# Patient Record
Sex: FEMALE | Race: WHITE | NOT HISPANIC OR LATINO | Employment: OTHER | URBAN - NONMETROPOLITAN AREA
[De-identification: names, ages, dates, MRNs, and addresses within clinical notes are randomized per-mention and may not be internally consistent; named-entity substitution may affect disease eponyms.]

---

## 2018-10-22 ENCOUNTER — HISTORICAL (OUTPATIENT)
Dept: ADMINISTRATIVE | Facility: HOSPITAL | Age: 68
End: 2018-10-22

## 2018-10-23 ENCOUNTER — HISTORICAL (OUTPATIENT)
Dept: ADMINISTRATIVE | Facility: HOSPITAL | Age: 68
End: 2018-10-23

## 2019-06-20 ENCOUNTER — HISTORICAL (OUTPATIENT)
Dept: ADMINISTRATIVE | Facility: HOSPITAL | Age: 69
End: 2019-06-20

## 2019-11-14 ENCOUNTER — HISTORICAL (OUTPATIENT)
Dept: ADMINISTRATIVE | Facility: HOSPITAL | Age: 69
End: 2019-11-14

## 2021-03-18 LAB
ALBUMIN SERPL-MCNC: 4 G/DL (ref 3.4–5)
ALBUMIN/GLOB SERPL: 1.4 {RATIO}
ALP SERPL-CCNC: 96 U/L (ref 50–144)
ALT SERPL-CCNC: 10 U/L (ref 1–45)
ANION GAP SERPL CALC-SCNC: 7 MMOL/L (ref 7–16)
AST SERPL-CCNC: 21 U/L (ref 14–36)
BASOPHILS # BLD AUTO: 0.04 X10(3)/MCL (ref 0.01–0.08)
BASOPHILS NFR BLD AUTO: 0.5 % (ref 0.1–1.2)
BILIRUB SERPL-MCNC: 0.45 MG/DL (ref 0.1–1)
BUN SERPL-MCNC: 32 MG/DL (ref 7–20)
CALCIUM SERPL-MCNC: 9.3 MG/DL (ref 8.4–10.2)
CHLORIDE SERPL-SCNC: 107 MMOL/L (ref 94–110)
CHOLEST SERPL-MCNC: 117 MG/DL (ref 0–200)
CO2 SERPL-SCNC: 24 MMOL/L (ref 21–32)
CREAT SERPL-MCNC: 1.8 MG/DL (ref 0.52–1.04)
CREAT/UREA NIT SERPL: 17.8 (ref 12–20)
EOSINOPHIL # BLD AUTO: 0.41 X10(3)/MCL (ref 0.04–0.36)
EOSINOPHIL NFR BLD AUTO: 5.3 % (ref 0.7–7)
EOSINOPHIL NFR BLD MANUAL: 3 % (ref 0–3)
ERYTHROCYTE [DISTWIDTH] IN BLOOD BY AUTOMATED COUNT: 15.9 % (ref 11–14.5)
EST. AVERAGE GLUCOSE BLD GHB EST-MCNC: 111 MG/DL (ref 70–115)
GLOBULIN SER-MCNC: 2.9 G/DL (ref 2–3.9)
GLUCOSE SERPL-MCNC: 105 MGM./DL (ref 70–115)
GRANULOCYTES NFR BLD MANUAL: 75 % (ref 37–73)
HBA1C MFR BLD: 5.7 % (ref 4–6)
HCT VFR BLD AUTO: 31.2 % (ref 36–48)
HDLC SERPL-MCNC: 62 MG/DL (ref 40–60)
HGB BLD-MCNC: 9.5 G/DL (ref 11.8–16)
IMM GRANULOCYTES # BLD AUTO: 0.02 X10E3/UL (ref 0–0.03)
IMM GRANULOCYTES NFR BLD AUTO: 0.3 % (ref 0–0.5)
LDLC SERPL CALC-MCNC: <30 MG/DL (ref 30–100)
LYMPHOCYTES # BLD AUTO: 1.33 X10(3)/MCL (ref 1.16–3.74)
LYMPHOCYTES NFR BLD AUTO: 17.1 % (ref 20–55)
LYMPHOCYTES NFR BLD MANUAL: 16 % (ref 20–55)
MCH RBC QN AUTO: 26.4 PG (ref 27–34)
MCHC RBC AUTO-ENTMCNC: 30.4 G/DL (ref 31–37)
MCV RBC AUTO: 86.7 FL (ref 79–99)
MONOCYTES # BLD AUTO: 0.65 X10(3)/MCL (ref 0.24–0.36)
MONOCYTES NFR BLD AUTO: 8.4 % (ref 4.7–12.5)
MONOCYTES NFR BLD MANUAL: 6 % (ref 0–10)
NEUTROPHILS # BLD AUTO: 5.31 X10(3)/MCL (ref 1.56–6.13)
NEUTROPHILS NFR BLD AUTO: 68.4 % (ref 37–73)
NRBC BLD MANUAL-RTO: 1 % (ref 0–1)
PLATELET # BLD AUTO: 353 X10(3)/MCL (ref 140–371)
PMV BLD AUTO: 10.8 FL (ref 9.4–12.4)
POTASSIUM SERPL-SCNC: 5.7 MMOL/L (ref 3.5–5.1)
PROT SERPL-MCNC: 6.9 G/DL (ref 6.3–8.2)
RBC # BLD AUTO: 3.6 X10(6)/MCL (ref 4–5.1)
SODIUM SERPL-SCNC: 138 MMOL/L (ref 135–145)
TRIGL SERPL-MCNC: 107 MG/DL (ref 30–200)
TSH SERPL-ACNC: 3.63 UIU/ML (ref 0.36–3.74)
WBC # SPEC AUTO: 7.8 X10(3)/MCL (ref 4–11.5)

## 2021-04-11 ENCOUNTER — HISTORICAL (OUTPATIENT)
Dept: ADMINISTRATIVE | Facility: HOSPITAL | Age: 71
End: 2021-04-11

## 2021-10-01 LAB
ALBUMIN SERPL-MCNC: 4.2 G/DL (ref 3.4–5)
ALBUMIN/GLOB SERPL: 1.4 {RATIO}
ALP SERPL-CCNC: 108 U/L (ref 50–144)
ALT SERPL-CCNC: 10 U/L (ref 1–45)
ANION GAP SERPL CALC-SCNC: 6 MMOL/L (ref 7–16)
AST SERPL-CCNC: 16 U/L (ref 14–36)
BASOPHILS # BLD AUTO: 0.08 X10(3)/MCL (ref 0.01–0.08)
BASOPHILS NFR BLD AUTO: 0.8 % (ref 0.1–1.2)
BILIRUB SERPL-MCNC: 0.31 MG/DL (ref 0.1–1)
BUN SERPL-MCNC: 21 MG/DL (ref 7–20)
CALCIUM SERPL-MCNC: 9.7 MG/DL (ref 8.4–10.2)
CHLORIDE SERPL-SCNC: 110 MMOL/L (ref 94–110)
CHOLEST SERPL-MCNC: 178 MG/DL (ref 0–200)
CO2 SERPL-SCNC: 23 MMOL/L (ref 21–32)
CREAT SERPL-MCNC: 2.13 MG/DL (ref 0.52–1.04)
CREAT/UREA NIT SERPL: 9.9 (ref 12–20)
EOSINOPHIL # BLD AUTO: 0.31 X10(3)/MCL (ref 0.04–0.36)
EOSINOPHIL NFR BLD AUTO: 3.1 % (ref 0.7–7)
ERYTHROCYTE [DISTWIDTH] IN BLOOD BY AUTOMATED COUNT: 15.5 % (ref 11–14.5)
GLOBULIN SER-MCNC: 3 G/DL (ref 2–3.9)
GLUCOSE SERPL-MCNC: 106 MGM./DL (ref 70–115)
HCT VFR BLD AUTO: 31.7 % (ref 36–48)
HDLC SERPL-MCNC: 65 MG/DL (ref 40–60)
HGB BLD-MCNC: 10 G/DL (ref 11.8–16)
IMM GRANULOCYTES # BLD AUTO: 0.03 X10E3/UL (ref 0–0.03)
IMM GRANULOCYTES NFR BLD AUTO: 0.3 % (ref 0–0.5)
LDLC SERPL CALC-MCNC: 72.3 MG/DL (ref 30–100)
LYMPHOCYTES # BLD AUTO: 1.62 X10(3)/MCL (ref 1.16–3.74)
LYMPHOCYTES NFR BLD AUTO: 16.3 % (ref 20–55)
MCH RBC QN AUTO: 26.2 PG (ref 27–34)
MCHC RBC AUTO-ENTMCNC: 31.5 G/DL (ref 31–37)
MCV RBC AUTO: 83 FL (ref 79–99)
MONOCYTES # BLD AUTO: 0.6 X10(3)/MCL (ref 0.24–0.36)
MONOCYTES NFR BLD AUTO: 6 % (ref 4.7–12.5)
NEUTROPHILS # BLD AUTO: 7.31 X10(3)/MCL (ref 1.56–6.13)
NEUTROPHILS NFR BLD AUTO: 73.5 % (ref 37–73)
PLATELET # BLD AUTO: 397 X10(3)/MCL (ref 140–371)
PMV BLD AUTO: 10.1 FL (ref 9.4–12.4)
POTASSIUM SERPL-SCNC: 5 MMOL/L (ref 3.5–5.1)
PROT SERPL-MCNC: 7.2 G/DL (ref 6.3–8.2)
RBC # BLD AUTO: 3.82 X10(6)/MCL (ref 4–5.1)
SODIUM SERPL-SCNC: 139 MMOL/L (ref 135–145)
TRIGL SERPL-MCNC: 129 MG/DL (ref 30–200)
TSH SERPL-ACNC: 2.37 UIU/ML (ref 0.36–3.74)
WBC # SPEC AUTO: 10 X10(3)/MCL (ref 4–11.5)

## 2021-11-02 LAB
ANION GAP SERPL CALC-SCNC: 11 MMOL/L (ref 7–16)
BUN SERPL-MCNC: 17 MG/DL (ref 7–20)
CALCIUM SERPL-MCNC: 9.1 MG/DL (ref 8.4–10.2)
CHLORIDE SERPL-SCNC: 104 MMOL/L (ref 94–110)
CO2 SERPL-SCNC: 24 MMOL/L (ref 21–32)
CREAT SERPL-MCNC: 1.61 MG/DL (ref 0.52–1.04)
CREAT/UREA NIT SERPL: 10.6 (ref 12–20)
GLUCOSE SERPL-MCNC: 104 MGM./DL (ref 70–115)
POTASSIUM SERPL-SCNC: 4.1 MMOL/L (ref 3.5–5.1)
SODIUM SERPL-SCNC: 139 MMOL/L (ref 135–145)

## 2022-02-16 LAB
AGE: 70
ANION GAP SERPL CALC-SCNC: 8 MMOL/L (ref 2–13)
BUN SERPL-MCNC: 21 MG/DL (ref 7–20)
CALCIUM SERPL-MCNC: 9.2 MG/DL (ref 8.4–10.2)
CHLORIDE SERPL-SCNC: 106 MMOL/L (ref 94–110)
CO2 SERPL-SCNC: 23 MMOL/L (ref 21–32)
CREAT SERPL-MCNC: 1.92 MG/DL (ref 0.52–1.04)
CREAT/UREA NIT SERPL: 10.9 (ref 12–20)
GLUCOSE SERPL-MCNC: 96 MG/DL (ref 70–115)
POTASSIUM SERPL-SCNC: 4.4 MMOL/L (ref 3.5–5.1)
SODIUM SERPL-SCNC: 137 MMOL/L (ref 135–145)

## 2022-03-15 LAB
AGE: 70
ANION GAP SERPL CALC-SCNC: 9 MMOL/L (ref 2–13)
BUN SERPL-MCNC: 24 MG/DL (ref 7–20)
CALCIUM SERPL-MCNC: 9.3 MG/DL (ref 8.4–10.2)
CHLORIDE SERPL-SCNC: 103 MMOL/L (ref 94–110)
CO2 SERPL-SCNC: 25 MMOL/L (ref 21–32)
CREAT SERPL-MCNC: 1.88 MG/DL (ref 0.52–1.04)
CREAT/UREA NIT SERPL: 12.8 (ref 12–20)
GLUCOSE SERPL-MCNC: 105 MG/DL (ref 70–115)
POTASSIUM SERPL-SCNC: 4.6 MMOL/L (ref 3.5–5.1)
SODIUM SERPL-SCNC: 137 MMOL/L (ref 135–145)

## 2022-04-10 ENCOUNTER — HISTORICAL (OUTPATIENT)
Dept: ADMINISTRATIVE | Facility: HOSPITAL | Age: 72
End: 2022-04-10

## 2022-04-27 VITALS
OXYGEN SATURATION: 97 % | SYSTOLIC BLOOD PRESSURE: 110 MMHG | WEIGHT: 190.94 LBS | DIASTOLIC BLOOD PRESSURE: 60 MMHG | HEIGHT: 67 IN | BODY MASS INDEX: 29.97 KG/M2

## 2022-05-04 ENCOUNTER — HISTORICAL (OUTPATIENT)
Dept: ADMINISTRATIVE | Facility: HOSPITAL | Age: 72
End: 2022-05-04

## 2022-11-30 ENCOUNTER — HISTORICAL (OUTPATIENT)
Dept: ADMINISTRATIVE | Facility: HOSPITAL | Age: 72
End: 2022-11-30

## 2023-01-05 VITALS
DIASTOLIC BLOOD PRESSURE: 78 MMHG | OXYGEN SATURATION: 99 % | HEART RATE: 80 BPM | BODY MASS INDEX: 30.42 KG/M2 | WEIGHT: 193.81 LBS | HEIGHT: 67 IN | TEMPERATURE: 99 F | SYSTOLIC BLOOD PRESSURE: 120 MMHG

## 2023-01-05 DIAGNOSIS — Z28.21 INFLUENZA VACCINATION DECLINED: ICD-10-CM

## 2023-01-05 DIAGNOSIS — I25.10 ARTERIOSCLEROSIS OF CORONARY ARTERY: ICD-10-CM

## 2023-01-05 DIAGNOSIS — F32.A DEPRESSIVE DISORDER: Primary | ICD-10-CM

## 2023-01-05 DIAGNOSIS — M17.9 OSTEOARTHRITIS OF KNEE, UNSPECIFIED LATERALITY, UNSPECIFIED OSTEOARTHRITIS TYPE: ICD-10-CM

## 2023-01-05 DIAGNOSIS — E66.9 OBESITY, UNSPECIFIED CLASSIFICATION, UNSPECIFIED OBESITY TYPE, UNSPECIFIED WHETHER SERIOUS COMORBIDITY PRESENT: ICD-10-CM

## 2023-01-05 DIAGNOSIS — K11.7 XEROSTOMIA: ICD-10-CM

## 2023-01-05 DIAGNOSIS — N18.30 STAGE 3 CHRONIC KIDNEY DISEASE, UNSPECIFIED WHETHER STAGE 3A OR 3B CKD: ICD-10-CM

## 2023-01-05 DIAGNOSIS — F41.1 GENERALIZED ANXIETY DISORDER: ICD-10-CM

## 2023-01-05 DIAGNOSIS — Z28.21 PNEUMOCOCCAL VACCINATION DECLINED: ICD-10-CM

## 2023-01-05 DIAGNOSIS — G47.00 INSOMNIA, UNSPECIFIED TYPE: ICD-10-CM

## 2023-01-05 DIAGNOSIS — I10 HYPERTENSION, UNSPECIFIED TYPE: ICD-10-CM

## 2023-01-05 RX ORDER — BUSPIRONE HYDROCHLORIDE 15 MG/1
15 TABLET ORAL 3 TIMES DAILY
COMMUNITY
Start: 2022-11-22 | End: 2023-02-23 | Stop reason: SDUPTHER

## 2023-01-05 RX ORDER — ROPINIROLE 0.25 MG/1
0.25 TABLET, FILM COATED ORAL
COMMUNITY
Start: 2022-07-05 | End: 2023-02-23 | Stop reason: SDUPTHER

## 2023-01-05 RX ORDER — ATORVASTATIN CALCIUM 40 MG/1
40 TABLET, FILM COATED ORAL DAILY
COMMUNITY
Start: 2022-03-15 | End: 2023-02-23 | Stop reason: SDUPTHER

## 2023-01-05 RX ORDER — DAPAGLIFLOZIN 10 MG/1
10 TABLET, FILM COATED ORAL DAILY
COMMUNITY
Start: 2022-05-18 | End: 2023-02-23 | Stop reason: SDUPTHER

## 2023-01-05 RX ORDER — AMLODIPINE BESYLATE 5 MG/1
5 TABLET ORAL DAILY
COMMUNITY
Start: 2022-11-04 | End: 2023-02-23 | Stop reason: SDUPTHER

## 2023-01-05 RX ORDER — HYDRALAZINE HYDROCHLORIDE 50 MG/1
50 TABLET, FILM COATED ORAL 2 TIMES DAILY
COMMUNITY
Start: 2022-11-04 | End: 2023-02-23 | Stop reason: SDUPTHER

## 2023-01-05 RX ORDER — CEPHALEXIN 250 MG/1
100 CAPSULE ORAL 2 TIMES DAILY
COMMUNITY
Start: 2022-11-23 | End: 2023-01-05

## 2023-01-05 RX ORDER — ESCITALOPRAM OXALATE 20 MG/1
20 TABLET ORAL DAILY
COMMUNITY
Start: 2022-11-04 | End: 2023-02-23 | Stop reason: SDUPTHER

## 2023-01-05 RX ORDER — CLOPIDOGREL BISULFATE 75 MG/1
75 TABLET ORAL DAILY
COMMUNITY
Start: 2022-03-15 | End: 2023-02-23 | Stop reason: SDUPTHER

## 2023-01-05 RX ORDER — ATORVASTATIN CALCIUM 40 MG/1
40 TABLET, FILM COATED ORAL DAILY
COMMUNITY
Start: 2022-11-04 | End: 2023-01-05

## 2023-01-05 RX ORDER — METOPROLOL SUCCINATE 25 MG/1
25 TABLET, EXTENDED RELEASE ORAL DAILY
COMMUNITY
Start: 2022-03-15 | End: 2023-05-02 | Stop reason: SDUPTHER

## 2023-01-25 ENCOUNTER — OFFICE VISIT (OUTPATIENT)
Dept: FAMILY MEDICINE | Facility: CLINIC | Age: 73
End: 2023-01-25
Payer: MEDICARE

## 2023-01-25 VITALS
HEIGHT: 67 IN | DIASTOLIC BLOOD PRESSURE: 82 MMHG | OXYGEN SATURATION: 97 % | HEART RATE: 100 BPM | SYSTOLIC BLOOD PRESSURE: 116 MMHG | TEMPERATURE: 98 F | BODY MASS INDEX: 29.79 KG/M2 | WEIGHT: 189.81 LBS

## 2023-01-25 DIAGNOSIS — D50.9 IRON DEFICIENCY ANEMIA, UNSPECIFIED IRON DEFICIENCY ANEMIA TYPE: ICD-10-CM

## 2023-01-25 DIAGNOSIS — F41.1 GENERALIZED ANXIETY DISORDER: ICD-10-CM

## 2023-01-25 DIAGNOSIS — F32.A DEPRESSIVE DISORDER: Primary | ICD-10-CM

## 2023-01-25 DIAGNOSIS — N18.32 STAGE 3B CHRONIC KIDNEY DISEASE: ICD-10-CM

## 2023-01-25 PROBLEM — M17.12 ARTHRITIS OF LEFT KNEE: Status: ACTIVE | Noted: 2023-01-25

## 2023-01-25 PROBLEM — D64.9 ANEMIA: Status: ACTIVE | Noted: 2023-01-25

## 2023-01-25 PROBLEM — I49.9 IRREGULAR HEART RHYTHM: Status: ACTIVE | Noted: 2023-01-25

## 2023-01-25 PROBLEM — Z79.899 MEDICATION MANAGEMENT: Status: ACTIVE | Noted: 2023-01-25

## 2023-01-25 PROCEDURE — 99214 OFFICE O/P EST MOD 30 MIN: CPT | Mod: ,,, | Performed by: NURSE PRACTITIONER

## 2023-01-25 PROCEDURE — 99214 PR OFFICE/OUTPT VISIT, EST, LEVL IV, 30-39 MIN: ICD-10-PCS | Mod: ,,, | Performed by: NURSE PRACTITIONER

## 2023-01-25 RX ORDER — CLONIDINE HYDROCHLORIDE 0.2 MG/1
0.2 TABLET ORAL 2 TIMES DAILY
COMMUNITY
Start: 2022-12-29 | End: 2023-02-23 | Stop reason: SDUPTHER

## 2023-01-25 RX ORDER — FERROUS SULFATE 324(65)MG
324 TABLET, DELAYED RELEASE (ENTERIC COATED) ORAL EVERY OTHER DAY
Qty: 15 TABLET | Refills: 11 | Status: SHIPPED | OUTPATIENT
Start: 2023-01-25 | End: 2023-05-02 | Stop reason: SDUPTHER

## 2023-01-25 NOTE — PROGRESS NOTES
Subjective:       Patient ID: Rosalie Campbell is a 72 y.o. female.    Chief Complaint: Anxiety and Depression    72-year-old white female with past medical history chronic kidney disease, chronic anemia, anxiety depression, RLS, CAD with stent 2019, hypertension, here for follow-up on depression/anxiety, chronic renal failure and iron supplementation.    Was seen for wellness in November and had a decline in her GFR.  She has previou refused referral to Nephrology.  She was started on Farxiga and she is tolerating that.  Repeat lab work shows stable renal function with a baseline creatinine of about 1.8.    She has chronic anemia, has been anemic since she was a child.  Had not been taking any iron supplementation but started after last visit.  However she is not been taking it routinely as it causes constipation.  She has been taking OTC medications to help with regulating her bowels.    At last visit she reported some increase in depression and anxiety.  She is currently taking care of her younger brother who was recently diagnosed with renal cancer and she is the sole caregiver for him since he has been ill.  Lexapro was increased and she feels that this has definitely perked up her mood.  Also she was given BuSpar to take t.i.d. instead of b.i.d. and she feels this has controlled her anxiety very well.  Denies SI/HI.    Labs reviewed with patient.  BUN 18, creatinine 1.79, GFR 29.8, H&H 8.9/28.1, platelets 348    Review of Systems   See HPI  Objective:      Physical Exam  Vitals and nursing note reviewed.   Constitutional:       Appearance: Normal appearance.   Cardiovascular:      Rate and Rhythm: Regular rhythm.      Heart sounds: Normal heart sounds.   Pulmonary:      Effort: Pulmonary effort is normal.      Breath sounds: Normal breath sounds.   Abdominal:      General: Bowel sounds are normal.      Palpations: Abdomen is soft.   Skin:     General: Skin is warm and dry.   Neurological:      Mental Status: She  is alert.   Psychiatric:         Mood and Affect: Mood normal.         Behavior: Behavior normal.       Assessment:       Problem List Items Addressed This Visit          Psychiatric    Depressive disorder - Primary    Generalized anxiety disorder       Renal/    Stage 3 chronic kidney disease    Relevant Orders    Basic Metabolic Panel       Oncology    Anemia    Relevant Orders    CBC Auto Differential         Plan:     1. Depressive disorder  Continue Lexapro 20 mg daily    2. Generalized anxiety disorder  Continue BuSpar 15 mg t.i.d.    3. Stage 3b chronic kidney disease  Renal function at baseline, creatinine 1.79.  She continues to refuse nephrology referral.  Currently on Farxiga 10 mg daily and tolerating.  - Basic Metabolic Panel; Future    4. Iron deficiency anemia, unspecified iron deficiency anemia type  Advised that she take iron supplementation every other day rather than daily.  Increase dietary fiber and water intake to help combat constipation.  Take OTC medications for constipation when needed.  - CBC Auto Differential; Future  - ferrous sulfate 324 mg (65 mg iron) TbEC; Take 1 tablet (324 mg total) by mouth every other day.  Dispense: 15 tablet; Refill: 11

## 2023-01-25 NOTE — PROGRESS NOTES
Subjective:       Patient ID: Rosalie Campbell is a 72 y.o. female.    Chief Complaint: Anxiety and Depression  Anxiety        Depression            Objective:          Assessment:       Problem List Items Addressed This Visit    None      Plan:     1. Depressive disorder  Improved on higher dose Lexapro. Continue Lexapro 20mg daily    2. Generalized anxiety disorder  Improved with increased dose of Buspar; continue 15mg TID    3. Stage 3b chronic kidney disease  Stable; creatinine improves slightly. Refuses Nephrology referral.     4. Anemia due to stage 3b chronic kidney disease  Advised to try taking iron supplementation every other day. Increase dietary fiber and water intake to help avoid constipation.    ***

## 2023-01-27 ENCOUNTER — DOCUMENTATION ONLY (OUTPATIENT)
Dept: FAMILY MEDICINE | Facility: CLINIC | Age: 73
End: 2023-01-27
Payer: MEDICARE

## 2023-02-23 ENCOUNTER — TELEPHONE (OUTPATIENT)
Dept: FAMILY MEDICINE | Facility: CLINIC | Age: 73
End: 2023-02-23
Payer: MEDICARE

## 2023-02-23 DIAGNOSIS — I10 HYPERTENSION, UNSPECIFIED TYPE: ICD-10-CM

## 2023-02-23 DIAGNOSIS — I25.10 CORONARY ARTERIOSCLEROSIS: ICD-10-CM

## 2023-02-23 DIAGNOSIS — F41.9 ANXIETY: ICD-10-CM

## 2023-02-23 DIAGNOSIS — E11.9 TYPE 2 DIABETES MELLITUS WITHOUT COMPLICATION, WITHOUT LONG-TERM CURRENT USE OF INSULIN: ICD-10-CM

## 2023-02-23 DIAGNOSIS — F32.A DEPRESSION, UNSPECIFIED DEPRESSION TYPE: ICD-10-CM

## 2023-02-23 DIAGNOSIS — E78.5 HYPERLIPIDEMIA, UNSPECIFIED HYPERLIPIDEMIA TYPE: ICD-10-CM

## 2023-02-23 DIAGNOSIS — G25.81 RLS (RESTLESS LEGS SYNDROME): Primary | ICD-10-CM

## 2023-02-23 RX ORDER — ESCITALOPRAM OXALATE 20 MG/1
20 TABLET ORAL DAILY
Qty: 30 TABLET | Refills: 2 | Status: SHIPPED | OUTPATIENT
Start: 2023-02-23 | End: 2023-05-02 | Stop reason: SDUPTHER

## 2023-02-23 RX ORDER — CLONIDINE HYDROCHLORIDE 0.2 MG/1
0.2 TABLET ORAL 2 TIMES DAILY
Qty: 60 TABLET | Refills: 2 | Status: SHIPPED | OUTPATIENT
Start: 2023-02-23 | End: 2023-05-02 | Stop reason: SDUPTHER

## 2023-02-23 RX ORDER — AMLODIPINE BESYLATE 5 MG/1
5 TABLET ORAL DAILY
Qty: 30 TABLET | Refills: 2 | Status: SHIPPED | OUTPATIENT
Start: 2023-02-23 | End: 2023-05-02 | Stop reason: SDUPTHER

## 2023-02-23 RX ORDER — ROPINIROLE 0.25 MG/1
0.25 TABLET, FILM COATED ORAL NIGHTLY
Qty: 30 TABLET | Refills: 2 | Status: SHIPPED | OUTPATIENT
Start: 2023-02-23 | End: 2023-05-02 | Stop reason: SDUPTHER

## 2023-02-23 RX ORDER — BUSPIRONE HYDROCHLORIDE 15 MG/1
15 TABLET ORAL 3 TIMES DAILY
Qty: 90 TABLET | Refills: 2 | Status: SHIPPED | OUTPATIENT
Start: 2023-02-23 | End: 2023-05-02 | Stop reason: SDUPTHER

## 2023-02-23 RX ORDER — HYDRALAZINE HYDROCHLORIDE 50 MG/1
50 TABLET, FILM COATED ORAL EVERY 8 HOURS
Qty: 90 TABLET | Refills: 2 | Status: SHIPPED | OUTPATIENT
Start: 2023-02-23 | End: 2023-05-02 | Stop reason: SDUPTHER

## 2023-02-23 RX ORDER — CLOPIDOGREL BISULFATE 75 MG/1
75 TABLET ORAL DAILY
Qty: 30 TABLET | Refills: 2 | Status: SHIPPED | OUTPATIENT
Start: 2023-02-23 | End: 2023-05-02 | Stop reason: SDUPTHER

## 2023-02-23 RX ORDER — ATORVASTATIN CALCIUM 40 MG/1
40 TABLET, FILM COATED ORAL DAILY
Qty: 30 TABLET | Refills: 2 | Status: SHIPPED | OUTPATIENT
Start: 2023-02-23 | End: 2023-05-02 | Stop reason: SDUPTHER

## 2023-02-23 RX ORDER — DAPAGLIFLOZIN 10 MG/1
10 TABLET, FILM COATED ORAL DAILY
Qty: 30 TABLET | Refills: 2 | Status: SHIPPED | OUTPATIENT
Start: 2023-02-23 | End: 2023-05-02 | Stop reason: SDUPTHER

## 2023-02-23 NOTE — TELEPHONE ENCOUNTER
Norvasc 5mg, lipitor 40mg, buspirone 15mg, clonidine 0.2mg, plavisc 75mg, farxiga 10mg, lexapro 20mg, hydralazine 50mg, requip 0.25mg, please refill at the hospital pharmacy

## 2023-04-13 PROCEDURE — 80048 BASIC METABOLIC PNL TOTAL CA: CPT | Performed by: NURSE PRACTITIONER

## 2023-04-13 PROCEDURE — 85025 COMPLETE CBC W/AUTO DIFF WBC: CPT | Performed by: NURSE PRACTITIONER

## 2023-05-02 ENCOUNTER — OFFICE VISIT (OUTPATIENT)
Dept: FAMILY MEDICINE | Facility: CLINIC | Age: 73
End: 2023-05-02
Payer: MEDICARE

## 2023-05-02 VITALS
HEART RATE: 100 BPM | TEMPERATURE: 98 F | BODY MASS INDEX: 28.68 KG/M2 | DIASTOLIC BLOOD PRESSURE: 70 MMHG | HEIGHT: 67 IN | WEIGHT: 182.75 LBS | OXYGEN SATURATION: 97 % | SYSTOLIC BLOOD PRESSURE: 138 MMHG

## 2023-05-02 DIAGNOSIS — I10 HYPERTENSION, UNSPECIFIED TYPE: ICD-10-CM

## 2023-05-02 DIAGNOSIS — N18.32 STAGE 3B CHRONIC KIDNEY DISEASE: Primary | ICD-10-CM

## 2023-05-02 DIAGNOSIS — D50.9 IRON DEFICIENCY ANEMIA, UNSPECIFIED IRON DEFICIENCY ANEMIA TYPE: ICD-10-CM

## 2023-05-02 DIAGNOSIS — F32.A DEPRESSION, UNSPECIFIED DEPRESSION TYPE: ICD-10-CM

## 2023-05-02 DIAGNOSIS — I25.10 CORONARY ARTERIOSCLEROSIS: ICD-10-CM

## 2023-05-02 DIAGNOSIS — M70.61 TROCHANTERIC BURSITIS OF RIGHT HIP: ICD-10-CM

## 2023-05-02 DIAGNOSIS — N18.32 ANEMIA DUE TO STAGE 3B CHRONIC KIDNEY DISEASE: ICD-10-CM

## 2023-05-02 DIAGNOSIS — F41.9 ANXIETY: ICD-10-CM

## 2023-05-02 DIAGNOSIS — G25.81 RLS (RESTLESS LEGS SYNDROME): ICD-10-CM

## 2023-05-02 DIAGNOSIS — D63.1 ANEMIA DUE TO STAGE 3B CHRONIC KIDNEY DISEASE: ICD-10-CM

## 2023-05-02 DIAGNOSIS — E78.5 HYPERLIPIDEMIA, UNSPECIFIED HYPERLIPIDEMIA TYPE: ICD-10-CM

## 2023-05-02 DIAGNOSIS — E11.9 TYPE 2 DIABETES MELLITUS WITHOUT COMPLICATION, WITHOUT LONG-TERM CURRENT USE OF INSULIN: ICD-10-CM

## 2023-05-02 PROCEDURE — 99214 OFFICE O/P EST MOD 30 MIN: CPT | Mod: ,,, | Performed by: NURSE PRACTITIONER

## 2023-05-02 PROCEDURE — 99214 PR OFFICE/OUTPT VISIT, EST, LEVL IV, 30-39 MIN: ICD-10-PCS | Mod: ,,, | Performed by: NURSE PRACTITIONER

## 2023-05-02 RX ORDER — DAPAGLIFLOZIN 10 MG/1
10 TABLET, FILM COATED ORAL DAILY
Qty: 30 TABLET | Refills: 11 | Status: SHIPPED | OUTPATIENT
Start: 2023-05-02 | End: 2024-01-31 | Stop reason: SINTOL

## 2023-05-02 RX ORDER — CLONIDINE HYDROCHLORIDE 0.2 MG/1
0.2 TABLET ORAL 2 TIMES DAILY
Qty: 60 TABLET | Refills: 11 | Status: SHIPPED | OUTPATIENT
Start: 2023-05-02 | End: 2024-01-31 | Stop reason: SDUPTHER

## 2023-05-02 RX ORDER — BUSPIRONE HYDROCHLORIDE 15 MG/1
15 TABLET ORAL 3 TIMES DAILY
Qty: 90 TABLET | Refills: 11 | Status: SHIPPED | OUTPATIENT
Start: 2023-05-02

## 2023-05-02 RX ORDER — METOPROLOL SUCCINATE 25 MG/1
25 TABLET, EXTENDED RELEASE ORAL DAILY
Qty: 30 TABLET | Refills: 11 | Status: SHIPPED | OUTPATIENT
Start: 2023-05-02 | End: 2024-01-31 | Stop reason: SDUPTHER

## 2023-05-02 RX ORDER — AMLODIPINE BESYLATE 5 MG/1
5 TABLET ORAL DAILY
Qty: 30 TABLET | Refills: 11 | Status: SHIPPED | OUTPATIENT
Start: 2023-05-02 | End: 2024-01-31 | Stop reason: SDUPTHER

## 2023-05-02 RX ORDER — CLOPIDOGREL BISULFATE 75 MG/1
75 TABLET ORAL DAILY
Qty: 30 TABLET | Refills: 11 | Status: SHIPPED | OUTPATIENT
Start: 2023-05-02 | End: 2024-01-31 | Stop reason: SDUPTHER

## 2023-05-02 RX ORDER — ATORVASTATIN CALCIUM 40 MG/1
40 TABLET, FILM COATED ORAL DAILY
Qty: 30 TABLET | Refills: 11 | Status: SHIPPED | OUTPATIENT
Start: 2023-05-02 | End: 2024-01-31 | Stop reason: SDUPTHER

## 2023-05-02 RX ORDER — PREDNISONE 20 MG/1
40 TABLET ORAL DAILY
Qty: 6 TABLET | Refills: 0 | Status: SHIPPED | OUTPATIENT
Start: 2023-05-02 | End: 2023-05-05

## 2023-05-02 RX ORDER — FERROUS SULFATE 324(65)MG
324 TABLET, DELAYED RELEASE (ENTERIC COATED) ORAL EVERY OTHER DAY
Qty: 15 TABLET | Refills: 11 | Status: SHIPPED | OUTPATIENT
Start: 2023-05-02

## 2023-05-02 RX ORDER — ESCITALOPRAM OXALATE 20 MG/1
20 TABLET ORAL DAILY
Qty: 30 TABLET | Refills: 11 | Status: SHIPPED | OUTPATIENT
Start: 2023-05-02 | End: 2024-01-31 | Stop reason: SDUPTHER

## 2023-05-02 RX ORDER — ROPINIROLE 0.25 MG/1
0.25 TABLET, FILM COATED ORAL NIGHTLY
Qty: 30 TABLET | Refills: 11 | Status: SHIPPED | OUTPATIENT
Start: 2023-05-02 | End: 2024-01-31 | Stop reason: SDUPTHER

## 2023-05-02 RX ORDER — HYDRALAZINE HYDROCHLORIDE 50 MG/1
50 TABLET, FILM COATED ORAL EVERY 8 HOURS
Qty: 90 TABLET | Refills: 11 | Status: SHIPPED | OUTPATIENT
Start: 2023-05-02 | End: 2024-01-31

## 2023-05-02 NOTE — PROGRESS NOTES
Patient ID: Rosalie Campbell  : 1950    Chief Complaint: Anemia and Chronic Kidney Disease (Follow up)    Allergies: Patient is allergic to codeine.     History of Present Illness:  The patient is a 72 y.o. White female who presents to clinic for follow up on Anemia and Chronic Kidney Disease (Follow up)     3 month f/u anemia and crf. GFR declined last year and was referred to nephrology (she refused referral) and started on Farxiga. Baseline creatinine around 1.8. her renal function has remained stable since starting the Farxiga.     Longstanding hx of anemia since childhood. Takes iron supplementation every 2-3 days. She does have issue with constipation if she takes it daily. She is drinking plenty of water but has not increased her dietary fiber intake.    She complains of RT hip pain over the last 2 months or so. Constant aching pain, worse with walking/standing or if she attempts to lay on that side. She has full ROM of that hip. +tenderness of lateral hip. She denies injury, no falls. She continues to care for her brother who has cancer and she has been taking him to many appointments/treatments and it entails a great deal of walking with each visit.     Wellness labs 2022:   H&H 8.6/26.58, platelets 322  Cholesterol 118, HDL 71, Trigs 123, LDL 30  TSH 2.39  BUN 25, creatinine 1.93, GFR 27  Liver wnl         Social History:  reports that she has been smoking cigarettes. She has been smoking an average of 1.5 packs per day. She has been exposed to tobacco smoke. She has never used smokeless tobacco. She reports that she does not drink alcohol and does not use drugs.    Past Medical History:  has a past medical history of Anxiety, Chronic renal disease stage 4, Colon cancer screening declined, Coronary artery disease, Depression, Hypertension, Irregular heart rhythm, Medication management, Obesity, unspecified, Osteoarthritis of knee, Pain, joint, shoulder region, right, Refused influenza vaccine, and  "Refused pneumococcal vaccination.    Current Medications:  Current Outpatient Medications   Medication Instructions    amLODIPine (NORVASC) 5 mg, Oral, Daily    atorvastatin (LIPITOR) 40 mg, Oral, Daily    busPIRone (BUSPAR) 15 mg, Oral, 3 times daily    cloNIDine (CATAPRES) 0.2 mg, Oral, 2 times daily    clopidogreL (PLAVIX) 75 mg, Oral, Daily    dapagliflozin (FARXIGA) 10 mg, Oral, Daily    EScitalopram oxalate (LEXAPRO) 20 mg, Oral, Daily    ferrous sulfate 324 mg, Oral, Every other day    hydrALAZINE (APRESOLINE) 50 mg, Oral, Every 8 hours    metoprolol succinate (TOPROL-XL) 25 mg, Oral, Daily    predniSONE (DELTASONE) 40 mg, Oral, Daily    rOPINIRole (REQUIP) 0.25 mg, Oral, Nightly       Review of Systems   See HPI    Visit Vitals  /70 (BP Location: Left arm, Patient Position: Sitting)   Pulse 100   Temp 97.9 °F (36.6 °C) (Temporal)   Ht 5' 6.93" (1.7 m)   Wt 82.9 kg (182 lb 12.2 oz)   SpO2 97%   BMI 28.68 kg/m²       Physical Exam  Vitals reviewed.   Constitutional:       Appearance: Normal appearance.   Cardiovascular:      Heart sounds: Normal heart sounds.   Pulmonary:      Breath sounds: Normal breath sounds.   Musculoskeletal:      Right hip: Bony tenderness (greater trochanter) present. No crepitus. Normal range of motion.      Left hip: Normal.   Skin:     General: Skin is warm and dry.   Neurological:      Mental Status: She is oriented to person, place, and time.          Labs Reviewed:  Chemistry:  Lab Results   Component Value Date     04/13/2023    K 4.3 04/13/2023    CHLORIDE 104 04/13/2023    BUN 21.0 (H) 04/13/2023    CREATININE 1.93 (H) 04/13/2023    EGFRNORACEVR 27 04/13/2023    GLUCOSE 105 04/13/2023    CALCIUM 9.0 04/13/2023    ALKPHOS 108 10/01/2021    LABPROT 7.2 10/01/2021    ALBUMIN 4.2 10/01/2021    AST 16 10/01/2021    ALT 10 10/01/2021    TSH 2.37 10/01/2021      Hematology:  Lab Results   Component Value Date    WBC 8.5 04/13/2023    RBC 3.52 (L) 04/13/2023    " HGB 9.0 (L) 04/13/2023    HCT 28.3 (L) 04/13/2023    MCV 80.4 04/13/2023    MCH 25.6 (L) 04/13/2023    MCHC 31.8 04/13/2023    RDW 16.0 (H) 04/13/2023     (H) 04/13/2023    MPV 9.8 04/13/2023         Assessment & Plan:  1. Stage 3b chronic kidney disease  Stable function; continue on Farxiga 10 mg daily.   Also discussed referral to Nephrology again and she is still unwilling to go at this time.     2. Anemia due to stage 3b chronic kidney disease  H&H 9.0/28.3  Improved from previous labs. Advised to continue with QOD Iron supplementation. Eat diet high in fiber and drink plenty of water to avoid constipation.     3. Trochanteric bursitis of right hip  Apply Voltaren to RT hip several times daily. Rest when possible. Apply ice/heat therapy 15 minutes at a time several times daily. Avoid NSAIDs secondary to CRF; continue with Tylenol Arthritis. Prednisone for a few days. Call is not improving over a couple weeks and we will send for xray.   Advised some stretching exercises.     -     predniSONE (DELTASONE) 20 MG tablet; Take 2 tablets (40 mg total) by mouth once daily. for 3 days  Dispense: 6 tablet; Refill: 0    Future Appointments   Date Time Provider Department Center   11/21/2023  8:10 AM LAB, [facility] LABORATORY DRAW STATION [facility] [place]   11/28/2023  8:30 AM JASWINDER Garcia [facility] Jenna [facility]       Follow up if symptoms worsen or fail to improve. Call sooner if needed.    JASWINDER Mosqueda

## 2023-05-30 ENCOUNTER — PATIENT MESSAGE (OUTPATIENT)
Dept: ADMINISTRATIVE | Facility: HOSPITAL | Age: 73
End: 2023-05-30
Payer: MEDICARE

## 2023-06-26 ENCOUNTER — HOSPITAL ENCOUNTER (INPATIENT)
Facility: HOSPITAL | Age: 73
LOS: 4 days | Discharge: HOME HEALTH | DRG: 378 | End: 2023-07-01
Attending: FAMILY MEDICINE | Admitting: FAMILY MEDICINE
Payer: MEDICARE

## 2023-06-26 DIAGNOSIS — N39.0 URINARY TRACT INFECTION WITHOUT HEMATURIA, SITE UNSPECIFIED: Primary | ICD-10-CM

## 2023-06-26 DIAGNOSIS — R53.1 WEAK: ICD-10-CM

## 2023-06-26 DIAGNOSIS — D64.9 ANEMIA, UNSPECIFIED TYPE: ICD-10-CM

## 2023-06-26 PROBLEM — K59.00 CONSTIPATION: Status: ACTIVE | Noted: 2023-06-26

## 2023-06-26 PROBLEM — N17.9 AKI (ACUTE KIDNEY INJURY): Status: ACTIVE | Noted: 2023-06-26

## 2023-06-26 PROBLEM — D62 ACUTE BLOOD LOSS ANEMIA: Status: ACTIVE | Noted: 2023-06-26

## 2023-06-26 LAB
ALBUMIN SERPL-MCNC: 3.8 G/DL (ref 3.4–5)
ALBUMIN/GLOB SERPL: 1.2 RATIO
ALP SERPL-CCNC: 87 UNIT/L (ref 50–144)
ALT SERPL-CCNC: 19 UNIT/L (ref 1–45)
ANION GAP SERPL CALC-SCNC: 8 MEQ/L (ref 2–13)
APPEARANCE UR: ABNORMAL
AST SERPL-CCNC: 20 UNIT/L (ref 14–36)
BACTERIA #/AREA URNS AUTO: ABNORMAL /HPF
BASOPHILS # BLD AUTO: 0.06 X10(3)/MCL (ref 0.01–0.08)
BASOPHILS NFR BLD AUTO: 0.4 % (ref 0.1–1.2)
BILIRUB UR QL STRIP.AUTO: NEGATIVE MG/DL
BILIRUBIN DIRECT+TOT PNL SERPL-MCNC: 0.3 MG/DL (ref 0–1)
BUN SERPL-MCNC: 20 MG/DL (ref 7–20)
CALCIUM SERPL-MCNC: 8.8 MG/DL (ref 8.4–10.2)
CHLORIDE SERPL-SCNC: 109 MMOL/L (ref 98–110)
CK MB SERPL-MCNC: 0.81 NG/ML (ref 0–3.38)
CK SERPL-CCNC: 42 U/L (ref 30–135)
CO2 SERPL-SCNC: 21 MMOL/L (ref 21–32)
COLOR STL: YELLOW
COLOR UR: YELLOW
CONSISTENCY STL: ABNORMAL
CREAT SERPL-MCNC: 2.56 MG/DL (ref 0.66–1.25)
CREAT/UREA NIT SERPL: 8 (ref 12–20)
EOSINOPHIL # BLD AUTO: 0.17 X10(3)/MCL (ref 0.04–0.36)
EOSINOPHIL NFR BLD AUTO: 1.2 % (ref 0.7–7)
ERYTHROCYTE [DISTWIDTH] IN BLOOD BY AUTOMATED COUNT: 15.4 % (ref 11–14.5)
FERRITIN SERPL-MCNC: 30.1 NG/ML (ref 6.24–264)
GFR SERPLBLD CREATININE-BSD FMLA CKD-EPI: 20 MLS/MIN/1.73/M2
GLOBULIN SER-MCNC: 3.2 GM/DL (ref 2–3.9)
GLUCOSE SERPL-MCNC: 122 MG/DL (ref 70–115)
GLUCOSE UR QL STRIP.AUTO: NEGATIVE MG/DL
HCT VFR BLD AUTO: 23.9 % (ref 36–48)
HEMOCCULT SP1 STL QL: POSITIVE
HEMOCCULT SP2 STL QL: POSITIVE
HGB BLD-MCNC: 7.5 G/DL (ref 11.8–16)
IMM GRANULOCYTES # BLD AUTO: 0.08 X10(3)/MCL (ref 0–0.03)
IMM GRANULOCYTES NFR BLD AUTO: 0.6 % (ref 0–0.5)
IRON SATN MFR SERPL: 6 % (ref 20–50)
IRON SERPL-MCNC: 11 UG/DL (ref 50–170)
KETONES UR QL STRIP.AUTO: NEGATIVE MG/DL
LACTATE SERPL-SCNC: 1.4 MMOL/L (ref 0.4–2)
LEUKOCYTE ESTERASE UR QL STRIP.AUTO: ABNORMAL UNIT/L
LYMPHOCYTES # BLD AUTO: 0.67 X10(3)/MCL (ref 1.16–3.74)
LYMPHOCYTES NFR BLD AUTO: 4.7 % (ref 20–55)
MAGNESIUM SERPL-MCNC: 1.6 MG/DL (ref 1.8–2.4)
MCH RBC QN AUTO: 24.4 PG (ref 27–34)
MCHC RBC AUTO-ENTMCNC: 31.4 G/DL (ref 31–37)
MCV RBC AUTO: 77.9 FL (ref 79–99)
MONOCYTES # BLD AUTO: 0.96 X10(3)/MCL (ref 0.24–0.36)
MONOCYTES NFR BLD AUTO: 6.8 % (ref 4.7–12.5)
NEUTROPHILS # BLD AUTO: 12.18 X10(3)/MCL (ref 1.56–6.13)
NEUTROPHILS NFR BLD AUTO: 86.3 % (ref 37–73)
NITRITE UR QL STRIP.AUTO: NEGATIVE
NRBC BLD AUTO-RTO: 0 %
PH UR STRIP.AUTO: 6 [PH]
PLATELET # BLD AUTO: 471 X10(3)/MCL (ref 140–371)
PMV BLD AUTO: 9.8 FL (ref 9.4–12.4)
POTASSIUM SERPL-SCNC: 3.8 MMOL/L (ref 3.5–5.1)
PROT SERPL-MCNC: 7 GM/DL (ref 6.3–8.2)
PROT UR QL STRIP.AUTO: 30 MG/DL
RBC # BLD AUTO: 3.07 X10(6)/MCL (ref 4–5.1)
RBC #/AREA URNS AUTO: ABNORMAL /HPF
RBC UR QL AUTO: NEGATIVE UNIT/L
SODIUM SERPL-SCNC: 138 MMOL/L (ref 135–145)
SP GR UR STRIP.AUTO: 1.02
SQUAMOUS #/AREA URNS AUTO: ABNORMAL /HPF
TIBC SERPL-MCNC: 178 UG/DL (ref 70–310)
TIBC SERPL-MCNC: 189 UG/DL (ref 250–450)
TRANSFERRIN SERPL-MCNC: 172 MG/DL (ref 173–360)
TROPONIN I SERPL-MCNC: <0.012 NG/ML (ref 0–0.03)
UROBILINOGEN UR STRIP-ACNC: 0.2 MG/DL
WBC # SPEC AUTO: 14.12 X10(3)/MCL (ref 4–11.5)
WBC #/AREA URNS AUTO: ABNORMAL /HPF

## 2023-06-26 PROCEDURE — G0378 HOSPITAL OBSERVATION PER HR: HCPCS

## 2023-06-26 PROCEDURE — 96375 TX/PRO/DX INJ NEW DRUG ADDON: CPT

## 2023-06-26 PROCEDURE — 25000003 PHARM REV CODE 250: Performed by: FAMILY MEDICINE

## 2023-06-26 PROCEDURE — 80053 COMPREHEN METABOLIC PANEL: CPT | Performed by: FAMILY MEDICINE

## 2023-06-26 PROCEDURE — 85025 COMPLETE CBC W/AUTO DIFF WBC: CPT | Performed by: FAMILY MEDICINE

## 2023-06-26 PROCEDURE — 82728 ASSAY OF FERRITIN: CPT | Performed by: FAMILY MEDICINE

## 2023-06-26 PROCEDURE — 84484 ASSAY OF TROPONIN QUANT: CPT | Performed by: FAMILY MEDICINE

## 2023-06-26 PROCEDURE — 63600175 PHARM REV CODE 636 W HCPCS: Performed by: FAMILY MEDICINE

## 2023-06-26 PROCEDURE — 93005 ELECTROCARDIOGRAM TRACING: CPT

## 2023-06-26 PROCEDURE — 82272 OCCULT BLD FECES 1-3 TESTS: CPT | Performed by: FAMILY MEDICINE

## 2023-06-26 PROCEDURE — C9113 INJ PANTOPRAZOLE SODIUM, VIA: HCPCS | Performed by: FAMILY MEDICINE

## 2023-06-26 PROCEDURE — 81001 URINALYSIS AUTO W/SCOPE: CPT | Performed by: FAMILY MEDICINE

## 2023-06-26 PROCEDURE — 36415 COLL VENOUS BLD VENIPUNCTURE: CPT | Performed by: FAMILY MEDICINE

## 2023-06-26 PROCEDURE — 82550 ASSAY OF CK (CPK): CPT | Performed by: FAMILY MEDICINE

## 2023-06-26 PROCEDURE — 83550 IRON BINDING TEST: CPT | Performed by: FAMILY MEDICINE

## 2023-06-26 PROCEDURE — 87088 URINE BACTERIA CULTURE: CPT | Performed by: FAMILY MEDICINE

## 2023-06-26 PROCEDURE — 94761 N-INVAS EAR/PLS OXIMETRY MLT: CPT

## 2023-06-26 PROCEDURE — 99285 EMERGENCY DEPT VISIT HI MDM: CPT | Mod: 25

## 2023-06-26 PROCEDURE — 96365 THER/PROPH/DIAG IV INF INIT: CPT

## 2023-06-26 PROCEDURE — 83735 ASSAY OF MAGNESIUM: CPT | Performed by: FAMILY MEDICINE

## 2023-06-26 PROCEDURE — 83605 ASSAY OF LACTIC ACID: CPT | Performed by: FAMILY MEDICINE

## 2023-06-26 PROCEDURE — 82272 OCCULT BLD FECES 1-3 TESTS: CPT | Mod: 91 | Performed by: FAMILY MEDICINE

## 2023-06-26 PROCEDURE — 82553 CREATINE MB FRACTION: CPT | Performed by: FAMILY MEDICINE

## 2023-06-26 PROCEDURE — 93010 EKG 12-LEAD: ICD-10-PCS | Mod: ,,, | Performed by: INTERNAL MEDICINE

## 2023-06-26 PROCEDURE — 99900035 HC TECH TIME PER 15 MIN (STAT)

## 2023-06-26 PROCEDURE — 93010 ELECTROCARDIOGRAM REPORT: CPT | Mod: ,,, | Performed by: INTERNAL MEDICINE

## 2023-06-26 PROCEDURE — 87040 BLOOD CULTURE FOR BACTERIA: CPT | Performed by: FAMILY MEDICINE

## 2023-06-26 RX ORDER — ATORVASTATIN CALCIUM 40 MG/1
40 TABLET, FILM COATED ORAL DAILY
Status: DISCONTINUED | OUTPATIENT
Start: 2023-06-26 | End: 2023-07-01 | Stop reason: HOSPADM

## 2023-06-26 RX ORDER — FAMOTIDINE 20 MG/1
20 TABLET, FILM COATED ORAL 2 TIMES DAILY
Status: DISCONTINUED | OUTPATIENT
Start: 2023-06-26 | End: 2023-06-26

## 2023-06-26 RX ORDER — METOPROLOL SUCCINATE 25 MG/1
25 TABLET, EXTENDED RELEASE ORAL DAILY
Status: DISCONTINUED | OUTPATIENT
Start: 2023-06-26 | End: 2023-07-01 | Stop reason: HOSPADM

## 2023-06-26 RX ORDER — SODIUM CHLORIDE 9 MG/ML
INJECTION, SOLUTION INTRAVENOUS CONTINUOUS
Status: DISCONTINUED | OUTPATIENT
Start: 2023-06-26 | End: 2023-07-01 | Stop reason: HOSPADM

## 2023-06-26 RX ORDER — ROPINIROLE 0.25 MG/1
0.25 TABLET, FILM COATED ORAL NIGHTLY
Status: DISCONTINUED | OUTPATIENT
Start: 2023-06-26 | End: 2023-07-01 | Stop reason: HOSPADM

## 2023-06-26 RX ORDER — PANTOPRAZOLE SODIUM 40 MG/10ML
40 INJECTION, POWDER, LYOPHILIZED, FOR SOLUTION INTRAVENOUS DAILY
Status: DISCONTINUED | OUTPATIENT
Start: 2023-06-27 | End: 2023-07-01

## 2023-06-26 RX ORDER — BUSPIRONE HYDROCHLORIDE 15 MG/1
15 TABLET ORAL 3 TIMES DAILY
Status: DISCONTINUED | OUTPATIENT
Start: 2023-06-26 | End: 2023-06-26

## 2023-06-26 RX ORDER — TALC
6 POWDER (GRAM) TOPICAL NIGHTLY PRN
Status: DISCONTINUED | OUTPATIENT
Start: 2023-06-26 | End: 2023-07-01 | Stop reason: HOSPADM

## 2023-06-26 RX ORDER — DAPAGLIFLOZIN 10 MG/1
10 TABLET, FILM COATED ORAL DAILY
Status: DISCONTINUED | OUTPATIENT
Start: 2023-06-27 | End: 2023-06-27

## 2023-06-26 RX ORDER — HYDRALAZINE HYDROCHLORIDE 50 MG/1
50 TABLET, FILM COATED ORAL EVERY 8 HOURS
Status: DISCONTINUED | OUTPATIENT
Start: 2023-06-26 | End: 2023-07-01 | Stop reason: HOSPADM

## 2023-06-26 RX ORDER — ACETAMINOPHEN 325 MG/1
650 TABLET ORAL EVERY 8 HOURS PRN
Status: DISCONTINUED | OUTPATIENT
Start: 2023-06-26 | End: 2023-06-29

## 2023-06-26 RX ORDER — FAMOTIDINE 20 MG/1
20 TABLET, FILM COATED ORAL DAILY
Status: DISCONTINUED | OUTPATIENT
Start: 2023-06-26 | End: 2023-07-01 | Stop reason: HOSPADM

## 2023-06-26 RX ORDER — PANTOPRAZOLE SODIUM 40 MG/10ML
40 INJECTION, POWDER, LYOPHILIZED, FOR SOLUTION INTRAVENOUS
Status: COMPLETED | OUTPATIENT
Start: 2023-06-26 | End: 2023-06-26

## 2023-06-26 RX ORDER — GLUCAGON 1 MG
1 KIT INJECTION
Status: CANCELLED | OUTPATIENT
Start: 2023-06-26

## 2023-06-26 RX ORDER — INSULIN ASPART 100 [IU]/ML
0-5 INJECTION, SOLUTION INTRAVENOUS; SUBCUTANEOUS EVERY 6 HOURS PRN
Status: CANCELLED | OUTPATIENT
Start: 2023-06-26

## 2023-06-26 RX ORDER — AMLODIPINE BESYLATE 5 MG/1
5 TABLET ORAL DAILY
Status: DISCONTINUED | OUTPATIENT
Start: 2023-06-26 | End: 2023-07-01 | Stop reason: HOSPADM

## 2023-06-26 RX ORDER — ONDANSETRON 2 MG/ML
4 INJECTION INTRAMUSCULAR; INTRAVENOUS EVERY 6 HOURS PRN
Status: DISCONTINUED | OUTPATIENT
Start: 2023-06-26 | End: 2023-07-01 | Stop reason: HOSPADM

## 2023-06-26 RX ORDER — LOSARTAN POTASSIUM 50 MG/1
50 TABLET ORAL DAILY
COMMUNITY
End: 2024-01-31 | Stop reason: SDUPTHER

## 2023-06-26 RX ORDER — ESCITALOPRAM OXALATE 10 MG/1
20 TABLET ORAL DAILY
Status: DISCONTINUED | OUTPATIENT
Start: 2023-06-26 | End: 2023-07-01 | Stop reason: HOSPADM

## 2023-06-26 RX ORDER — DAPAGLIFLOZIN 10 MG/1
10 TABLET, FILM COATED ORAL DAILY
Status: DISCONTINUED | OUTPATIENT
Start: 2023-06-26 | End: 2023-06-26

## 2023-06-26 RX ORDER — CLONIDINE HYDROCHLORIDE 0.1 MG/1
0.2 TABLET ORAL 2 TIMES DAILY
Status: DISCONTINUED | OUTPATIENT
Start: 2023-06-26 | End: 2023-07-01 | Stop reason: HOSPADM

## 2023-06-26 RX ADMIN — ROPINIROLE HYDROCHLORIDE 0.25 MG: 0.25 TABLET, FILM COATED ORAL at 09:06

## 2023-06-26 RX ADMIN — ATORVASTATIN CALCIUM 40 MG: 40 TABLET, FILM COATED ORAL at 08:06

## 2023-06-26 RX ADMIN — HYDRALAZINE HYDROCHLORIDE 50 MG: 50 TABLET, FILM COATED ORAL at 02:06

## 2023-06-26 RX ADMIN — CEFTRIAXONE SODIUM 1 G: 1 INJECTION, POWDER, FOR SOLUTION INTRAMUSCULAR; INTRAVENOUS at 11:06

## 2023-06-26 RX ADMIN — SODIUM CHLORIDE: 9 INJECTION, SOLUTION INTRAVENOUS at 05:06

## 2023-06-26 RX ADMIN — CLONIDINE HYDROCHLORIDE 0.2 MG: 0.1 TABLET ORAL at 09:06

## 2023-06-26 RX ADMIN — PANTOPRAZOLE SODIUM 40 MG: 40 INJECTION, POWDER, FOR SOLUTION INTRAVENOUS at 11:06

## 2023-06-26 RX ADMIN — HYDRALAZINE HYDROCHLORIDE 50 MG: 50 TABLET, FILM COATED ORAL at 09:06

## 2023-06-26 RX ADMIN — SODIUM CHLORIDE: 9 INJECTION, SOLUTION INTRAVENOUS at 02:06

## 2023-06-26 RX ADMIN — SODIUM CHLORIDE 1000 ML: 9 INJECTION, SOLUTION INTRAVENOUS at 11:06

## 2023-06-26 NOTE — HPI
Fatigue    Constipation    Shaking       C/o shaking, weakness onset Friday pm constipation onset x 2 weeks, pt reports having a normal bm this am      73 yo followed by Liang Mccracken at the [facility] clinic.    Patient presents to the emergency room with weakness and shivering which started this morning.  She suffers from anxiety and had a similar shivering episode on Saturday morning, for which she took an anxiety pill.  It did not stop and to about an hour and a half later.  But since then she has been feeling a little weak and tired and this morning had another shivering episode. , concerned that it is anxiety.  He has had some constipation since starting iron pills about 2 weeks ago for chronic anemia, but this resolved this morning and she is feeling much better with that.  She denies any abdominal pain.  Does state that she has been having melena on and off for a while, does take Plavix for cardiac stents.  Pt was evaluated in ER and found to have HGB of 7.5 down from 9 about 2 months ago as well as worsening BUN/Cr up to 2.5 which was 1.9 in April.  She still has not had a BM since taking one iron pill Rx by her PCP 2 weeks ago.

## 2023-06-26 NOTE — SUBJECTIVE & OBJECTIVE
Past Medical History:   Diagnosis Date    Anxiety     Chronic renal disease stage 4     Colon cancer screening declined     Coronary artery disease     Depression     Hypertension     Irregular heart rhythm     Medication management     Obesity, unspecified     Osteoarthritis of knee     Pain, joint, shoulder region, right     Refused influenza vaccine     Refused pneumococcal vaccination        Past Surgical History:   Procedure Laterality Date    ANGIOPLASTY  11/20/2019    ESOPHAGOGASTRODUODENOSCOPY  10/29/2018    STENT, AORTA         Review of patient's allergies indicates:   Allergen Reactions    Codeine Other (See Comments)     Syncope  Other reaction(s): Syncope       No current facility-administered medications on file prior to encounter.     Current Outpatient Medications on File Prior to Encounter   Medication Sig    amLODIPine (NORVASC) 5 MG tablet Take 1 tablet (5 mg total) by mouth once daily.    atorvastatin (LIPITOR) 40 MG tablet Take 1 tablet (40 mg total) by mouth Daily.    busPIRone (BUSPAR) 15 MG tablet Take 1 tablet (15 mg total) by mouth 3 (three) times daily.    cloNIDine (CATAPRES) 0.2 MG tablet Take 1 tablet (0.2 mg total) by mouth 2 (two) times daily.    clopidogreL (PLAVIX) 75 mg tablet Take 1 tablet (75 mg total) by mouth Daily.    EScitalopram oxalate (LEXAPRO) 20 MG tablet Take 1 tablet (20 mg total) by mouth once daily.    hydrALAZINE (APRESOLINE) 50 MG tablet Take 1 tablet (50 mg total) by mouth every 8 (eight) hours.    losartan (COZAAR) 50 MG tablet Take 50 mg by mouth once daily.    rOPINIRole (REQUIP) 0.25 MG tablet Take 1 tablet (0.25 mg total) by mouth every evening.    dapagliflozin (FARXIGA) 10 mg tablet Take 1 tablet (10 mg total) by mouth once daily.    ferrous sulfate 324 mg (65 mg iron) TbEC Take 1 tablet (324 mg total) by mouth every other day.    metoprolol succinate (TOPROL-XL) 25 MG 24 hr tablet Take 1 tablet (25 mg total) by mouth once daily.     Family History     None       Tobacco Use    Smoking status: Every Day     Packs/day: 1.50     Types: Cigarettes     Passive exposure: Current    Smokeless tobacco: Never   Substance and Sexual Activity    Alcohol use: Never    Drug use: Never    Sexual activity: Not Currently     Review of Systems   Constitutional:  Negative for activity change, appetite change and fever.   Respiratory:  Negative for chest tightness, shortness of breath and wheezing.    Cardiovascular:  Negative for chest pain.   Gastrointestinal:  Negative for abdominal pain, constipation, diarrhea, nausea and vomiting.   Genitourinary:  Negative for dysuria.   Skin:  Negative for rash and wound.   Neurological:  Negative for tremors and headaches.   Objective:     Vital Signs (Most Recent):  Temp: 99 °F (37.2 °C) (06/26/23 1305)  Pulse: 64 (06/26/23 1305)  Resp: 20 (06/26/23 1305)  BP: (!) 122/52 (06/26/23 1305)  SpO2: 99 % (06/26/23 1216) Vital Signs (24h Range):  Temp:  [99 °F (37.2 °C)-99.9 °F (37.7 °C)] 99 °F (37.2 °C)  Pulse:  [63-99] 64  Resp:  [18-20] 20  SpO2:  [98 %-100 %] 99 %  BP: (106-122)/(52-60) 122/52     Weight: 76.4 kg (168 lb 8 oz)  Body mass index is 25.62 kg/m².     Physical Exam  Constitutional:       General: She is not in acute distress.     Appearance: Normal appearance. She is not ill-appearing, toxic-appearing or diaphoretic.   HENT:      Head: Normocephalic and atraumatic.      Right Ear: External ear normal.      Left Ear: External ear normal.      Nose: Nose normal. No congestion or rhinorrhea.      Mouth/Throat:      Mouth: Mucous membranes are moist.      Pharynx: Oropharynx is clear.   Eyes:      Extraocular Movements: Extraocular movements intact.      Conjunctiva/sclera: Conjunctivae normal.      Pupils: Pupils are equal, round, and reactive to light.   Neck:      Vascular: No carotid bruit.   Cardiovascular:      Rate and Rhythm: Normal rate and regular rhythm.      Pulses: Normal pulses.      Heart sounds: Normal heart sounds.  No murmur heard.  Pulmonary:      Effort: Pulmonary effort is normal. No respiratory distress.      Breath sounds: Normal breath sounds. No wheezing, rhonchi or rales.   Abdominal:      General: Abdomen is flat. Bowel sounds are normal. There is no distension.      Palpations: Abdomen is soft.      Tenderness: There is no abdominal tenderness. There is no guarding.   Musculoskeletal:         General: No swelling or tenderness. Normal range of motion.      Cervical back: Normal range of motion and neck supple. No tenderness.      Right lower leg: No edema.      Left lower leg: No edema.   Lymphadenopathy:      Cervical: No cervical adenopathy.   Skin:     General: Skin is warm and dry.      Coloration: Skin is not jaundiced or pale.      Findings: Bruising present.   Neurological:      General: No focal deficit present.      Mental Status: She is alert and oriented to person, place, and time. Mental status is at baseline.      Cranial Nerves: No cranial nerve deficit.      Motor: No weakness.      Coordination: Coordination normal.      Gait: Gait normal.   Psychiatric:         Mood and Affect: Mood normal.         Behavior: Behavior normal.         Thought Content: Thought content normal.         Judgment: Judgment normal.            CRANIAL NERVES     CN III, IV, VI   Pupils are equal, round, and reactive to light.     Significant Labs: All pertinent labs within the past 24 hours have been reviewed.  BMP:   Recent Labs   Lab 06/26/23  0953      K 3.8   CO2 21   BUN 20.0   CREATININE 2.56*   CALCIUM 8.8   MG 1.60*     CBC:   Recent Labs   Lab 06/26/23  0953   WBC 14.12*   HGB 7.5*   HCT 23.9*   *       Significant Imaging: I have reviewed all pertinent imaging results/findings within the past 24 hours.

## 2023-06-26 NOTE — ASSESSMENT & PLAN NOTE
Patient with acute kidney injury likely due to IVVD/dehydration NEYDA is currently worsening. Labs reviewed- Renal function/electrolytes with Estimated Creatinine Clearance: 20.3 mL/min (A) (based on SCr of 2.56 mg/dL (H)). according to latest data. Monitor urine output and serial BMP and adjust therapy as needed. Avoid nephrotoxins and renally dose meds for GFR listed above.     Cr was 1.9 in April, now 2.5  Give ivf  Recheck in am

## 2023-06-26 NOTE — ED PROVIDER NOTES
Encounter Date: 6/26/2023       History     Chief Complaint   Patient presents with    Fatigue    Constipation    Shaking     C/o shaking, weakness onset Friday pm constipation onset x 2 weeks, pt reports having a normal bm this am     Patient presents to the emergency room with weakness and shivering which started this morning.  She suffers from anxiety and had a similar shivering episode on Saturday morning, for which she took an anxiety pill.  It did not stop and to about an hour and a half later.  But since then she has been feeling a little weak and tired and this morning had another shivering episode. , concerned that it is anxiety.  He has had some constipation since starting iron pills but 2 weeks ago for chronic anemia, but this resolved this morning and she is feeling much better with that.  She denies any abdominal pain.  Does state that she has been having melena on and off for a while, does take Plavix for cardiac stents.    The history is provided by the patient.   Review of patient's allergies indicates:   Allergen Reactions    Codeine Other (See Comments)     Syncope  Other reaction(s): Syncope     Past Medical History:   Diagnosis Date    Anxiety     Chronic renal disease stage 4     Colon cancer screening declined     Coronary artery disease     Depression     Hypertension     Irregular heart rhythm     Medication management     Obesity, unspecified     Osteoarthritis of knee     Pain, joint, shoulder region, right     Refused influenza vaccine     Refused pneumococcal vaccination      Past Surgical History:   Procedure Laterality Date    ANGIOPLASTY  11/20/2019    ESOPHAGOGASTRODUODENOSCOPY  10/29/2018    STENT, AORTA       History reviewed. No pertinent family history.  Social History     Tobacco Use    Smoking status: Every Day     Packs/day: 1.50     Types: Cigarettes     Passive exposure: Current    Smokeless tobacco: Never   Substance Use Topics    Alcohol use: Never    Drug use: Never      Review of Systems   Constitutional:  Positive for chills and fatigue. Negative for fever.   HENT:  Negative for sore throat.    Respiratory:  Negative for shortness of breath.    Cardiovascular:  Negative for chest pain.   Gastrointestinal:  Positive for constipation. Negative for nausea and vomiting.        Occasional black stools   Genitourinary:  Negative for dysuria and urgency.   Musculoskeletal:  Negative for back pain.   Skin:  Negative for rash.   Neurological:  Positive for tremors. Negative for weakness.   Hematological:  Does not bruise/bleed easily.   Psychiatric/Behavioral:  The patient is nervous/anxious.    All other systems reviewed and are negative.    Physical Exam     Initial Vitals [06/26/23 0919]   BP Pulse Resp Temp SpO2   106/60 99 20 99.9 °F (37.7 °C) 100 %      MAP       --         Physical Exam    Nursing note and vitals reviewed.  Constitutional: She appears well-developed and well-nourished.   Patient is in no distress is in good spirits, but is a little tremulous.  She is alert and oriented x4   HENT:   Head: Normocephalic and atraumatic.   Eyes: EOM are normal. Pupils are equal, round, and reactive to light.   Neck: Neck supple.   Normal range of motion.  Cardiovascular:  Normal rate, regular rhythm and normal heart sounds.           Pulmonary/Chest: Breath sounds normal.   Abdominal: Abdomen is soft. Bowel sounds are normal. There is no abdominal tenderness.   Musculoskeletal:         General: No edema. Normal range of motion.      Cervical back: Normal range of motion and neck supple.     Neurological: She is alert and oriented to person, place, and time.   Skin: Skin is warm and dry. Capillary refill takes less than 2 seconds.   Psychiatric: Her behavior is normal. Judgment and thought content normal.   She is anxious but does not appear depressed       ED Course   Procedures  Labs Reviewed   COMPREHENSIVE METABOLIC PANEL - Abnormal; Notable for the following components:        Result Value    Glucose Level 122 (*)     Creatinine 2.56 (*)     BUN/Creatinine Ratio 8 (*)     All other components within normal limits   URINALYSIS - Abnormal; Notable for the following components:    Appearance, UA SL CLOUDY (*)     Protein, UA 30 (*)     Leukocyte Esterase, UA Small (*)     All other components within normal limits    Narrative:      URINE STABILITY IS 2 HOURS AT ROOM TEMP OR    SIX HOURS REFRIGERATED. PERFORMING TESTING ON    SPECIMENS GREATER THAN THIS AGE MAY AFFECT THE    FOLLOWING TESTS:    PH          SPECIFIC GRAVITY           BLOOD    CLARITY     BILIRUBIN               UROBILINOGEN   MAGNESIUM - Abnormal; Notable for the following components:    Magnesium Level 1.60 (*)     All other components within normal limits   CBC WITH DIFFERENTIAL - Abnormal; Notable for the following components:    WBC 14.12 (*)     RBC 3.07 (*)     Hgb 7.5 (*)     Hct 23.9 (*)     MCV 77.9 (*)     MCH 24.4 (*)     RDW 15.4 (*)     Platelet 471 (*)     Neut % 86.3 (*)     Lymph % 4.7 (*)     Lymph # 0.67 (*)     Neut # 12.18 (*)     Mono # 0.96 (*)     IG# 0.08 (*)     IG% 0.6 (*)     All other components within normal limits   URINALYSIS, MICROSCOPIC - Abnormal; Notable for the following components:    Bacteria, UA 1+ (*)     WBC, UA 11-20 (*)     Squamous Epithelial Cells, UA Few (*)     All other components within normal limits   TROPONIN I - Normal   CK - Normal   CK-MB - Normal   CULTURE, URINE   BLOOD CULTURE [facility]   BLOOD CULTURE [facility]   CBC W/ AUTO DIFFERENTIAL    Narrative:     The following orders were created for panel order CBC Auto Differential.  Procedure                               Abnormality         Status                     ---------                               -----------         ------                     CBC with Differential[[id number]]        Abnormal            Final result                 Please view results for these tests on the individual orders.   LACTIC ACID, PLASMA   IRON AND  TIBC   FERRITIN     EKG Readings: (Independently Interpreted)   Rhythm: Normal Sinus Rhythm. Ectopy: No Ectopy. Conduction: Normal. ST Segments: Normal ST Segments. T Waves: Normal. Clinical Impression: Normal Sinus Rhythm     Imaging Results    None          Medications   cefTRIAXone (ROCEPHIN) 1 g in dextrose 5 % in water (D5W) 5 % 100 mL IVPB (MB+) (1 g Intravenous New Bag 6/26/23 1135)   sodium chloride 0.9% bolus 1,000 mL 1,000 mL (1,000 mLs Intravenous New Bag 6/26/23 1135)   pantoprazole injection 40 mg (40 mg Intravenous Given 6/26/23 1138)     Medical Decision Making:   Initial Assessment:   Tremors, anxiety  Differential Diagnosis:   Chills?, sepsis, anxiety, anemia  Clinical Tests:   Lab Tests: Ordered and Reviewed       <> Summary of Lab: Wbc 14  UTI on ua  Cr >2  Radiological Study: Ordered and Reviewed  Medical Tests: Ordered and Reviewed  ED Management:  Wbc's are elevated at 14 K, urinary tract infection diagnosed.  Added lactic acid and cultures x2, Rocephin, IV fluid.  Doubt sepsis at this point in time, but with the tremors, they actually maybe chills or rigors.  Will admit to the hospital for this, also H&H is low and hemoglobin of 7.5.  In April, it was 9.  With melanoma, and history of peptic ulcer disease in the past, start Protonix IV and get a surgical consult.  Chronic kidney disease noted as well.                        Clinical Impression:   Final diagnoses:  [R53.1] Weak  [N39.0] Urinary tract infection without hematuria, site unspecified (Primary)  [D64.9] Anemia, unspecified type        ED Disposition Condition    Observation Stable                George Saleh MD  06/26/23 8216

## 2023-06-26 NOTE — PLAN OF CARE
06/26/23 1606   Discharge Assessment   Assessment Type Discharge Planning Assessment   Confirmed/corrected address, phone number and insurance Yes   Confirmed Demographics Correct on Facesheet   Source of Information patient   When was your last doctors appointment? 05/02/23   Reason For Admission UTI, Weakness, Anemia   People in Home alone   Do you expect to return to your current living situation? No   Prior to hospitilization cognitive status: Alert/Oriented   Current cognitive status: Alert/Oriented   Walking or Climbing Stairs ambulation difficulty, requires equipment   Mobility Management Rolling Walker   Equipment Currently Used at Home walker, rolling   Readmission within 30 days? No   Patient currently being followed by outpatient case management? No   Do you currently have service(s) that help you manage your care at home? No   Do you take prescription medications? Yes   Do you have prescription coverage? Yes   Coverage MCR   Do you have any problems affording any of your prescribed medications? No   Is the patient taking medications as prescribed? yes   Who is going to help you get home at discharge? Brother   How do you get to doctors appointments? car, drives self   Are you on dialysis? No   Do you take coumadin? No   Discharge Plan A Home   DME Needed Upon Discharge  none   Discharge Plan discussed with: Patient   Transition of Care Barriers None   Physical Activity   On average, how many days per week do you engage in moderate to strenuous exercise (like a brisk walk)? 3 days   On average, how many minutes do you engage in exercise at this level? 10 min   Financial Resource Strain   How hard is it for you to pay for the very basics like food, housing, medical care, and heating? Not very   Housing Stability   In the last 12 months, was there a time when you were not able to pay the mortgage or rent on time? N   In the last 12 months, how many places have you lived? 1   In the last 12 months, was  there a time when you did not have a steady place to sleep or slept in a shelter (including now)? N   Transportation Needs   In the past 12 months, has lack of transportation kept you from medical appointments or from getting medications? no   In the past 12 months, has lack of transportation kept you from meetings, work, or from getting things needed for daily living? No   Food Insecurity   Within the past 12 months, you worried that your food would run out before you got the money to buy more. Never true   Within the past 12 months, the food you bought just didn't last and you didn't have money to get more. Never true   Stress   Do you feel stress - tense, restless, nervous, or anxious, or unable to sleep at night because your mind is troubled all the time - these days? Rather much   Social Connections   In a typical week, how many times do you talk on the phone with family, friends, or neighbors? Twice a week   How often do you get together with friends or relatives? More than 3   How often do you attend Mandaeism or Faith services? More than 4   Do you belong to any clubs or organizations such as Mandaeism groups, unions, fraternal or athletic groups, or school groups? No   How often do you attend meetings of the clubs or organizations you belong to? Never   Are you , , , , never , or living with a partner?    Alcohol Use   Q1: How often do you have a drink containing alcohol? Monthly or l   Q2: How many drinks containing alcohol do you have on a typical day when you are drinking? 1 or 2   Q3: How often do you have six or more drinks on one occasion? Never

## 2023-06-26 NOTE — PLAN OF CARE
Patient status ongoing, progressing. Will continue monitoring.    Problem: Adult Inpatient Plan of Care  Goal: Plan of Care Review  Outcome: Ongoing, Progressing  Goal: Patient-Specific Goal (Individualized)  Outcome: Ongoing, Progressing  Goal: Absence of Hospital-Acquired Illness or Injury  Outcome: Ongoing, Progressing  Goal: Optimal Comfort and Wellbeing  Outcome: Ongoing, Progressing  Goal: Readiness for Transition of Care  Outcome: Ongoing, Progressing     Problem: Fall Injury Risk  Goal: Absence of Fall and Fall-Related Injury  Outcome: Ongoing, Progressing     Problem: Impaired Wound Healing  Goal: Optimal Wound Healing  Outcome: Ongoing, Progressing     Problem: Fluid and Electrolyte Imbalance (Acute Kidney Injury/Impairment)  Goal: Fluid and Electrolyte Balance  Outcome: Ongoing, Progressing     Problem: Oral Intake Inadequate (Acute Kidney Injury/Impairment)  Goal: Optimal Nutrition Intake  Outcome: Ongoing, Progressing     Problem: Renal Function Impairment (Acute Kidney Injury/Impairment)  Goal: Effective Renal Function  Outcome: Ongoing, Progressing     Problem: Anemia  Goal: Anemia Symptom Improvement  Outcome: Ongoing, Progressing     Problem: UTI (Urinary Tract Infection)  Goal: Improved Infection Symptoms  Outcome: Ongoing, Progressing

## 2023-06-26 NOTE — H&P
[facility] Medicine  History & Physical    Patient Name: Rosalie Campbell  MRN: [id number]  Patient Class: OP- Observation  Admission Date: 6/26/2023  Attending Physician: Raquel Vogel MD   Primary Care Provider: JASWINDER Mosqueda         Patient information was obtained from patient and ER records.     Subjective:     Principal Problem:Acute blood loss anemia    Chief Complaint:   Chief Complaint   Patient presents with    Fatigue    Constipation    Shaking     C/o shaking, weakness onset Friday pm constipation onset x 2 weeks, pt reports having a normal bm this am        HPI:    Fatigue    Constipation    Shaking       C/o shaking, weakness onset Friday pm constipation onset x 2 weeks, pt reports having a normal bm this am      71 yo followed by Liang Mccracken at the [facility].    Patient presents to the emergency room with weakness and shivering which started this morning.  She suffers from anxiety and had a similar shivering episode on Saturday morning, for which she took an anxiety pill.  It did not stop and to about an hour and a half later.  But since then she has been feeling a little weak and tired and this morning had another shivering episode. , concerned that it is anxiety.  He has had some constipation since starting iron pills about 2 weeks ago for chronic anemia, but this resolved this morning and she is feeling much better with that.  She denies any abdominal pain.  Does state that she has been having melena on and off for a while, does take Plavix for cardiac stents.  Pt was evaluated in ER and found to have HGB of 7.5 down from 9 about 2 months ago as well as worsening BUN/Cr up to 2.5 which was 1.9 in April.  She still has not had a BM since taking one iron pill Rx by her PCP 2 weeks ago.      Past Medical History:   Diagnosis Date    Anxiety     Chronic renal disease stage 4     Colon cancer screening declined     Coronary artery disease      Depression     Hypertension     Irregular heart rhythm     Medication management     Obesity, unspecified     Osteoarthritis of knee     Pain, joint, shoulder region, right     Refused influenza vaccine     Refused pneumococcal vaccination        Past Surgical History:   Procedure Laterality Date    ANGIOPLASTY  11/20/2019    ESOPHAGOGASTRODUODENOSCOPY  10/29/2018    STENT, AORTA         Review of patient's allergies indicates:   Allergen Reactions    Codeine Other (See Comments)     Syncope  Other reaction(s): Syncope       No current facility-administered medications on file prior to encounter.     Current Outpatient Medications on File Prior to Encounter   Medication Sig    amLODIPine (NORVASC) 5 MG tablet Take 1 tablet (5 mg total) by mouth once daily.    atorvastatin (LIPITOR) 40 MG tablet Take 1 tablet (40 mg total) by mouth Daily.    busPIRone (BUSPAR) 15 MG tablet Take 1 tablet (15 mg total) by mouth 3 (three) times daily.    cloNIDine (CATAPRES) 0.2 MG tablet Take 1 tablet (0.2 mg total) by mouth 2 (two) times daily.    clopidogreL (PLAVIX) 75 mg tablet Take 1 tablet (75 mg total) by mouth Daily.    EScitalopram oxalate (LEXAPRO) 20 MG tablet Take 1 tablet (20 mg total) by mouth once daily.    hydrALAZINE (APRESOLINE) 50 MG tablet Take 1 tablet (50 mg total) by mouth every 8 (eight) hours.    losartan (COZAAR) 50 MG tablet Take 50 mg by mouth once daily.    rOPINIRole (REQUIP) 0.25 MG tablet Take 1 tablet (0.25 mg total) by mouth every evening.    dapagliflozin (FARXIGA) 10 mg tablet Take 1 tablet (10 mg total) by mouth once daily.    ferrous sulfate 324 mg (65 mg iron) TbEC Take 1 tablet (324 mg total) by mouth every other day.    metoprolol succinate (TOPROL-XL) 25 MG 24 hr tablet Take 1 tablet (25 mg total) by mouth once daily.     Family History    None       Tobacco Use    Smoking status: Every Day     Packs/day: 1.50     Types: Cigarettes     Passive exposure: Current     Home Smokeless tobacco: Never   Substance and Sexual Activity    Alcohol use: Never    Drug use: Never    Sexual activity: Not Currently     Review of Systems   Constitutional:  Negative for activity change, appetite change and fever.   Respiratory:  Negative for chest tightness, shortness of breath and wheezing.    Cardiovascular:  Negative for chest pain.   Gastrointestinal:  Negative for abdominal pain, constipation, diarrhea, nausea and vomiting.   Genitourinary:  Negative for dysuria.   Skin:  Negative for rash and wound.   Neurological:  Negative for tremors and headaches.   Objective:     Vital Signs (Most Recent):  Temp: 99 °F (37.2 °C) (06/26/23 1305)  Pulse: 64 (06/26/23 1305)  Resp: 20 (06/26/23 1305)  BP: (!) 122/52 (06/26/23 1305)  SpO2: 99 % (06/26/23 1216) Vital Signs (24h Range):  Temp:  [99 °F (37.2 °C)-99.9 °F (37.7 °C)] 99 °F (37.2 °C)  Pulse:  [63-99] 64  Resp:  [18-20] 20  SpO2:  [98 %-100 %] 99 %  BP: (106-122)/(52-60) 122/52     Weight: 76.4 kg (168 lb 8 oz)  Body mass index is 25.62 kg/m².     Physical Exam  Constitutional:       General: She is not in acute distress.     Appearance: Normal appearance. She is not ill-appearing, toxic-appearing or diaphoretic.   HENT:      Head: Normocephalic and atraumatic.      Right Ear: External ear normal.      Left Ear: External ear normal.      Nose: Nose normal. No congestion or rhinorrhea.      Mouth/Throat:      Mouth: Mucous membranes are moist.      Pharynx: Oropharynx is clear.   Eyes:      Extraocular Movements: Extraocular movements intact.      Conjunctiva/sclera: Conjunctivae normal.      Pupils: Pupils are equal, round, and reactive to light.   Neck:      Vascular: No carotid bruit.   Cardiovascular:      Rate and Rhythm: Normal rate and regular rhythm.      Pulses: Normal pulses.      Heart sounds: Normal heart sounds. No murmur heard.  Pulmonary:      Effort: Pulmonary effort is normal. No respiratory distress.      Breath sounds: Normal  breath sounds. No wheezing, rhonchi or rales.   Abdominal:      General: Abdomen is flat. Bowel sounds are normal. There is no distension.      Palpations: Abdomen is soft.      Tenderness: There is no abdominal tenderness. There is no guarding.   Musculoskeletal:         General: No swelling or tenderness. Normal range of motion.      Cervical back: Normal range of motion and neck supple. No tenderness.      Right lower leg: No edema.      Left lower leg: No edema.   Lymphadenopathy:      Cervical: No cervical adenopathy.   Skin:     General: Skin is warm and dry.      Coloration: Skin is not jaundiced or pale.      Findings: Bruising present.   Neurological:      General: No focal deficit present.      Mental Status: She is alert and oriented to person, place, and time. Mental status is at baseline.      Cranial Nerves: No cranial nerve deficit.      Motor: No weakness.      Coordination: Coordination normal.      Gait: Gait normal.   Psychiatric:         Mood and Affect: Mood normal.         Behavior: Behavior normal.         Thought Content: Thought content normal.         Judgment: Judgment normal.            CRANIAL NERVES     CN III, IV, VI   Pupils are equal, round, and reactive to light.     Significant Labs: All pertinent labs within the past 24 hours have been reviewed.  BMP:   Recent Labs   Lab 06/26/23  0953      K 3.8   CO2 21   BUN 20.0   CREATININE 2.56*   CALCIUM 8.8   MG 1.60*     CBC:   Recent Labs   Lab 06/26/23  0953   WBC 14.12*   HGB 7.5*   HCT 23.9*   *       Significant Imaging: I have reviewed all pertinent imaging results/findings within the past 24 hours.    Assessment/Plan:     * Acute blood loss anemia  Check iron levels, b12  cosnult surgery for possible EGD      Constipation  Give lactulose      Hypertension  Chronic, controlled.  Latest blood pressure and vitals reviewed-   Temp:  [99 °F (37.2 °C)-99.9 °F (37.7 °C)]   Pulse:  [63-99]   Resp:  [18-20]   BP:  (106-122)/(52-60)   SpO2:  [98 %-100 %] .   Home meds for hypertension were reviewed and noted below.   Hypertension Medications             amLODIPine (NORVASC) 5 MG tablet Take 1 tablet (5 mg total) by mouth once daily.    cloNIDine (CATAPRES) 0.2 MG tablet Take 1 tablet (0.2 mg total) by mouth 2 (two) times daily.    hydrALAZINE (APRESOLINE) 50 MG tablet Take 1 tablet (50 mg total) by mouth every 8 (eight) hours.    losartan (COZAAR) 50 MG tablet Take 50 mg by mouth once daily.    metoprolol succinate (TOPROL-XL) 25 MG 24 hr tablet Take 1 tablet (25 mg total) by mouth once daily.          While in the hospital, will manage blood pressure as follows; Continue home antihypertensive regimen    Will utilize p.r.n. blood pressure medication only if patient's blood pressure greater than  180/110 and she develops symptoms such as worsening chest pain or shortness of breath.        NEYDA (acute kidney injury)  Patient with acute kidney injury likely due to IVVD/dehydration NEYDA is currently worsening. Labs reviewed- Renal function/electrolytes with Estimated Creatinine Clearance: 20.3 mL/min (A) (based on SCr of 2.56 mg/dL (H)). according to latest data. Monitor urine output and serial BMP and adjust therapy as needed. Avoid nephrotoxins and renally dose meds for GFR listed above.     Cr was 1.9 in April, now 2.5  Give ivf  Recheck in am      VTE Risk Mitigation (From admission, onward)         Ordered     IP VTE HIGH RISK PATIENT  Once         06/26/23 1331     Place sequential compression device  Until discontinued         06/26/23 1331                   On 06/26/2023, patient should be placed in hospital observation services under my care.        Raquel Vogel MD  Department of Hospital Medicine  [facility]

## 2023-06-26 NOTE — ASSESSMENT & PLAN NOTE
Chronic, controlled.  Latest blood pressure and vitals reviewed-   Temp:  [99 °F (37.2 °C)-99.9 °F (37.7 °C)]   Pulse:  [63-99]   Resp:  [18-20]   BP: (106-122)/(52-60)   SpO2:  [98 %-100 %] .   Home meds for hypertension were reviewed and noted below.   Hypertension Medications             amLODIPine (NORVASC) 5 MG tablet Take 1 tablet (5 mg total) by mouth once daily.    cloNIDine (CATAPRES) 0.2 MG tablet Take 1 tablet (0.2 mg total) by mouth 2 (two) times daily.    hydrALAZINE (APRESOLINE) 50 MG tablet Take 1 tablet (50 mg total) by mouth every 8 (eight) hours.    losartan (COZAAR) 50 MG tablet Take 50 mg by mouth once daily.    metoprolol succinate (TOPROL-XL) 25 MG 24 hr tablet Take 1 tablet (25 mg total) by mouth once daily.          While in the hospital, will manage blood pressure as follows; Continue home antihypertensive regimen    Will utilize p.r.n. blood pressure medication only if patient's blood pressure greater than  180/110 and she develops symptoms such as worsening chest pain or shortness of breath.

## 2023-06-26 NOTE — Clinical Note
Diagnosis: Urinary tract infection without hematuria, site unspecified [[id number]]   Future Attending Provider: AMOS VIVAS [[id number]]   Admitting Provider:: AMOS VIVAS [[id number]]

## 2023-06-26 NOTE — PROGRESS NOTES
Pharmacist Renal Dose Adjustment Note    Rosalie Campbell is a 72 y.o. female being treated with the medication famotidine    Patient Data:    Vital Signs (Most Recent):  Temp: 99 °F (37.2 °C) (06/26/23 1305)  Pulse: 64 (06/26/23 1305)  Resp: 20 (06/26/23 1305)  BP: (!) 122/52 (06/26/23 1305)  SpO2: 99 % (06/26/23 1216) Vital Signs (72h Range):  Temp:  [99 °F (37.2 °C)-99.9 °F (37.7 °C)]   Pulse:  [63-99]   Resp:  [18-20]   BP: (106-122)/(52-60)   SpO2:  [98 %-100 %]      Recent Labs   Lab 06/26/23  0953   CREATININE 2.56*     Serum creatinine: 2.56 mg/dL (H) 06/26/23 0953  Estimated creatinine clearance: 20.3 mL/min (A)    Medication:famotidine dose: 20mg frequency q12h will be changed to medication:famotidine dose:20mg frequency:q24h    Pharmacist's Name: Lorena Grayson  Pharmacist's Extension: [phone]

## 2023-06-27 LAB
ABO + RH BLD: NORMAL
ABO + RH BLD: NORMAL
ABO AND RH: NORMAL
ABORH RETYPE: NORMAL
ALBUMIN SERPL-MCNC: 2.9 G/DL (ref 3.4–5)
ALBUMIN/GLOB SERPL: 1.1 RATIO
ALP SERPL-CCNC: 80 UNIT/L (ref 50–144)
ALT SERPL-CCNC: 11 UNIT/L (ref 1–45)
ANION GAP SERPL CALC-SCNC: 7 MEQ/L (ref 2–13)
ANTIBODY SCREEN: NORMAL
AST SERPL-CCNC: 15 UNIT/L (ref 14–36)
BASOPHILS # BLD AUTO: 0.04 X10(3)/MCL (ref 0.01–0.08)
BASOPHILS NFR BLD AUTO: 0.4 % (ref 0.1–1.2)
BILIRUBIN DIRECT+TOT PNL SERPL-MCNC: 0.4 MG/DL (ref 0–1)
BLD PROD TYP BPU: NORMAL
BLD PROD TYP BPU: NORMAL
BLOOD UNIT EXPIRATION DATE: NORMAL
BLOOD UNIT EXPIRATION DATE: NORMAL
BLOOD UNIT TYPE CODE: 600
BLOOD UNIT TYPE CODE: 600
BUN SERPL-MCNC: 17 MG/DL (ref 7–20)
CALCIUM SERPL-MCNC: 8.1 MG/DL (ref 8.4–10.2)
CHLORIDE SERPL-SCNC: 110 MMOL/L (ref 98–110)
CO2 SERPL-SCNC: 18 MMOL/L (ref 21–32)
CREAT SERPL-MCNC: 2.09 MG/DL (ref 0.66–1.25)
CREAT/UREA NIT SERPL: 8 (ref 12–20)
CROSSMATCH INTERPRETATION: NORMAL
CROSSMATCH INTERPRETATION: NORMAL
DISPENSE STATUS: NORMAL
DISPENSE STATUS: NORMAL
EOSINOPHIL # BLD AUTO: 0.21 X10(3)/MCL (ref 0.04–0.36)
EOSINOPHIL NFR BLD AUTO: 2.2 % (ref 0.7–7)
ERYTHROCYTE [DISTWIDTH] IN BLOOD BY AUTOMATED COUNT: 15.2 % (ref 11–14.5)
FERRITIN SERPL-MCNC: 29.7 NG/ML (ref 6.24–264)
FOLATE SERPL-MCNC: 3.8 NG/ML (ref 2.8–20)
GFR SERPLBLD CREATININE-BSD FMLA CKD-EPI: 25 MLS/MIN/1.73/M2
GLOBULIN SER-MCNC: 2.7 GM/DL (ref 2–3.9)
GLUCOSE SERPL-MCNC: 101 MG/DL (ref 70–115)
HCT VFR BLD AUTO: 21.5 % (ref 36–48)
HEMOCCULT SP3 STL QL: POSITIVE
HGB BLD-MCNC: 6.7 G/DL (ref 11.8–16)
IMM GRANULOCYTES # BLD AUTO: 0.05 X10(3)/MCL (ref 0–0.03)
IMM GRANULOCYTES NFR BLD AUTO: 0.5 % (ref 0–0.5)
IRON SATN MFR SERPL: 7 % (ref 20–50)
IRON SERPL-MCNC: 12 UG/DL (ref 50–170)
LYMPHOCYTES # BLD AUTO: 1 X10(3)/MCL (ref 1.16–3.74)
LYMPHOCYTES NFR BLD AUTO: 10.3 % (ref 20–55)
MAGNESIUM SERPL-MCNC: 1.6 MG/DL (ref 1.8–2.4)
MCH RBC QN AUTO: 23.8 PG (ref 27–34)
MCHC RBC AUTO-ENTMCNC: 31.2 G/DL (ref 31–37)
MCV RBC AUTO: 76.5 FL (ref 79–99)
MONOCYTES # BLD AUTO: 0.85 X10(3)/MCL (ref 0.24–0.36)
MONOCYTES NFR BLD AUTO: 8.8 % (ref 4.7–12.5)
NEUTROPHILS # BLD AUTO: 7.52 X10(3)/MCL (ref 1.56–6.13)
NEUTROPHILS NFR BLD AUTO: 77.8 % (ref 37–73)
NRBC BLD AUTO-RTO: 0 %
PLATELET # BLD AUTO: 403 X10(3)/MCL (ref 140–371)
PMV BLD AUTO: 9.9 FL (ref 9.4–12.4)
POTASSIUM SERPL-SCNC: 3.6 MMOL/L (ref 3.5–5.1)
PROT SERPL-MCNC: 5.6 GM/DL (ref 6.3–8.2)
RBC # BLD AUTO: 2.81 X10(6)/MCL (ref 4–5.1)
SODIUM SERPL-SCNC: 135 MMOL/L (ref 135–145)
SPECIMEN OUTDATE: NORMAL
TIBC SERPL-MCNC: 167 UG/DL (ref 70–310)
TIBC SERPL-MCNC: 179 UG/DL (ref 250–450)
TRANSFERRIN SERPL-MCNC: 158 MG/DL (ref 173–360)
UNIT NUMBER: NORMAL
UNIT NUMBER: NORMAL
VIT B12 SERPL-MCNC: 298 PG/ML (ref 211–946)
WBC # SPEC AUTO: 9.67 X10(3)/MCL (ref 4–11.5)

## 2023-06-27 PROCEDURE — 36415 COLL VENOUS BLD VENIPUNCTURE: CPT | Performed by: FAMILY MEDICINE

## 2023-06-27 PROCEDURE — 83735 ASSAY OF MAGNESIUM: CPT | Performed by: FAMILY MEDICINE

## 2023-06-27 PROCEDURE — C9113 INJ PANTOPRAZOLE SODIUM, VIA: HCPCS | Performed by: FAMILY MEDICINE

## 2023-06-27 PROCEDURE — 25000003 PHARM REV CODE 250: Performed by: FAMILY MEDICINE

## 2023-06-27 PROCEDURE — 86920 COMPATIBILITY TEST SPIN: CPT | Performed by: INTERNAL MEDICINE

## 2023-06-27 PROCEDURE — 82746 ASSAY OF FOLIC ACID SERUM: CPT | Performed by: FAMILY MEDICINE

## 2023-06-27 PROCEDURE — 11000001 HC ACUTE MED/SURG PRIVATE ROOM

## 2023-06-27 PROCEDURE — 82728 ASSAY OF FERRITIN: CPT | Performed by: FAMILY MEDICINE

## 2023-06-27 PROCEDURE — 80053 COMPREHEN METABOLIC PANEL: CPT | Performed by: FAMILY MEDICINE

## 2023-06-27 PROCEDURE — 82607 VITAMIN B-12: CPT | Performed by: FAMILY MEDICINE

## 2023-06-27 PROCEDURE — 82272 OCCULT BLD FECES 1-3 TESTS: CPT | Performed by: FAMILY MEDICINE

## 2023-06-27 PROCEDURE — P9016 RBC LEUKOCYTES REDUCED: HCPCS | Performed by: INTERNAL MEDICINE

## 2023-06-27 PROCEDURE — 86900 BLOOD TYPING SEROLOGIC ABO: CPT | Performed by: INTERNAL MEDICINE

## 2023-06-27 PROCEDURE — 36430 TRANSFUSION BLD/BLD COMPNT: CPT

## 2023-06-27 PROCEDURE — 63600175 PHARM REV CODE 636 W HCPCS: Performed by: FAMILY MEDICINE

## 2023-06-27 PROCEDURE — 83550 IRON BINDING TEST: CPT | Performed by: FAMILY MEDICINE

## 2023-06-27 PROCEDURE — 36415 COLL VENOUS BLD VENIPUNCTURE: CPT | Performed by: INTERNAL MEDICINE

## 2023-06-27 PROCEDURE — 85025 COMPLETE CBC W/AUTO DIFF WBC: CPT | Performed by: FAMILY MEDICINE

## 2023-06-27 PROCEDURE — 94761 N-INVAS EAR/PLS OXIMETRY MLT: CPT

## 2023-06-27 RX ORDER — HYDROCODONE BITARTRATE AND ACETAMINOPHEN 500; 5 MG/1; MG/1
TABLET ORAL
Status: DISCONTINUED | OUTPATIENT
Start: 2023-06-27 | End: 2023-07-01 | Stop reason: HOSPADM

## 2023-06-27 RX ADMIN — CEFTRIAXONE SODIUM 1 G: 1 INJECTION, POWDER, FOR SOLUTION INTRAMUSCULAR; INTRAVENOUS at 10:06

## 2023-06-27 RX ADMIN — CLONIDINE HYDROCHLORIDE 0.2 MG: 0.1 TABLET ORAL at 09:06

## 2023-06-27 RX ADMIN — METOPROLOL SUCCINATE 25 MG: 25 TABLET, EXTENDED RELEASE ORAL at 09:06

## 2023-06-27 RX ADMIN — PANTOPRAZOLE SODIUM 40 MG: 40 INJECTION, POWDER, FOR SOLUTION INTRAVENOUS at 09:06

## 2023-06-27 RX ADMIN — AMLODIPINE BESYLATE 5 MG: 5 TABLET ORAL at 09:06

## 2023-06-27 RX ADMIN — HYDRALAZINE HYDROCHLORIDE 50 MG: 50 TABLET, FILM COATED ORAL at 06:06

## 2023-06-27 RX ADMIN — ATORVASTATIN CALCIUM 40 MG: 40 TABLET, FILM COATED ORAL at 05:06

## 2023-06-27 RX ADMIN — ESCITALOPRAM OXALATE 20 MG: 10 TABLET ORAL at 09:06

## 2023-06-27 RX ADMIN — FAMOTIDINE 20 MG: 20 TABLET, FILM COATED ORAL at 09:06

## 2023-06-27 RX ADMIN — HYDRALAZINE HYDROCHLORIDE 50 MG: 50 TABLET, FILM COATED ORAL at 01:06

## 2023-06-27 RX ADMIN — ROPINIROLE HYDROCHLORIDE 0.25 MG: 0.25 TABLET, FILM COATED ORAL at 08:06

## 2023-06-27 RX ADMIN — Medication 6 MG: at 08:06

## 2023-06-27 NOTE — CONSULTS
" Inpatient Nutrition Assessment    Admit Date: 6/26/2023   Total duration of encounter: 1 day     Nutrition Recommendation/Prescription     Continue Clear Liquid Diet. Once medically appropriate ADAT to Renal, Low Residue Diet.    Add Boost Breeze BID. Once diet advanced change to Novasource Renal.    If appetite does not improve by follow up recommend consider appetite stimulant d/t early satiety     Continue to encourage PO intake and honor preferences as able.       RD to monitor patients Electrolytes, GI Function , Labs, PO Intake, and Weight and adjust MNT as needed.       Communication of Recommendations: reviewed with nurse and reviewed with patient    Nutrition Assessment     Malnutrition Assessment/Nutrition-Focused Physical Exam    Unable to perform at this time.     Chart Review    Reason Seen: continuous nutrition monitoring and physician consult for wound    Malnutrition Screening Tool Results   Have you recently lost weight without trying?: No  Have you been eating poorly because of a decreased appetite?: No   MST Score: 0     Diagnosis:  Acute Blood Loss Anemia, Constipation, HTN, NEYDA.     Relevant Medical History: Anxiety, CKD, Depression, HTN, and Obesity.    Nutrition-Related Medications: Atorvastatin, Toprol XL, IVF @ 100 ml/hr.   Calorie Containing IV Medications: no significant kcals from medications at this time    Nutrition-Related Labs: Date: (6/27):  , RBC- 2.81L, HGB- 6.7L, HCT- 21.5L, MCV- 76.5L, MCH- 23.8L, CO2- 18L, Cr- 2.09H, CrCl- 26.9, Alb- 2.9L, and Mag- 1.6L    Diet/PN Order: Diet clear liquid  Diet NPO  Oral Supplement Order: none  Tube Feeding Order: none  Appetite/Oral Intake: poor/not applicable No recorded PO intake at this time.   Factors Affecting Nutritional Intake: decreased appetite and early satiety  Food/Adventism/Cultural Preferences:  vegetables "broccoli and spinach"  Food Allergies: none reported and no known food allergies    Skin Integrity: bruised " "(ecchymotic)  Wound(s):       Comments    (): Patient in room with multiple family members. Patient reports poor appetite now and has been that way for a couple of months d/t early satiety. Patient believes she's lost ~20# and does not like vegetables. Willing to try ONS. GI: CONSTIPATION/LBM- (Nurse reports no more constipation), BRDN: 20, NO EDEMA NOTED, 24 HR I/O: 120/0.    Anthropometrics    Height: 5' 8" (172.7 cm) Height Method: Stated  Last Weight: 76.8 kg (169 lb 4.8 oz) (23 2301) Weight Method: Bed Scale  BMI (Calculated): 25.7  BMI Classification: normal (BMI 18.5-24.9)     Ideal Body Weight (IBW), Female: 140 lb     % Ideal Body Weight, Female (lb): 120.36 %                             Usual Weight Provided By: patient and EMR weight history    Wt Readings from Last 5 Encounters:   23 76.8 kg (169 lb 4.8 oz)   23 82.9 kg (182 lb 12.2 oz)   23 86.1 kg (189 lb 13.1 oz)   23 87.9 kg (193 lb 12.6 oz)   21 86.6 kg (190 lb 14.7 oz)     Weight Change(s) Since Admission:  Admit Weight: 82.6 kg (182 lb) (23 0919)  (): Patient reports ~20# weight loss, per EMR patient lost 13.4# (7.3%) weight loss in almost 2 months. Significant per time frame.     Estimated Needs    Weight Used For Calorie Calculations: 66.9 kg (147 lb 7.8 oz) (ABW)  Energy Calorie Requirements (kcal): 0799-7911 KCAL (26-30 KCAL/KG ABW)  Energy Need Method: Kcal/kg  Weight Used For Protein Calculations: 66.9 kg (147 lb 7.8 oz) (ABW)  Protein Requirements: 60-74 GM PRO (0.9-1.1 GM/KG ABW)  Fluid Requirements (mL): 1000 ML H2O + OUTPUT  Temp (24hrs), Av.4 °F (36.9 °C), Min:97.3 °F (36.3 °C), Max:99.5 °F (37.5 °C)         Enteral Nutrition    Patient not receiving enteral nutrition at this time.    Parenteral Nutrition    Patient not receiving parenteral nutrition support at this time.    Evaluation of Received Nutrient Intake    Calories: not meeting estimated needs  Protein: not meeting " estimated needs    Patient Education    Not applicable.         Nutrition Diagnosis     PES: Inadequate oral intake related to altered GI function as evidenced by conditions associated with diagnosis. (new)    Interventions/Goals     Intervention(s): general/healthful diet, modified composition of meals/snacks, commercial beverage, and collaboration with other providers  Goal: Meet Greater than 75% of nutritional needs by discharge. (new)    Monitoring & Evaluation     Dietitian will monitor Food and Beverage Intake, Energy Intake, Weight, Weight Change, and Gastrointestinal Profile.  Nutrition Risk/Follow-Up: high (follow-up in 1-4 days)   Please consult if re-assessment needed sooner.

## 2023-06-27 NOTE — SUBJECTIVE & OBJECTIVE
Past Medical History:   Diagnosis Date    Anxiety     Chronic renal disease stage 4     Colon cancer screening declined     Coronary artery disease     Depression     Hypertension     Irregular heart rhythm     Medication management     Obesity, unspecified     Osteoarthritis of knee     Pain, joint, shoulder region, right     Refused influenza vaccine     Refused pneumococcal vaccination        Past Surgical History:   Procedure Laterality Date    ANGIOPLASTY  11/20/2019    ESOPHAGOGASTRODUODENOSCOPY  10/29/2018    STENT, AORTA         Review of patient's allergies indicates:   Allergen Reactions    Codeine Other (See Comments)     Syncope  Other reaction(s): Syncope       No current facility-administered medications on file prior to encounter.     Current Outpatient Medications on File Prior to Encounter   Medication Sig    amLODIPine (NORVASC) 5 MG tablet Take 1 tablet (5 mg total) by mouth once daily.    atorvastatin (LIPITOR) 40 MG tablet Take 1 tablet (40 mg total) by mouth Daily.    busPIRone (BUSPAR) 15 MG tablet Take 1 tablet (15 mg total) by mouth 3 (three) times daily.    cloNIDine (CATAPRES) 0.2 MG tablet Take 1 tablet (0.2 mg total) by mouth 2 (two) times daily.    clopidogreL (PLAVIX) 75 mg tablet Take 1 tablet (75 mg total) by mouth Daily.    EScitalopram oxalate (LEXAPRO) 20 MG tablet Take 1 tablet (20 mg total) by mouth once daily.    hydrALAZINE (APRESOLINE) 50 MG tablet Take 1 tablet (50 mg total) by mouth every 8 (eight) hours.    losartan (COZAAR) 50 MG tablet Take 50 mg by mouth once daily.    rOPINIRole (REQUIP) 0.25 MG tablet Take 1 tablet (0.25 mg total) by mouth every evening.    dapagliflozin (FARXIGA) 10 mg tablet Take 1 tablet (10 mg total) by mouth once daily.    ferrous sulfate 324 mg (65 mg iron) TbEC Take 1 tablet (324 mg total) by mouth every other day.    metoprolol succinate (TOPROL-XL) 25 MG 24 hr tablet Take 1 tablet (25 mg total) by mouth once daily.     Family History     None       Tobacco Use    Smoking status: Every Day     Packs/day: 1.50     Types: Cigarettes     Passive exposure: Current    Smokeless tobacco: Never   Substance and Sexual Activity    Alcohol use: Never    Drug use: Never    Sexual activity: Not Currently     Review of Systems   Constitutional:  Negative for activity change, appetite change and fever.   Respiratory:  Negative for chest tightness, shortness of breath and wheezing.    Cardiovascular:  Negative for chest pain.   Gastrointestinal:  Negative for abdominal pain, constipation, diarrhea, nausea and vomiting.   Genitourinary:  Negative for dysuria.   Skin:  Negative for rash and wound.   Neurological:  Negative for tremors and headaches.   Objective:     Vital Signs (Most Recent):  Temp: 97.6 °F (36.4 °C) (06/27/23 1132)  Pulse: (!) 57 (06/27/23 1132)  Resp: 20 (06/27/23 1132)  BP: 110/82 (06/27/23 1132)  SpO2: 96 % (06/27/23 1132) Vital Signs (24h Range):  Temp:  [97.3 °F (36.3 °C)-99.5 °F (37.5 °C)] 97.6 °F (36.4 °C)  Pulse:  [57-85] 57  Resp:  [18-20] 20  SpO2:  [96 %-99 %] 96 %  BP: (110-145)/(46-82) 110/82     Weight: 76.8 kg (169 lb 4.8 oz)  Body mass index is 25.74 kg/m².     Physical Exam  Constitutional:       General: She is not in acute distress.     Appearance: Normal appearance. She is not ill-appearing, toxic-appearing or diaphoretic.   HENT:      Head: Normocephalic and atraumatic.      Right Ear: External ear normal.      Left Ear: External ear normal.      Nose: Nose normal. No congestion or rhinorrhea.      Mouth/Throat:      Mouth: Mucous membranes are moist.      Pharynx: Oropharynx is clear.   Eyes:      Extraocular Movements: Extraocular movements intact.      Conjunctiva/sclera: Conjunctivae normal.      Pupils: Pupils are equal, round, and reactive to light.   Neck:      Vascular: No carotid bruit.   Cardiovascular:      Rate and Rhythm: Normal rate and regular rhythm.      Pulses: Normal pulses.      Heart sounds: Normal heart  sounds. No murmur heard.  Pulmonary:      Effort: Pulmonary effort is normal. No respiratory distress.      Breath sounds: Normal breath sounds. No wheezing, rhonchi or rales.   Abdominal:      General: Abdomen is flat. Bowel sounds are normal. There is no distension.      Palpations: Abdomen is soft.      Tenderness: There is no abdominal tenderness. There is no guarding.   Musculoskeletal:         General: No swelling or tenderness. Normal range of motion.      Cervical back: Normal range of motion and neck supple. No tenderness.      Right lower leg: No edema.      Left lower leg: No edema.   Lymphadenopathy:      Cervical: No cervical adenopathy.   Skin:     General: Skin is warm and dry.      Coloration: Skin is not jaundiced or pale.      Findings: Bruising present.   Neurological:      General: No focal deficit present.      Mental Status: She is alert and oriented to person, place, and time. Mental status is at baseline.      Cranial Nerves: No cranial nerve deficit.      Motor: No weakness.      Coordination: Coordination normal.      Gait: Gait normal.   Psychiatric:         Mood and Affect: Mood normal.         Behavior: Behavior normal.         Thought Content: Thought content normal.         Judgment: Judgment normal.            CRANIAL NERVES     CN III, IV, VI   Pupils are equal, round, and reactive to light.     Significant Labs: All pertinent labs within the past 24 hours have been reviewed.  BMP:   Recent Labs   Lab 06/27/23  0451      K 3.6   CO2 18*   BUN 17.0   CREATININE 2.09*   CALCIUM 8.1*   MG 1.60*       CBC:   Recent Labs   Lab 06/26/23  0953 06/27/23  0451   WBC 14.12* 9.67   HGB 7.5* 6.7*   HCT 23.9* 21.5*   * 403*         Significant Imaging: I have reviewed all pertinent imaging results/findings within the past 24 hours.   O-T Advancement Flap Text: The defect edges were debeveled with a #15 scalpel blade.  Given the location of the defect, shape of the defect and the proximity to free margins an O-T advancement flap was deemed most appropriate.  Using a sterile surgical marker, an appropriate advancement flap was drawn incorporating the defect and placing the expected incisions within the relaxed skin tension lines where possible.    The area thus outlined was incised deep to adipose tissue with a #15 scalpel blade.  The skin margins were undermined to an appropriate distance in all directions utilizing iris scissors.

## 2023-06-27 NOTE — PLAN OF CARE
Problem: Adult Inpatient Plan of Care  Goal: Plan of Care Review  Outcome: Ongoing, Not Progressing  Goal: Patient-Specific Goal (Individualized)  Outcome: Ongoing, Not Progressing  Goal: Absence of Hospital-Acquired Illness or Injury  Outcome: Ongoing, Not Progressing  Goal: Optimal Comfort and Wellbeing  Outcome: Ongoing, Not Progressing  Goal: Readiness for Transition of Care  Outcome: Ongoing, Not Progressing     Problem: Fall Injury Risk  Goal: Absence of Fall and Fall-Related Injury  Outcome: Ongoing, Not Progressing     Problem: Impaired Wound Healing  Goal: Optimal Wound Healing  Outcome: Ongoing, Not Progressing     Problem: Fluid and Electrolyte Imbalance (Acute Kidney Injury/Impairment)  Goal: Fluid and Electrolyte Balance  Outcome: Ongoing, Not Progressing     Problem: Oral Intake Inadequate (Acute Kidney Injury/Impairment)  Goal: Optimal Nutrition Intake  Outcome: Ongoing, Not Progressing     Problem: Renal Function Impairment (Acute Kidney Injury/Impairment)  Goal: Effective Renal Function  Outcome: Ongoing, Not Progressing     Problem: Anemia  Goal: Anemia Symptom Improvement  Outcome: Ongoing, Not Progressing     Problem: UTI (Urinary Tract Infection)  Goal: Improved Infection Symptoms  Outcome: Ongoing, Not Progressing

## 2023-06-27 NOTE — PLAN OF CARE
Problem: Adult Inpatient Plan of Care  Goal: Plan of Care Review  Outcome: Ongoing, Progressing  Goal: Patient-Specific Goal (Individualized)  Outcome: Ongoing, Progressing  Goal: Absence of Hospital-Acquired Illness or Injury  Outcome: Ongoing, Progressing  Goal: Optimal Comfort and Wellbeing  Outcome: Ongoing, Progressing  Goal: Readiness for Transition of Care  Outcome: Ongoing, Progressing     Problem: Fall Injury Risk  Goal: Absence of Fall and Fall-Related Injury  Outcome: Ongoing, Progressing     Problem: Impaired Wound Healing  Goal: Optimal Wound Healing  Outcome: Ongoing, Progressing     Problem: Fluid and Electrolyte Imbalance (Acute Kidney Injury/Impairment)  Goal: Fluid and Electrolyte Balance  Outcome: Ongoing, Progressing     Problem: Oral Intake Inadequate (Acute Kidney Injury/Impairment)  Goal: Optimal Nutrition Intake  Outcome: Ongoing, Progressing     Problem: Renal Function Impairment (Acute Kidney Injury/Impairment)  Goal: Effective Renal Function  Outcome: Ongoing, Progressing     Problem: Anemia  Goal: Anemia Symptom Improvement  Outcome: Ongoing, Progressing     Problem: UTI (Urinary Tract Infection)  Goal: Improved Infection Symptoms  Outcome: Ongoing, Progressing

## 2023-06-27 NOTE — PROGRESS NOTES
[facility] Medicine  Progress Note    Patient Name: Rosalie Campbell  MRN: [id number]  Patient Class: OP- Observation   Admission Date: 6/26/2023  Length of Stay: 0 days  Attending Physician: Raquel Vogel MD  Primary Care Provider: JASWINDER Mosqueda        Subjective:     Principal Problem:Acute blood loss anemia        HPI:   Fatigue    Constipation    Shaking       C/o shaking, weakness onset Friday pm constipation onset x 2 weeks, pt reports having a normal bm this am      71 yo followed by Liang Mccracken at the [facility] clinic.    Patient presents to the emergency room with weakness and shivering which started this morning.  She suffers from anxiety and had a similar shivering episode on Saturday morning, for which she took an anxiety pill.  It did not stop and to about an hour and a half later.  But since then she has been feeling a little weak and tired and this morning had another shivering episode. , concerned that it is anxiety.  He has had some constipation since starting iron pills about 2 weeks ago for chronic anemia, but this resolved this morning and she is feeling much better with that.  She denies any abdominal pain.  Does state that she has been having melena on and off for a while, does take Plavix for cardiac stents.  Pt was evaluated in ER and found to have HGB of 7.5 down from 9 about 2 months ago as well as worsening BUN/Cr up to 2.5 which was 1.9 in April.  She still has not had a BM since taking one iron pill Rx by her PCP 2 weeks ago.      Overview/Hospital Course:  06/27/2023 patient doing well.  Surgery has been consulted to evaluate the GI bleeding.  Creatinine is around 2.  Hemoglobin is down in the sixes.  We will transfuse blood and get her prepared for scopes tomorrow.      Past Medical History:   Diagnosis Date    Anxiety     Chronic renal disease stage 4     Colon cancer screening declined     Coronary artery disease     Depression      Hypertension     Irregular heart rhythm     Medication management     Obesity, unspecified     Osteoarthritis of knee     Pain, joint, shoulder region, right     Refused influenza vaccine     Refused pneumococcal vaccination        Past Surgical History:   Procedure Laterality Date    ANGIOPLASTY  11/20/2019    ESOPHAGOGASTRODUODENOSCOPY  10/29/2018    STENT, AORTA         Review of patient's allergies indicates:   Allergen Reactions    Codeine Other (See Comments)     Syncope  Other reaction(s): Syncope       No current facility-administered medications on file prior to encounter.     Current Outpatient Medications on File Prior to Encounter   Medication Sig    amLODIPine (NORVASC) 5 MG tablet Take 1 tablet (5 mg total) by mouth once daily.    atorvastatin (LIPITOR) 40 MG tablet Take 1 tablet (40 mg total) by mouth Daily.    busPIRone (BUSPAR) 15 MG tablet Take 1 tablet (15 mg total) by mouth 3 (three) times daily.    cloNIDine (CATAPRES) 0.2 MG tablet Take 1 tablet (0.2 mg total) by mouth 2 (two) times daily.    clopidogreL (PLAVIX) 75 mg tablet Take 1 tablet (75 mg total) by mouth Daily.    EScitalopram oxalate (LEXAPRO) 20 MG tablet Take 1 tablet (20 mg total) by mouth once daily.    hydrALAZINE (APRESOLINE) 50 MG tablet Take 1 tablet (50 mg total) by mouth every 8 (eight) hours.    losartan (COZAAR) 50 MG tablet Take 50 mg by mouth once daily.    rOPINIRole (REQUIP) 0.25 MG tablet Take 1 tablet (0.25 mg total) by mouth every evening.    dapagliflozin (FARXIGA) 10 mg tablet Take 1 tablet (10 mg total) by mouth once daily.    ferrous sulfate 324 mg (65 mg iron) TbEC Take 1 tablet (324 mg total) by mouth every other day.    metoprolol succinate (TOPROL-XL) 25 MG 24 hr tablet Take 1 tablet (25 mg total) by mouth once daily.     Family History    None       Tobacco Use    Smoking status: Every Day     Packs/day: 1.50     Types: Cigarettes     Passive exposure: Current    Smokeless tobacco:  Never   Substance and Sexual Activity    Alcohol use: Never    Drug use: Never    Sexual activity: Not Currently     Review of Systems   Constitutional:  Negative for activity change, appetite change and fever.   Respiratory:  Negative for chest tightness, shortness of breath and wheezing.    Cardiovascular:  Negative for chest pain.   Gastrointestinal:  Negative for abdominal pain, constipation, diarrhea, nausea and vomiting.   Genitourinary:  Negative for dysuria.   Skin:  Negative for rash and wound.   Neurological:  Negative for tremors and headaches.   Objective:     Vital Signs (Most Recent):  Temp: 97.6 °F (36.4 °C) (06/27/23 1132)  Pulse: (!) 57 (06/27/23 1132)  Resp: 20 (06/27/23 1132)  BP: 110/82 (06/27/23 1132)  SpO2: 96 % (06/27/23 1132) Vital Signs (24h Range):  Temp:  [97.3 °F (36.3 °C)-99.5 °F (37.5 °C)] 97.6 °F (36.4 °C)  Pulse:  [57-85] 57  Resp:  [18-20] 20  SpO2:  [96 %-99 %] 96 %  BP: (110-145)/(46-82) 110/82     Weight: 76.8 kg (169 lb 4.8 oz)  Body mass index is 25.74 kg/m².     Physical Exam  Constitutional:       General: She is not in acute distress.     Appearance: Normal appearance. She is not ill-appearing, toxic-appearing or diaphoretic.   HENT:      Head: Normocephalic and atraumatic.      Right Ear: External ear normal.      Left Ear: External ear normal.      Nose: Nose normal. No congestion or rhinorrhea.      Mouth/Throat:      Mouth: Mucous membranes are moist.      Pharynx: Oropharynx is clear.   Eyes:      Extraocular Movements: Extraocular movements intact.      Conjunctiva/sclera: Conjunctivae normal.      Pupils: Pupils are equal, round, and reactive to light.   Neck:      Vascular: No carotid bruit.   Cardiovascular:      Rate and Rhythm: Normal rate and regular rhythm.      Pulses: Normal pulses.      Heart sounds: Normal heart sounds. No murmur heard.  Pulmonary:      Effort: Pulmonary effort is normal. No respiratory distress.      Breath sounds: Normal breath sounds.  No wheezing, rhonchi or rales.   Abdominal:      General: Abdomen is flat. Bowel sounds are normal. There is no distension.      Palpations: Abdomen is soft.      Tenderness: There is no abdominal tenderness. There is no guarding.   Musculoskeletal:         General: No swelling or tenderness. Normal range of motion.      Cervical back: Normal range of motion and neck supple. No tenderness.      Right lower leg: No edema.      Left lower leg: No edema.   Lymphadenopathy:      Cervical: No cervical adenopathy.   Skin:     General: Skin is warm and dry.      Coloration: Skin is not jaundiced or pale.      Findings: Bruising present.   Neurological:      General: No focal deficit present.      Mental Status: She is alert and oriented to person, place, and time. Mental status is at baseline.      Cranial Nerves: No cranial nerve deficit.      Motor: No weakness.      Coordination: Coordination normal.      Gait: Gait normal.   Psychiatric:         Mood and Affect: Mood normal.         Behavior: Behavior normal.         Thought Content: Thought content normal.         Judgment: Judgment normal.            CRANIAL NERVES     CN III, IV, VI   Pupils are equal, round, and reactive to light.     Significant Labs: All pertinent labs within the past 24 hours have been reviewed.  BMP:   Recent Labs   Lab 06/27/23  0451      K 3.6   CO2 18*   BUN 17.0   CREATININE 2.09*   CALCIUM 8.1*   MG 1.60*       CBC:   Recent Labs   Lab 06/26/23  0953 06/27/23  0451   WBC 14.12* 9.67   HGB 7.5* 6.7*   HCT 23.9* 21.5*   * 403*         Significant Imaging: I have reviewed all pertinent imaging results/findings within the past 24 hours.      Assessment/Plan:      * Acute blood loss anemia  Check iron levels, b12  cosnult surgery for possible EGD  06/27/2023 we will transfuse 2 units of packed red blood cells.  Surgery will evaluate for endoscopy.  Keep her NPO after midnight.    NEYDA (acute kidney injury)  Patient with acute kidney  injury likely due to IVVD/dehydration NEYDA is currently worsening. Labs reviewed- Renal function/electrolytes with Estimated Creatinine Clearance: 26.9 mL/min (A) (based on SCr of 2.09 mg/dL (H)). according to latest data. Monitor urine output and serial BMP and adjust therapy as needed. Avoid nephrotoxins and renally dose meds for GFR listed above.     Cr was 1.9 in April, now 2.5  Give ivf  Recheck in am    Constipation  Give lactulose      Hypertension  Chronic, controlled.  Latest blood pressure and vitals reviewed-   Temp:  [97.3 °F (36.3 °C)-99.5 °F (37.5 °C)]   Pulse:  [57-85]   Resp:  [18-20]   BP: (110-145)/(46-82)   SpO2:  [96 %-99 %] .   Home meds for hypertension were reviewed and noted below.   Hypertension Medications             amLODIPine (NORVASC) 5 MG tablet Take 1 tablet (5 mg total) by mouth once daily.    cloNIDine (CATAPRES) 0.2 MG tablet Take 1 tablet (0.2 mg total) by mouth 2 (two) times daily.    hydrALAZINE (APRESOLINE) 50 MG tablet Take 1 tablet (50 mg total) by mouth every 8 (eight) hours.    losartan (COZAAR) 50 MG tablet Take 50 mg by mouth once daily.    metoprolol succinate (TOPROL-XL) 25 MG 24 hr tablet Take 1 tablet (25 mg total) by mouth once daily.          While in the hospital, will manage blood pressure as follows; Continue home antihypertensive regimen    Will utilize p.r.n. blood pressure medication only if patient's blood pressure greater than  180/110 and she develops symptoms such as worsening chest pain or shortness of breath.          VTE Risk Mitigation (From admission, onward)         Ordered     IP VTE HIGH RISK PATIENT  Once         06/26/23 1331     Place sequential compression device  Until discontinued         06/26/23 1331                Discharge Planning   PARRIS: 6/29/2023     Code Status: Full Code   Is the patient medically ready for discharge?:     Reason for patient still in hospital (select all that apply): Patient unstable  Discharge Plan A: Home                   Darren Meier MD  Department of Hospital Medicine   [facility]

## 2023-06-27 NOTE — ASSESSMENT & PLAN NOTE
Check iron levels, b12  cosnult surgery for possible EGD  06/27/2023 we will transfuse 2 units of packed red blood cells.  Surgery will evaluate for endoscopy.  Keep her NPO after midnight.

## 2023-06-27 NOTE — ASSESSMENT & PLAN NOTE
Patient with acute kidney injury likely due to IVVD/dehydration NEYDA is currently worsening. Labs reviewed- Renal function/electrolytes with Estimated Creatinine Clearance: 26.9 mL/min (A) (based on SCr of 2.09 mg/dL (H)). according to latest data. Monitor urine output and serial BMP and adjust therapy as needed. Avoid nephrotoxins and renally dose meds for GFR listed above.     Cr was 1.9 in April, now 2.5  Give ivf  Recheck in am

## 2023-06-27 NOTE — ASSESSMENT & PLAN NOTE
Chronic, controlled.  Latest blood pressure and vitals reviewed-   Temp:  [97.3 °F (36.3 °C)-99.5 °F (37.5 °C)]   Pulse:  [57-85]   Resp:  [18-20]   BP: (110-145)/(46-82)   SpO2:  [96 %-99 %] .   Home meds for hypertension were reviewed and noted below.   Hypertension Medications             amLODIPine (NORVASC) 5 MG tablet Take 1 tablet (5 mg total) by mouth once daily.    cloNIDine (CATAPRES) 0.2 MG tablet Take 1 tablet (0.2 mg total) by mouth 2 (two) times daily.    hydrALAZINE (APRESOLINE) 50 MG tablet Take 1 tablet (50 mg total) by mouth every 8 (eight) hours.    losartan (COZAAR) 50 MG tablet Take 50 mg by mouth once daily.    metoprolol succinate (TOPROL-XL) 25 MG 24 hr tablet Take 1 tablet (25 mg total) by mouth once daily.          While in the hospital, will manage blood pressure as follows; Continue home antihypertensive regimen    Will utilize p.r.n. blood pressure medication only if patient's blood pressure greater than  180/110 and she develops symptoms such as worsening chest pain or shortness of breath.

## 2023-06-27 NOTE — HOSPITAL COURSE
06/27/2023 patient doing well.  Surgery has been consulted to evaluate the GI bleeding.  Creatinine is around 2.  Hemoglobin is down in the sixes.  We will transfuse blood and get her prepared for scopes tomorrow.  08/20/2023 patient doing well waiting for scopes.  H&H is stable she is not had any further bleeding  06/29/2023 patient being set up for colonoscopy tomorrow.  Bleeding scan pending.  H&H stable.  06/30/2023 patient's colonoscopy was canceled today will be done tomorrow.  H&H is stable and she is in good spirits.  07/01/2023 brief discharge summary elderly female on Plavix for coronary stents presented with GI bleeding.  Hospital course patient was admitted to the hospital she was transfused with blood.  Surgery was consulted.  Plavix was held.  She underwent EGD which did not show any evidence of bleeding.  Prep her for colonoscopy however patient declined she wants to go home and do this as an outpatient.  She is followed by Sheila Reynolds.  She will go home on p.o. Protonix.  Due to coronary stents will restart her Plavix.  She will come back to the emergency room she has any further bleeding or feels bad.  Plan is to do an outpatient colonoscopy through Dr. Hansen

## 2023-06-28 ENCOUNTER — ANESTHESIA (OUTPATIENT)
Dept: GASTROENTEROLOGY | Facility: HOSPITAL | Age: 73
DRG: 378 | End: 2023-06-28
Payer: MEDICARE

## 2023-06-28 ENCOUNTER — ANESTHESIA EVENT (OUTPATIENT)
Dept: GASTROENTEROLOGY | Facility: HOSPITAL | Age: 73
DRG: 378 | End: 2023-06-28
Payer: MEDICARE

## 2023-06-28 LAB
ANION GAP SERPL CALC-SCNC: 7 MEQ/L (ref 2–13)
BACTERIA UR CULT: ABNORMAL
BASOPHILS # BLD AUTO: 0.04 X10(3)/MCL (ref 0.01–0.08)
BASOPHILS NFR BLD AUTO: 0.4 % (ref 0.1–1.2)
BUN SERPL-MCNC: 15 MG/DL (ref 7–20)
CALCIUM SERPL-MCNC: 7.9 MG/DL (ref 8.4–10.2)
CHLORIDE SERPL-SCNC: 112 MMOL/L (ref 98–110)
CO2 SERPL-SCNC: 16 MMOL/L (ref 21–32)
CREAT SERPL-MCNC: 1.88 MG/DL (ref 0.66–1.25)
CREAT/UREA NIT SERPL: 8 (ref 12–20)
EOSINOPHIL # BLD AUTO: 0.17 X10(3)/MCL (ref 0.04–0.36)
EOSINOPHIL NFR BLD AUTO: 1.8 % (ref 0.7–7)
ERYTHROCYTE [DISTWIDTH] IN BLOOD BY AUTOMATED COUNT: 16.1 % (ref 11–14.5)
GFR SERPLBLD CREATININE-BSD FMLA CKD-EPI: 28 MLS/MIN/1.73/M2
GLUCOSE SERPL-MCNC: 99 MG/DL (ref 70–115)
HCT VFR BLD AUTO: 27 % (ref 36–48)
HGB BLD-MCNC: 8.8 G/DL (ref 11.8–16)
IMM GRANULOCYTES # BLD AUTO: 0.05 X10(3)/MCL (ref 0–0.03)
IMM GRANULOCYTES NFR BLD AUTO: 0.5 % (ref 0–0.5)
LYMPHOCYTES # BLD AUTO: 0.88 X10(3)/MCL (ref 1.16–3.74)
LYMPHOCYTES NFR BLD AUTO: 9.4 % (ref 20–55)
MCH RBC QN AUTO: 26 PG (ref 27–34)
MCHC RBC AUTO-ENTMCNC: 32.6 G/DL (ref 31–37)
MCV RBC AUTO: 79.9 FL (ref 79–99)
MONOCYTES # BLD AUTO: 0.86 X10(3)/MCL (ref 0.24–0.36)
MONOCYTES NFR BLD AUTO: 9.1 % (ref 4.7–12.5)
NEUTROPHILS # BLD AUTO: 7.41 X10(3)/MCL (ref 1.56–6.13)
NEUTROPHILS NFR BLD AUTO: 78.8 % (ref 37–73)
NRBC BLD AUTO-RTO: 0 %
PLATELET # BLD AUTO: 361 X10(3)/MCL (ref 140–371)
PMV BLD AUTO: 9.8 FL (ref 9.4–12.4)
POTASSIUM SERPL-SCNC: 3.5 MMOL/L (ref 3.5–5.1)
RBC # BLD AUTO: 3.38 X10(6)/MCL (ref 4–5.1)
SODIUM SERPL-SCNC: 135 MMOL/L (ref 135–145)
WBC # SPEC AUTO: 9.41 X10(3)/MCL (ref 4–11.5)

## 2023-06-28 PROCEDURE — 21400001 HC TELEMETRY ROOM

## 2023-06-28 PROCEDURE — 36415 COLL VENOUS BLD VENIPUNCTURE: CPT | Performed by: INTERNAL MEDICINE

## 2023-06-28 PROCEDURE — 63600175 PHARM REV CODE 636 W HCPCS: Performed by: FAMILY MEDICINE

## 2023-06-28 PROCEDURE — D9220A PRA ANESTHESIA: ICD-10-PCS | Mod: ,,, | Performed by: NURSE ANESTHETIST, CERTIFIED REGISTERED

## 2023-06-28 PROCEDURE — 25000003 PHARM REV CODE 250: Performed by: SURGERY

## 2023-06-28 PROCEDURE — 25000003 PHARM REV CODE 250: Performed by: FAMILY MEDICINE

## 2023-06-28 PROCEDURE — 63600175 PHARM REV CODE 636 W HCPCS: Performed by: NURSE ANESTHETIST, CERTIFIED REGISTERED

## 2023-06-28 PROCEDURE — 43239 EGD BIOPSY SINGLE/MULTIPLE: CPT | Performed by: SURGERY

## 2023-06-28 PROCEDURE — C9113 INJ PANTOPRAZOLE SODIUM, VIA: HCPCS | Performed by: FAMILY MEDICINE

## 2023-06-28 PROCEDURE — 94799 UNLISTED PULMONARY SVC/PX: CPT

## 2023-06-28 PROCEDURE — 85025 COMPLETE CBC W/AUTO DIFF WBC: CPT | Performed by: INTERNAL MEDICINE

## 2023-06-28 PROCEDURE — 25000003 PHARM REV CODE 250: Performed by: NURSE ANESTHETIST, CERTIFIED REGISTERED

## 2023-06-28 PROCEDURE — D9220A PRA ANESTHESIA: Mod: ,,, | Performed by: NURSE ANESTHETIST, CERTIFIED REGISTERED

## 2023-06-28 PROCEDURE — 94761 N-INVAS EAR/PLS OXIMETRY MLT: CPT

## 2023-06-28 PROCEDURE — 80048 BASIC METABOLIC PNL TOTAL CA: CPT | Performed by: INTERNAL MEDICINE

## 2023-06-28 PROCEDURE — 11000001 HC ACUTE MED/SURG PRIVATE ROOM

## 2023-06-28 RX ORDER — MUPIROCIN 20 MG/G
1 OINTMENT TOPICAL 2 TIMES DAILY
Status: DISCONTINUED | OUTPATIENT
Start: 2023-06-28 | End: 2023-07-01 | Stop reason: HOSPADM

## 2023-06-28 RX ORDER — LIDOCAINE HYDROCHLORIDE 20 MG/ML
INJECTION, SOLUTION EPIDURAL; INFILTRATION; INTRACAUDAL; PERINEURAL
Status: DISCONTINUED | OUTPATIENT
Start: 2023-06-28 | End: 2023-06-28

## 2023-06-28 RX ORDER — GLYCOPYRROLATE 0.2 MG/ML
INJECTION INTRAMUSCULAR; INTRAVENOUS
Status: DISCONTINUED | OUTPATIENT
Start: 2023-06-28 | End: 2023-06-28

## 2023-06-28 RX ORDER — PROPOFOL 10 MG/ML
VIAL (ML) INTRAVENOUS
Status: DISCONTINUED | OUTPATIENT
Start: 2023-06-28 | End: 2023-06-28

## 2023-06-28 RX ORDER — SODIUM CHLORIDE 9 MG/ML
INJECTION, SOLUTION INTRAVENOUS CONTINUOUS
Status: DISCONTINUED | OUTPATIENT
Start: 2023-06-28 | End: 2023-07-01 | Stop reason: HOSPADM

## 2023-06-28 RX ORDER — HYDROCODONE BITARTRATE AND ACETAMINOPHEN 5; 325 MG/1; MG/1
1 TABLET ORAL EVERY 4 HOURS PRN
Status: DISCONTINUED | OUTPATIENT
Start: 2023-06-28 | End: 2023-07-01 | Stop reason: HOSPADM

## 2023-06-28 RX ADMIN — CLONIDINE HYDROCHLORIDE 0.2 MG: 0.1 TABLET ORAL at 08:06

## 2023-06-28 RX ADMIN — ROPINIROLE HYDROCHLORIDE 0.25 MG: 0.25 TABLET, FILM COATED ORAL at 08:06

## 2023-06-28 RX ADMIN — METOPROLOL SUCCINATE 25 MG: 25 TABLET, EXTENDED RELEASE ORAL at 09:06

## 2023-06-28 RX ADMIN — PROPOFOL 30 MG: 10 INJECTION, EMULSION INTRAVENOUS at 03:06

## 2023-06-28 RX ADMIN — PROPOFOL 40 MG: 10 INJECTION, EMULSION INTRAVENOUS at 03:06

## 2023-06-28 RX ADMIN — CEFTRIAXONE SODIUM 1 G: 1 INJECTION, POWDER, FOR SOLUTION INTRAMUSCULAR; INTRAVENOUS at 12:06

## 2023-06-28 RX ADMIN — ATORVASTATIN CALCIUM 40 MG: 40 TABLET, FILM COATED ORAL at 05:06

## 2023-06-28 RX ADMIN — SODIUM CHLORIDE: 9 INJECTION, SOLUTION INTRAVENOUS at 03:06

## 2023-06-28 RX ADMIN — MUPIROCIN 1 G: 20 OINTMENT TOPICAL at 08:06

## 2023-06-28 RX ADMIN — WHITE PETROLATUM 41 % TOPICAL OINTMENT: OINTMENT at 10:06

## 2023-06-28 RX ADMIN — SODIUM CHLORIDE: 9 INJECTION, SOLUTION INTRAVENOUS at 10:06

## 2023-06-28 RX ADMIN — WHITE PETROLATUM 41 % TOPICAL OINTMENT: OINTMENT at 05:06

## 2023-06-28 RX ADMIN — LIDOCAINE HYDROCHLORIDE 4 ML: 20 INJECTION, SOLUTION EPIDURAL; INFILTRATION; INTRACAUDAL; PERINEURAL at 03:06

## 2023-06-28 RX ADMIN — WHITE PETROLATUM 41 % TOPICAL OINTMENT: OINTMENT at 12:06

## 2023-06-28 RX ADMIN — FAMOTIDINE 20 MG: 20 TABLET, FILM COATED ORAL at 09:06

## 2023-06-28 RX ADMIN — GLYCOPYRROLATE 0.2 MG: 0.2 INJECTION INTRAMUSCULAR; INTRAVENOUS at 03:06

## 2023-06-28 RX ADMIN — PANTOPRAZOLE SODIUM 40 MG: 40 INJECTION, POWDER, FOR SOLUTION INTRAVENOUS at 09:06

## 2023-06-28 RX ADMIN — HYDRALAZINE HYDROCHLORIDE 50 MG: 50 TABLET, FILM COATED ORAL at 10:06

## 2023-06-28 NOTE — PROGRESS NOTES
[facility]-Med/Surg  Wound Care    Patient Name:  Rosalie Campbell   MRN:  [id number]  Date: 6/28/2023  Diagnosis: Acute blood loss anemia    History:     Past Medical History:   Diagnosis Date    Anxiety     Chronic renal disease stage 4     Colon cancer screening declined     Coronary artery disease     Depression     Hypertension     Irregular heart rhythm     Medication management     Obesity, unspecified     Osteoarthritis of knee     Pain, joint, shoulder region, right     Refused influenza vaccine     Refused pneumococcal vaccination        Social History     Socioeconomic History    Marital status:    Tobacco Use    Smoking status: Every Day     Packs/day: 1.50     Types: Cigarettes     Passive exposure: Current    Smokeless tobacco: Never   Substance and Sexual Activity    Alcohol use: Never    Drug use: Never    Sexual activity: Not Currently     Social Determinants of Health     Financial Resource Strain: Low Risk     Difficulty of Paying Living Expenses: Not very hard   Food Insecurity: No Food Insecurity    Worried About Running Out of Food in the Last Year: Never true    Ran Out of Food in the Last Year: Never true   Transportation Needs: No Transportation Needs    Lack of Transportation (Medical): No    Lack of Transportation (Non-Medical): No   Physical Activity: Insufficiently Active    Days of Exercise per Week: 3 days    Minutes of Exercise per Session: 10 min   Stress: Stress Concern Present    Feeling of Stress : Rather much   Social Connections: Moderately Isolated    Frequency of Communication with Friends and Family: Twice a week    Frequency of Social Gatherings with Friends and Family: More than three times a week    Attends Anabaptism Services: More than 4 times per year    Active Member of Clubs or Organizations: No    Attends Club or Organization Meetings: Never    Marital Status:    Housing Stability: Low Risk     Unable to Pay for Housing in the Last Year: No     Number of Places Lived in the Last Year: 1    Unstable Housing in the Last Year: No       Precautions:     Allergies as of 06/26/2023 - Reviewed 06/26/2023   Allergen Reaction Noted    Codeine Other (See Comments) 01/05/2023       WOC Assessment Details/Treatment        06/28/23 0950   WOCN Assessment   WOCN Total Time (mins) 40   Visit Date 06/28/23   Visit Time 0900   Consult Type New   WOCN Speciality Wound   Number of Wounds 1   Intervention assessed;applied   Teaching on-going        Altered Skin Integrity 06/28/23 0949 Right Groin Moisture associated dermatitis   Date First Assessed/Time First Assessed: 06/28/23 0949   Side: Right  Location: Groin  Primary Wound Type: Moisture associated dermatitis   Wound Image    Dressing Appearance Open to air   Drainage Amount None   Appearance Intact;Red   Tissue loss description Not applicable   Care Cleansed with:;Sterile normal saline  (applied barrier cream)        Altered Skin Integrity 06/28/23 0950 Left Groin Moisture associated dermatitis   Date First Assessed/Time First Assessed: 06/28/23 0950   Side: Left  Location: Groin  Primary Wound Type: Moisture associated dermatitis   Wound Image    Dressing Appearance Open to air   Drainage Amount None   Appearance Intact;Red   Tissue loss description Not applicable   Care Cleansed with:;Sterile normal saline  (applied barrier cream)        Altered Skin Integrity 06/28/23 0952 Gluteal cleft Moisture associated dermatitis   Date First Assessed/Time First Assessed: 06/28/23 0952   Altered Skin Integrity Present on Admission - Did Patient arrive to the hospital with altered skin?: yes  Location: Gluteal cleft  Primary Wound Type: Moisture associated dermatitis   Wound Image     Dressing Appearance Open to air   Drainage Amount Scant   Drainage Characteristics/Odor Serosanguineous   Appearance Pink;Smooth;Moist   Tissue loss description Partial thickness   Wound Edges Defined   Wound Length (cm) 1.2 cm   Wound Width (cm) 0.2  cm   Wound Depth (cm) 0.1 cm   Wound Volume (cm^3) 0.024 cm^3   Wound Surface Area (cm^2) 0.24 cm^2   Care Cleansed with:;Sterile normal saline  (applied barrier cream)      Orders placed.     06/28/2023

## 2023-06-28 NOTE — CONSULTS
Patient is a 72-yr-old female followed by Duke Mccracken at the [facility] clinic. Patient presents to ER with weakness and shivering which started this morning.  She suffers from anxiety and had a similar shivering episode on Saturday morning, for which she took an anxiety pill.  It did not stop and to about an hour and a half later.  But since then she has been feeling a little weak and tired and this morning had another shivering episode, concerned that it is anxiety. Patient has had some constipation since starting iron pills about 2 weeks ago for chronic anemia, but this resolved this morning and she is feeling much better with that.  She denied any abdominal pain..  Does state that she has been having melena on and off for a while, does take Plavix for cardiac stents. Patient was evaluated in ER and found to have HGB of 7.5 down from about 9 about 2 months ago as well as worsening Bun/Cr up to 2.5 which was 1.9 in April. She still has not had a BM since taking one iron pill Rx by her PCP 2 weeks ago.    Review of patients' allergies indicates:  Allergens  Codeine reaction on patient is Syncope    Past Medical History  Diagnosis  Anxiety  Chronic renal disease stage 4  Colon cancer screening declined  Coronary Artery Disease(S/P stents -Angioplasty, Dr. Nagy, [facility])  Depression  Hypertension  Irregular Heart Rhythm(? Atrial Fib- not on blood thinners)  Medication Management  Obesity, Unspecified -Lost 20# Unexplained.   Osteoarthritis of Knee   Right Joint pain shoulder region    Past Surgical History:  Procedure  No Colonoscopy  EGD(Esophagogastroduodenoscopy) - 4-6 yrs ago, Bleeding Ulcer  Angioplasty(every 3-4 yrs)   Stent, Aorta  Stress test >4 yrs ago, Dr. Nagy  Echocardiogram     Immunizations  Dtap vaccine > 5yrs  No Flu vaccine  No Pneumonia vaccine  No Shingles vaccine  No Covid 19 vaccine  No Hepatitis vaccine    Family History  Mother  of Pancreatic Ca  Father  from MI(Myocardial  "Infarct)  Brother remission from Lympha carcoma  Brother is bipolar schizophrenic    Social History:  Smoker smokes 1/2 lise cigarettes per day for 55 yrs  Alcohol use Myrtle   No Drugs  No  service  No snf time  No Rehab  Employment  at [employer]  Unmarried    X2   AB0  1Son, pharmacy tech, J[place]  Resides in [place]  PCP is Liang Taveras,CELSA-KATHLEEN    Review of Systems:  10 pt ROS Performed and is negative unless noted    Objective   Vital Signs    2023   1305  Temp is 99 F (37.2 C)  Pulse is 64  Respirations is 20  BP is 122/52  SpO2 is 99%  Weight is 76.5 kg(168 lb 9.6 oz)  Height is 5' 8" (172.7 cm)  Body Mass Index is 25.64 kg/m2    Physical Exam  Patient is in normal appearance and is not in acute distress  Head is atraumatic. Mucous membranes are moist  Extraocular movements intact and pupils are equal, round, and reactive to light and patient wears cheaters to read.   No carotid bruit  Heart rate and rhythm is normal  Pulmonary efforts are normal with no respiratory distress.   Abdomen is flat with no distention, and bowel sounds are normal  Normal range of motion with no edema of the  bilateral extremities   No cervical adenopathy  Skin is warm and dry   Patient is alert, and oriented to person, place and time. Gait is normal. Mental status is at baseline and coordination is normal.  Mood and behavior is normal.     Laboratory Findings  CBC 2023  0953  14.12 >  7.5 /23.9   47.1<     79.9 /26.0  CMP   2023  0953    135/3.5     112 /16     15.0/1.88     99<     7.9  /          Assessment  Patient is a 72-yr-old female followed by Duke Mccracken at the [facility] clinic. Patient presents to ER with weakness and shivering which started this morning.  She suffers from anxiety and had a similar shivering episode on Saturday morning, for which she took an anxiety pill.  It did not stop and to about an hour and a half later.  But since then she has been feeling " a little weak and tired and this morning had another shivering episode, concerned that it is anxiety. Patient has had some constipation since starting iron pills about 2 weeks ago for chronic anemia, but this resolved this morning and she is feeling much better with that.  She denied any abdominal pain..  Does state that she has been having melena on and off for a while, does take Plavix for cardiac stents. Patient was evaluated in ER and found to have HGB of 7.5 down from about 9 about 2 months ago as well as worsening Bun/Cr up to 2.5 which was 1.9 in April. She still has not had a BM since taking one iron pill Rx by her PCP 2 weeks ago.      Plan  Bleeding Scan 24 hr   EGD in AM  Colonoscopy to follow, after above

## 2023-06-28 NOTE — PROGRESS NOTES
[facility] Medicine  Progress Note    Patient Name: Rosalie Campbell  MRN: [id number]  Patient Class: IP- Inpatient   Admission Date: 6/26/2023  Length of Stay: 1 days  Attending Physician: Raquel Vogel MD  Primary Care Provider: JASWINDER Mosqueda        Subjective:     Principal Problem:Acute blood loss anemia        HPI:   Fatigue    Constipation    Shaking       C/o shaking, weakness onset Friday pm constipation onset x 2 weeks, pt reports having a normal bm this am      73 yo followed by Liang Mccracken at the [facility] clinic.    Patient presents to the emergency room with weakness and shivering which started this morning.  She suffers from anxiety and had a similar shivering episode on Saturday morning, for which she took an anxiety pill.  It did not stop and to about an hour and a half later.  But since then she has been feeling a little weak and tired and this morning had another shivering episode. , concerned that it is anxiety.  He has had some constipation since starting iron pills about 2 weeks ago for chronic anemia, but this resolved this morning and she is feeling much better with that.  She denies any abdominal pain.  Does state that she has been having melena on and off for a while, does take Plavix for cardiac stents.  Pt was evaluated in ER and found to have HGB of 7.5 down from 9 about 2 months ago as well as worsening BUN/Cr up to 2.5 which was 1.9 in April.  She still has not had a BM since taking one iron pill Rx by her PCP 2 weeks ago.      Overview/Hospital Course:  06/27/2023 patient doing well.  Surgery has been consulted to evaluate the GI bleeding.  Creatinine is around 2.  Hemoglobin is down in the sixes.  We will transfuse blood and get her prepared for scopes tomorrow.  08/20/2023 patient doing well waiting for scopes.  H&H is stable she is not had any further bleeding      Past Medical History:   Diagnosis Date    Anxiety     Chronic renal disease  stage 4     Colon cancer screening declined     Coronary artery disease     Depression     Hypertension     Irregular heart rhythm     Medication management     Obesity, unspecified     Osteoarthritis of knee     Pain, joint, shoulder region, right     Refused influenza vaccine     Refused pneumococcal vaccination        Past Surgical History:   Procedure Laterality Date    ANGIOPLASTY  11/20/2019    ESOPHAGOGASTRODUODENOSCOPY  10/29/2018    STENT, AORTA         Review of patient's allergies indicates:   Allergen Reactions    Codeine Other (See Comments)     Syncope  Other reaction(s): Syncope       No current facility-administered medications on file prior to encounter.     Current Outpatient Medications on File Prior to Encounter   Medication Sig    amLODIPine (NORVASC) 5 MG tablet Take 1 tablet (5 mg total) by mouth once daily.    atorvastatin (LIPITOR) 40 MG tablet Take 1 tablet (40 mg total) by mouth Daily.    busPIRone (BUSPAR) 15 MG tablet Take 1 tablet (15 mg total) by mouth 3 (three) times daily.    cloNIDine (CATAPRES) 0.2 MG tablet Take 1 tablet (0.2 mg total) by mouth 2 (two) times daily.    clopidogreL (PLAVIX) 75 mg tablet Take 1 tablet (75 mg total) by mouth Daily.    EScitalopram oxalate (LEXAPRO) 20 MG tablet Take 1 tablet (20 mg total) by mouth once daily.    hydrALAZINE (APRESOLINE) 50 MG tablet Take 1 tablet (50 mg total) by mouth every 8 (eight) hours.    losartan (COZAAR) 50 MG tablet Take 50 mg by mouth once daily.    rOPINIRole (REQUIP) 0.25 MG tablet Take 1 tablet (0.25 mg total) by mouth every evening.    dapagliflozin (FARXIGA) 10 mg tablet Take 1 tablet (10 mg total) by mouth once daily.    ferrous sulfate 324 mg (65 mg iron) TbEC Take 1 tablet (324 mg total) by mouth every other day.    metoprolol succinate (TOPROL-XL) 25 MG 24 hr tablet Take 1 tablet (25 mg total) by mouth once daily.     Family History    None       Tobacco Use    Smoking status: Every Day      Packs/day: 1.50     Types: Cigarettes     Passive exposure: Current    Smokeless tobacco: Never   Substance and Sexual Activity    Alcohol use: Never    Drug use: Never    Sexual activity: Not Currently     Review of Systems   Constitutional:  Negative for activity change, appetite change and fever.   Respiratory:  Negative for chest tightness, shortness of breath and wheezing.    Cardiovascular:  Negative for chest pain.   Gastrointestinal:  Negative for abdominal pain, constipation, diarrhea, nausea and vomiting.   Genitourinary:  Negative for dysuria.   Skin:  Negative for rash and wound.   Neurological:  Negative for tremors and headaches.   Objective:     Vital Signs (Most Recent):  Temp: 98.9 °F (37.2 °C) (06/28/23 1105)  Pulse: (!) 55 (06/28/23 1105)  Resp: 20 (06/28/23 1105)  BP: 118/79 (06/28/23 1105)  SpO2: 99 % (06/28/23 1105) Vital Signs (24h Range):  Temp:  [97 °F (36.1 °C)-98.9 °F (37.2 °C)] 98.9 °F (37.2 °C)  Pulse:  [54-72] 55  Resp:  [18-20] 20  SpO2:  [97 %-100 %] 99 %  BP: ()/(41-79) 118/79     Weight: 76.5 kg (168 lb 9.6 oz)  Body mass index is 25.64 kg/m².     Physical Exam  Constitutional:       General: She is not in acute distress.     Appearance: Normal appearance. She is not ill-appearing, toxic-appearing or diaphoretic.   HENT:      Head: Normocephalic and atraumatic.      Right Ear: External ear normal.      Left Ear: External ear normal.      Nose: Nose normal. No congestion or rhinorrhea.      Mouth/Throat:      Mouth: Mucous membranes are moist.      Pharynx: Oropharynx is clear.   Eyes:      Extraocular Movements: Extraocular movements intact.      Conjunctiva/sclera: Conjunctivae normal.      Pupils: Pupils are equal, round, and reactive to light.   Neck:      Vascular: No carotid bruit.   Cardiovascular:      Rate and Rhythm: Normal rate and regular rhythm.      Pulses: Normal pulses.      Heart sounds: Normal heart sounds. No murmur heard.  Pulmonary:      Effort:  Pulmonary effort is normal. No respiratory distress.      Breath sounds: Normal breath sounds. No wheezing, rhonchi or rales.   Abdominal:      General: Abdomen is flat. Bowel sounds are normal. There is no distension.      Palpations: Abdomen is soft.      Tenderness: There is no abdominal tenderness. There is no guarding.   Musculoskeletal:         General: No swelling or tenderness. Normal range of motion.      Cervical back: Normal range of motion and neck supple. No tenderness.      Right lower leg: No edema.      Left lower leg: No edema.   Lymphadenopathy:      Cervical: No cervical adenopathy.   Skin:     General: Skin is warm and dry.      Coloration: Skin is not jaundiced or pale.      Findings: Bruising present.   Neurological:      General: No focal deficit present.      Mental Status: She is alert and oriented to person, place, and time. Mental status is at baseline.      Cranial Nerves: No cranial nerve deficit.      Motor: No weakness.      Coordination: Coordination normal.      Gait: Gait normal.   Psychiatric:         Mood and Affect: Mood normal.         Behavior: Behavior normal.         Thought Content: Thought content normal.         Judgment: Judgment normal.            CRANIAL NERVES     CN III, IV, VI   Pupils are equal, round, and reactive to light.     Significant Labs: All pertinent labs within the past 24 hours have been reviewed.  BMP:   Recent Labs   Lab 06/27/23  0451 06/28/23  0427    135   K 3.6 3.5   CO2 18* 16*   BUN 17.0 15.0   CREATININE 2.09* 1.88*   CALCIUM 8.1* 7.9*   MG 1.60*  --        CBC:   Recent Labs   Lab 06/27/23  0451 06/28/23  0427   WBC 9.67 9.41   HGB 6.7* 8.8*   HCT 21.5* 27.0*   * 361         Significant Imaging: I have reviewed all pertinent imaging results/findings within the past 24 hours.      Assessment/Plan:      * Acute blood loss anemia  Check iron levels, b12  cosnult surgery for possible EGD  06/27/2023 we will transfuse 2 units of packed  red blood cells.  Surgery will evaluate for endoscopy.  Keep her NPO after midnight.  06/28/2023 patient doing well tolerating the 2 units of packed red blood cells.  Awaiting for endoscopy today.  We will repeat labs tomorrow.    NEYDA (acute kidney injury)  Patient with acute kidney injury likely due to IVVD/dehydration NEYDA is currently worsening. Labs reviewed- Renal function/electrolytes with Estimated Creatinine Clearance: 27.7 mL/min (A) (based on SCr of 1.88 mg/dL (H)). according to latest data. Monitor urine output and serial BMP and adjust therapy as needed. Avoid nephrotoxins and renally dose meds for GFR listed above.     Cr was 1.9 in April, now 2.5  Give ivf  Recheck in am    Constipation  Give lactulose      Hypertension  Chronic, controlled.  Latest blood pressure and vitals reviewed-   Temp:  [97 °F (36.1 °C)-98.9 °F (37.2 °C)]   Pulse:  [54-72]   Resp:  [18-20]   BP: ()/(41-79)   SpO2:  [97 %-100 %] .   Home meds for hypertension were reviewed and noted below.   Hypertension Medications             amLODIPine (NORVASC) 5 MG tablet Take 1 tablet (5 mg total) by mouth once daily.    cloNIDine (CATAPRES) 0.2 MG tablet Take 1 tablet (0.2 mg total) by mouth 2 (two) times daily.    hydrALAZINE (APRESOLINE) 50 MG tablet Take 1 tablet (50 mg total) by mouth every 8 (eight) hours.    losartan (COZAAR) 50 MG tablet Take 50 mg by mouth once daily.    metoprolol succinate (TOPROL-XL) 25 MG 24 hr tablet Take 1 tablet (25 mg total) by mouth once daily.          While in the hospital, will manage blood pressure as follows; Continue home antihypertensive regimen    Will utilize p.r.n. blood pressure medication only if patient's blood pressure greater than  180/110 and she develops symptoms such as worsening chest pain or shortness of breath.          VTE Risk Mitigation (From admission, onward)         Ordered     IP VTE HIGH RISK PATIENT  Once         06/26/23 1331     Place sequential compression device   Until discontinued         06/26/23 1331                Discharge Planning   PARRIS: 6/30/2023     Code Status: Full Code   Is the patient medically ready for discharge?:     Reason for patient still in hospital (select all that apply): Patient unstable  Discharge Plan A: Home                  Darren Meier MD  Department of Hospital Medicine   [facility]

## 2023-06-28 NOTE — ASSESSMENT & PLAN NOTE
Chronic, controlled.  Latest blood pressure and vitals reviewed-   Temp:  [97 °F (36.1 °C)-98.9 °F (37.2 °C)]   Pulse:  [54-72]   Resp:  [18-20]   BP: ()/(41-79)   SpO2:  [97 %-100 %] .   Home meds for hypertension were reviewed and noted below.   Hypertension Medications             amLODIPine (NORVASC) 5 MG tablet Take 1 tablet (5 mg total) by mouth once daily.    cloNIDine (CATAPRES) 0.2 MG tablet Take 1 tablet (0.2 mg total) by mouth 2 (two) times daily.    hydrALAZINE (APRESOLINE) 50 MG tablet Take 1 tablet (50 mg total) by mouth every 8 (eight) hours.    losartan (COZAAR) 50 MG tablet Take 50 mg by mouth once daily.    metoprolol succinate (TOPROL-XL) 25 MG 24 hr tablet Take 1 tablet (25 mg total) by mouth once daily.          While in the hospital, will manage blood pressure as follows; Continue home antihypertensive regimen    Will utilize p.r.n. blood pressure medication only if patient's blood pressure greater than  180/110 and she develops symptoms such as worsening chest pain or shortness of breath.

## 2023-06-28 NOTE — SUBJECTIVE & OBJECTIVE
Past Medical History:   Diagnosis Date    Anxiety     Chronic renal disease stage 4     Colon cancer screening declined     Coronary artery disease     Depression     Hypertension     Irregular heart rhythm     Medication management     Obesity, unspecified     Osteoarthritis of knee     Pain, joint, shoulder region, right     Refused influenza vaccine     Refused pneumococcal vaccination        Past Surgical History:   Procedure Laterality Date    ANGIOPLASTY  11/20/2019    ESOPHAGOGASTRODUODENOSCOPY  10/29/2018    STENT, AORTA         Review of patient's allergies indicates:   Allergen Reactions    Codeine Other (See Comments)     Syncope  Other reaction(s): Syncope       No current facility-administered medications on file prior to encounter.     Current Outpatient Medications on File Prior to Encounter   Medication Sig    amLODIPine (NORVASC) 5 MG tablet Take 1 tablet (5 mg total) by mouth once daily.    atorvastatin (LIPITOR) 40 MG tablet Take 1 tablet (40 mg total) by mouth Daily.    busPIRone (BUSPAR) 15 MG tablet Take 1 tablet (15 mg total) by mouth 3 (three) times daily.    cloNIDine (CATAPRES) 0.2 MG tablet Take 1 tablet (0.2 mg total) by mouth 2 (two) times daily.    clopidogreL (PLAVIX) 75 mg tablet Take 1 tablet (75 mg total) by mouth Daily.    EScitalopram oxalate (LEXAPRO) 20 MG tablet Take 1 tablet (20 mg total) by mouth once daily.    hydrALAZINE (APRESOLINE) 50 MG tablet Take 1 tablet (50 mg total) by mouth every 8 (eight) hours.    losartan (COZAAR) 50 MG tablet Take 50 mg by mouth once daily.    rOPINIRole (REQUIP) 0.25 MG tablet Take 1 tablet (0.25 mg total) by mouth every evening.    dapagliflozin (FARXIGA) 10 mg tablet Take 1 tablet (10 mg total) by mouth once daily.    ferrous sulfate 324 mg (65 mg iron) TbEC Take 1 tablet (324 mg total) by mouth every other day.    metoprolol succinate (TOPROL-XL) 25 MG 24 hr tablet Take 1 tablet (25 mg total) by mouth once daily.     Family History     None       Tobacco Use    Smoking status: Every Day     Packs/day: 1.50     Types: Cigarettes     Passive exposure: Current    Smokeless tobacco: Never   Substance and Sexual Activity    Alcohol use: Never    Drug use: Never    Sexual activity: Not Currently     Review of Systems   Constitutional:  Negative for activity change, appetite change and fever.   Respiratory:  Negative for chest tightness, shortness of breath and wheezing.    Cardiovascular:  Negative for chest pain.   Gastrointestinal:  Negative for abdominal pain, constipation, diarrhea, nausea and vomiting.   Genitourinary:  Negative for dysuria.   Skin:  Negative for rash and wound.   Neurological:  Negative for tremors and headaches.   Objective:     Vital Signs (Most Recent):  Temp: 98.9 °F (37.2 °C) (06/28/23 1105)  Pulse: (!) 55 (06/28/23 1105)  Resp: 20 (06/28/23 1105)  BP: 118/79 (06/28/23 1105)  SpO2: 99 % (06/28/23 1105) Vital Signs (24h Range):  Temp:  [97 °F (36.1 °C)-98.9 °F (37.2 °C)] 98.9 °F (37.2 °C)  Pulse:  [54-72] 55  Resp:  [18-20] 20  SpO2:  [97 %-100 %] 99 %  BP: ()/(41-79) 118/79     Weight: 76.5 kg (168 lb 9.6 oz)  Body mass index is 25.64 kg/m².     Physical Exam  Constitutional:       General: She is not in acute distress.     Appearance: Normal appearance. She is not ill-appearing, toxic-appearing or diaphoretic.   HENT:      Head: Normocephalic and atraumatic.      Right Ear: External ear normal.      Left Ear: External ear normal.      Nose: Nose normal. No congestion or rhinorrhea.      Mouth/Throat:      Mouth: Mucous membranes are moist.      Pharynx: Oropharynx is clear.   Eyes:      Extraocular Movements: Extraocular movements intact.      Conjunctiva/sclera: Conjunctivae normal.      Pupils: Pupils are equal, round, and reactive to light.   Neck:      Vascular: No carotid bruit.   Cardiovascular:      Rate and Rhythm: Normal rate and regular rhythm.      Pulses: Normal pulses.      Heart sounds: Normal heart  sounds. No murmur heard.  Pulmonary:      Effort: Pulmonary effort is normal. No respiratory distress.      Breath sounds: Normal breath sounds. No wheezing, rhonchi or rales.   Abdominal:      General: Abdomen is flat. Bowel sounds are normal. There is no distension.      Palpations: Abdomen is soft.      Tenderness: There is no abdominal tenderness. There is no guarding.   Musculoskeletal:         General: No swelling or tenderness. Normal range of motion.      Cervical back: Normal range of motion and neck supple. No tenderness.      Right lower leg: No edema.      Left lower leg: No edema.   Lymphadenopathy:      Cervical: No cervical adenopathy.   Skin:     General: Skin is warm and dry.      Coloration: Skin is not jaundiced or pale.      Findings: Bruising present.   Neurological:      General: No focal deficit present.      Mental Status: She is alert and oriented to person, place, and time. Mental status is at baseline.      Cranial Nerves: No cranial nerve deficit.      Motor: No weakness.      Coordination: Coordination normal.      Gait: Gait normal.   Psychiatric:         Mood and Affect: Mood normal.         Behavior: Behavior normal.         Thought Content: Thought content normal.         Judgment: Judgment normal.            CRANIAL NERVES     CN III, IV, VI   Pupils are equal, round, and reactive to light.     Significant Labs: All pertinent labs within the past 24 hours have been reviewed.  BMP:   Recent Labs   Lab 06/27/23  0451 06/28/23 0427    135   K 3.6 3.5   CO2 18* 16*   BUN 17.0 15.0   CREATININE 2.09* 1.88*   CALCIUM 8.1* 7.9*   MG 1.60*  --        CBC:   Recent Labs   Lab 06/27/23  0451 06/28/23 0427   WBC 9.67 9.41   HGB 6.7* 8.8*   HCT 21.5* 27.0*   * 361         Significant Imaging: I have reviewed all pertinent imaging results/findings within the past 24 hours.

## 2023-06-28 NOTE — ANESTHESIA PREPROCEDURE EVALUATION
06/28/2023  Rosalie Campbell is a 72 y.o., female.      Pre-op Assessment    I have reviewed the Patient Summary Reports.     I have reviewed the Nursing Notes. I have reviewed the NPO Status.   I have reviewed the Medications.     Review of Systems  Anesthesia Hx:  No problems with previous Anesthesia  Denies Family Hx of Anesthesia complications.   Denies Personal Hx of Anesthesia complications.   Hematology/Oncology:  Hematology Normal   Oncology Normal     EENT/Dental:EENT/Dental Normal   Cardiovascular:   Exercise tolerance: good Hypertension, well controlled CAD asymptomatic   Hypertension, Essential Hypertension    Pulmonary:  Pulmonary Normal    Renal/:   Chronic Renal Disease, CKD  Kidney Function/Disease    Hepatic/GI:  Hepatic/GI Normal    Musculoskeletal:   Arthritis     Neurological:  Neurology Normal    Endocrine:  Endocrine Normal    Dermatological:  Skin Normal    Psych:   Psychiatric History anxiety          Physical Exam  General: Well nourished, Cooperative, Alert and Oriented    Airway:  Mallampati: II / II  Mouth Opening: Normal  TM Distance: Normal  Tongue: Normal  Neck ROM: Normal ROM    Dental:  Intact        Anesthesia Plan  Type of Anesthesia, risks & benefits discussed:    Anesthesia Type: MAC  Intra-op Monitoring Plan: Standard ASA Monitors  Post Op Pain Control Plan: multimodal analgesia  Induction:  IV  Informed Consent: Informed consent signed with the Patient and all parties understand the risks and agree with anesthesia plan.  All questions answered. Patient consented to blood products? Yes  ASA Score: 3  Day of Surgery Review of History & Physical: H&P Update referred to the surgeon/provider.I have interviewed and examined the patient. I have reviewed the patient's H&P dated: There are no significant changes.     Ready For Surgery From Anesthesia Perspective.     .

## 2023-06-28 NOTE — ASSESSMENT & PLAN NOTE
Patient with acute kidney injury likely due to IVVD/dehydration NEYDA is currently worsening. Labs reviewed- Renal function/electrolytes with Estimated Creatinine Clearance: 27.7 mL/min (A) (based on SCr of 1.88 mg/dL (H)). according to latest data. Monitor urine output and serial BMP and adjust therapy as needed. Avoid nephrotoxins and renally dose meds for GFR listed above.     Cr was 1.9 in April, now 2.5  Give ivf  Recheck in am

## 2023-06-28 NOTE — ASSESSMENT & PLAN NOTE
Check iron levels, b12  cosnult surgery for possible EGD  06/27/2023 we will transfuse 2 units of packed red blood cells.  Surgery will evaluate for endoscopy.  Keep her NPO after midnight.  06/28/2023 patient doing well tolerating the 2 units of packed red blood cells.  Awaiting for endoscopy today.  We will repeat labs tomorrow.

## 2023-06-28 NOTE — ANESTHESIA POSTPROCEDURE EVALUATION
Anesthesia Post Evaluation    Patient: Rosalie Campbell    Procedure(s) Performed: * No procedures listed *    Final Anesthesia Type: MAC      Patient location during evaluation: GI PACU  Patient participation: Yes- Able to Participate  Level of consciousness: awake and alert and oriented  Post-procedure vital signs: reviewed and stable  Pain management: adequate  Airway patency: patent    PONV status at discharge: No PONV  Anesthetic complications: no      Cardiovascular status: blood pressure returned to baseline and stable  Respiratory status: unassisted, spontaneous ventilation and room air  Hydration status: euvolemic  Follow-up not needed.  Comments: Patient to bed per self          Vitals Value Taken Time   /61 06/28/23 1355   Temp 36.9 °C (98.4 °F) 06/28/23 1355   Pulse 58 06/28/23 1355   Resp 18 06/28/23 1355   SpO2 99 % 06/28/23 1355         No case tracking events are documented in the log.      Pain/Amy Score: No data recorded

## 2023-06-28 NOTE — H&P (VIEW-ONLY)
Patient is a 72-yr-old female followed by Duke Mccracken at the [facility] clinic. Patient presents to ER with weakness and shivering which started this morning.  She suffers from anxiety and had a similar shivering episode on Saturday morning, for which she took an anxiety pill.  It did not stop and to about an hour and a half later.  But since then she has been feeling a little weak and tired and this morning had another shivering episode, concerned that it is anxiety. Patient has had some constipation since starting iron pills about 2 weeks ago for chronic anemia, but this resolved this morning and she is feeling much better with that.  She denied any abdominal pain..  Does state that she has been having melena on and off for a while, does take Plavix for cardiac stents. Patient was evaluated in ER and found to have HGB of 7.5 down from about 9 about 2 months ago as well as worsening Bun/Cr up to 2.5 which was 1.9 in April. She still has not had a BM since taking one iron pill Rx by her PCP 2 weeks ago.    Review of patients' allergies indicates:  Allergens  Codeine reaction on patient is Syncope    Past Medical History  Diagnosis  Anxiety  Chronic renal disease stage 4  Colon cancer screening declined  Coronary Artery Disease(S/P stents -Angioplasty, Dr. Nagy, [facility])  Depression  Hypertension  Irregular Heart Rhythm(? Atrial Fib- not on blood thinners)  Medication Management  Obesity, Unspecified -Lost 20# Unexplained.   Osteoarthritis of Knee   Right Joint pain shoulder region    Past Surgical History:  Procedure  No Colonoscopy  EGD(Esophagogastroduodenoscopy) - 4-6 yrs ago, Bleeding Ulcer  Angioplasty(every 3-4 yrs)   Stent, Aorta  Stress test >4 yrs ago, Dr. Nagy  Echocardiogram     Immunizations  Dtap vaccine > 5yrs  No Flu vaccine  No Pneumonia vaccine  No Shingles vaccine  No Covid 19 vaccine  No Hepatitis vaccine    Family History  Mother  of Pancreatic Ca  Father  from MI(Myocardial  "Infarct)  Brother remission from Lympha carcoma  Brother is bipolar schizophrenic    Social History:  Smoker smokes 1/2 lise cigarettes per day for 55 yrs  Alcohol use Myrtle   No Drugs  No  service  No group home time  No Rehab  Employment  at [employer]  Unmarried    X2   AB0  1Son, pharmacy tech, Jena  Resides in [place]  PCP is Liang Taveras,CELSA-KATHLEEN    Review of Systems:  10 pt ROS Performed and is negative unless noted    Objective   Vital Signs    2023   1305  Temp is 99 F (37.2 C)  Pulse is 64  Respirations is 20  BP is 122/52  SpO2 is 99%  Weight is 76.5 kg(168 lb 9.6 oz)  Height is 5' 8" (172.7 cm)  Body Mass Index is 25.64 kg/m2    Physical Exam  Patient is in normal appearance and is not in acute distress  Head is atraumatic. Mucous membranes are moist  Extraocular movements intact and pupils are equal, round, and reactive to light and patient wears cheaters to read.   No carotid bruit  Heart rate and rhythm is normal  Pulmonary efforts are normal with no respiratory distress.   Abdomen is flat with no distention, and bowel sounds are normal  Normal range of motion with no edema of the  bilateral extremities   No cervical adenopathy  Skin is warm and dry   Patient is alert, and oriented to person, place and time. Gait is normal. Mental status is at baseline and coordination is normal.  Mood and behavior is normal.     Laboratory Findings  CBC 2023  0953  14.12 >  7.5 /23.9   47.1<     79.9 /26.0  CMP   2023  0953    135/3.5     112 /16     15.0/1.88     99<     7.9  /          Assessment  Patient is a 72-yr-old female followed by Duke Mccracken at the [facility] clinic. Patient presents to ER with weakness and shivering which started this morning.  She suffers from anxiety and had a similar shivering episode on Saturday morning, for which she took an anxiety pill.  It did not stop and to about an hour and a half later.  But since then she has been feeling " a little weak and tired and this morning had another shivering episode, concerned that it is anxiety. Patient has had some constipation since starting iron pills about 2 weeks ago for chronic anemia, but this resolved this morning and she is feeling much better with that.  She denied any abdominal pain..  Does state that she has been having melena on and off for a while, does take Plavix for cardiac stents. Patient was evaluated in ER and found to have HGB of 7.5 down from about 9 about 2 months ago as well as worsening Bun/Cr up to 2.5 which was 1.9 in April. She still has not had a BM since taking one iron pill Rx by her PCP 2 weeks ago.      Plan  Bleeding Scan 24 hr   EGD in AM  Colonoscopy to follow, after above

## 2023-06-29 LAB
ANION GAP SERPL CALC-SCNC: 7 MEQ/L (ref 2–13)
BASOPHILS # BLD AUTO: 0.06 X10(3)/MCL (ref 0.01–0.08)
BASOPHILS NFR BLD AUTO: 0.8 % (ref 0.1–1.2)
BUN SERPL-MCNC: 13 MG/DL (ref 7–20)
CALCIUM SERPL-MCNC: 8.4 MG/DL (ref 8.4–10.2)
CHLORIDE SERPL-SCNC: 111 MMOL/L (ref 98–110)
CO2 SERPL-SCNC: 20 MMOL/L (ref 21–32)
CREAT SERPL-MCNC: 1.83 MG/DL (ref 0.66–1.25)
CREAT/UREA NIT SERPL: 7 (ref 12–20)
EOSINOPHIL # BLD AUTO: 0.23 X10(3)/MCL (ref 0.04–0.36)
EOSINOPHIL NFR BLD AUTO: 3.1 % (ref 0.7–7)
ERYTHROCYTE [DISTWIDTH] IN BLOOD BY AUTOMATED COUNT: 16.3 % (ref 11–14.5)
GFR SERPLBLD CREATININE-BSD FMLA CKD-EPI: 29 MLS/MIN/1.73/M2
GLUCOSE SERPL-MCNC: 96 MG/DL (ref 70–115)
HCT VFR BLD AUTO: 30.5 % (ref 36–48)
HGB BLD-MCNC: 9.7 G/DL (ref 11.8–16)
IMM GRANULOCYTES # BLD AUTO: 0.04 X10(3)/MCL (ref 0–0.03)
IMM GRANULOCYTES NFR BLD AUTO: 0.5 % (ref 0–0.5)
LYMPHOCYTES # BLD AUTO: 1.09 X10(3)/MCL (ref 1.16–3.74)
LYMPHOCYTES NFR BLD AUTO: 14.7 % (ref 20–55)
MCH RBC QN AUTO: 25.8 PG (ref 27–34)
MCHC RBC AUTO-ENTMCNC: 31.8 G/DL (ref 31–37)
MCV RBC AUTO: 81.1 FL (ref 79–99)
MONOCYTES # BLD AUTO: 0.52 X10(3)/MCL (ref 0.24–0.36)
MONOCYTES NFR BLD AUTO: 7 % (ref 4.7–12.5)
NEUTROPHILS # BLD AUTO: 5.45 X10(3)/MCL (ref 1.56–6.13)
NEUTROPHILS NFR BLD AUTO: 73.9 % (ref 37–73)
NRBC BLD AUTO-RTO: 0 %
PLATELET # BLD AUTO: 371 X10(3)/MCL (ref 140–371)
PMV BLD AUTO: 10 FL (ref 9.4–12.4)
POTASSIUM SERPL-SCNC: 3.9 MMOL/L (ref 3.5–5.1)
RBC # BLD AUTO: 3.76 X10(6)/MCL (ref 4–5.1)
SODIUM SERPL-SCNC: 138 MMOL/L (ref 135–145)
WBC # SPEC AUTO: 7.39 X10(3)/MCL (ref 4–11.5)

## 2023-06-29 PROCEDURE — 25000003 PHARM REV CODE 250: Performed by: SURGERY

## 2023-06-29 PROCEDURE — 21400001 HC TELEMETRY ROOM

## 2023-06-29 PROCEDURE — 99900035 HC TECH TIME PER 15 MIN (STAT)

## 2023-06-29 PROCEDURE — 63600175 PHARM REV CODE 636 W HCPCS: Performed by: SURGERY

## 2023-06-29 PROCEDURE — 36415 COLL VENOUS BLD VENIPUNCTURE: CPT | Performed by: FAMILY MEDICINE

## 2023-06-29 PROCEDURE — 94761 N-INVAS EAR/PLS OXIMETRY MLT: CPT

## 2023-06-29 PROCEDURE — 80048 BASIC METABOLIC PNL TOTAL CA: CPT | Performed by: FAMILY MEDICINE

## 2023-06-29 PROCEDURE — 94760 N-INVAS EAR/PLS OXIMETRY 1: CPT

## 2023-06-29 PROCEDURE — C9113 INJ PANTOPRAZOLE SODIUM, VIA: HCPCS | Performed by: SURGERY

## 2023-06-29 PROCEDURE — 94799 UNLISTED PULMONARY SVC/PX: CPT

## 2023-06-29 PROCEDURE — 85025 COMPLETE CBC W/AUTO DIFF WBC: CPT | Performed by: FAMILY MEDICINE

## 2023-06-29 RX ORDER — POLYETHYLENE GLYCOL 3350 17 G/17G
17 POWDER, FOR SOLUTION ORAL
Status: COMPLETED | OUTPATIENT
Start: 2023-06-29 | End: 2023-06-29

## 2023-06-29 RX ORDER — ACETAMINOPHEN 325 MG/1
650 TABLET ORAL EVERY 8 HOURS PRN
Status: DISCONTINUED | OUTPATIENT
Start: 2023-06-29 | End: 2023-07-01 | Stop reason: HOSPADM

## 2023-06-29 RX ADMIN — WHITE PETROLATUM 41 % TOPICAL OINTMENT: OINTMENT at 11:06

## 2023-06-29 RX ADMIN — SODIUM CHLORIDE: 9 INJECTION, SOLUTION INTRAVENOUS at 04:06

## 2023-06-29 RX ADMIN — POLYETHYLENE GLYCOL 3350 17 G: 17 POWDER, FOR SOLUTION ORAL at 04:06

## 2023-06-29 RX ADMIN — MUPIROCIN 1 G: 20 OINTMENT TOPICAL at 09:06

## 2023-06-29 RX ADMIN — POLYETHYLENE GLYCOL 3350 17 G: 17 POWDER, FOR SOLUTION ORAL at 12:06

## 2023-06-29 RX ADMIN — AMLODIPINE BESYLATE 5 MG: 5 TABLET ORAL at 09:06

## 2023-06-29 RX ADMIN — POLYETHYLENE GLYCOL 3350 17 G: 17 POWDER, FOR SOLUTION ORAL at 02:06

## 2023-06-29 RX ADMIN — CLONIDINE HYDROCHLORIDE 0.2 MG: 0.1 TABLET ORAL at 09:06

## 2023-06-29 RX ADMIN — ESCITALOPRAM OXALATE 20 MG: 10 TABLET ORAL at 09:06

## 2023-06-29 RX ADMIN — POLYETHYLENE GLYCOL 3350 17 G: 17 POWDER, FOR SOLUTION ORAL at 01:06

## 2023-06-29 RX ADMIN — Medication 6 MG: at 09:06

## 2023-06-29 RX ADMIN — POLYETHYLENE GLYCOL 3350 17 G: 17 POWDER, FOR SOLUTION ORAL at 06:06

## 2023-06-29 RX ADMIN — POLYETHYLENE GLYCOL 3350 17 G: 17 POWDER, FOR SOLUTION ORAL at 05:06

## 2023-06-29 RX ADMIN — WHITE PETROLATUM 41 % TOPICAL OINTMENT: OINTMENT at 12:06

## 2023-06-29 RX ADMIN — ROPINIROLE HYDROCHLORIDE 0.25 MG: 0.25 TABLET, FILM COATED ORAL at 09:06

## 2023-06-29 RX ADMIN — PANTOPRAZOLE SODIUM 40 MG: 40 INJECTION, POWDER, FOR SOLUTION INTRAVENOUS at 09:06

## 2023-06-29 RX ADMIN — FAMOTIDINE 20 MG: 20 TABLET, FILM COATED ORAL at 09:06

## 2023-06-29 RX ADMIN — CEFTRIAXONE SODIUM 1 G: 1 INJECTION, POWDER, FOR SOLUTION INTRAMUSCULAR; INTRAVENOUS at 12:06

## 2023-06-29 RX ADMIN — ATORVASTATIN CALCIUM 40 MG: 40 TABLET, FILM COATED ORAL at 04:06

## 2023-06-29 RX ADMIN — HYDRALAZINE HYDROCHLORIDE 50 MG: 50 TABLET, FILM COATED ORAL at 05:06

## 2023-06-29 RX ADMIN — POLYETHYLENE GLYCOL 3350 17 G: 17 POWDER, FOR SOLUTION ORAL at 03:06

## 2023-06-29 RX ADMIN — WHITE PETROLATUM 41 % TOPICAL OINTMENT: OINTMENT at 06:06

## 2023-06-29 RX ADMIN — HYDRALAZINE HYDROCHLORIDE 50 MG: 50 TABLET, FILM COATED ORAL at 01:06

## 2023-06-29 RX ADMIN — METOPROLOL SUCCINATE 25 MG: 25 TABLET, EXTENDED RELEASE ORAL at 09:06

## 2023-06-29 RX ADMIN — WHITE PETROLATUM 41 % TOPICAL OINTMENT: OINTMENT at 05:06

## 2023-06-29 NOTE — ASSESSMENT & PLAN NOTE
Check iron levels, b12  cosnult surgery for possible EGD  06/27/2023 we will transfuse 2 units of packed red blood cells.  Surgery will evaluate for endoscopy.  Keep her NPO after midnight.  06/28/2023 patient doing well tolerating the 2 units of packed red blood cells.  Awaiting for endoscopy today.  We will repeat labs tomorrow.  06/29/2023 awaiting colonoscopy for tomorrow.  EGD was negative.

## 2023-06-29 NOTE — ASSESSMENT & PLAN NOTE
Patient with acute kidney injury likely due to IVVD/dehydration NEYDA is currently worsening. Labs reviewed- Renal function/electrolytes with Estimated Creatinine Clearance: 28.4 mL/min (A) (based on SCr of 1.83 mg/dL (H)). according to latest data. Monitor urine output and serial BMP and adjust therapy as needed. Avoid nephrotoxins and renally dose meds for GFR listed above.     Cr was 1.9 in April, now 2.5  Give ivf  Recheck in am

## 2023-06-29 NOTE — PROGRESS NOTES
[facility] Medicine  Progress Note    Patient Name: Rosalie Campbell  MRN: [id number]  Patient Class: IP- Inpatient   Admission Date: 6/26/2023  Length of Stay: 2 days  Attending Physician: Raquel Vogel MD  Primary Care Provider: JASWINDER Mosqueda        Subjective:     Principal Problem:Acute blood loss anemia        HPI:   Fatigue    Constipation    Shaking       C/o shaking, weakness onset Friday pm constipation onset x 2 weeks, pt reports having a normal bm this am      73 yo followed by Liang Mccracken at the [facility] clinic.    Patient presents to the emergency room with weakness and shivering which started this morning.  She suffers from anxiety and had a similar shivering episode on Saturday morning, for which she took an anxiety pill.  It did not stop and to about an hour and a half later.  But since then she has been feeling a little weak and tired and this morning had another shivering episode. , concerned that it is anxiety.  He has had some constipation since starting iron pills about 2 weeks ago for chronic anemia, but this resolved this morning and she is feeling much better with that.  She denies any abdominal pain.  Does state that she has been having melena on and off for a while, does take Plavix for cardiac stents.  Pt was evaluated in ER and found to have HGB of 7.5 down from 9 about 2 months ago as well as worsening BUN/Cr up to 2.5 which was 1.9 in April.  She still has not had a BM since taking one iron pill Rx by her PCP 2 weeks ago.      Overview/Hospital Course:  06/27/2023 patient doing well.  Surgery has been consulted to evaluate the GI bleeding.  Creatinine is around 2.  Hemoglobin is down in the sixes.  We will transfuse blood and get her prepared for scopes tomorrow.  08/20/2023 patient doing well waiting for scopes.  H&H is stable she is not had any further bleeding  06/29/2023 patient being set up for colonoscopy tomorrow.  Bleeding scan pending.   H&H stable.      Past Medical History:   Diagnosis Date    Anxiety     Chronic renal disease stage 4     Colon cancer screening declined     Coronary artery disease     Depression     Hypertension     Irregular heart rhythm     Medication management     Obesity, unspecified     Osteoarthritis of knee     Pain, joint, shoulder region, right     Refused influenza vaccine     Refused pneumococcal vaccination        Past Surgical History:   Procedure Laterality Date    ANGIOPLASTY  11/20/2019    ESOPHAGOGASTRODUODENOSCOPY  10/29/2018    STENT, AORTA         Review of patient's allergies indicates:   Allergen Reactions    Codeine Other (See Comments)     Syncope  Other reaction(s): Syncope       No current facility-administered medications on file prior to encounter.     Current Outpatient Medications on File Prior to Encounter   Medication Sig    amLODIPine (NORVASC) 5 MG tablet Take 1 tablet (5 mg total) by mouth once daily.    atorvastatin (LIPITOR) 40 MG tablet Take 1 tablet (40 mg total) by mouth Daily.    busPIRone (BUSPAR) 15 MG tablet Take 1 tablet (15 mg total) by mouth 3 (three) times daily.    cloNIDine (CATAPRES) 0.2 MG tablet Take 1 tablet (0.2 mg total) by mouth 2 (two) times daily.    clopidogreL (PLAVIX) 75 mg tablet Take 1 tablet (75 mg total) by mouth Daily.    EScitalopram oxalate (LEXAPRO) 20 MG tablet Take 1 tablet (20 mg total) by mouth once daily.    hydrALAZINE (APRESOLINE) 50 MG tablet Take 1 tablet (50 mg total) by mouth every 8 (eight) hours.    losartan (COZAAR) 50 MG tablet Take 50 mg by mouth once daily.    rOPINIRole (REQUIP) 0.25 MG tablet Take 1 tablet (0.25 mg total) by mouth every evening.    dapagliflozin (FARXIGA) 10 mg tablet Take 1 tablet (10 mg total) by mouth once daily.    ferrous sulfate 324 mg (65 mg iron) TbEC Take 1 tablet (324 mg total) by mouth every other day.    metoprolol succinate (TOPROL-XL) 25 MG 24 hr tablet Take 1 tablet (25 mg total)  by mouth once daily.     Family History    None       Tobacco Use    Smoking status: Every Day     Packs/day: 1.50     Types: Cigarettes     Passive exposure: Current    Smokeless tobacco: Never   Substance and Sexual Activity    Alcohol use: Never    Drug use: Never    Sexual activity: Not Currently     Review of Systems   Constitutional:  Negative for activity change, appetite change and fever.   Respiratory:  Negative for chest tightness, shortness of breath and wheezing.    Cardiovascular:  Negative for chest pain.   Gastrointestinal:  Negative for abdominal pain, constipation, diarrhea, nausea and vomiting.   Genitourinary:  Negative for dysuria.   Skin:  Negative for rash and wound.   Neurological:  Negative for tremors and headaches.   Objective:     Vital Signs (Most Recent):  Temp: 98.5 °F (36.9 °C) (06/29/23 1056)  Pulse: (!) 56 (06/29/23 1056)  Resp: 20 (06/29/23 1056)  BP: (!) 136/57 (06/29/23 1056)  SpO2: 99 % (06/29/23 1056) Vital Signs (24h Range):  Temp:  [97.6 °F (36.4 °C)-99 °F (37.2 °C)] 98.5 °F (36.9 °C)  Pulse:  [56-90] 56  Resp:  [17-20] 20  SpO2:  [94 %-99 %] 99 %  BP: (119-154)/(48-78) 136/57     Weight: 76.2 kg (168 lb 1.6 oz)  Body mass index is 25.56 kg/m².     Physical Exam  Constitutional:       General: She is not in acute distress.     Appearance: Normal appearance. She is not ill-appearing, toxic-appearing or diaphoretic.   HENT:      Head: Normocephalic and atraumatic.      Right Ear: External ear normal.      Left Ear: External ear normal.      Nose: Nose normal. No congestion or rhinorrhea.      Mouth/Throat:      Mouth: Mucous membranes are moist.      Pharynx: Oropharynx is clear.   Eyes:      Extraocular Movements: Extraocular movements intact.      Conjunctiva/sclera: Conjunctivae normal.      Pupils: Pupils are equal, round, and reactive to light.   Neck:      Vascular: No carotid bruit.   Cardiovascular:      Rate and Rhythm: Normal rate and regular rhythm.      Pulses:  Normal pulses.      Heart sounds: Normal heart sounds. No murmur heard.  Pulmonary:      Effort: Pulmonary effort is normal. No respiratory distress.      Breath sounds: Normal breath sounds. No wheezing, rhonchi or rales.   Abdominal:      General: Abdomen is flat. Bowel sounds are normal. There is no distension.      Palpations: Abdomen is soft.      Tenderness: There is no abdominal tenderness. There is no guarding.   Musculoskeletal:         General: No swelling or tenderness. Normal range of motion.      Cervical back: Normal range of motion and neck supple. No tenderness.      Right lower leg: No edema.      Left lower leg: No edema.   Lymphadenopathy:      Cervical: No cervical adenopathy.   Skin:     General: Skin is warm and dry.      Coloration: Skin is not jaundiced or pale.      Findings: Bruising present.   Neurological:      General: No focal deficit present.      Mental Status: She is alert and oriented to person, place, and time. Mental status is at baseline.      Cranial Nerves: No cranial nerve deficit.      Motor: No weakness.      Coordination: Coordination normal.      Gait: Gait normal.   Psychiatric:         Mood and Affect: Mood normal.         Behavior: Behavior normal.         Thought Content: Thought content normal.         Judgment: Judgment normal.            CRANIAL NERVES     CN III, IV, VI   Pupils are equal, round, and reactive to light.     Significant Labs: All pertinent labs within the past 24 hours have been reviewed.  BMP:   Recent Labs   Lab 06/29/23  0507      K 3.9   CO2 20*   BUN 13.0   CREATININE 1.83*   CALCIUM 8.4       CBC:   Recent Labs   Lab 06/28/23  0427 06/29/23  0507   WBC 9.41 7.39   HGB 8.8* 9.7*   HCT 27.0* 30.5*    371         Significant Imaging: I have reviewed all pertinent imaging results/findings within the past 24 hours.      Assessment/Plan:      * Acute blood loss anemia  Check iron levels, b12  cosnult surgery for possible EGD  06/27/2023  we will transfuse 2 units of packed red blood cells.  Surgery will evaluate for endoscopy.  Keep her NPO after midnight.  06/28/2023 patient doing well tolerating the 2 units of packed red blood cells.  Awaiting for endoscopy today.  We will repeat labs tomorrow.  06/29/2023 awaiting colonoscopy for tomorrow.  EGD was negative.    NEYDA (acute kidney injury)  Patient with acute kidney injury likely due to IVVD/dehydration NEYDA is currently worsening. Labs reviewed- Renal function/electrolytes with Estimated Creatinine Clearance: 28.4 mL/min (A) (based on SCr of 1.83 mg/dL (H)). according to latest data. Monitor urine output and serial BMP and adjust therapy as needed. Avoid nephrotoxins and renally dose meds for GFR listed above.     Cr was 1.9 in April, now 2.5  Give ivf  Recheck in am    Constipation  Give lactulose      Hypertension  Chronic, controlled.  Latest blood pressure and vitals reviewed-   Temp:  [97.6 °F (36.4 °C)-99 °F (37.2 °C)]   Pulse:  [56-90]   Resp:  [17-20]   BP: (119-154)/(48-78)   SpO2:  [94 %-99 %] .   Home meds for hypertension were reviewed and noted below.   Hypertension Medications             amLODIPine (NORVASC) 5 MG tablet Take 1 tablet (5 mg total) by mouth once daily.    cloNIDine (CATAPRES) 0.2 MG tablet Take 1 tablet (0.2 mg total) by mouth 2 (two) times daily.    hydrALAZINE (APRESOLINE) 50 MG tablet Take 1 tablet (50 mg total) by mouth every 8 (eight) hours.    losartan (COZAAR) 50 MG tablet Take 50 mg by mouth once daily.    metoprolol succinate (TOPROL-XL) 25 MG 24 hr tablet Take 1 tablet (25 mg total) by mouth once daily.          While in the hospital, will manage blood pressure as follows; Continue home antihypertensive regimen    Will utilize p.r.n. blood pressure medication only if patient's blood pressure greater than  180/110 and she develops symptoms such as worsening chest pain or shortness of breath.          VTE Risk Mitigation (From admission, onward)         Ordered      IP VTE HIGH RISK PATIENT  Once         06/28/23 1814     Place sequential compression device  Until discontinued         06/28/23 1814     Place sequential compression device  Until discontinued         06/26/23 1331                Discharge Planning   PARRIS: 6/30/2023     Code Status: Full Code   Is the patient medically ready for discharge?:     Reason for patient still in hospital (select all that apply): Patient unstable  Discharge Plan A: Home                  Darren Meier MD  Department of Hospital Medicine   [facility]-Med/Surg

## 2023-06-29 NOTE — SUBJECTIVE & OBJECTIVE
Past Medical History:   Diagnosis Date    Anxiety     Chronic renal disease stage 4     Colon cancer screening declined     Coronary artery disease     Depression     Hypertension     Irregular heart rhythm     Medication management     Obesity, unspecified     Osteoarthritis of knee     Pain, joint, shoulder region, right     Refused influenza vaccine     Refused pneumococcal vaccination        Past Surgical History:   Procedure Laterality Date    ANGIOPLASTY  11/20/2019    ESOPHAGOGASTRODUODENOSCOPY  10/29/2018    STENT, AORTA         Review of patient's allergies indicates:   Allergen Reactions    Codeine Other (See Comments)     Syncope  Other reaction(s): Syncope       No current facility-administered medications on file prior to encounter.     Current Outpatient Medications on File Prior to Encounter   Medication Sig    amLODIPine (NORVASC) 5 MG tablet Take 1 tablet (5 mg total) by mouth once daily.    atorvastatin (LIPITOR) 40 MG tablet Take 1 tablet (40 mg total) by mouth Daily.    busPIRone (BUSPAR) 15 MG tablet Take 1 tablet (15 mg total) by mouth 3 (three) times daily.    cloNIDine (CATAPRES) 0.2 MG tablet Take 1 tablet (0.2 mg total) by mouth 2 (two) times daily.    clopidogreL (PLAVIX) 75 mg tablet Take 1 tablet (75 mg total) by mouth Daily.    EScitalopram oxalate (LEXAPRO) 20 MG tablet Take 1 tablet (20 mg total) by mouth once daily.    hydrALAZINE (APRESOLINE) 50 MG tablet Take 1 tablet (50 mg total) by mouth every 8 (eight) hours.    losartan (COZAAR) 50 MG tablet Take 50 mg by mouth once daily.    rOPINIRole (REQUIP) 0.25 MG tablet Take 1 tablet (0.25 mg total) by mouth every evening.    dapagliflozin (FARXIGA) 10 mg tablet Take 1 tablet (10 mg total) by mouth once daily.    ferrous sulfate 324 mg (65 mg iron) TbEC Take 1 tablet (324 mg total) by mouth every other day.    metoprolol succinate (TOPROL-XL) 25 MG 24 hr tablet Take 1 tablet (25 mg total) by mouth once daily.     Family History     None       Tobacco Use    Smoking status: Every Day     Packs/day: 1.50     Types: Cigarettes     Passive exposure: Current    Smokeless tobacco: Never   Substance and Sexual Activity    Alcohol use: Never    Drug use: Never    Sexual activity: Not Currently     Review of Systems   Constitutional:  Negative for activity change, appetite change and fever.   Respiratory:  Negative for chest tightness, shortness of breath and wheezing.    Cardiovascular:  Negative for chest pain.   Gastrointestinal:  Negative for abdominal pain, constipation, diarrhea, nausea and vomiting.   Genitourinary:  Negative for dysuria.   Skin:  Negative for rash and wound.   Neurological:  Negative for tremors and headaches.   Objective:     Vital Signs (Most Recent):  Temp: 98.5 °F (36.9 °C) (06/29/23 1056)  Pulse: (!) 56 (06/29/23 1056)  Resp: 20 (06/29/23 1056)  BP: (!) 136/57 (06/29/23 1056)  SpO2: 99 % (06/29/23 1056) Vital Signs (24h Range):  Temp:  [97.6 °F (36.4 °C)-99 °F (37.2 °C)] 98.5 °F (36.9 °C)  Pulse:  [56-90] 56  Resp:  [17-20] 20  SpO2:  [94 %-99 %] 99 %  BP: (119-154)/(48-78) 136/57     Weight: 76.2 kg (168 lb 1.6 oz)  Body mass index is 25.56 kg/m².     Physical Exam  Constitutional:       General: She is not in acute distress.     Appearance: Normal appearance. She is not ill-appearing, toxic-appearing or diaphoretic.   HENT:      Head: Normocephalic and atraumatic.      Right Ear: External ear normal.      Left Ear: External ear normal.      Nose: Nose normal. No congestion or rhinorrhea.      Mouth/Throat:      Mouth: Mucous membranes are moist.      Pharynx: Oropharynx is clear.   Eyes:      Extraocular Movements: Extraocular movements intact.      Conjunctiva/sclera: Conjunctivae normal.      Pupils: Pupils are equal, round, and reactive to light.   Neck:      Vascular: No carotid bruit.   Cardiovascular:      Rate and Rhythm: Normal rate and regular rhythm.      Pulses: Normal pulses.      Heart sounds: Normal heart  sounds. No murmur heard.  Pulmonary:      Effort: Pulmonary effort is normal. No respiratory distress.      Breath sounds: Normal breath sounds. No wheezing, rhonchi or rales.   Abdominal:      General: Abdomen is flat. Bowel sounds are normal. There is no distension.      Palpations: Abdomen is soft.      Tenderness: There is no abdominal tenderness. There is no guarding.   Musculoskeletal:         General: No swelling or tenderness. Normal range of motion.      Cervical back: Normal range of motion and neck supple. No tenderness.      Right lower leg: No edema.      Left lower leg: No edema.   Lymphadenopathy:      Cervical: No cervical adenopathy.   Skin:     General: Skin is warm and dry.      Coloration: Skin is not jaundiced or pale.      Findings: Bruising present.   Neurological:      General: No focal deficit present.      Mental Status: She is alert and oriented to person, place, and time. Mental status is at baseline.      Cranial Nerves: No cranial nerve deficit.      Motor: No weakness.      Coordination: Coordination normal.      Gait: Gait normal.   Psychiatric:         Mood and Affect: Mood normal.         Behavior: Behavior normal.         Thought Content: Thought content normal.         Judgment: Judgment normal.            CRANIAL NERVES     CN III, IV, VI   Pupils are equal, round, and reactive to light.     Significant Labs: All pertinent labs within the past 24 hours have been reviewed.  BMP:   Recent Labs   Lab 06/29/23  0507      K 3.9   CO2 20*   BUN 13.0   CREATININE 1.83*   CALCIUM 8.4       CBC:   Recent Labs   Lab 06/28/23  0427 06/29/23  0507   WBC 9.41 7.39   HGB 8.8* 9.7*   HCT 27.0* 30.5*    371         Significant Imaging: I have reviewed all pertinent imaging results/findings within the past 24 hours.

## 2023-06-29 NOTE — ASSESSMENT & PLAN NOTE
Chronic, controlled.  Latest blood pressure and vitals reviewed-   Temp:  [97.6 °F (36.4 °C)-99 °F (37.2 °C)]   Pulse:  [56-90]   Resp:  [17-20]   BP: (119-154)/(48-78)   SpO2:  [94 %-99 %] .   Home meds for hypertension were reviewed and noted below.   Hypertension Medications             amLODIPine (NORVASC) 5 MG tablet Take 1 tablet (5 mg total) by mouth once daily.    cloNIDine (CATAPRES) 0.2 MG tablet Take 1 tablet (0.2 mg total) by mouth 2 (two) times daily.    hydrALAZINE (APRESOLINE) 50 MG tablet Take 1 tablet (50 mg total) by mouth every 8 (eight) hours.    losartan (COZAAR) 50 MG tablet Take 50 mg by mouth once daily.    metoprolol succinate (TOPROL-XL) 25 MG 24 hr tablet Take 1 tablet (25 mg total) by mouth once daily.          While in the hospital, will manage blood pressure as follows; Continue home antihypertensive regimen    Will utilize p.r.n. blood pressure medication only if patient's blood pressure greater than  180/110 and she develops symptoms such as worsening chest pain or shortness of breath.

## 2023-06-30 ENCOUNTER — ANESTHESIA EVENT (OUTPATIENT)
Dept: GASTROENTEROLOGY | Facility: HOSPITAL | Age: 73
DRG: 378 | End: 2023-06-30
Payer: MEDICARE

## 2023-06-30 ENCOUNTER — ANESTHESIA (OUTPATIENT)
Dept: GASTROENTEROLOGY | Facility: HOSPITAL | Age: 73
DRG: 378 | End: 2023-06-30
Payer: MEDICARE

## 2023-06-30 LAB
ANION GAP SERPL CALC-SCNC: 10 MEQ/L (ref 2–13)
BASOPHILS # BLD AUTO: 0.04 X10(3)/MCL (ref 0.01–0.08)
BASOPHILS NFR BLD AUTO: 0.7 % (ref 0.1–1.2)
BUN SERPL-MCNC: 11 MG/DL (ref 7–20)
CALCIUM SERPL-MCNC: 8.2 MG/DL (ref 8.4–10.2)
CHLORIDE SERPL-SCNC: 109 MMOL/L (ref 98–110)
CO2 SERPL-SCNC: 18 MMOL/L (ref 21–32)
CREAT SERPL-MCNC: 1.68 MG/DL (ref 0.66–1.25)
CREAT/UREA NIT SERPL: 7 (ref 12–20)
EOSINOPHIL # BLD AUTO: 0.15 X10(3)/MCL (ref 0.04–0.36)
EOSINOPHIL NFR BLD AUTO: 2.6 % (ref 0.7–7)
ERYTHROCYTE [DISTWIDTH] IN BLOOD BY AUTOMATED COUNT: 16.7 % (ref 11–14.5)
GFR SERPLBLD CREATININE-BSD FMLA CKD-EPI: 32 MLS/MIN/1.73/M2
GLUCOSE SERPL-MCNC: 106 MG/DL (ref 70–115)
HCT VFR BLD AUTO: 30 % (ref 36–48)
HGB BLD-MCNC: 9.5 G/DL (ref 11.8–16)
IMM GRANULOCYTES # BLD AUTO: 0.04 X10(3)/MCL (ref 0–0.03)
IMM GRANULOCYTES NFR BLD AUTO: 0.7 % (ref 0–0.5)
LYMPHOCYTES # BLD AUTO: 0.76 X10(3)/MCL (ref 1.16–3.74)
LYMPHOCYTES NFR BLD AUTO: 12.9 % (ref 20–55)
MCH RBC QN AUTO: 25.8 PG (ref 27–34)
MCHC RBC AUTO-ENTMCNC: 31.7 G/DL (ref 31–37)
MCV RBC AUTO: 81.5 FL (ref 79–99)
MONOCYTES # BLD AUTO: 0.39 X10(3)/MCL (ref 0.24–0.36)
MONOCYTES NFR BLD AUTO: 6.6 % (ref 4.7–12.5)
NEUTROPHILS # BLD AUTO: 4.49 X10(3)/MCL (ref 1.56–6.13)
NEUTROPHILS NFR BLD AUTO: 76.5 % (ref 37–73)
NRBC BLD AUTO-RTO: 0 %
PLATELET # BLD AUTO: 353 X10(3)/MCL (ref 140–371)
PMV BLD AUTO: 9.5 FL (ref 9.4–12.4)
POCT GLUCOSE: 120 MG/DL (ref 70–110)
POTASSIUM SERPL-SCNC: 3.5 MMOL/L (ref 3.5–5.1)
RBC # BLD AUTO: 3.68 X10(6)/MCL (ref 4–5.1)
SODIUM SERPL-SCNC: 137 MMOL/L (ref 135–145)
WBC # SPEC AUTO: 5.87 X10(3)/MCL (ref 4–11.5)

## 2023-06-30 PROCEDURE — 63600175 PHARM REV CODE 636 W HCPCS: Performed by: SURGERY

## 2023-06-30 PROCEDURE — 21400001 HC TELEMETRY ROOM

## 2023-06-30 PROCEDURE — 36415 COLL VENOUS BLD VENIPUNCTURE: CPT | Performed by: FAMILY MEDICINE

## 2023-06-30 PROCEDURE — 94761 N-INVAS EAR/PLS OXIMETRY MLT: CPT

## 2023-06-30 PROCEDURE — 25000003 PHARM REV CODE 250: Performed by: SURGERY

## 2023-06-30 PROCEDURE — C9113 INJ PANTOPRAZOLE SODIUM, VIA: HCPCS | Performed by: SURGERY

## 2023-06-30 PROCEDURE — 85025 COMPLETE CBC W/AUTO DIFF WBC: CPT | Performed by: FAMILY MEDICINE

## 2023-06-30 PROCEDURE — 80048 BASIC METABOLIC PNL TOTAL CA: CPT | Performed by: FAMILY MEDICINE

## 2023-06-30 PROCEDURE — 94799 UNLISTED PULMONARY SVC/PX: CPT

## 2023-06-30 RX ADMIN — CEFTRIAXONE SODIUM 1 G: 1 INJECTION, POWDER, FOR SOLUTION INTRAMUSCULAR; INTRAVENOUS at 11:06

## 2023-06-30 RX ADMIN — ESCITALOPRAM OXALATE 20 MG: 10 TABLET ORAL at 09:06

## 2023-06-30 RX ADMIN — WHITE PETROLATUM 41 % TOPICAL OINTMENT: OINTMENT at 05:06

## 2023-06-30 RX ADMIN — AMLODIPINE BESYLATE 5 MG: 5 TABLET ORAL at 09:06

## 2023-06-30 RX ADMIN — ATORVASTATIN CALCIUM 40 MG: 40 TABLET, FILM COATED ORAL at 04:06

## 2023-06-30 RX ADMIN — HYDRALAZINE HYDROCHLORIDE 50 MG: 50 TABLET, FILM COATED ORAL at 09:06

## 2023-06-30 RX ADMIN — WHITE PETROLATUM 41 % TOPICAL OINTMENT: OINTMENT at 11:06

## 2023-06-30 RX ADMIN — CLONIDINE HYDROCHLORIDE 0.2 MG: 0.1 TABLET ORAL at 09:06

## 2023-06-30 RX ADMIN — PANTOPRAZOLE SODIUM 40 MG: 40 INJECTION, POWDER, FOR SOLUTION INTRAVENOUS at 09:06

## 2023-06-30 RX ADMIN — FAMOTIDINE 20 MG: 20 TABLET, FILM COATED ORAL at 09:06

## 2023-06-30 RX ADMIN — SODIUM CHLORIDE: 9 INJECTION, SOLUTION INTRAVENOUS at 01:06

## 2023-06-30 RX ADMIN — SODIUM CHLORIDE: 9 INJECTION, SOLUTION INTRAVENOUS at 04:06

## 2023-06-30 RX ADMIN — MUPIROCIN 1 G: 20 OINTMENT TOPICAL at 09:06

## 2023-06-30 RX ADMIN — WHITE PETROLATUM 41 % TOPICAL OINTMENT: OINTMENT at 06:06

## 2023-06-30 RX ADMIN — SODIUM CHLORIDE: 9 INJECTION, SOLUTION INTRAVENOUS at 11:06

## 2023-06-30 RX ADMIN — ROPINIROLE HYDROCHLORIDE 0.25 MG: 0.25 TABLET, FILM COATED ORAL at 09:06

## 2023-06-30 NOTE — SUBJECTIVE & OBJECTIVE
Past Medical History:   Diagnosis Date    Anxiety     Chronic renal disease stage 4     Colon cancer screening declined     Coronary artery disease     Depression     Hypertension     Irregular heart rhythm     Medication management     Obesity, unspecified     Osteoarthritis of knee     Pain, joint, shoulder region, right     Refused influenza vaccine     Refused pneumococcal vaccination        Past Surgical History:   Procedure Laterality Date    ANGIOPLASTY  11/20/2019    ESOPHAGOGASTRODUODENOSCOPY  10/29/2018    STENT, AORTA         Review of patient's allergies indicates:   Allergen Reactions    Codeine Other (See Comments)     Syncope  Other reaction(s): Syncope       No current facility-administered medications on file prior to encounter.     Current Outpatient Medications on File Prior to Encounter   Medication Sig    amLODIPine (NORVASC) 5 MG tablet Take 1 tablet (5 mg total) by mouth once daily.    atorvastatin (LIPITOR) 40 MG tablet Take 1 tablet (40 mg total) by mouth Daily.    busPIRone (BUSPAR) 15 MG tablet Take 1 tablet (15 mg total) by mouth 3 (three) times daily.    cloNIDine (CATAPRES) 0.2 MG tablet Take 1 tablet (0.2 mg total) by mouth 2 (two) times daily.    clopidogreL (PLAVIX) 75 mg tablet Take 1 tablet (75 mg total) by mouth Daily.    EScitalopram oxalate (LEXAPRO) 20 MG tablet Take 1 tablet (20 mg total) by mouth once daily.    hydrALAZINE (APRESOLINE) 50 MG tablet Take 1 tablet (50 mg total) by mouth every 8 (eight) hours.    losartan (COZAAR) 50 MG tablet Take 50 mg by mouth once daily.    rOPINIRole (REQUIP) 0.25 MG tablet Take 1 tablet (0.25 mg total) by mouth every evening.    dapagliflozin (FARXIGA) 10 mg tablet Take 1 tablet (10 mg total) by mouth once daily.    ferrous sulfate 324 mg (65 mg iron) TbEC Take 1 tablet (324 mg total) by mouth every other day.    metoprolol succinate (TOPROL-XL) 25 MG 24 hr tablet Take 1 tablet (25 mg total) by mouth once daily.     Family History     None       Tobacco Use    Smoking status: Every Day     Packs/day: 1.50     Types: Cigarettes     Passive exposure: Current    Smokeless tobacco: Never   Substance and Sexual Activity    Alcohol use: Never    Drug use: Never    Sexual activity: Not Currently     Review of Systems   Constitutional:  Negative for activity change, appetite change and fever.   Respiratory:  Negative for chest tightness, shortness of breath and wheezing.    Cardiovascular:  Negative for chest pain.   Gastrointestinal:  Negative for abdominal pain, constipation, diarrhea, nausea and vomiting.   Genitourinary:  Negative for dysuria.   Skin:  Negative for rash and wound.   Neurological:  Negative for tremors and headaches.   Objective:     Vital Signs (Most Recent):  Temp: 97.7 °F (36.5 °C) (06/30/23 0732)  Pulse: (!) 55 (06/30/23 0753)  Resp: 18 (06/30/23 0753)  BP: (!) 126/56 (06/30/23 0732)  SpO2: 100 % (06/30/23 0753) Vital Signs (24h Range):  Temp:  [97.1 °F (36.2 °C)-98.7 °F (37.1 °C)] 97.7 °F (36.5 °C)  Pulse:  [47-60] 55  Resp:  [18-20] 18  SpO2:  [97 %-100 %] 100 %  BP: ()/(45-64) 126/56     Weight: 76.2 kg (168 lb)  Body mass index is 25.54 kg/m².     Physical Exam  Constitutional:       General: She is not in acute distress.     Appearance: Normal appearance. She is not ill-appearing, toxic-appearing or diaphoretic.   HENT:      Head: Normocephalic and atraumatic.      Right Ear: External ear normal.      Left Ear: External ear normal.      Nose: Nose normal. No congestion or rhinorrhea.      Mouth/Throat:      Mouth: Mucous membranes are moist.      Pharynx: Oropharynx is clear.   Eyes:      Extraocular Movements: Extraocular movements intact.      Conjunctiva/sclera: Conjunctivae normal.      Pupils: Pupils are equal, round, and reactive to light.   Neck:      Vascular: No carotid bruit.   Cardiovascular:      Rate and Rhythm: Normal rate and regular rhythm.      Pulses: Normal pulses.      Heart sounds: Normal heart  sounds. No murmur heard.  Pulmonary:      Effort: Pulmonary effort is normal. No respiratory distress.      Breath sounds: Normal breath sounds. No wheezing, rhonchi or rales.   Abdominal:      General: Abdomen is flat. Bowel sounds are normal. There is no distension.      Palpations: Abdomen is soft.      Tenderness: There is no abdominal tenderness. There is no guarding.   Musculoskeletal:         General: No swelling or tenderness. Normal range of motion.      Cervical back: Normal range of motion and neck supple. No tenderness.      Right lower leg: No edema.      Left lower leg: No edema.   Lymphadenopathy:      Cervical: No cervical adenopathy.   Skin:     General: Skin is warm and dry.      Coloration: Skin is not jaundiced or pale.      Findings: Bruising present.   Neurological:      General: No focal deficit present.      Mental Status: She is alert and oriented to person, place, and time. Mental status is at baseline.      Cranial Nerves: No cranial nerve deficit.      Motor: No weakness.      Coordination: Coordination normal.      Gait: Gait normal.   Psychiatric:         Mood and Affect: Mood normal.         Behavior: Behavior normal.         Thought Content: Thought content normal.         Judgment: Judgment normal.            CRANIAL NERVES     CN III, IV, VI   Pupils are equal, round, and reactive to light.     Significant Labs: All pertinent labs within the past 24 hours have been reviewed.  BMP:   Recent Labs   Lab 06/30/23  0443      K 3.5   CO2 18*   BUN 11.0   CREATININE 1.68*   CALCIUM 8.2*       CBC:   Recent Labs   Lab 06/29/23  0507 06/30/23  0443   WBC 7.39 5.87   HGB 9.7* 9.5*   HCT 30.5* 30.0*    353         Significant Imaging: I have reviewed all pertinent imaging results/findings within the past 24 hours.

## 2023-06-30 NOTE — PROGRESS NOTES
[facility]  Progress Note    Patient Name: Rosalie Campbell  MRN: [id number]  Patient Class: IP- Inpatient   Admission Date: 6/26/2023  Length of Stay: 3 days  Attending Physician: Raquel Vogel MD  Primary Care Provider: JASWINDER Mosqueda        Subjective:     Principal Problem:Acute blood loss anemia        HPI:   Fatigue    Constipation    Shaking       C/o shaking, weakness onset Friday pm constipation onset x 2 weeks, pt reports having a normal bm this am      71 yo followed by Liang Mccracken at the [facility] clinic.    Patient presents to the emergency room with weakness and shivering which started this morning.  She suffers from anxiety and had a similar shivering episode on Saturday morning, for which she took an anxiety pill.  It did not stop and to about an hour and a half later.  But since then she has been feeling a little weak and tired and this morning had another shivering episode. , concerned that it is anxiety.  He has had some constipation since starting iron pills about 2 weeks ago for chronic anemia, but this resolved this morning and she is feeling much better with that.  She denies any abdominal pain.  Does state that she has been having melena on and off for a while, does take Plavix for cardiac stents.  Pt was evaluated in ER and found to have HGB of 7.5 down from 9 about 2 months ago as well as worsening BUN/Cr up to 2.5 which was 1.9 in April.  She still has not had a BM since taking one iron pill Rx by her PCP 2 weeks ago.      Overview/Hospital Course:  06/27/2023 patient doing well.  Surgery has been consulted to evaluate the GI bleeding.  Creatinine is around 2.  Hemoglobin is down in the sixes.  We will transfuse blood and get her prepared for scopes tomorrow.  08/20/2023 patient doing well waiting for scopes.  H&H is stable she is not had any further bleeding  06/29/2023 patient being set up for colonoscopy tomorrow.  Bleeding scan pending.   H&H stable.  06/30/2023 patient's colonoscopy was canceled today will be done tomorrow.  H&H is stable and she is in good spirits.      Past Medical History:   Diagnosis Date    Anxiety     Chronic renal disease stage 4     Colon cancer screening declined     Coronary artery disease     Depression     Hypertension     Irregular heart rhythm     Medication management     Obesity, unspecified     Osteoarthritis of knee     Pain, joint, shoulder region, right     Refused influenza vaccine     Refused pneumococcal vaccination        Past Surgical History:   Procedure Laterality Date    ANGIOPLASTY  11/20/2019    ESOPHAGOGASTRODUODENOSCOPY  10/29/2018    STENT, AORTA         Review of patient's allergies indicates:   Allergen Reactions    Codeine Other (See Comments)     Syncope  Other reaction(s): Syncope       No current facility-administered medications on file prior to encounter.     Current Outpatient Medications on File Prior to Encounter   Medication Sig    amLODIPine (NORVASC) 5 MG tablet Take 1 tablet (5 mg total) by mouth once daily.    atorvastatin (LIPITOR) 40 MG tablet Take 1 tablet (40 mg total) by mouth Daily.    busPIRone (BUSPAR) 15 MG tablet Take 1 tablet (15 mg total) by mouth 3 (three) times daily.    cloNIDine (CATAPRES) 0.2 MG tablet Take 1 tablet (0.2 mg total) by mouth 2 (two) times daily.    clopidogreL (PLAVIX) 75 mg tablet Take 1 tablet (75 mg total) by mouth Daily.    EScitalopram oxalate (LEXAPRO) 20 MG tablet Take 1 tablet (20 mg total) by mouth once daily.    hydrALAZINE (APRESOLINE) 50 MG tablet Take 1 tablet (50 mg total) by mouth every 8 (eight) hours.    losartan (COZAAR) 50 MG tablet Take 50 mg by mouth once daily.    rOPINIRole (REQUIP) 0.25 MG tablet Take 1 tablet (0.25 mg total) by mouth every evening.    dapagliflozin (FARXIGA) 10 mg tablet Take 1 tablet (10 mg total) by mouth once daily.    ferrous sulfate 324 mg (65 mg iron) TbEC Take 1 tablet (324 mg  total) by mouth every other day.    metoprolol succinate (TOPROL-XL) 25 MG 24 hr tablet Take 1 tablet (25 mg total) by mouth once daily.     Family History    None       Tobacco Use    Smoking status: Every Day     Packs/day: 1.50     Types: Cigarettes     Passive exposure: Current    Smokeless tobacco: Never   Substance and Sexual Activity    Alcohol use: Never    Drug use: Never    Sexual activity: Not Currently     Review of Systems   Constitutional:  Negative for activity change, appetite change and fever.   Respiratory:  Negative for chest tightness, shortness of breath and wheezing.    Cardiovascular:  Negative for chest pain.   Gastrointestinal:  Negative for abdominal pain, constipation, diarrhea, nausea and vomiting.   Genitourinary:  Negative for dysuria.   Skin:  Negative for rash and wound.   Neurological:  Negative for tremors and headaches.   Objective:     Vital Signs (Most Recent):  Temp: 97.7 °F (36.5 °C) (06/30/23 0732)  Pulse: (!) 55 (06/30/23 0753)  Resp: 18 (06/30/23 0753)  BP: (!) 126/56 (06/30/23 0732)  SpO2: 100 % (06/30/23 0753) Vital Signs (24h Range):  Temp:  [97.1 °F (36.2 °C)-98.7 °F (37.1 °C)] 97.7 °F (36.5 °C)  Pulse:  [47-60] 55  Resp:  [18-20] 18  SpO2:  [97 %-100 %] 100 %  BP: ()/(45-64) 126/56     Weight: 76.2 kg (168 lb)  Body mass index is 25.54 kg/m².     Physical Exam  Constitutional:       General: She is not in acute distress.     Appearance: Normal appearance. She is not ill-appearing, toxic-appearing or diaphoretic.   HENT:      Head: Normocephalic and atraumatic.      Right Ear: External ear normal.      Left Ear: External ear normal.      Nose: Nose normal. No congestion or rhinorrhea.      Mouth/Throat:      Mouth: Mucous membranes are moist.      Pharynx: Oropharynx is clear.   Eyes:      Extraocular Movements: Extraocular movements intact.      Conjunctiva/sclera: Conjunctivae normal.      Pupils: Pupils are equal, round, and reactive to light.   Neck:       Vascular: No carotid bruit.   Cardiovascular:      Rate and Rhythm: Normal rate and regular rhythm.      Pulses: Normal pulses.      Heart sounds: Normal heart sounds. No murmur heard.  Pulmonary:      Effort: Pulmonary effort is normal. No respiratory distress.      Breath sounds: Normal breath sounds. No wheezing, rhonchi or rales.   Abdominal:      General: Abdomen is flat. Bowel sounds are normal. There is no distension.      Palpations: Abdomen is soft.      Tenderness: There is no abdominal tenderness. There is no guarding.   Musculoskeletal:         General: No swelling or tenderness. Normal range of motion.      Cervical back: Normal range of motion and neck supple. No tenderness.      Right lower leg: No edema.      Left lower leg: No edema.   Lymphadenopathy:      Cervical: No cervical adenopathy.   Skin:     General: Skin is warm and dry.      Coloration: Skin is not jaundiced or pale.      Findings: Bruising present.   Neurological:      General: No focal deficit present.      Mental Status: She is alert and oriented to person, place, and time. Mental status is at baseline.      Cranial Nerves: No cranial nerve deficit.      Motor: No weakness.      Coordination: Coordination normal.      Gait: Gait normal.   Psychiatric:         Mood and Affect: Mood normal.         Behavior: Behavior normal.         Thought Content: Thought content normal.         Judgment: Judgment normal.            CRANIAL NERVES     CN III, IV, VI   Pupils are equal, round, and reactive to light.     Significant Labs: All pertinent labs within the past 24 hours have been reviewed.  BMP:   Recent Labs   Lab 06/30/23  0443      K 3.5   CO2 18*   BUN 11.0   CREATININE 1.68*   CALCIUM 8.2*       CBC:   Recent Labs   Lab 06/29/23  0507 06/30/23  0443   WBC 7.39 5.87   HGB 9.7* 9.5*   HCT 30.5* 30.0*    353         Significant Imaging: I have reviewed all pertinent imaging results/findings within the past 24  hours.      Assessment/Plan:      * Acute blood loss anemia  Check iron levels, b12  cosnult surgery for possible EGD  06/27/2023 we will transfuse 2 units of packed red blood cells.  Surgery will evaluate for endoscopy.  Keep her NPO after midnight.  06/28/2023 patient doing well tolerating the 2 units of packed red blood cells.  Awaiting for endoscopy today.  We will repeat labs tomorrow.  06/29/2023 awaiting colonoscopy for tomorrow.  EGD was negative.  06/30/2023 colonoscopy tomorrow    NEYDA (acute kidney injury)  Patient with acute kidney injury likely due to IVVD/dehydration NEYDA is currently worsening. Labs reviewed- Renal function/electrolytes with Estimated Creatinine Clearance: 31 mL/min (A) (based on SCr of 1.68 mg/dL (H)). according to latest data. Monitor urine output and serial BMP and adjust therapy as needed. Avoid nephrotoxins and renally dose meds for GFR listed above.     Cr was 1.9 in April, now 2.5  Give ivf  Recheck in am    Constipation  Give lactulose      Hypertension  Chronic, controlled.  Latest blood pressure and vitals reviewed-   Temp:  [97.1 °F (36.2 °C)-98.7 °F (37.1 °C)]   Pulse:  [47-60]   Resp:  [18-20]   BP: ()/(45-64)   SpO2:  [97 %-100 %] .   Home meds for hypertension were reviewed and noted below.   Hypertension Medications             amLODIPine (NORVASC) 5 MG tablet Take 1 tablet (5 mg total) by mouth once daily.    cloNIDine (CATAPRES) 0.2 MG tablet Take 1 tablet (0.2 mg total) by mouth 2 (two) times daily.    hydrALAZINE (APRESOLINE) 50 MG tablet Take 1 tablet (50 mg total) by mouth every 8 (eight) hours.    losartan (COZAAR) 50 MG tablet Take 50 mg by mouth once daily.    metoprolol succinate (TOPROL-XL) 25 MG 24 hr tablet Take 1 tablet (25 mg total) by mouth once daily.          While in the hospital, will manage blood pressure as follows; Continue home antihypertensive regimen    Will utilize p.r.n. blood pressure medication only if patient's blood pressure greater  than  180/110 and she develops symptoms such as worsening chest pain or shortness of breath.          VTE Risk Mitigation (From admission, onward)         Ordered     IP VTE HIGH RISK PATIENT  Once         06/28/23 1814     Place sequential compression device  Until discontinued         06/28/23 1814     Place sequential compression device  Until discontinued         06/26/23 1331                Discharge Planning   PARRIS: 7/1/2023     Code Status: Full Code   Is the patient medically ready for discharge?:     Reason for patient still in hospital (select all that apply): Patient unstable  Discharge Plan A: Home                  Darren Meier MD  Department of Hospital Medicine   [facility]

## 2023-06-30 NOTE — ANESTHESIA PREPROCEDURE EVALUATION
06/30/2023  Rosalie Campbell is a 72 y.o., female.      Pre-op Assessment    I have reviewed the Patient Summary Reports.     I have reviewed the Nursing Notes. I have reviewed the NPO Status.   I have reviewed the Medications.     Review of Systems  Anesthesia Hx:  No problems with previous Anesthesia  Denies Family Hx of Anesthesia complications.   Denies Personal Hx of Anesthesia complications.   Social:  Smoker    Hematology/Oncology:     Oncology Normal    -- Anemia:   EENT/Dental:EENT/Dental Normal   Cardiovascular:   Exercise tolerance: good Hypertension CAD      Pulmonary:  Pulmonary Normal    Renal/:   Chronic Renal Disease, CKD    Hepatic/GI:  Hepatic/GI Normal    Musculoskeletal:   Arthritis     Neurological:  Neurology Normal    Endocrine:  Endocrine Normal    Dermatological:  Skin Normal    Psych:   Psychiatric History anxiety          Physical Exam  General: Well nourished, Cooperative, Alert and Oriented    Airway:  Mallampati: II / II  Mouth Opening: Normal  TM Distance: Normal  Tongue: Normal  Neck ROM: Normal ROM    Dental:  Intact        Anesthesia Plan  Type of Anesthesia, risks & benefits discussed:    Anesthesia Type: MAC  Intra-op Monitoring Plan: Standard ASA Monitors  Induction:  IV  Informed Consent: Informed consent signed with the Patient and all parties understand the risks and agree with anesthesia plan.  All questions answered. Patient consented to blood products? No  ASA Score: 3  Day of Surgery Review of History & Physical: H&P Update referred to the surgeon/provider.I have interviewed and examined the patient. I have reviewed the patient's H&P dated: There are no significant changes.     Ready For Surgery From Anesthesia Perspective.     .

## 2023-06-30 NOTE — ASSESSMENT & PLAN NOTE
Check iron levels, b12  cosnult surgery for possible EGD  06/27/2023 we will transfuse 2 units of packed red blood cells.  Surgery will evaluate for endoscopy.  Keep her NPO after midnight.  06/28/2023 patient doing well tolerating the 2 units of packed red blood cells.  Awaiting for endoscopy today.  We will repeat labs tomorrow.  06/29/2023 awaiting colonoscopy for tomorrow.  EGD was negative.  06/30/2023 colonoscopy tomorrow

## 2023-06-30 NOTE — PROGRESS NOTES
" Inpatient Nutrition Assessment    Admit Date: 6/26/2023   Total duration of encounter: 4 days     Nutrition Recommendation/Prescription     Continue Clear Liquid Diet. Once medically appropriate ADAT to Renal, Low Residue Diet.    Change Boost to Pro source. Once diet advanced change to Novasource Renal.    If appetite does not improve once diet advanced consider appetite stimulant.     If unable to advance diet within 24-48 hours recommend consider alternate nutrition.       RD to monitor patients Electrolytes, GI Function , Labs, PO Intake, and Weight and adjust MNT as needed.       Communication of Recommendations: reviewed with nurse and reviewed with patient    Nutrition Assessment     Malnutrition Assessment/Nutrition-Focused Physical Exam    Unable to perform at this time.     Chart Review    Reason Seen: follow-up    Malnutrition Screening Tool Results   Have you recently lost weight without trying?: No  Have you been eating poorly because of a decreased appetite?: No   MST Score: 0     Diagnosis:  Acute Blood Loss Anemia, Constipation, HTN, NEYDA.     Relevant Medical History: Anxiety, CKD, Depression, HTN, and Obesity.    Nutrition-Related Medications: Atorvastatin, Toprol XL, IVF @ 100 ml/hr.   Calorie Containing IV Medications: no significant kcals from medications at this time    Nutrition-Related Labs: Date: (6/27):  , RBC- 2.81L, HGB- 6.7L, HCT- 21.5L, MCV- 76.5L, MCH- 23.8L, CO2- 18L, Cr- 2.09H, CrCl- 26.9, Alb- 2.9L, and Mag- 1.6L     Date: (6/30):  , RBC- 3.68L, HGB- 9.5L, HCT- 30.0L, MCH- 25.8L, CO2- 18L, Cr- 1.68H, and CrCl- 31      Diet/PN Order: Diet clear liquid  Oral Supplement Order: Boost Breeze  Tube Feeding Order: none  Appetite/Oral Intake: poor/0-25% of meals 17%-6 Meals.   Factors Affecting Nutritional Intake: altered gastrointestinal function and clear liquid diet  Food/Oriental orthodox/Cultural Preferences:  vegetables "broccoli and spinach"  Food Allergies: none reported and no known food " "allergies    Skin Integrity: bruised (ecchymotic)  Wound(s):      Altered Skin Integrity 06/28/23 0949 Right Groin Moisture associated dermatitis-Tissue loss description: Not applicable       Altered Skin Integrity 06/28/23 0950 Left Groin Moisture associated dermatitis-Tissue loss description: Not applicable       Altered Skin Integrity 06/28/23 0952 Gluteal cleft Moisture associated dermatitis-Tissue loss description: Partial thickness     Comments    (6/27): Patient in room with multiple family members. Patient reports poor appetite now and has been that way for a couple of months d/t early satiety. Patient believes she's lost ~20# and does not like vegetables. Willing to try ONS. GI: CONSTIPATION/LBM-6/26 (Nurse reports no more constipation), BRDN: 20, NO EDEMA NOTED, 24 HR I/O: 120/0.    (6/30): Patient is hungry/good appetite but dislikes Clear Liquid Diet and is on NPO/CLD day 4. Patient dislikes Boost Breeze and willing to try Pro Source. Nurse reports colonoscopy pushed back to tomorrow. Due to this patient reports if no colonoscopy tomorrow, she's leaving the hospital. GI: DIARRHEA/LBM-6/29, BRDN:20, NO EDEMA NOTED, 24 HR I/O: 480/0.    Anthropometrics    Height: 5' 8" (172.7 cm) Height Method: Stated  Last Weight: 76.2 kg (168 lb) (06/29/23 2306) Weight Method: Bed Scale  BMI (Calculated): 25.6  BMI Classification: normal (BMI 18.5-24.9)     Ideal Body Weight (IBW), Female: 140 lb     % Ideal Body Weight, Female (lb): 120.36 %                             Usual Weight Provided By: patient and EMR weight history    Wt Readings from Last 5 Encounters:   06/29/23 76.2 kg (168 lb)   05/02/23 82.9 kg (182 lb 12.2 oz)   01/25/23 86.1 kg (189 lb 13.1 oz)   01/05/23 87.9 kg (193 lb 12.6 oz)   11/16/21 86.6 kg (190 lb 14.7 oz)     Weight Change(s) Since Admission:  Admit Weight: 82.6 kg (182 lb) (06/26/23 0919)  (6/27): Patient reports ~20# weight loss, per EMR patient lost 13.4# (7.3%) weight loss in almost 2 " months. Significant per time frame.   (): Patient weighed 76.8 kg on , CBW- 76.2 kg showing a 1.32# (0.7%) weight loss in 4 days.     Estimated Needs    Weight Used For Calorie Calculations: 66.9 kg (147 lb 7.8 oz) (ABW)  Energy Calorie Requirements (kcal): 6357-5914 KCAL (26-30 KCAL/KG ABW)  Energy Need Method: Kcal/kg  Weight Used For Protein Calculations: 66.9 kg (147 lb 7.8 oz) (ABW)  Protein Requirements: 60-74 GM PRO (0.9-1.1 GM/KG ABW)  Fluid Requirements (mL): 1000 ML H2O + OUTPUT  Temp (24hrs), Av.9 °F (36.6 °C), Min:97.1 °F (36.2 °C), Max:98.7 °F (37.1 °C)         Enteral Nutrition    Patient not receiving enteral nutrition at this time.    Parenteral Nutrition    Patient not receiving parenteral nutrition support at this time.    Evaluation of Received Nutrient Intake    Calories: not meeting estimated needs  Protein: not meeting estimated needs    Patient Education    Not applicable.         Nutrition Diagnosis     PES: Inadequate oral intake related to altered GI function as evidenced by conditions associated with diagnosis. (continues)    Interventions/Goals     Intervention(s): general/healthful diet, modified composition of meals/snacks, commercial beverage, and collaboration with other providers  Goal: Meet Greater than 75% of nutritional needs by discharge. (goal not met)    Monitoring & Evaluation     Dietitian will monitor Food and Beverage Intake, Energy Intake, Weight, Weight Change, and Gastrointestinal Profile.  Nutrition Risk/Follow-Up: moderate (follow-up in 3-5 days)   Please consult if re-assessment needed sooner.

## 2023-06-30 NOTE — ASSESSMENT & PLAN NOTE
Patient with acute kidney injury likely due to IVVD/dehydration NEYDA is currently worsening. Labs reviewed- Renal function/electrolytes with Estimated Creatinine Clearance: 31 mL/min (A) (based on SCr of 1.68 mg/dL (H)). according to latest data. Monitor urine output and serial BMP and adjust therapy as needed. Avoid nephrotoxins and renally dose meds for GFR listed above.     Cr was 1.9 in April, now 2.5  Give ivf  Recheck in am

## 2023-06-30 NOTE — ASSESSMENT & PLAN NOTE
Chronic, controlled.  Latest blood pressure and vitals reviewed-   Temp:  [97.1 °F (36.2 °C)-98.7 °F (37.1 °C)]   Pulse:  [47-60]   Resp:  [18-20]   BP: ()/(45-64)   SpO2:  [97 %-100 %] .   Home meds for hypertension were reviewed and noted below.   Hypertension Medications             amLODIPine (NORVASC) 5 MG tablet Take 1 tablet (5 mg total) by mouth once daily.    cloNIDine (CATAPRES) 0.2 MG tablet Take 1 tablet (0.2 mg total) by mouth 2 (two) times daily.    hydrALAZINE (APRESOLINE) 50 MG tablet Take 1 tablet (50 mg total) by mouth every 8 (eight) hours.    losartan (COZAAR) 50 MG tablet Take 50 mg by mouth once daily.    metoprolol succinate (TOPROL-XL) 25 MG 24 hr tablet Take 1 tablet (25 mg total) by mouth once daily.          While in the hospital, will manage blood pressure as follows; Continue home antihypertensive regimen    Will utilize p.r.n. blood pressure medication only if patient's blood pressure greater than  180/110 and she develops symptoms such as worsening chest pain or shortness of breath.

## 2023-07-01 VITALS
DIASTOLIC BLOOD PRESSURE: 58 MMHG | TEMPERATURE: 99 F | HEART RATE: 60 BPM | BODY MASS INDEX: 28.07 KG/M2 | SYSTOLIC BLOOD PRESSURE: 132 MMHG | WEIGHT: 185.19 LBS | OXYGEN SATURATION: 99 % | HEIGHT: 68 IN | RESPIRATION RATE: 20 BRPM

## 2023-07-01 LAB
ANION GAP SERPL CALC-SCNC: 10 MEQ/L (ref 2–13)
BACTERIA BLD CULT: NORMAL
BASOPHILS # BLD AUTO: 0.04 X10(3)/MCL (ref 0.01–0.08)
BASOPHILS NFR BLD AUTO: 0.7 % (ref 0.1–1.2)
BUN SERPL-MCNC: 8 MG/DL (ref 7–20)
CALCIUM SERPL-MCNC: 8.2 MG/DL (ref 8.4–10.2)
CHLORIDE SERPL-SCNC: 111 MMOL/L (ref 98–110)
CO2 SERPL-SCNC: 17 MMOL/L (ref 21–32)
CREAT SERPL-MCNC: 1.62 MG/DL (ref 0.66–1.25)
CREAT/UREA NIT SERPL: 5 (ref 12–20)
EOSINOPHIL # BLD AUTO: 0.21 X10(3)/MCL (ref 0.04–0.36)
EOSINOPHIL NFR BLD AUTO: 3.8 % (ref 0.7–7)
ERYTHROCYTE [DISTWIDTH] IN BLOOD BY AUTOMATED COUNT: 16.9 % (ref 11–14.5)
GFR SERPLBLD CREATININE-BSD FMLA CKD-EPI: 34 MLS/MIN/1.73/M2
GLUCOSE SERPL-MCNC: 94 MG/DL (ref 70–115)
HCT VFR BLD AUTO: 29.7 % (ref 36–48)
HGB BLD-MCNC: 9.6 G/DL (ref 11.8–16)
IMM GRANULOCYTES # BLD AUTO: 0.03 X10(3)/MCL (ref 0–0.03)
IMM GRANULOCYTES NFR BLD AUTO: 0.5 % (ref 0–0.5)
LYMPHOCYTES # BLD AUTO: 0.97 X10(3)/MCL (ref 1.16–3.74)
LYMPHOCYTES NFR BLD AUTO: 17.7 % (ref 20–55)
MCH RBC QN AUTO: 26.2 PG (ref 27–34)
MCHC RBC AUTO-ENTMCNC: 32.3 G/DL (ref 31–37)
MCV RBC AUTO: 80.9 FL (ref 79–99)
MONOCYTES # BLD AUTO: 0.48 X10(3)/MCL (ref 0.24–0.36)
MONOCYTES NFR BLD AUTO: 8.8 % (ref 4.7–12.5)
NEUTROPHILS # BLD AUTO: 3.75 X10(3)/MCL (ref 1.56–6.13)
NEUTROPHILS NFR BLD AUTO: 68.5 % (ref 37–73)
NRBC BLD AUTO-RTO: 0 %
PLATELET # BLD AUTO: 375 X10(3)/MCL (ref 140–371)
PMV BLD AUTO: 9.5 FL (ref 9.4–12.4)
POTASSIUM SERPL-SCNC: 3.3 MMOL/L (ref 3.5–5.1)
RBC # BLD AUTO: 3.67 X10(6)/MCL (ref 4–5.1)
SODIUM SERPL-SCNC: 138 MMOL/L (ref 135–145)
WBC # SPEC AUTO: 5.48 X10(3)/MCL (ref 4–11.5)

## 2023-07-01 PROCEDURE — 80048 BASIC METABOLIC PNL TOTAL CA: CPT | Performed by: FAMILY MEDICINE

## 2023-07-01 PROCEDURE — 36415 COLL VENOUS BLD VENIPUNCTURE: CPT | Performed by: FAMILY MEDICINE

## 2023-07-01 PROCEDURE — 85025 COMPLETE CBC W/AUTO DIFF WBC: CPT | Performed by: FAMILY MEDICINE

## 2023-07-01 PROCEDURE — 94761 N-INVAS EAR/PLS OXIMETRY MLT: CPT

## 2023-07-01 PROCEDURE — 25000003 PHARM REV CODE 250: Performed by: SURGERY

## 2023-07-01 PROCEDURE — C9113 INJ PANTOPRAZOLE SODIUM, VIA: HCPCS | Performed by: SURGERY

## 2023-07-01 PROCEDURE — 63600175 PHARM REV CODE 636 W HCPCS: Performed by: SURGERY

## 2023-07-01 RX ORDER — PANTOPRAZOLE SODIUM 40 MG/1
40 TABLET, DELAYED RELEASE ORAL DAILY
Status: DISCONTINUED | OUTPATIENT
Start: 2023-07-02 | End: 2023-07-01 | Stop reason: HOSPADM

## 2023-07-01 RX ADMIN — FAMOTIDINE 20 MG: 20 TABLET, FILM COATED ORAL at 09:07

## 2023-07-01 RX ADMIN — AMLODIPINE BESYLATE 5 MG: 5 TABLET ORAL at 09:07

## 2023-07-01 RX ADMIN — PANTOPRAZOLE SODIUM 40 MG: 40 INJECTION, POWDER, FOR SOLUTION INTRAVENOUS at 09:07

## 2023-07-01 RX ADMIN — ESCITALOPRAM OXALATE 20 MG: 10 TABLET ORAL at 09:07

## 2023-07-01 RX ADMIN — METOPROLOL SUCCINATE 25 MG: 25 TABLET, EXTENDED RELEASE ORAL at 09:07

## 2023-07-01 RX ADMIN — WHITE PETROLATUM 41 % TOPICAL OINTMENT: OINTMENT at 06:07

## 2023-07-01 RX ADMIN — HYDRALAZINE HYDROCHLORIDE 50 MG: 50 TABLET, FILM COATED ORAL at 06:07

## 2023-07-01 RX ADMIN — MUPIROCIN 1 G: 20 OINTMENT TOPICAL at 09:07

## 2023-07-01 RX ADMIN — CLONIDINE HYDROCHLORIDE 0.2 MG: 0.1 TABLET ORAL at 09:07

## 2023-07-01 NOTE — DISCHARGE SUMMARY
[facility]/Surg  [facility] Medicine  Discharge Summary      Patient Name: Rosalie Campbell  MRN: [id number]  [facility]: [id number]  Patient Class: IP- Inpatient  Admission Date: 6/26/2023  Hospital Length of Stay: 4 days  Discharge Date and Time:  07/01/2023 12:03 PM  Attending Physician: Raquel Vogel MD   Discharging Provider: Darren Meier MD  Primary Care Provider: Jany Taveras, CLAUDIAP-KATHLEEN    Primary Care Team: Networked reference to record PCT     HPI:    Fatigue    Constipation    Shaking       C/o shaking, weakness onset Friday pm constipation onset x 2 weeks, pt reports having a normal bm this am      73 yo followed by Liang Mccracken at the [facility].    Patient presents to the emergency room with weakness and shivering which started this morning.  She suffers from anxiety and had a similar shivering episode on Saturday morning, for which she took an anxiety pill.  It did not stop and to about an hour and a half later.  But since then she has been feeling a little weak and tired and this morning had another shivering episode. , concerned that it is anxiety.  He has had some constipation since starting iron pills about 2 weeks ago for chronic anemia, but this resolved this morning and she is feeling much better with that.  She denies any abdominal pain.  Does state that she has been having melena on and off for a while, does take Plavix for cardiac stents.  Pt was evaluated in ER and found to have HGB of 7.5 down from 9 about 2 months ago as well as worsening BUN/Cr up to 2.5 which was 1.9 in April.  She still has not had a BM since taking one iron pill Rx by her PCP 2 weeks ago.      * No surgery found *      Hospital Course:   06/27/2023 patient doing well.  Surgery has been consulted to evaluate the GI bleeding.  Creatinine is around 2.  Hemoglobin is down in the sixes.  We will transfuse blood and get her prepared for scopes tomorrow.  08/20/2023 patient doing well waiting for scopes.  H&H  is stable she is not had any further bleeding  06/29/2023 patient being set up for colonoscopy tomorrow.  Bleeding scan pending.  H&H stable.  06/30/2023 patient's colonoscopy was canceled today will be done tomorrow.  H&H is stable and she is in good spirits.  07/01/2023 brief discharge summary elderly female on Plavix for coronary stents presented with GI bleeding.  Hospital course patient was admitted to the hospital she was transfused with blood.  Surgery was consulted.  Plavix was held.  She underwent EGD which did not show any evidence of bleeding.  Prep her for colonoscopy however patient declined she wants to go home and do this as an outpatient.  She is followed by Sheila Reynolds.  She will go home on p.o. Protonix.  Due to coronary stents will restart her Plavix.  She will come back to the emergency room she has any further bleeding or feels bad.  Plan is to do an outpatient colonoscopy through Dr. Hansen       Goals of Care Treatment Preferences:  Code Status: Full Code      Consults:   Consults (From admission, onward)        Status Ordering Provider     IP consult to case management  Once        Provider:  (Not yet assigned)    FRANKIE Haas     Inpatient consult to General surgery  Once        Provider:  Frankie Resendez MD    Acknowledged FRANKIE RESENDEZ     Inpatient consult to Hospitalist  Once        Provider:  Raquel Vogel MD    Acknowledged FRANKIE RESENDEZ          No new Assessment & Plan notes have been filed under this hospital service since the last note was generated.  Service: Hospital Medicine    Final Active Diagnoses:    Diagnosis Date Noted POA    PRINCIPAL PROBLEM:  Acute blood loss anemia [D62] 06/26/2023 Yes    Constipation [K59.00] 06/26/2023 Yes    NEYDA (acute kidney injury) [N17.9] 06/26/2023 Yes    Hypertension [I10] 01/05/2023 Yes      Problems Resolved During this Admission:       Discharged Condition: good    Disposition: Home-Health Care Svc    Follow  Up:   Follow-up Information     JASWINDER Mosqueda Follow up in 1 week(s).    Specialty: Family Medicine  Contact information:  [address]   [place] [id number] [phone]                       Patient Instructions:   No discharge procedures on file.    Significant Diagnostic Studies:     Pending Diagnostic Studies:     Procedure Component Value Units Date/Time    Specimen to Pathology [[id number]] Collected: 06/28/23 1535    Order Status: Sent Lab Status: In process Updated: 06/29/23 0813    Specimen: Tissue from Antrum     Specimen to Pathology Gastrointestinal tract [[id number]] Collected: 06/28/23 1538    Order Status: Sent Lab Status: No result     Specimen: Tissue          Medications:  Reconciled Home Medications:      Medication List      CONTINUE taking these medications    amLODIPine 5 MG tablet  Commonly known as: NORVASC  Take 1 tablet (5 mg total) by mouth once daily.     atorvastatin 40 MG tablet  Commonly known as: LIPITOR  Take 1 tablet (40 mg total) by mouth Daily.     busPIRone 15 MG tablet  Commonly known as: BUSPAR  Take 1 tablet (15 mg total) by mouth 3 (three) times daily.     cloNIDine 0.2 MG tablet  Commonly known as: CATAPRES  Take 1 tablet (0.2 mg total) by mouth 2 (two) times daily.     clopidogreL 75 mg tablet  Commonly known as: PLAVIX  Take 1 tablet (75 mg total) by mouth Daily.     dapagliflozin propanediol 10 mg tablet  Commonly known as: FARXIGA  Take 1 tablet (10 mg total) by mouth once daily.     EScitalopram oxalate 20 MG tablet  Commonly known as: LEXAPRO  Take 1 tablet (20 mg total) by mouth once daily.     ferrous sulfate 324 mg (65 mg iron) Tbec  Take 1 tablet (324 mg total) by mouth every other day.     hydrALAZINE 50 MG tablet  Commonly known as: APRESOLINE  Take 1 tablet (50 mg total) by mouth every 8 (eight) hours.     losartan 50 MG tablet  Commonly known as: COZAAR  Take 50 mg by mouth once daily.     metoprolol succinate 25 MG 24 hr tablet  Commonly known  as: TOPROL-XL  Take 1 tablet (25 mg total) by mouth once daily.     rOPINIRole 0.25 MG tablet  Commonly known as: REQUIP  Take 1 tablet (0.25 mg total) by mouth every evening.        Physical Exam  Constitutional:       General: She is not in acute distress.     Appearance: Normal appearance. She is not ill-appearing, toxic-appearing or diaphoretic.   HENT:      Head: Normocephalic and atraumatic.      Right Ear: External ear normal.      Left Ear: External ear normal.      Nose: Nose normal. No congestion or rhinorrhea.      Mouth/Throat:      Mouth: Mucous membranes are moist.      Pharynx: Oropharynx is clear.   Eyes:      Extraocular Movements: Extraocular movements intact.      Conjunctiva/sclera: Conjunctivae normal.      Pupils: Pupils are equal, round, and reactive to light.   Neck:      Vascular: No carotid bruit.   Cardiovascular:      Rate and Rhythm: Normal rate and regular rhythm.      Pulses: Normal pulses.      Heart sounds: Normal heart sounds. No murmur heard.  Pulmonary:      Effort: Pulmonary effort is normal. No respiratory distress.      Breath sounds: Normal breath sounds. No wheezing, rhonchi or rales.   Abdominal:      General: Abdomen is flat. Bowel sounds are normal. There is no distension.      Palpations: Abdomen is soft.      Tenderness: There is no abdominal tenderness. There is no guarding.   Musculoskeletal:         General: No swelling or tenderness. Normal range of motion.      Cervical back: Normal range of motion and neck supple. No tenderness.      Right lower leg: No edema.      Left lower leg: No edema.   Lymphadenopathy:      Cervical: No cervical adenopathy.   Skin:     General: Skin is warm and dry.      Coloration: Skin is not jaundiced or pale.      Neurological:      General: No focal deficit present.      Mental Status: She is alert and oriented to person, place, and time. Mental status is at baseline.      Cranial Nerves: No cranial nerve deficit.      Motor: No  weakness.      Coordination: Coordination normal.      Gait: Gait normal.   Psychiatric:         Mood and Affect: Mood normal.         Behavior: Behavior normal.         Thought Content: Thought content normal.         Judgment: Judgment normal.        Indwelling Lines/Drains at time of discharge:   Lines/Drains/Airways     None                 Time spent on the discharge of patient: 36 minutes         Darren Meier MD  Department of Hospital Medicine  [facility]

## 2023-07-01 NOTE — NURSING
Discharge instructions given to patient and family, allowed time for questions and concerns. Patient verbalized understanding of instructions given.

## 2023-07-01 NOTE — NURSING
Phoned [facility] to arrange home health services on discharge.  Spoke to Kim, will have on call nurse call back.

## 2023-07-01 NOTE — NURSING
Received a call back from [facility] on call nurse, Belle.  Requested to fax face sheet, D/C summary, and recent progress note to office.  Informed her that patient is in agreement to wait until Monday, July 3, 2023 to be seen.

## 2023-07-03 ENCOUNTER — HOSPITAL ENCOUNTER (INPATIENT)
Facility: HOSPITAL | Age: 73
LOS: 1 days | Discharge: HOME HEALTH | DRG: 378 | End: 2023-07-07
Admitting: FAMILY MEDICINE
Payer: MEDICARE

## 2023-07-03 DIAGNOSIS — K62.5 RECTAL BLEEDING: Primary | ICD-10-CM

## 2023-07-03 DIAGNOSIS — E87.6 HYPOKALEMIA: ICD-10-CM

## 2023-07-03 PROBLEM — N18.4 STAGE 4 CHRONIC KIDNEY DISEASE: Status: ACTIVE | Noted: 2023-07-03

## 2023-07-03 LAB
ALBUMIN SERPL-MCNC: 3.6 G/DL (ref 3.4–5)
ALBUMIN/GLOB SERPL: 1.3 RATIO
ALP SERPL-CCNC: 89 UNIT/L (ref 50–144)
ALT SERPL-CCNC: 17 UNIT/L (ref 1–45)
ANION GAP SERPL CALC-SCNC: 10 MEQ/L (ref 2–13)
AST SERPL-CCNC: 21 UNIT/L (ref 14–36)
BACTERIA BLD CULT: NORMAL
BASOPHILS # BLD AUTO: 0.04 X10(3)/MCL (ref 0.01–0.08)
BASOPHILS NFR BLD AUTO: 0.7 % (ref 0.1–1.2)
BILIRUBIN DIRECT+TOT PNL SERPL-MCNC: 0.3 MG/DL (ref 0–1)
BNP BLD-MCNC: 5460 PG/ML (ref 10–450)
BUN SERPL-MCNC: 7 MG/DL (ref 7–20)
CALCIUM SERPL-MCNC: 8.6 MG/DL (ref 8.4–10.2)
CHLORIDE SERPL-SCNC: 109 MMOL/L (ref 98–110)
CO2 SERPL-SCNC: 20 MMOL/L (ref 21–32)
CREAT SERPL-MCNC: 1.79 MG/DL (ref 0.66–1.25)
CREAT/UREA NIT SERPL: 4 (ref 12–20)
EOSINOPHIL # BLD AUTO: 0.27 X10(3)/MCL (ref 0.04–0.36)
EOSINOPHIL NFR BLD AUTO: 4.4 % (ref 0.7–7)
ERYTHROCYTE [DISTWIDTH] IN BLOOD BY AUTOMATED COUNT: 17.2 % (ref 11–14.5)
GFR SERPLBLD CREATININE-BSD FMLA CKD-EPI: 30 MLS/MIN/1.73/M2
GLOBULIN SER-MCNC: 2.8 GM/DL (ref 2–3.9)
GLUCOSE SERPL-MCNC: 117 MG/DL (ref 70–115)
HCT VFR BLD AUTO: 26.7 % (ref 36–48)
HCT VFR BLD AUTO: 30.5 % (ref 36–48)
HGB BLD-MCNC: 8.8 G/DL (ref 11.8–16)
HGB BLD-MCNC: 9.8 G/DL (ref 11.8–16)
IMM GRANULOCYTES # BLD AUTO: 0.05 X10(3)/MCL (ref 0–0.03)
IMM GRANULOCYTES NFR BLD AUTO: 0.8 % (ref 0–0.5)
LYMPHOCYTES # BLD AUTO: 1.45 X10(3)/MCL (ref 1.16–3.74)
LYMPHOCYTES NFR BLD AUTO: 23.6 % (ref 20–55)
MCH RBC QN AUTO: 25.9 PG (ref 27–34)
MCHC RBC AUTO-ENTMCNC: 32.1 G/DL (ref 31–37)
MCV RBC AUTO: 80.5 FL (ref 79–99)
MONOCYTES # BLD AUTO: 0.64 X10(3)/MCL (ref 0.24–0.36)
MONOCYTES NFR BLD AUTO: 10.4 % (ref 4.7–12.5)
NEUTROPHILS # BLD AUTO: 3.7 X10(3)/MCL (ref 1.56–6.13)
NEUTROPHILS NFR BLD AUTO: 60.1 % (ref 37–73)
NRBC BLD AUTO-RTO: 0 %
PLATELET # BLD AUTO: 349 X10(3)/MCL (ref 140–371)
PMV BLD AUTO: 9.6 FL (ref 9.4–12.4)
POTASSIUM SERPL-SCNC: 2.9 MMOL/L (ref 3.5–5.1)
PROT SERPL-MCNC: 6.4 GM/DL (ref 6.3–8.2)
RBC # BLD AUTO: 3.79 X10(6)/MCL (ref 4–5.1)
SODIUM SERPL-SCNC: 139 MMOL/L (ref 135–145)
WBC # SPEC AUTO: 6.15 X10(3)/MCL (ref 4–11.5)

## 2023-07-03 PROCEDURE — G0378 HOSPITAL OBSERVATION PER HR: HCPCS

## 2023-07-03 PROCEDURE — 85025 COMPLETE CBC W/AUTO DIFF WBC: CPT

## 2023-07-03 PROCEDURE — 85014 HEMATOCRIT: CPT

## 2023-07-03 PROCEDURE — 80053 COMPREHEN METABOLIC PANEL: CPT

## 2023-07-03 PROCEDURE — 83880 ASSAY OF NATRIURETIC PEPTIDE: CPT

## 2023-07-03 PROCEDURE — 36415 COLL VENOUS BLD VENIPUNCTURE: CPT

## 2023-07-03 PROCEDURE — 25000003 PHARM REV CODE 250

## 2023-07-03 PROCEDURE — 99285 EMERGENCY DEPT VISIT HI MDM: CPT

## 2023-07-03 RX ORDER — ROPINIROLE 0.25 MG/1
0.25 TABLET, FILM COATED ORAL NIGHTLY
Status: DISCONTINUED | OUTPATIENT
Start: 2023-07-04 | End: 2023-07-07 | Stop reason: HOSPADM

## 2023-07-03 RX ORDER — POTASSIUM CHLORIDE 20 MEQ/1
40 TABLET, EXTENDED RELEASE ORAL
Status: COMPLETED | OUTPATIENT
Start: 2023-07-03 | End: 2023-07-03

## 2023-07-03 RX ORDER — ONDANSETRON 2 MG/ML
4 INJECTION INTRAMUSCULAR; INTRAVENOUS EVERY 8 HOURS PRN
Status: DISCONTINUED | OUTPATIENT
Start: 2023-07-03 | End: 2023-07-07 | Stop reason: HOSPADM

## 2023-07-03 RX ORDER — ESCITALOPRAM OXALATE 10 MG/1
20 TABLET ORAL DAILY
Status: DISCONTINUED | OUTPATIENT
Start: 2023-07-04 | End: 2023-07-07 | Stop reason: HOSPADM

## 2023-07-03 RX ORDER — SODIUM CHLORIDE 0.9 % (FLUSH) 0.9 %
10 SYRINGE (ML) INJECTION
Status: DISCONTINUED | OUTPATIENT
Start: 2023-07-03 | End: 2023-07-07 | Stop reason: HOSPADM

## 2023-07-03 RX ORDER — ATORVASTATIN CALCIUM 40 MG/1
40 TABLET, FILM COATED ORAL DAILY
Status: DISCONTINUED | OUTPATIENT
Start: 2023-07-04 | End: 2023-07-07 | Stop reason: HOSPADM

## 2023-07-03 RX ORDER — HYDRALAZINE HYDROCHLORIDE 50 MG/1
50 TABLET, FILM COATED ORAL EVERY 8 HOURS
Status: DISCONTINUED | OUTPATIENT
Start: 2023-07-04 | End: 2023-07-07 | Stop reason: HOSPADM

## 2023-07-03 RX ORDER — TALC
6 POWDER (GRAM) TOPICAL NIGHTLY PRN
Status: DISCONTINUED | OUTPATIENT
Start: 2023-07-03 | End: 2023-07-07 | Stop reason: HOSPADM

## 2023-07-03 RX ORDER — LOSARTAN POTASSIUM 50 MG/1
50 TABLET ORAL DAILY
Status: DISCONTINUED | OUTPATIENT
Start: 2023-07-04 | End: 2023-07-07 | Stop reason: HOSPADM

## 2023-07-03 RX ORDER — BUSPIRONE HYDROCHLORIDE 15 MG/1
15 TABLET ORAL 3 TIMES DAILY
Status: DISCONTINUED | OUTPATIENT
Start: 2023-07-04 | End: 2023-07-07 | Stop reason: HOSPADM

## 2023-07-03 RX ORDER — METOPROLOL SUCCINATE 25 MG/1
25 TABLET, EXTENDED RELEASE ORAL DAILY
Status: DISCONTINUED | OUTPATIENT
Start: 2023-07-04 | End: 2023-07-07 | Stop reason: HOSPADM

## 2023-07-03 RX ORDER — FAMOTIDINE 10 MG/ML
20 INJECTION INTRAVENOUS DAILY
Status: DISCONTINUED | OUTPATIENT
Start: 2023-07-04 | End: 2023-07-05

## 2023-07-03 RX ORDER — AMLODIPINE BESYLATE 5 MG/1
5 TABLET ORAL DAILY
Status: DISCONTINUED | OUTPATIENT
Start: 2023-07-04 | End: 2023-07-07 | Stop reason: HOSPADM

## 2023-07-03 RX ORDER — POTASSIUM CHLORIDE 20 MEQ/1
40 TABLET, EXTENDED RELEASE ORAL 3 TIMES DAILY
Status: DISCONTINUED | OUTPATIENT
Start: 2023-07-04 | End: 2023-07-07 | Stop reason: HOSPADM

## 2023-07-03 RX ORDER — LANOLIN ALCOHOL/MO/W.PET/CERES
1 CREAM (GRAM) TOPICAL DAILY
Status: DISCONTINUED | OUTPATIENT
Start: 2023-07-04 | End: 2023-07-07 | Stop reason: HOSPADM

## 2023-07-03 RX ORDER — CLONIDINE HYDROCHLORIDE 0.1 MG/1
0.2 TABLET ORAL 2 TIMES DAILY
Status: DISCONTINUED | OUTPATIENT
Start: 2023-07-04 | End: 2023-07-07 | Stop reason: HOSPADM

## 2023-07-03 RX ADMIN — POTASSIUM CHLORIDE 40 MEQ: 1500 TABLET, EXTENDED RELEASE ORAL at 11:07

## 2023-07-03 RX ADMIN — POTASSIUM CHLORIDE 40 MEQ: 1500 TABLET, EXTENDED RELEASE ORAL at 07:07

## 2023-07-04 LAB
ALBUMIN SERPL-MCNC: 2.9 G/DL (ref 3.4–5)
ALBUMIN/GLOB SERPL: 1.1 RATIO
ALP SERPL-CCNC: 77 UNIT/L (ref 50–144)
ALT SERPL-CCNC: 13 UNIT/L (ref 1–45)
ANION GAP SERPL CALC-SCNC: 4 MEQ/L (ref 2–13)
AST SERPL-CCNC: 15 UNIT/L (ref 14–36)
BASOPHILS # BLD AUTO: 0.04 X10(3)/MCL (ref 0.01–0.08)
BASOPHILS # BLD AUTO: 0.04 X10(3)/MCL (ref 0.01–0.08)
BASOPHILS NFR BLD AUTO: 0.7 % (ref 0.1–1.2)
BASOPHILS NFR BLD AUTO: 0.7 % (ref 0.1–1.2)
BILIRUBIN DIRECT+TOT PNL SERPL-MCNC: 0.4 MG/DL (ref 0–1)
BUN SERPL-MCNC: 6 MG/DL (ref 7–20)
CALCIUM SERPL-MCNC: 7.8 MG/DL (ref 8.4–10.2)
CHLORIDE SERPL-SCNC: 112 MMOL/L (ref 98–110)
CO2 SERPL-SCNC: 23 MMOL/L (ref 21–32)
CREAT SERPL-MCNC: 1.64 MG/DL (ref 0.66–1.25)
CREAT/UREA NIT SERPL: 4 (ref 12–20)
EOSINOPHIL # BLD AUTO: 0.17 X10(3)/MCL (ref 0.04–0.36)
EOSINOPHIL # BLD AUTO: 0.27 X10(3)/MCL (ref 0.04–0.36)
EOSINOPHIL NFR BLD AUTO: 2.8 % (ref 0.7–7)
EOSINOPHIL NFR BLD AUTO: 5 % (ref 0.7–7)
ERYTHROCYTE [DISTWIDTH] IN BLOOD BY AUTOMATED COUNT: 17.2 % (ref 11–14.5)
ERYTHROCYTE [DISTWIDTH] IN BLOOD BY AUTOMATED COUNT: 17.3 % (ref 11–14.5)
GFR SERPLBLD CREATININE-BSD FMLA CKD-EPI: 33 MLS/MIN/1.73/M2
GLOBULIN SER-MCNC: 2.6 GM/DL (ref 2–3.9)
GLUCOSE SERPL-MCNC: 100 MG/DL (ref 70–115)
HCT VFR BLD AUTO: 26.8 % (ref 36–48)
HCT VFR BLD AUTO: 27.1 % (ref 36–48)
HGB BLD-MCNC: 8.6 G/DL (ref 11.8–16)
HGB BLD-MCNC: 8.6 G/DL (ref 11.8–16)
IMM GRANULOCYTES # BLD AUTO: 0.03 X10(3)/MCL (ref 0–0.03)
IMM GRANULOCYTES # BLD AUTO: 0.04 X10(3)/MCL (ref 0–0.03)
IMM GRANULOCYTES NFR BLD AUTO: 0.6 % (ref 0–0.5)
IMM GRANULOCYTES NFR BLD AUTO: 0.7 % (ref 0–0.5)
LYMPHOCYTES # BLD AUTO: 1.02 X10(3)/MCL (ref 1.16–3.74)
LYMPHOCYTES # BLD AUTO: 1.04 X10(3)/MCL (ref 1.16–3.74)
LYMPHOCYTES NFR BLD AUTO: 17.4 % (ref 20–55)
LYMPHOCYTES NFR BLD AUTO: 18.8 % (ref 20–55)
MCH RBC QN AUTO: 25.6 PG (ref 27–34)
MCH RBC QN AUTO: 25.7 PG (ref 27–34)
MCHC RBC AUTO-ENTMCNC: 31.7 G/DL (ref 31–37)
MCHC RBC AUTO-ENTMCNC: 32.1 G/DL (ref 31–37)
MCV RBC AUTO: 79.8 FL (ref 79–99)
MCV RBC AUTO: 80.9 FL (ref 79–99)
MONOCYTES # BLD AUTO: 0.46 X10(3)/MCL (ref 0.24–0.36)
MONOCYTES # BLD AUTO: 0.69 X10(3)/MCL (ref 0.24–0.36)
MONOCYTES NFR BLD AUTO: 12.7 % (ref 4.7–12.5)
MONOCYTES NFR BLD AUTO: 7.7 % (ref 4.7–12.5)
NEUTROPHILS # BLD AUTO: 3.39 X10(3)/MCL (ref 1.56–6.13)
NEUTROPHILS # BLD AUTO: 4.24 X10(3)/MCL (ref 1.56–6.13)
NEUTROPHILS NFR BLD AUTO: 62.2 % (ref 37–73)
NEUTROPHILS NFR BLD AUTO: 70.7 % (ref 37–73)
NRBC BLD AUTO-RTO: 0 %
NRBC BLD AUTO-RTO: 0 %
PLATELET # BLD AUTO: 295 X10(3)/MCL (ref 140–371)
PLATELET # BLD AUTO: 322 X10(3)/MCL (ref 140–371)
PMV BLD AUTO: 9.4 FL (ref 9.4–12.4)
PMV BLD AUTO: 9.8 FL (ref 9.4–12.4)
POTASSIUM SERPL-SCNC: 3.8 MMOL/L (ref 3.5–5.1)
PROT SERPL-MCNC: 5.5 GM/DL (ref 6.3–8.2)
RBC # BLD AUTO: 3.35 X10(6)/MCL (ref 4–5.1)
RBC # BLD AUTO: 3.36 X10(6)/MCL (ref 4–5.1)
SODIUM SERPL-SCNC: 139 MMOL/L (ref 135–145)
WBC # SPEC AUTO: 5.44 X10(3)/MCL (ref 4–11.5)
WBC # SPEC AUTO: 5.99 X10(3)/MCL (ref 4–11.5)

## 2023-07-04 PROCEDURE — 25000003 PHARM REV CODE 250

## 2023-07-04 PROCEDURE — 36415 COLL VENOUS BLD VENIPUNCTURE: CPT

## 2023-07-04 PROCEDURE — 94761 N-INVAS EAR/PLS OXIMETRY MLT: CPT

## 2023-07-04 PROCEDURE — G0378 HOSPITAL OBSERVATION PER HR: HCPCS

## 2023-07-04 PROCEDURE — 36415 COLL VENOUS BLD VENIPUNCTURE: CPT | Performed by: INTERNAL MEDICINE

## 2023-07-04 PROCEDURE — 85025 COMPLETE CBC W/AUTO DIFF WBC: CPT | Mod: 91 | Performed by: INTERNAL MEDICINE

## 2023-07-04 PROCEDURE — 25000003 PHARM REV CODE 250: Performed by: SURGERY

## 2023-07-04 PROCEDURE — 85025 COMPLETE CBC W/AUTO DIFF WBC: CPT

## 2023-07-04 PROCEDURE — 80053 COMPREHEN METABOLIC PANEL: CPT

## 2023-07-04 RX ORDER — CLONIDINE HYDROCHLORIDE 0.1 MG/1
0.2 TABLET ORAL ONCE
Status: COMPLETED | OUTPATIENT
Start: 2023-07-04 | End: 2023-07-04

## 2023-07-04 RX ORDER — POLYETHYLENE GLYCOL 3350 17 G/17G
17 POWDER, FOR SOLUTION ORAL
Status: COMPLETED | OUTPATIENT
Start: 2023-07-04 | End: 2023-07-05

## 2023-07-04 RX ORDER — ROPINIROLE 0.25 MG/1
0.25 TABLET, FILM COATED ORAL ONCE
Status: COMPLETED | OUTPATIENT
Start: 2023-07-04 | End: 2023-07-04

## 2023-07-04 RX ADMIN — FERROUS SULFATE TAB 325 MG (65 MG ELEMENTAL FE) 1 EACH: 325 (65 FE) TAB at 09:07

## 2023-07-04 RX ADMIN — ATORVASTATIN CALCIUM 40 MG: 40 TABLET, FILM COATED ORAL at 04:07

## 2023-07-04 RX ADMIN — ESCITALOPRAM OXALATE 20 MG: 10 TABLET ORAL at 09:07

## 2023-07-04 RX ADMIN — POLYETHYLENE GLYCOL 3350 17 G: 17 POWDER, FOR SOLUTION ORAL at 05:07

## 2023-07-04 RX ADMIN — CLONIDINE HYDROCHLORIDE 0.2 MG: 0.1 TABLET ORAL at 01:07

## 2023-07-04 RX ADMIN — AMLODIPINE BESYLATE 5 MG: 5 TABLET ORAL at 09:07

## 2023-07-04 RX ADMIN — ROPINIROLE HYDROCHLORIDE 0.25 MG: 0.25 TABLET, FILM COATED ORAL at 09:07

## 2023-07-04 RX ADMIN — POLYETHYLENE GLYCOL 3350 17 G: 17 POWDER, FOR SOLUTION ORAL at 10:07

## 2023-07-04 RX ADMIN — BUSPIRONE HYDROCHLORIDE 15 MG: 15 TABLET ORAL at 03:07

## 2023-07-04 RX ADMIN — BUSPIRONE HYDROCHLORIDE 15 MG: 15 TABLET ORAL at 09:07

## 2023-07-04 RX ADMIN — POLYETHYLENE GLYCOL 3350 17 G: 17 POWDER, FOR SOLUTION ORAL at 09:07

## 2023-07-04 RX ADMIN — POLYETHYLENE GLYCOL 3350 17 G: 17 POWDER, FOR SOLUTION ORAL at 11:07

## 2023-07-04 RX ADMIN — POLYETHYLENE GLYCOL 3350 17 G: 17 POWDER, FOR SOLUTION ORAL at 04:07

## 2023-07-04 RX ADMIN — METOPROLOL SUCCINATE 25 MG: 25 TABLET, EXTENDED RELEASE ORAL at 09:07

## 2023-07-04 RX ADMIN — CLONIDINE HYDROCHLORIDE 0.2 MG: 0.1 TABLET ORAL at 09:07

## 2023-07-04 RX ADMIN — POLYETHYLENE GLYCOL 3350 17 G: 17 POWDER, FOR SOLUTION ORAL at 07:07

## 2023-07-04 RX ADMIN — FAMOTIDINE 20 MG: 10 INJECTION, SOLUTION INTRAVENOUS at 09:07

## 2023-07-04 RX ADMIN — HYDRALAZINE HYDROCHLORIDE 50 MG: 50 TABLET ORAL at 10:07

## 2023-07-04 RX ADMIN — HYDRALAZINE HYDROCHLORIDE 50 MG: 50 TABLET ORAL at 03:07

## 2023-07-04 RX ADMIN — POLYETHYLENE GLYCOL 3350 17 G: 17 POWDER, FOR SOLUTION ORAL at 06:07

## 2023-07-04 RX ADMIN — HYDRALAZINE HYDROCHLORIDE 50 MG: 50 TABLET ORAL at 05:07

## 2023-07-04 RX ADMIN — POTASSIUM CHLORIDE 40 MEQ: 1500 TABLET, EXTENDED RELEASE ORAL at 09:07

## 2023-07-04 RX ADMIN — LOSARTAN POTASSIUM 50 MG: 50 TABLET, FILM COATED ORAL at 09:07

## 2023-07-04 RX ADMIN — ROPINIROLE HYDROCHLORIDE 0.25 MG: 0.25 TABLET, FILM COATED ORAL at 01:07

## 2023-07-04 NOTE — HPI
HPI: 72 year old female with pmh HTN, CAD on Plavix presenting to ED with complaints of bright red blood per rectum with associated diarrhea earlier in the day prior to presentation. Patient was actually prepping for a colonoscopy tby drinking 2 gallons Golytely but never occurred as she noted blood in toilet bowl. She endorses history of hemorrhoids and is currently taking Plavix. She denies fever or chills.           Past Medical History:   Diagnosis Date    Anxiety      Chronic renal disease stage 4      Colon cancer screening declined      Coronary artery disease      Depression      Hypertension      Irregular heart rhythm      Medication management      Obesity, unspecified      Osteoarthritis of knee      Pain, joint, shoulder region, right      Refused influenza vaccine      Refused pneumococcal vaccination

## 2023-07-04 NOTE — HOSPITAL COURSE
07/04/2023 patient readmitted for GI bleeding.  Surgery is consulted and will probably do the colonoscopy they wanted to do prior to her leaving last time.  Continue to monitor H&H.  07/05/2023 patient undergoing colonoscopy today the workup for GI bleeding.  H&H is stable.  Repeat labs tomorrow.  Further care depending on scope results.  07/06/2023 patient underwent colonoscopy yesterday which showed colitis.  She is been started on antibiotics by the surgeon.  H&H is stable.  07/07/2023 patient currently being treated for severe colitis with antibiotics.  Surgery is following.  H&H is stable.  Possible discharge tomorrow on p.o. antibiotics.    Addendum: 07/07/2023 pt requesting d/c home on po abx, tolerated food and would like to d/c so she can get all her meds today.  Grandson at bedside and pt is ready for d/c home.

## 2023-07-04 NOTE — SUBJECTIVE & OBJECTIVE
Interval History:     Review of Systems   Constitutional:  Negative for activity change, appetite change, chills, diaphoresis, fatigue and fever.   HENT:  Negative for congestion, ear pain, rhinorrhea and sore throat.    Eyes:  Negative for pain, discharge and redness.   Respiratory:  Negative for apnea, cough, choking, chest tightness, shortness of breath and wheezing.    Cardiovascular:  Negative for chest pain, palpitations and leg swelling.   Gastrointestinal:  Positive for blood in stool. Negative for abdominal distention, abdominal pain, constipation, diarrhea, nausea and vomiting.   Endocrine: Negative for cold intolerance, heat intolerance and polyuria.   Genitourinary:  Negative for difficulty urinating, dysuria, frequency, hematuria and pelvic pain.   Musculoskeletal:  Negative for arthralgias, back pain, gait problem, joint swelling and myalgias.   Skin:  Negative for color change, pallor, rash and wound.   Neurological:  Negative for seizures, syncope, speech difficulty, numbness and headaches.   Psychiatric/Behavioral:  Negative for agitation, confusion, hallucinations and sleep disturbance. The patient is not nervous/anxious.    Objective:     Vital Signs (Most Recent):  Temp: 97.8 °F (36.6 °C) (07/04/23 1041)  Pulse: (!) 52 (07/04/23 1041)  Resp: 20 (07/04/23 1041)  BP: (!) 143/58 (07/04/23 1041)  SpO2: 96 % (07/04/23 1041) Vital Signs (24h Range):  Temp:  [97.2 °F (36.2 °C)-98.3 °F (36.8 °C)] 97.8 °F (36.6 °C)  Pulse:  [52-70] 52  Resp:  [18-20] 20  SpO2:  [95 %-100 %] 96 %  BP: (134-189)/(58-78) 143/58     Weight: 80 kg (176 lb 4.8 oz)  Body mass index is 26.81 kg/m².    Intake/Output Summary (Last 24 hours) at 7/4/2023 1100  Last data filed at 7/4/2023 0904  Gross per 24 hour   Intake 120 ml   Output --   Net 120 ml         Physical Exam  Constitutional:       General: She is not in acute distress.     Appearance: Normal appearance. She is ill-appearing. She is not toxic-appearing or diaphoretic.    HENT:      Head: Normocephalic and atraumatic.      Right Ear: External ear normal.      Left Ear: External ear normal.      Nose: Nose normal. No congestion or rhinorrhea.      Mouth/Throat:      Mouth: Mucous membranes are moist.      Pharynx: Oropharynx is clear.   Eyes:      Extraocular Movements: Extraocular movements intact.      Conjunctiva/sclera: Conjunctivae normal.      Pupils: Pupils are equal, round, and reactive to light.   Neck:      Vascular: No carotid bruit.   Cardiovascular:      Rate and Rhythm: Normal rate and regular rhythm.      Pulses: Normal pulses.      Heart sounds: Normal heart sounds. No murmur heard.  Pulmonary:      Effort: Pulmonary effort is normal. No respiratory distress.      Breath sounds: Normal breath sounds. No wheezing, rhonchi or rales.   Abdominal:      General: Abdomen is flat. Bowel sounds are normal. There is no distension.      Palpations: Abdomen is soft.      Tenderness: There is no abdominal tenderness. There is no guarding.   Musculoskeletal:         General: No swelling or tenderness. Normal range of motion.      Cervical back: Normal range of motion and neck supple. No tenderness.      Right lower leg: No edema.      Left lower leg: No edema.   Lymphadenopathy:      Cervical: No cervical adenopathy.   Skin:     General: Skin is warm and dry.      Coloration: Skin is pale. Skin is not jaundiced.   Neurological:      General: No focal deficit present.      Mental Status: She is alert and oriented to person, place, and time. Mental status is at baseline.      Cranial Nerves: No cranial nerve deficit.      Motor: No weakness.      Coordination: Coordination normal.      Gait: Gait normal.   Psychiatric:         Mood and Affect: Mood normal.         Behavior: Behavior normal.         Thought Content: Thought content normal.         Judgment: Judgment normal.           Significant Labs: All pertinent labs within the past 24 hours have been reviewed.    Significant  Imaging: I have reviewed all pertinent imaging results/findings within the past 24 hours.

## 2023-07-04 NOTE — PLAN OF CARE
Patient status ongoing, progressing. Will continue monitoring.     Problem: Adult Inpatient Plan of Care  Goal: Plan of Care Review  Outcome: Ongoing, Progressing  Goal: Patient-Specific Goal (Individualized)  Outcome: Ongoing, Progressing  Goal: Absence of Hospital-Acquired Illness or Injury  Outcome: Ongoing, Progressing  Goal: Optimal Comfort and Wellbeing  Outcome: Ongoing, Progressing  Goal: Readiness for Transition of Care  Outcome: Ongoing, Progressing     Problem: Fluid and Electrolyte Imbalance (Acute Kidney Injury/Impairment)  Goal: Fluid and Electrolyte Balance  Outcome: Ongoing, Progressing     Problem: Oral Intake Inadequate (Acute Kidney Injury/Impairment)  Goal: Optimal Nutrition Intake  Outcome: Ongoing, Progressing     Problem: Renal Function Impairment (Acute Kidney Injury/Impairment)  Goal: Effective Renal Function  Outcome: Ongoing, Progressing     Problem: Impaired Wound Healing  Goal: Optimal Wound Healing  Outcome: Ongoing, Progressing     Problem: Fall Injury Risk  Goal: Absence of Fall and Fall-Related Injury  Outcome: Ongoing, Progressing

## 2023-07-04 NOTE — ED PROVIDER NOTES
Encounter Date: 7/3/2023       History     Chief Complaint   Patient presents with    Rectal Bleeding     Pt reports bright red rectal bleeding and diarrhea x3 onset today approx. 3 hours PTA.  Pt denies any accompanying GI symptoms but reports bilateral lower extremity swelling.       72-year-old female presents with bloody diarrhea x3 hours.  She was recently in the hospital, preparing for a colonoscopy that never occurred.  She drank 2 gal of GoLYTELY during that time.  Started with diarrhea about 3 hours ago, and noticed blood in the bowl.  She does have hemorrhoids, and is on Plavix.  She is unsure whether the blood was in the stool, or from a hemorrhoid.  She is not feeling weak in any way.    The history is provided by the patient.   Review of patient's allergies indicates:   Allergen Reactions    Codeine Other (See Comments)     Syncope  Other reaction(s): Syncope     Past Medical History:   Diagnosis Date    Anxiety     Chronic renal disease stage 4     Colon cancer screening declined     Coronary artery disease     Depression     Hypertension     Irregular heart rhythm     Medication management     Obesity, unspecified     Osteoarthritis of knee     Pain, joint, shoulder region, right     Refused influenza vaccine     Refused pneumococcal vaccination      Past Surgical History:   Procedure Laterality Date    ANGIOPLASTY  11/20/2019    ESOPHAGOGASTRODUODENOSCOPY  10/29/2018    STENT, AORTA       History reviewed. No pertinent family history.  Social History     Tobacco Use    Smoking status: Every Day     Packs/day: 1.50     Types: Cigarettes     Passive exposure: Current    Smokeless tobacco: Never   Substance Use Topics    Alcohol use: Never    Drug use: Never     Review of Systems   Constitutional:  Negative for fever.   HENT:  Negative for sore throat.    Respiratory:  Negative for shortness of breath.    Cardiovascular:  Positive for leg swelling. Negative for chest pain.   Gastrointestinal:  Positive  for blood in stool and diarrhea. Negative for nausea.   Genitourinary:  Negative for dysuria.   Musculoskeletal:  Negative for back pain.   Skin:  Negative for rash.   Neurological:  Negative for weakness.   Hematological:  Does not bruise/bleed easily.   All other systems reviewed and are negative.    Physical Exam     Initial Vitals [07/03/23 1915]   BP Pulse Resp Temp SpO2   (!) 189/70 66 18 97.7 °F (36.5 °C) 99 %      MAP       --         Physical Exam    Nursing note and vitals reviewed.  Constitutional: Vital signs are normal. She appears well-developed and well-nourished. She is cooperative.   HENT:   Head: Normocephalic and atraumatic.   Mouth/Throat: Oropharynx is clear and moist.   Eyes: Conjunctivae, EOM and lids are normal. Pupils are equal, round, and reactive to light.   Neck: Trachea normal. Neck supple.   Normal range of motion.  Cardiovascular:  Normal rate, regular rhythm, normal heart sounds and intact distal pulses.           Pulmonary/Chest: Breath sounds normal.   Abdominal: Abdomen is soft. Bowel sounds are normal.   Genitourinary:    Genitourinary Comments: Patient has several external hemorrhoids, but no obvious bleeding site.     Musculoskeletal:         General: Edema present. Normal range of motion.      Cervical back: Normal, normal range of motion and neck supple.      Lumbar back: Normal.     Neurological: She is alert and oriented to person, place, and time. She has normal strength. Coordination normal.   Skin: Skin is warm, dry and intact. Capillary refill takes less than 2 seconds.   Psychiatric: She has a normal mood and affect. Her speech is normal and behavior is normal. Judgment and thought content normal. Cognition and memory are normal.       ED Course   Procedures  Labs Reviewed   COMPREHENSIVE METABOLIC PANEL - Abnormal; Notable for the following components:       Result Value    Potassium Level 2.9 (*)     Carbon Dioxide 20 (*)     Glucose Level 117 (*)     Creatinine 1.79  (*)     BUN/Creatinine Ratio 4 (*)     All other components within normal limits   CBC WITH DIFFERENTIAL - Abnormal; Notable for the following components:    RBC 3.79 (*)     Hgb 9.8 (*)     Hct 30.5 (*)     MCH 25.9 (*)     RDW 17.2 (*)     Mono # 0.64 (*)     IG# 0.05 (*)     IG% 0.8 (*)     All other components within normal limits   NT-PRO NATRIURETIC PEPTIDE - Abnormal; Notable for the following components:    ProBNP 5,460 (*)     All other components within normal limits   HEMOGLOBIN AND HEMATOCRIT, BLOOD - Abnormal; Notable for the following components:    Hgb 8.8 (*)     Hct 26.7 (*)     All other components within normal limits   CBC W/ AUTO DIFFERENTIAL    Narrative:     The following orders were created for panel order CBC auto differential.  Procedure                               Abnormality         Status                     ---------                               -----------         ------                     CBC with Differential[[id number]]        Abnormal            Final result                 Please view results for these tests on the individual orders.          Imaging Results    None          Medications   potassium chloride SA CR tablet 40 mEq (has no administration in time range)   potassium chloride SA CR tablet 40 mEq (40 mEq Oral Given 7/1950)     Medical Decision Making:   Initial Assessment:   Diarrhea, blood in stool, patient on Plavix, CBC stable  Differential Diagnosis:   Hemorrhoidal Bleeding, lower GI bleed  ED Management:  Observe patient in the emergency room, and repeat H&H in 3 hours.  Although patient has not had a bowel movement in the emergency room in the last 3 hours, her hemoglobin did drop from 9.8-8.8.  We will admit her to observation, and consult surgery.                        Clinical Impression:   Final diagnoses:  [K62.5] Rectal bleeding (Primary)  [E87.6] Hypokalemia        ED Disposition Condition    Observation Stable                Mauro Sloan,  MD  07/03/23 1324

## 2023-07-04 NOTE — H&P
[facility]    History & Physical      Patient Name: Rosalie Campbell  MRN: [id number]  Admission Date: 7/3/2023  Attending Physician: Sekou Shah MD   Primary Care Provider: JASWINDER Mosqueda         Patient information was obtained from patient and ER records.     Subjective:     Principal Problem:Rectal bleeding    Chief Complaint:   Chief Complaint   Patient presents with    Rectal Bleeding     Pt reports bright red rectal bleeding and diarrhea x3 onset today approx. 3 hours PTA.  Pt denies any accompanying GI symptoms but reports bilateral lower extremity swelling.          HPI: 72 year old female with pmh HTN, CAD on Plavix presenting to ED with complaints of bright red blood per rectum with associated diarrhea earlier in the day prior to presentation. Patient was actually prepping for a colonoscopy tby drinking 2 gallons Golytely but never occurred as she noted blood in toilet bowl. She endorses history of hemorrhoids and is currently taking Plavix. She denies fever or chills.     Past Medical History:   Diagnosis Date    Anxiety     Chronic renal disease stage 4     Colon cancer screening declined     Coronary artery disease     Depression     Hypertension     Irregular heart rhythm     Medication management     Obesity, unspecified     Osteoarthritis of knee     Pain, joint, shoulder region, right     Refused influenza vaccine     Refused pneumococcal vaccination        Past Surgical History:   Procedure Laterality Date    ANGIOPLASTY  11/20/2019    ESOPHAGOGASTRODUODENOSCOPY  10/29/2018    STENT, AORTA         Review of patient's allergies indicates:   Allergen Reactions    Codeine Other (See Comments)     Syncope  Other reaction(s): Syncope       No current facility-administered medications on file prior to encounter.     Current Outpatient Medications on File Prior to Encounter   Medication Sig    amLODIPine (NORVASC) 5 MG tablet Take 1 tablet (5 mg total) by mouth once  daily.    atorvastatin (LIPITOR) 40 MG tablet Take 1 tablet (40 mg total) by mouth Daily.    busPIRone (BUSPAR) 15 MG tablet Take 1 tablet (15 mg total) by mouth 3 (three) times daily.    cloNIDine (CATAPRES) 0.2 MG tablet Take 1 tablet (0.2 mg total) by mouth 2 (two) times daily.    clopidogreL (PLAVIX) 75 mg tablet Take 1 tablet (75 mg total) by mouth Daily.    EScitalopram oxalate (LEXAPRO) 20 MG tablet Take 1 tablet (20 mg total) by mouth once daily.    hydrALAZINE (APRESOLINE) 50 MG tablet Take 1 tablet (50 mg total) by mouth every 8 (eight) hours.    losartan (COZAAR) 50 MG tablet Take 50 mg by mouth once daily.    metoprolol succinate (TOPROL-XL) 25 MG 24 hr tablet Take 1 tablet (25 mg total) by mouth once daily.    rOPINIRole (REQUIP) 0.25 MG tablet Take 1 tablet (0.25 mg total) by mouth every evening.    dapagliflozin (FARXIGA) 10 mg tablet Take 1 tablet (10 mg total) by mouth once daily.    ferrous sulfate 324 mg (65 mg iron) TbEC Take 1 tablet (324 mg total) by mouth every other day.     Family History    None       Tobacco Use    Smoking status: Every Day     Packs/day: 1.50     Types: Cigarettes     Passive exposure: Current    Smokeless tobacco: Never   Substance and Sexual Activity    Alcohol use: Never    Drug use: Never    Sexual activity: Not Currently     Review of Systems   Constitutional: Negative.    HENT: Negative.     Eyes: Negative.    Respiratory: Negative.     Cardiovascular: Negative.    Gastrointestinal:  Positive for anal bleeding.   Endocrine: Negative.    Genitourinary: Negative.    Musculoskeletal: Negative.    Skin: Negative.    Allergic/Immunologic: Negative.    Neurological: Negative.    Hematological: Negative.    Psychiatric/Behavioral: Negative.     Objective:     Vital Signs (Most Recent):  Temp: 97.7 °F (36.5 °C) (07/03/23 1915)  Pulse: 63 (07/03/23 2330)  Resp: 18 (07/03/23 2330)  BP: (!) 156/70 (07/03/23 2330)  SpO2: 100 % (07/03/23 2330) Vital Signs (24h Range):  Temp:   [97.7 °F (36.5 °C)] 97.7 °F (36.5 °C)  Pulse:  [55-66] 63  Resp:  [18] 18  SpO2:  [97 %-100 %] 100 %  BP: (152-189)/(70-76) 156/70     Weight: 81.8 kg (180 lb 6.4 oz)  Body mass index is 27.43 kg/m².    Physical Exam  Constitutional:       Appearance: Normal appearance.   HENT:      Head: Normocephalic and atraumatic.      Nose: Nose normal.      Mouth/Throat:      Mouth: Mucous membranes are moist.   Eyes:      Extraocular Movements: Extraocular movements intact.      Pupils: Pupils are equal, round, and reactive to light.   Cardiovascular:      Rate and Rhythm: Normal rate and regular rhythm.      Pulses: Normal pulses.      Heart sounds: Normal heart sounds.   Pulmonary:      Effort: Pulmonary effort is normal.      Breath sounds: Normal breath sounds.   Abdominal:      General: Abdomen is flat.      Palpations: Abdomen is soft.   Musculoskeletal:         General: Normal range of motion.      Cervical back: Neck supple.   Skin:     General: Skin is warm and dry.      Capillary Refill: Capillary refill takes less than 2 seconds.   Neurological:      General: No focal deficit present.      Mental Status: She is alert and oriented to person, place, and time.   Psychiatric:         Behavior: Behavior normal.         CRANIAL NERVES     CN III, IV, VI   Pupils are equal, round, and reactive to light.    Significant Labs: All pertinent labs within the past 24 hours have been reviewed.  Recent Lab Results         07/03/23  2229   07/03/23  1914        Albumin/Globulin Ratio   1.3       Albumin   3.6       Alkaline Phosphatase   89       ALT   17       Anion Gap   10.0       AST   21       Baso #   0.04       Basophil %   0.7       BILIRUBIN TOTAL   0.3       BUN   7.0       BUN/CREAT RATIO   4       Calcium   8.6       Chloride   109       CO2   20       Creatinine   1.79       eGFR   30  Comment:                      EGFR INTERPRETATION    Beginning 8/15/22 we are reporting the eGFRcr calculation as recommended by the  [employer]. The eGFRcr equation has similar overall performance characteristics to the older equation, but the values may differ by more than 10% particularly at higher values of eGFRcr and younger adult ages.    NKF stages of chronic kidney disease (CKD)  Stage 1: Kidney damage with normal or increased eGFR (>90 mL/min/1.73 m^2)  Stage 2: Mild reduction in GFR (60-89 mL/min/1.73 m^2)  Stage 3a: Moderate reduction in GFR (45-59 mL/min/1.73 m^2)  Stage 3b: Moderate reduction in GFR (30-44 mL/min/1.73 m^2)  Stage 4: Severe reduction in GFR (15-29 mL/min/1.73 m^2)  Stage 5: Kidney failure (GFR <15 mL/min/1.73 m^2)           Eos #   0.27       Eosinophil %   4.4       Globulin, Total   2.8       Glucose   117       Hematocrit 26.7   30.5       Hemoglobin 8.8   9.8       Immature Grans (Abs)   0.05       Immature Granulocytes   0.8       Lymph #   1.45       LYMPH %   23.6       MCH   25.9       MCHC   32.1       MCV   80.5       Mono #   0.64       Mono %   10.4       MPV   9.6       Neut #   3.70       Neut %   60.1       nRBC   0.0       Platelets   349       Potassium   2.9       ProBNP   5,460       PROTEIN TOTAL   6.4       RBC   3.79       RDW   17.2       Sodium   139       WBC   6.15               Significant Imaging: I have reviewed all pertinent imaging results/findings within the past 24 hours.    Assessment/Plan:     Active Diagnoses:    Diagnosis Date Noted POA    PRINCIPAL PROBLEM:  Rectal bleeding [K62.5] 07/03/2023 Yes    Hypokalemia [E87.6] 07/03/2023 Yes    Stage 4 chronic kidney disease [N18.4] 07/03/2023 Yes    Acute blood loss anemia [D62] 06/26/2023 Yes    Depressive disorder [F32.A] 01/05/2023 Yes    Generalized anxiety disorder [F41.1] 01/05/2023 Yes    Hypertension [I10] 01/05/2023 Yes      Problems Resolved During this Admission:     VTE Risk Mitigation (From admission, onward)           Ordered     IP VTE HIGH RISK PATIENT  Once         07/03/23 2349     Place sequential  compression device  Until discontinued         07/03/23 2349     Reason for No Pharmacological VTE Prophylaxis  Once        Question:  Reasons:  Answer:  Active Bleeding    07/03/23 2349                  Acute Blood Loss Anemia:  2/2 to GI bleed  Currently hemodynamically stable  Hgb 9.8 --> 8.6  Reported blood in stools prior to presentation  Place on telemetry; monitor hemodynamics closely  Holding Plavix  Type and cross match  Transfuse PRBCs as needed  PPI Daily  No chemical DVT ppx, SCDs unless also contraindicated  Surgery Consulted   NPO     Hypokalemia:  K+ 2.9  Due to GI loses  Replacement stated in ED  Will continue to replete prn    HTN:  Resume home meds once reconciled    CKD Stage 4:  Scr 1.8  Avoid nephrotoxins  Monitor labs           Code: FULL   PPX: BSCDs       The patient is expected to have a LOS less than 2 midnights and will be admitted to OBSERVATION status.       Service was provided using HIPAA compliant web platform using SOC for audio/visual equipment.  Patient location: Hospital  Provider Location: [place], [place]  Participants on call: Bedside RN, Patient  Consent was obtained and the patient was seen with nurse assiting from the bedside.      Ricky Coffey MD  Department of Hospital Medicine   [facility]

## 2023-07-04 NOTE — PROGRESS NOTES
[facility]Surg  [facility] Medicine  Progress Note    Patient Name: Rosalie Campbell  MRN: [id number]  Patient Class: OP- Observation   Admission Date: 7/3/2023  Length of Stay: 0 days  Attending Physician: Sekou Shah MD  Primary Care Provider: JASWINDER Mosqueda        Subjective:     Principal Problem:Acute blood loss anemia        HPI:  HPI: 72 year old female with pmh HTN, CAD on Plavix presenting to ED with complaints of bright red blood per rectum with associated diarrhea earlier in the day prior to presentation. Patient was actually prepping for a colonoscopy tby drinking 2 gallons Golytely but never occurred as she noted blood in toilet bowl. She endorses history of hemorrhoids and is currently taking Plavix. She denies fever or chills.           Past Medical History:   Diagnosis Date    Anxiety      Chronic renal disease stage 4      Colon cancer screening declined      Coronary artery disease      Depression      Hypertension      Irregular heart rhythm      Medication management      Obesity, unspecified      Osteoarthritis of knee      Pain, joint, shoulder region, right      Refused influenza vaccine      Refused pneumococcal vaccination        Overview/Hospital Course:  07/04/2023 patient readmitted for GI bleeding.  Surgery is consulted and will probably do the colonoscopy they wanted to do prior to her leaving last time.  Continue to monitor H&H.      Interval History:     Review of Systems   Constitutional:  Negative for activity change, appetite change, chills, diaphoresis, fatigue and fever.   HENT:  Negative for congestion, ear pain, rhinorrhea and sore throat.    Eyes:  Negative for pain, discharge and redness.   Respiratory:  Negative for apnea, cough, choking, chest tightness, shortness of breath and wheezing.    Cardiovascular:  Negative for chest pain, palpitations and leg swelling.   Gastrointestinal:  Positive for blood in stool. Negative for abdominal  distention, abdominal pain, constipation, diarrhea, nausea and vomiting.   Endocrine: Negative for cold intolerance, heat intolerance and polyuria.   Genitourinary:  Negative for difficulty urinating, dysuria, frequency, hematuria and pelvic pain.   Musculoskeletal:  Negative for arthralgias, back pain, gait problem, joint swelling and myalgias.   Skin:  Negative for color change, pallor, rash and wound.   Neurological:  Negative for seizures, syncope, speech difficulty, numbness and headaches.   Psychiatric/Behavioral:  Negative for agitation, confusion, hallucinations and sleep disturbance. The patient is not nervous/anxious.    Objective:     Vital Signs (Most Recent):  Temp: 97.8 °F (36.6 °C) (07/04/23 1041)  Pulse: (!) 52 (07/04/23 1041)  Resp: 20 (07/04/23 1041)  BP: (!) 143/58 (07/04/23 1041)  SpO2: 96 % (07/04/23 1041) Vital Signs (24h Range):  Temp:  [97.2 °F (36.2 °C)-98.3 °F (36.8 °C)] 97.8 °F (36.6 °C)  Pulse:  [52-70] 52  Resp:  [18-20] 20  SpO2:  [95 %-100 %] 96 %  BP: (134-189)/(58-78) 143/58     Weight: 80 kg (176 lb 4.8 oz)  Body mass index is 26.81 kg/m².    Intake/Output Summary (Last 24 hours) at 7/4/2023 1100  Last data filed at 7/4/2023 0904  Gross per 24 hour   Intake 120 ml   Output --   Net 120 ml         Physical Exam  Constitutional:       General: She is not in acute distress.     Appearance: Normal appearance. She is ill-appearing. She is not toxic-appearing or diaphoretic.   HENT:      Head: Normocephalic and atraumatic.      Right Ear: External ear normal.      Left Ear: External ear normal.      Nose: Nose normal. No congestion or rhinorrhea.      Mouth/Throat:      Mouth: Mucous membranes are moist.      Pharynx: Oropharynx is clear.   Eyes:      Extraocular Movements: Extraocular movements intact.      Conjunctiva/sclera: Conjunctivae normal.      Pupils: Pupils are equal, round, and reactive to light.   Neck:      Vascular: No carotid bruit.   Cardiovascular:      Rate and Rhythm:  Normal rate and regular rhythm.      Pulses: Normal pulses.      Heart sounds: Normal heart sounds. No murmur heard.  Pulmonary:      Effort: Pulmonary effort is normal. No respiratory distress.      Breath sounds: Normal breath sounds. No wheezing, rhonchi or rales.   Abdominal:      General: Abdomen is flat. Bowel sounds are normal. There is no distension.      Palpations: Abdomen is soft.      Tenderness: There is no abdominal tenderness. There is no guarding.   Musculoskeletal:         General: No swelling or tenderness. Normal range of motion.      Cervical back: Normal range of motion and neck supple. No tenderness.      Right lower leg: No edema.      Left lower leg: No edema.   Lymphadenopathy:      Cervical: No cervical adenopathy.   Skin:     General: Skin is warm and dry.      Coloration: Skin is pale. Skin is not jaundiced.   Neurological:      General: No focal deficit present.      Mental Status: She is alert and oriented to person, place, and time. Mental status is at baseline.      Cranial Nerves: No cranial nerve deficit.      Motor: No weakness.      Coordination: Coordination normal.      Gait: Gait normal.   Psychiatric:         Mood and Affect: Mood normal.         Behavior: Behavior normal.         Thought Content: Thought content normal.         Judgment: Judgment normal.           Significant Labs: All pertinent labs within the past 24 hours have been reviewed.    Significant Imaging: I have reviewed all pertinent imaging results/findings within the past 24 hours.      Assessment/Plan:      * Acute blood loss anemia  Monitor H&H NPO.  Consult Dr. Resendez.      Hypertension  Low-salt diet continue current medications      Generalized anxiety disorder  Continue current medications.      Depressive disorder  Patient has persistent depression which is mild and is currently controlled. Will Continue anti-depressant medications. We will not consult psychiatry at this time. Patient does not  display psychosis at this time. Continue to monitor closely and adjust plan of care as needed.          VTE Risk Mitigation (From admission, onward)         Ordered     IP VTE HIGH RISK PATIENT  Once         07/03/23 2349     Place sequential compression device  Until discontinued         07/03/23 2349     Reason for No Pharmacological VTE Prophylaxis  Once        Question:  Reasons:  Answer:  Active Bleeding    07/03/23 2349                Discharge Planning   PARRIS:      Code Status: Full Code   Is the patient medically ready for discharge?:     Reason for patient still in hospital (select all that apply): Patient unstable                     Darren Meier MD  Department of Hospital Medicine   [facility]

## 2023-07-04 NOTE — PLAN OF CARE
Problem: Adult Inpatient Plan of Care  Goal: Plan of Care Review  Outcome: Ongoing, Progressing  Goal: Patient-Specific Goal (Individualized)  Outcome: Ongoing, Progressing  Goal: Absence of Hospital-Acquired Illness or Injury  Outcome: Ongoing, Progressing  Goal: Optimal Comfort and Wellbeing  Outcome: Ongoing, Progressing  Goal: Readiness for Transition of Care  Outcome: Ongoing, Progressing     Problem: Fluid and Electrolyte Imbalance (Acute Kidney Injury/Impairment)  Goal: Fluid and Electrolyte Balance  Outcome: Ongoing, Progressing     Problem: Oral Intake Inadequate (Acute Kidney Injury/Impairment)  Goal: Optimal Nutrition Intake  Outcome: Ongoing, Progressing     Problem: Renal Function Impairment (Acute Kidney Injury/Impairment)  Goal: Effective Renal Function  Outcome: Ongoing, Progressing     Problem: Impaired Wound Healing  Goal: Optimal Wound Healing  Outcome: Ongoing, Progressing     Problem: Fall Injury Risk  Goal: Absence of Fall and Fall-Related Injury  Outcome: Ongoing, Progressing

## 2023-07-05 ENCOUNTER — PATIENT OUTREACH (OUTPATIENT)
Dept: ADMINISTRATIVE | Facility: CLINIC | Age: 73
End: 2023-07-05
Payer: MEDICARE

## 2023-07-05 LAB
ANION GAP SERPL CALC-SCNC: 8 MEQ/L (ref 2–13)
BASOPHILS # BLD AUTO: 0.04 X10(3)/MCL (ref 0.01–0.08)
BASOPHILS NFR BLD AUTO: 0.7 % (ref 0.1–1.2)
BUN SERPL-MCNC: 5 MG/DL (ref 7–20)
CALCIUM SERPL-MCNC: 8.2 MG/DL (ref 8.4–10.2)
CHLORIDE SERPL-SCNC: 107 MMOL/L (ref 98–110)
CO2 SERPL-SCNC: 23 MMOL/L (ref 21–32)
CREAT SERPL-MCNC: 1.52 MG/DL (ref 0.66–1.25)
CREAT/UREA NIT SERPL: 3 (ref 12–20)
EOSINOPHIL # BLD AUTO: 0.22 X10(3)/MCL (ref 0.04–0.36)
EOSINOPHIL NFR BLD AUTO: 4.1 % (ref 0.7–7)
ERYTHROCYTE [DISTWIDTH] IN BLOOD BY AUTOMATED COUNT: 17.6 % (ref 11–14.5)
GFR SERPLBLD CREATININE-BSD FMLA CKD-EPI: 37 MLS/MIN/1.73/M2
GLUCOSE SERPL-MCNC: 98 MG/DL (ref 70–115)
HCT VFR BLD AUTO: 27.5 % (ref 36–48)
HGB BLD-MCNC: 8.8 G/DL (ref 11.8–16)
IMM GRANULOCYTES # BLD AUTO: 0.03 X10(3)/MCL (ref 0–0.03)
IMM GRANULOCYTES NFR BLD AUTO: 0.6 % (ref 0–0.5)
LYMPHOCYTES # BLD AUTO: 1.13 X10(3)/MCL (ref 1.16–3.74)
LYMPHOCYTES NFR BLD AUTO: 21 % (ref 20–55)
MCH RBC QN AUTO: 25.7 PG (ref 27–34)
MCHC RBC AUTO-ENTMCNC: 32 G/DL (ref 31–37)
MCV RBC AUTO: 80.4 FL (ref 79–99)
MONOCYTES # BLD AUTO: 0.71 X10(3)/MCL (ref 0.24–0.36)
MONOCYTES NFR BLD AUTO: 13.2 % (ref 4.7–12.5)
NEUTROPHILS # BLD AUTO: 3.26 X10(3)/MCL (ref 1.56–6.13)
NEUTROPHILS NFR BLD AUTO: 60.4 % (ref 37–73)
NRBC BLD AUTO-RTO: 0 %
PLATELET # BLD AUTO: 288 X10(3)/MCL (ref 140–371)
PMV BLD AUTO: 9.7 FL (ref 9.4–12.4)
POTASSIUM SERPL-SCNC: 3.7 MMOL/L (ref 3.5–5.1)
RBC # BLD AUTO: 3.42 X10(6)/MCL (ref 4–5.1)
SODIUM SERPL-SCNC: 138 MMOL/L (ref 135–145)
WBC # SPEC AUTO: 5.39 X10(3)/MCL (ref 4–11.5)

## 2023-07-05 PROCEDURE — 45380 COLONOSCOPY AND BIOPSY: CPT | Performed by: SURGERY

## 2023-07-05 PROCEDURE — 63600175 PHARM REV CODE 636 W HCPCS: Performed by: NURSE ANESTHETIST, CERTIFIED REGISTERED

## 2023-07-05 PROCEDURE — 94761 N-INVAS EAR/PLS OXIMETRY MLT: CPT

## 2023-07-05 PROCEDURE — 94760 N-INVAS EAR/PLS OXIMETRY 1: CPT

## 2023-07-05 PROCEDURE — G0378 HOSPITAL OBSERVATION PER HR: HCPCS

## 2023-07-05 PROCEDURE — 80048 BASIC METABOLIC PNL TOTAL CA: CPT | Performed by: FAMILY MEDICINE

## 2023-07-05 PROCEDURE — 25000003 PHARM REV CODE 250: Performed by: SURGERY

## 2023-07-05 PROCEDURE — D9220A PRA ANESTHESIA: Mod: ,,, | Performed by: NURSE ANESTHETIST, CERTIFIED REGISTERED

## 2023-07-05 PROCEDURE — 94799 UNLISTED PULMONARY SVC/PX: CPT

## 2023-07-05 PROCEDURE — D9220A PRA ANESTHESIA: ICD-10-PCS | Mod: ,,, | Performed by: NURSE ANESTHETIST, CERTIFIED REGISTERED

## 2023-07-05 PROCEDURE — 25000003 PHARM REV CODE 250: Performed by: NURSE ANESTHETIST, CERTIFIED REGISTERED

## 2023-07-05 PROCEDURE — 25000003 PHARM REV CODE 250

## 2023-07-05 PROCEDURE — 36415 COLL VENOUS BLD VENIPUNCTURE: CPT | Performed by: FAMILY MEDICINE

## 2023-07-05 PROCEDURE — 99900035 HC TECH TIME PER 15 MIN (STAT)

## 2023-07-05 PROCEDURE — 85025 COMPLETE CBC W/AUTO DIFF WBC: CPT | Performed by: FAMILY MEDICINE

## 2023-07-05 RX ORDER — FAMOTIDINE 20 MG/1
20 TABLET, FILM COATED ORAL DAILY
Status: DISCONTINUED | OUTPATIENT
Start: 2023-07-06 | End: 2023-07-07 | Stop reason: HOSPADM

## 2023-07-05 RX ORDER — MUPIROCIN 20 MG/G
1 OINTMENT TOPICAL 2 TIMES DAILY
Status: DISCONTINUED | OUTPATIENT
Start: 2023-07-05 | End: 2023-07-07 | Stop reason: HOSPADM

## 2023-07-05 RX ORDER — PROPOFOL 10 MG/ML
VIAL (ML) INTRAVENOUS
Status: DISCONTINUED | OUTPATIENT
Start: 2023-07-05 | End: 2023-07-05

## 2023-07-05 RX ORDER — SODIUM CHLORIDE 9 MG/ML
INJECTION, SOLUTION INTRAVENOUS CONTINUOUS
Status: DISCONTINUED | OUTPATIENT
Start: 2023-07-05 | End: 2023-07-05

## 2023-07-05 RX ORDER — HYDROCODONE BITARTRATE AND ACETAMINOPHEN 5; 325 MG/1; MG/1
1 TABLET ORAL EVERY 4 HOURS PRN
Status: DISCONTINUED | OUTPATIENT
Start: 2023-07-05 | End: 2023-07-07 | Stop reason: HOSPADM

## 2023-07-05 RX ADMIN — BUSPIRONE HYDROCHLORIDE 15 MG: 15 TABLET ORAL at 09:07

## 2023-07-05 RX ADMIN — AMLODIPINE BESYLATE 5 MG: 5 TABLET ORAL at 08:07

## 2023-07-05 RX ADMIN — SODIUM CHLORIDE: 9 INJECTION, SOLUTION INTRAVENOUS at 02:07

## 2023-07-05 RX ADMIN — LOSARTAN POTASSIUM 50 MG: 50 TABLET, FILM COATED ORAL at 08:07

## 2023-07-05 RX ADMIN — CLONIDINE HYDROCHLORIDE 0.2 MG: 0.1 TABLET ORAL at 09:07

## 2023-07-05 RX ADMIN — ATORVASTATIN CALCIUM 40 MG: 40 TABLET, FILM COATED ORAL at 04:07

## 2023-07-05 RX ADMIN — PROPOFOL 70 MG: 10 INJECTION, EMULSION INTRAVENOUS at 03:07

## 2023-07-05 RX ADMIN — HYDRALAZINE HYDROCHLORIDE 50 MG: 50 TABLET ORAL at 01:07

## 2023-07-05 RX ADMIN — PROPOFOL 40 MG: 10 INJECTION, EMULSION INTRAVENOUS at 03:07

## 2023-07-05 RX ADMIN — HYDRALAZINE HYDROCHLORIDE 50 MG: 50 TABLET ORAL at 09:07

## 2023-07-05 RX ADMIN — PROPOFOL 50 MG: 10 INJECTION, EMULSION INTRAVENOUS at 03:07

## 2023-07-05 RX ADMIN — POTASSIUM CHLORIDE 40 MEQ: 1500 TABLET, EXTENDED RELEASE ORAL at 09:07

## 2023-07-05 RX ADMIN — METOPROLOL SUCCINATE 25 MG: 25 TABLET, EXTENDED RELEASE ORAL at 08:07

## 2023-07-05 RX ADMIN — HYDRALAZINE HYDROCHLORIDE 50 MG: 50 TABLET ORAL at 05:07

## 2023-07-05 RX ADMIN — CLONIDINE HYDROCHLORIDE 0.2 MG: 0.1 TABLET ORAL at 08:07

## 2023-07-05 RX ADMIN — ROPINIROLE HYDROCHLORIDE 0.25 MG: 0.25 TABLET, FILM COATED ORAL at 09:07

## 2023-07-05 RX ADMIN — SODIUM CHLORIDE: 9 INJECTION, SOLUTION INTRAVENOUS at 03:07

## 2023-07-05 RX ADMIN — FAMOTIDINE 20 MG: 10 INJECTION, SOLUTION INTRAVENOUS at 08:07

## 2023-07-05 RX ADMIN — MUPIROCIN 1 G: 20 OINTMENT TOPICAL at 09:07

## 2023-07-05 RX ADMIN — POLYETHYLENE GLYCOL 3350 17 G: 17 POWDER, FOR SOLUTION ORAL at 12:07

## 2023-07-05 NOTE — PLAN OF CARE
07/05/23 1250   Readmission   Why were you hospitalized in the last 30 days? GI Bleed   Why were you readmitted? Alarmed about signs/symptoms   When you left the hospital how did you feel? I felt good when i left, felt alittle weak.   When you left the hospital where did you go? Home with Home Health  ([facility])   Did patient/caregiver refused recommended DC plan? No   Tell me about what happened between when you left the hospital and the day you returned. Bloody Bowel Movement   When did you start not feeling well? 7/3/23   Did you try to manage your symptoms your self? No   Did you call anyone? No   Why? didn't think of it, called daughter and she brought me to ER.   Did you try to see or did see a doctor or nurse before you came? No   Why? didn't think of it, called daughter and she brought me to ER.   Did you have  a follow-up appointment on discharge? Yes  (wasn't time for follow-up yet.)   Was this a planned readmission? No

## 2023-07-05 NOTE — ANESTHESIA POSTPROCEDURE EVALUATION
Anesthesia Post Evaluation    Patient: Rosalie Campbell    Procedure(s) Performed: * No procedures listed *    Final Anesthesia Type: MAC      Patient location during evaluation: floor  Patient participation: Yes- Able to Participate  Level of consciousness: awake and alert, awake and oriented  Post-procedure vital signs: reviewed and stable  Pain management: adequate  Airway patency: patent    PONV status at discharge: No PONV  Anesthetic complications: no      Cardiovascular status: blood pressure returned to baseline  Respiratory status: unassisted, room air and spontaneous ventilation  Hydration status: euvolemic  Follow-up not needed.          Vitals Value Taken Time   /57 07/05/23 1128   Temp 37 °C (98.6 °F) 07/05/23 1128   Pulse 51 07/05/23 1128   Resp 20 07/05/23 1128   SpO2 99 % 07/05/23 1128         No case tracking events are documented in the log.      Pain/Amy Score: No data recorded

## 2023-07-05 NOTE — PLAN OF CARE
07/05/23 0958   Discharge Assessment   Assessment Type Discharge Planning Assessment   Confirmed/corrected address, phone number and insurance Yes   Confirmed Demographics Correct on Facesheet   Source of Information patient;health record   When was your last doctors appointment? 05/02/23   Reason For Admission GI Bleed   People in Home alone   Do you expect to return to your current living situation? Yes   Prior to hospitilization cognitive status: Alert/Oriented   Current cognitive status: Alert/Oriented   Walking or Climbing Stairs ambulation difficulty, requires equipment   Mobility Management Rollator   Equipment Currently Used at Home cane, straight;raised toilet;bath bench;rollator   Readmission within 30 days? Yes  ([facility] 6/26-7/1  GI Bleed)   Do you currently have service(s) that help you manage your care at home? Yes   Name and Contact number of agency [facility]  [phone]   Is the pt/caregiver preference to resume services with current agency Yes   Do you take prescription medications? Yes   Do you have prescription coverage? Yes   Coverage MCR   Do you have any problems affording any of your prescribed medications? No   Is the patient taking medications as prescribed? yes   Who is going to help you get home at discharge? Brother   How do you get to doctors appointments? car, drives self   Are you on dialysis? No   Do you take coumadin? No   Discharge Plan A Home Health   Discharge Plan B Rehab   DME Needed Upon Discharge  none   Discharge Plan discussed with: Patient   Transition of Care Barriers None

## 2023-07-05 NOTE — PROGRESS NOTES
[facility] Medicine  Progress Note    Patient Name: Rosalie Campbell  MRN: [id number]  Patient Class: OP- Observation   Admission Date: 7/3/2023  Length of Stay: 0 days  Attending Physician: Sekou Shah MD  Primary Care Provider: JASWINDER Mosqueda        Subjective:     Principal Problem:Acute blood loss anemia        HPI:  HPI: 72 year old female with pmh HTN, CAD on Plavix presenting to ED with complaints of bright red blood per rectum with associated diarrhea earlier in the day prior to presentation. Patient was actually prepping for a colonoscopy tby drinking 2 gallons Golytely but never occurred as she noted blood in toilet bowl. She endorses history of hemorrhoids and is currently taking Plavix. She denies fever or chills.           Past Medical History:   Diagnosis Date    Anxiety      Chronic renal disease stage 4      Colon cancer screening declined      Coronary artery disease      Depression      Hypertension      Irregular heart rhythm      Medication management      Obesity, unspecified      Osteoarthritis of knee      Pain, joint, shoulder region, right      Refused influenza vaccine      Refused pneumococcal vaccination        Overview/Hospital Course:  07/04/2023 patient readmitted for GI bleeding.  Surgery is consulted and will probably do the colonoscopy they wanted to do prior to her leaving last time.  Continue to monitor H&H.  07/05/2023 patient undergoing colonoscopy today the workup for GI bleeding.  H&H is stable.  Repeat labs tomorrow.  Further care depending on scope results.      Interval History:     Review of Systems   Constitutional:  Negative for activity change, appetite change, chills, diaphoresis, fatigue and fever.   HENT:  Negative for congestion, ear pain, rhinorrhea and sore throat.    Eyes:  Negative for pain, discharge and redness.   Respiratory:  Negative for apnea, cough, choking, chest tightness, shortness of breath and  wheezing.    Cardiovascular:  Negative for chest pain, palpitations and leg swelling.   Gastrointestinal:  Positive for blood in stool. Negative for abdominal distention, abdominal pain, constipation, diarrhea, nausea and vomiting.   Endocrine: Negative for cold intolerance, heat intolerance and polyuria.   Genitourinary:  Negative for difficulty urinating, dysuria, frequency, hematuria and pelvic pain.   Musculoskeletal:  Negative for arthralgias, back pain, gait problem, joint swelling and myalgias.   Skin:  Negative for color change, pallor, rash and wound.   Neurological:  Negative for seizures, syncope, speech difficulty, numbness and headaches.   Psychiatric/Behavioral:  Negative for agitation, confusion, hallucinations and sleep disturbance. The patient is not nervous/anxious.    Objective:     Vital Signs (Most Recent):  Temp: 98.6 °F (37 °C) (07/05/23 1128)  Pulse: (!) 51 (07/05/23 1128)  Resp: 20 (07/05/23 1128)  BP: (!) 140/57 (07/05/23 1128)  SpO2: 99 % (07/05/23 1128) Vital Signs (24h Range):  Temp:  [98.5 °F (36.9 °C)-98.9 °F (37.2 °C)] 98.6 °F (37 °C)  Pulse:  [51-64] 51  Resp:  [18-20] 20  SpO2:  [95 %-99 %] 99 %  BP: (131-152)/(53-61) 140/57     Weight: 79.6 kg (175 lb 8 oz)  Body mass index is 26.68 kg/m².    Intake/Output Summary (Last 24 hours) at 7/5/2023 1216  Last data filed at 7/4/2023 1742  Gross per 24 hour   Intake 180 ml   Output --   Net 180 ml           Physical Exam  Constitutional:       General: She is not in acute distress.     Appearance: Normal appearance. She is ill-appearing. She is not toxic-appearing or diaphoretic.   HENT:      Head: Normocephalic and atraumatic.      Right Ear: External ear normal.      Left Ear: External ear normal.      Nose: Nose normal. No congestion or rhinorrhea.      Mouth/Throat:      Mouth: Mucous membranes are moist.      Pharynx: Oropharynx is clear.   Eyes:      Extraocular Movements: Extraocular movements intact.      Conjunctiva/sclera:  Conjunctivae normal.      Pupils: Pupils are equal, round, and reactive to light.   Neck:      Vascular: No carotid bruit.   Cardiovascular:      Rate and Rhythm: Normal rate and regular rhythm.      Pulses: Normal pulses.      Heart sounds: Normal heart sounds. No murmur heard.  Pulmonary:      Effort: Pulmonary effort is normal. No respiratory distress.      Breath sounds: Normal breath sounds. No wheezing, rhonchi or rales.   Abdominal:      General: Abdomen is flat. Bowel sounds are normal. There is no distension.      Palpations: Abdomen is soft.      Tenderness: There is no abdominal tenderness. There is no guarding.   Musculoskeletal:         General: No swelling or tenderness. Normal range of motion.      Cervical back: Normal range of motion and neck supple. No tenderness.      Right lower leg: No edema.      Left lower leg: No edema.   Lymphadenopathy:      Cervical: No cervical adenopathy.   Skin:     General: Skin is warm and dry.      Coloration: Skin is pale. Skin is not jaundiced.   Neurological:      General: No focal deficit present.      Mental Status: She is alert and oriented to person, place, and time. Mental status is at baseline.      Cranial Nerves: No cranial nerve deficit.      Motor: No weakness.      Coordination: Coordination normal.      Gait: Gait normal.   Psychiatric:         Mood and Affect: Mood normal.         Behavior: Behavior normal.         Thought Content: Thought content normal.         Judgment: Judgment normal.           Significant Labs: All pertinent labs within the past 24 hours have been reviewed.    Significant Imaging: I have reviewed all pertinent imaging results/findings within the past 24 hours.      Assessment/Plan:      * Acute blood loss anemia  Monitor H&H NPO.  Consult Dr. Resendez.  Colonoscopy today.    Hypertension  Low-salt diet continue current medications      Generalized anxiety disorder  Continue current medications.      Depressive  disorder  Patient has persistent depression which is mild and is currently controlled. Will Continue anti-depressant medications. We will not consult psychiatry at this time. Patient does not display psychosis at this time. Continue to monitor closely and adjust plan of care as needed.          VTE Risk Mitigation (From admission, onward)         Ordered     IP VTE HIGH RISK PATIENT  Once         07/03/23 2349     Place sequential compression device  Until discontinued         07/03/23 2349     Reason for No Pharmacological VTE Prophylaxis  Once        Question:  Reasons:  Answer:  Active Bleeding    07/03/23 2349                Discharge Planning   PARRIS:      Code Status: Full Code   Is the patient medically ready for discharge?:     Reason for patient still in hospital (select all that apply): Patient unstable  Discharge Plan A: Home Health                  Darren Meier MD  Department of Hospital Medicine   [facility]

## 2023-07-05 NOTE — SUBJECTIVE & OBJECTIVE
Interval History:     Review of Systems   Constitutional:  Negative for activity change, appetite change, chills, diaphoresis, fatigue and fever.   HENT:  Negative for congestion, ear pain, rhinorrhea and sore throat.    Eyes:  Negative for pain, discharge and redness.   Respiratory:  Negative for apnea, cough, choking, chest tightness, shortness of breath and wheezing.    Cardiovascular:  Negative for chest pain, palpitations and leg swelling.   Gastrointestinal:  Positive for blood in stool. Negative for abdominal distention, abdominal pain, constipation, diarrhea, nausea and vomiting.   Endocrine: Negative for cold intolerance, heat intolerance and polyuria.   Genitourinary:  Negative for difficulty urinating, dysuria, frequency, hematuria and pelvic pain.   Musculoskeletal:  Negative for arthralgias, back pain, gait problem, joint swelling and myalgias.   Skin:  Negative for color change, pallor, rash and wound.   Neurological:  Negative for seizures, syncope, speech difficulty, numbness and headaches.   Psychiatric/Behavioral:  Negative for agitation, confusion, hallucinations and sleep disturbance. The patient is not nervous/anxious.    Objective:     Vital Signs (Most Recent):  Temp: 98.6 °F (37 °C) (07/05/23 1128)  Pulse: (!) 51 (07/05/23 1128)  Resp: 20 (07/05/23 1128)  BP: (!) 140/57 (07/05/23 1128)  SpO2: 99 % (07/05/23 1128) Vital Signs (24h Range):  Temp:  [98.5 °F (36.9 °C)-98.9 °F (37.2 °C)] 98.6 °F (37 °C)  Pulse:  [51-64] 51  Resp:  [18-20] 20  SpO2:  [95 %-99 %] 99 %  BP: (131-152)/(53-61) 140/57     Weight: 79.6 kg (175 lb 8 oz)  Body mass index is 26.68 kg/m².    Intake/Output Summary (Last 24 hours) at 7/5/2023 1216  Last data filed at 7/4/2023 1742  Gross per 24 hour   Intake 180 ml   Output --   Net 180 ml           Physical Exam  Constitutional:       General: She is not in acute distress.     Appearance: Normal appearance. She is ill-appearing. She is not toxic-appearing or diaphoretic.    HENT:      Head: Normocephalic and atraumatic.      Right Ear: External ear normal.      Left Ear: External ear normal.      Nose: Nose normal. No congestion or rhinorrhea.      Mouth/Throat:      Mouth: Mucous membranes are moist.      Pharynx: Oropharynx is clear.   Eyes:      Extraocular Movements: Extraocular movements intact.      Conjunctiva/sclera: Conjunctivae normal.      Pupils: Pupils are equal, round, and reactive to light.   Neck:      Vascular: No carotid bruit.   Cardiovascular:      Rate and Rhythm: Normal rate and regular rhythm.      Pulses: Normal pulses.      Heart sounds: Normal heart sounds. No murmur heard.  Pulmonary:      Effort: Pulmonary effort is normal. No respiratory distress.      Breath sounds: Normal breath sounds. No wheezing, rhonchi or rales.   Abdominal:      General: Abdomen is flat. Bowel sounds are normal. There is no distension.      Palpations: Abdomen is soft.      Tenderness: There is no abdominal tenderness. There is no guarding.   Musculoskeletal:         General: No swelling or tenderness. Normal range of motion.      Cervical back: Normal range of motion and neck supple. No tenderness.      Right lower leg: No edema.      Left lower leg: No edema.   Lymphadenopathy:      Cervical: No cervical adenopathy.   Skin:     General: Skin is warm and dry.      Coloration: Skin is pale. Skin is not jaundiced.   Neurological:      General: No focal deficit present.      Mental Status: She is alert and oriented to person, place, and time. Mental status is at baseline.      Cranial Nerves: No cranial nerve deficit.      Motor: No weakness.      Coordination: Coordination normal.      Gait: Gait normal.   Psychiatric:         Mood and Affect: Mood normal.         Behavior: Behavior normal.         Thought Content: Thought content normal.         Judgment: Judgment normal.           Significant Labs: All pertinent labs within the past 24 hours have been reviewed.    Significant  Imaging: I have reviewed all pertinent imaging results/findings within the past 24 hours.

## 2023-07-05 NOTE — PROGRESS NOTES
Pharmacist Intervention IV to PO Note    Rosalie Campbell is a 72 y.o. female being treated with IV medication famotidine    Patient Data:    Vital Signs (Most Recent):  Temp: 98.1 °F (36.7 °C) (07/05/23 1600)  Pulse: (!) 48 (07/05/23 1600)  Resp: 18 (07/05/23 1600)  BP: (!) 137/59 (07/05/23 1600)  SpO2: 99 % (07/05/23 1600) Vital Signs (72h Range):  Temp:  [97.2 °F (36.2 °C)-98.9 °F (37.2 °C)]   Pulse:  [48-70]   Resp:  [18-20]   BP: (130-189)/(53-78)   SpO2:  [95 %-100 %]      CBC:  Recent Labs   Lab 07/04/23  0500 07/04/23  1117 07/05/23  0434   WBC 5.44 5.99 5.39   RBC 3.35* 3.36* 3.42*   HGB 8.6* 8.6* 8.8*   HCT 27.1* 26.8* 27.5*    295 288   MCV 80.9 79.8 80.4   MCH 25.7* 25.6* 25.7*   MCHC 31.7 32.1 32.0     CMP:     Recent Labs   Lab 07/03/23  1914 07/04/23  0500 07/05/23  0434   CALCIUM 8.6 7.8* 8.2*   ALBUMIN 3.6 2.9*  --     139 138   K 2.9* 3.8 3.7   CO2 20* 23 23   BUN 7.0 6.0* 5.0*   CREATININE 1.79* 1.64* 1.52*   ALKPHOS 89 77  --    ALT 17 13  --    AST 21 15  --    BILITOT 0.3 0.4  --        Dietary Orders:  Diet Orders            Diet clear liquid: Clear Liquid starting at 07/05 1622            Based on the following criteria, this patient qualifies for intravenous to oral conversion:  [x] The patients gastrointestinal tract is functioning (tolerating medications via oral or enteral route for 24 hours and tolerating food or enteral feeds for 24 hours).  [x] The patient is hemodynamically stable for 24 hours (heart rate <100 beats per minute, systolic blood pressure >99 mm Hg, and respiratory rate <20 breaths per minute).  [x] The patient shows clinical improvement (afebrile for at least 24 hours and white blood cell count downtrending or normalized). Additionally, the patient must be non-neutropenic (absolute neutrophil count >500 cells/mm3).  [x] For antimicrobials, the patient has received IV therapy for at least 24 hours.    IV medication Pepcid 20mg IVP Daily will be changed to oral  medication Famotidine 20mg PO Daily    Pharmacist's Name: Shawn Srinivasan  Pharmacist's Extension: [phone]

## 2023-07-06 PROBLEM — K52.9 COLITIS: Status: ACTIVE | Noted: 2023-07-06

## 2023-07-06 LAB
ANION GAP SERPL CALC-SCNC: 3 MEQ/L (ref 2–13)
BASOPHILS # BLD AUTO: 0.03 X10(3)/MCL (ref 0.01–0.08)
BASOPHILS NFR BLD AUTO: 0.7 % (ref 0.1–1.2)
BUN SERPL-MCNC: 7 MG/DL (ref 7–20)
CALCIUM SERPL-MCNC: 8.4 MG/DL (ref 8.4–10.2)
CHLORIDE SERPL-SCNC: 107 MMOL/L (ref 98–110)
CO2 SERPL-SCNC: 25 MMOL/L (ref 21–32)
CREAT SERPL-MCNC: 1.51 MG/DL (ref 0.66–1.25)
CREAT/UREA NIT SERPL: 5 (ref 12–20)
EOSINOPHIL # BLD AUTO: 0.31 X10(3)/MCL (ref 0.04–0.36)
EOSINOPHIL NFR BLD AUTO: 7 % (ref 0.7–7)
ERYTHROCYTE [DISTWIDTH] IN BLOOD BY AUTOMATED COUNT: 17.4 % (ref 11–14.5)
GFR SERPLBLD CREATININE-BSD FMLA CKD-EPI: 37 MLS/MIN/1.73/M2
GLUCOSE SERPL-MCNC: 98 MG/DL (ref 70–115)
HCT VFR BLD AUTO: 26.2 % (ref 36–48)
HGB BLD-MCNC: 8.4 G/DL (ref 11.8–16)
IMM GRANULOCYTES # BLD AUTO: 0.02 X10(3)/MCL (ref 0–0.03)
IMM GRANULOCYTES NFR BLD AUTO: 0.4 % (ref 0–0.5)
LYMPHOCYTES # BLD AUTO: 0.8 X10(3)/MCL (ref 1.16–3.74)
LYMPHOCYTES NFR BLD AUTO: 18 % (ref 20–55)
MCH RBC QN AUTO: 25.7 PG (ref 27–34)
MCHC RBC AUTO-ENTMCNC: 32.1 G/DL (ref 31–37)
MCV RBC AUTO: 80.1 FL (ref 79–99)
MONOCYTES # BLD AUTO: 0.55 X10(3)/MCL (ref 0.24–0.36)
MONOCYTES NFR BLD AUTO: 12.4 % (ref 4.7–12.5)
NEUTROPHILS # BLD AUTO: 2.74 X10(3)/MCL (ref 1.56–6.13)
NEUTROPHILS NFR BLD AUTO: 61.5 % (ref 37–73)
NRBC BLD AUTO-RTO: 0 %
PLATELET # BLD AUTO: 275 X10(3)/MCL (ref 140–371)
PMV BLD AUTO: 10 FL (ref 9.4–12.4)
POTASSIUM SERPL-SCNC: 3.8 MMOL/L (ref 3.5–5.1)
RBC # BLD AUTO: 3.27 X10(6)/MCL (ref 4–5.1)
SODIUM SERPL-SCNC: 135 MMOL/L (ref 135–145)
WBC # SPEC AUTO: 4.45 X10(3)/MCL (ref 4–11.5)

## 2023-07-06 PROCEDURE — 85025 COMPLETE CBC W/AUTO DIFF WBC: CPT | Performed by: INTERNAL MEDICINE

## 2023-07-06 PROCEDURE — 25000003 PHARM REV CODE 250

## 2023-07-06 PROCEDURE — 11000001 HC ACUTE MED/SURG PRIVATE ROOM

## 2023-07-06 PROCEDURE — 94761 N-INVAS EAR/PLS OXIMETRY MLT: CPT

## 2023-07-06 PROCEDURE — 25000003 PHARM REV CODE 250: Performed by: FAMILY MEDICINE

## 2023-07-06 PROCEDURE — 99900035 HC TECH TIME PER 15 MIN (STAT)

## 2023-07-06 PROCEDURE — 25000003 PHARM REV CODE 250: Performed by: SURGERY

## 2023-07-06 PROCEDURE — 80048 BASIC METABOLIC PNL TOTAL CA: CPT | Performed by: INTERNAL MEDICINE

## 2023-07-06 PROCEDURE — 36415 COLL VENOUS BLD VENIPUNCTURE: CPT | Performed by: INTERNAL MEDICINE

## 2023-07-06 RX ORDER — CIPROFLOXACIN 500 MG/1
500 TABLET ORAL EVERY 12 HOURS
Status: DISCONTINUED | OUTPATIENT
Start: 2023-07-06 | End: 2023-07-07 | Stop reason: HOSPADM

## 2023-07-06 RX ORDER — METRONIDAZOLE 250 MG/1
500 TABLET ORAL EVERY 8 HOURS
Status: DISCONTINUED | OUTPATIENT
Start: 2023-07-06 | End: 2023-07-07 | Stop reason: HOSPADM

## 2023-07-06 RX ADMIN — ROPINIROLE HYDROCHLORIDE 0.25 MG: 0.25 TABLET, FILM COATED ORAL at 08:07

## 2023-07-06 RX ADMIN — BUSPIRONE HYDROCHLORIDE 15 MG: 15 TABLET ORAL at 02:07

## 2023-07-06 RX ADMIN — ATORVASTATIN CALCIUM 40 MG: 40 TABLET, FILM COATED ORAL at 04:07

## 2023-07-06 RX ADMIN — CIPROFLOXACIN HYDROCHLORIDE 500 MG: 500 TABLET, FILM COATED ORAL at 08:07

## 2023-07-06 RX ADMIN — POTASSIUM CHLORIDE 40 MEQ: 1500 TABLET, EXTENDED RELEASE ORAL at 02:07

## 2023-07-06 RX ADMIN — ESCITALOPRAM OXALATE 20 MG: 10 TABLET ORAL at 08:07

## 2023-07-06 RX ADMIN — METRONIDAZOLE 500 MG: 250 TABLET ORAL at 09:07

## 2023-07-06 RX ADMIN — POTASSIUM CHLORIDE 40 MEQ: 1500 TABLET, EXTENDED RELEASE ORAL at 08:07

## 2023-07-06 RX ADMIN — AMLODIPINE BESYLATE 5 MG: 5 TABLET ORAL at 08:07

## 2023-07-06 RX ADMIN — MUPIROCIN 1 G: 20 OINTMENT TOPICAL at 08:07

## 2023-07-06 RX ADMIN — BUSPIRONE HYDROCHLORIDE 15 MG: 15 TABLET ORAL at 08:07

## 2023-07-06 RX ADMIN — HYDRALAZINE HYDROCHLORIDE 50 MG: 50 TABLET ORAL at 02:07

## 2023-07-06 RX ADMIN — HYDRALAZINE HYDROCHLORIDE 50 MG: 50 TABLET ORAL at 05:07

## 2023-07-06 RX ADMIN — METRONIDAZOLE 500 MG: 250 TABLET ORAL at 02:07

## 2023-07-06 RX ADMIN — FERROUS SULFATE TAB 325 MG (65 MG ELEMENTAL FE) 1 EACH: 325 (65 FE) TAB at 08:07

## 2023-07-06 RX ADMIN — CLONIDINE HYDROCHLORIDE 0.2 MG: 0.1 TABLET ORAL at 08:07

## 2023-07-06 RX ADMIN — HYDRALAZINE HYDROCHLORIDE 50 MG: 50 TABLET ORAL at 10:07

## 2023-07-06 RX ADMIN — FAMOTIDINE 20 MG: 20 TABLET, FILM COATED ORAL at 08:07

## 2023-07-06 RX ADMIN — LOSARTAN POTASSIUM 50 MG: 50 TABLET, FILM COATED ORAL at 08:07

## 2023-07-06 NOTE — PROGRESS NOTES
[facility] Medicine  Progress Note    Patient Name: Rosalie Campbell  MRN: [id number]  Patient Class: OP- Observation   Admission Date: 7/3/2023  Length of Stay: 0 days  Attending Physician: Sekou Shah MD  Primary Care Provider: JASWINDER Mosqueda        Subjective:     Principal Problem:Acute blood loss anemia        HPI:  HPI: 72 year old female with pmh HTN, CAD on Plavix presenting to ED with complaints of bright red blood per rectum with associated diarrhea earlier in the day prior to presentation. Patient was actually prepping for a colonoscopy tby drinking 2 gallons Golytely but never occurred as she noted blood in toilet bowl. She endorses history of hemorrhoids and is currently taking Plavix. She denies fever or chills.           Past Medical History:   Diagnosis Date    Anxiety      Chronic renal disease stage 4      Colon cancer screening declined      Coronary artery disease      Depression      Hypertension      Irregular heart rhythm      Medication management      Obesity, unspecified      Osteoarthritis of knee      Pain, joint, shoulder region, right      Refused influenza vaccine      Refused pneumococcal vaccination        Overview/Hospital Course:  07/04/2023 patient readmitted for GI bleeding.  Surgery is consulted and will probably do the colonoscopy they wanted to do prior to her leaving last time.  Continue to monitor H&H.  07/05/2023 patient undergoing colonoscopy today the workup for GI bleeding.  H&H is stable.  Repeat labs tomorrow.  Further care depending on scope results.  07/06/2023 patient underwent colonoscopy yesterday which showed colitis.  She is been started on antibiotics by the surgeon.  H&H is stable.      Interval History:     Review of Systems   Constitutional:  Negative for activity change, appetite change, chills, diaphoresis, fatigue and fever.   HENT:  Negative for congestion, ear pain, rhinorrhea and sore throat.     Eyes:  Negative for pain, discharge and redness.   Respiratory:  Negative for apnea, cough, choking, chest tightness, shortness of breath and wheezing.    Cardiovascular:  Negative for chest pain, palpitations and leg swelling.   Gastrointestinal:  Positive for blood in stool. Negative for abdominal distention, abdominal pain, constipation, diarrhea, nausea and vomiting.   Endocrine: Negative for cold intolerance, heat intolerance and polyuria.   Genitourinary:  Negative for difficulty urinating, dysuria, frequency, hematuria and pelvic pain.   Musculoskeletal:  Negative for arthralgias, back pain, gait problem, joint swelling and myalgias.   Skin:  Negative for color change, pallor, rash and wound.   Neurological:  Negative for seizures, syncope, speech difficulty, numbness and headaches.   Psychiatric/Behavioral:  Negative for agitation, confusion, hallucinations and sleep disturbance. The patient is not nervous/anxious.    Objective:     Vital Signs (Most Recent):  Temp: 98.2 °F (36.8 °C) (07/06/23 1057)  Pulse: 60 (07/06/23 1057)  Resp: 16 (07/06/23 0812)  BP: (!) 120/59 (07/06/23 1057)  SpO2: 98 % (07/06/23 1057) Vital Signs (24h Range):  Temp:  [97.1 °F (36.2 °C)-98.8 °F (37.1 °C)] 98.2 °F (36.8 °C)  Pulse:  [48-60] 60  Resp:  [16-20] 16  SpO2:  [96 %-99 %] 98 %  BP: (118-143)/(47-61) 120/59     Weight: 78.2 kg (172 lb 4.8 oz)  Body mass index is 26.2 kg/m².    Intake/Output Summary (Last 24 hours) at 7/6/2023 1424  Last data filed at 7/6/2023 1334  Gross per 24 hour   Intake 1800 ml   Output --   Net 1800 ml           Physical Exam  Constitutional:       General: She is not in acute distress.     Appearance: Normal appearance. She is ill-appearing. She is not toxic-appearing or diaphoretic.   HENT:      Head: Normocephalic and atraumatic.      Right Ear: External ear normal.      Left Ear: External ear normal.      Nose: Nose normal. No congestion or rhinorrhea.      Mouth/Throat:      Mouth: Mucous  membranes are moist.      Pharynx: Oropharynx is clear.   Eyes:      Extraocular Movements: Extraocular movements intact.      Conjunctiva/sclera: Conjunctivae normal.      Pupils: Pupils are equal, round, and reactive to light.   Neck:      Vascular: No carotid bruit.   Cardiovascular:      Rate and Rhythm: Normal rate and regular rhythm.      Pulses: Normal pulses.      Heart sounds: Normal heart sounds. No murmur heard.  Pulmonary:      Effort: Pulmonary effort is normal. No respiratory distress.      Breath sounds: Normal breath sounds. No wheezing, rhonchi or rales.   Abdominal:      General: Abdomen is flat. Bowel sounds are normal. There is no distension.      Palpations: Abdomen is soft.      Tenderness: There is no abdominal tenderness. There is no guarding.   Musculoskeletal:         General: No swelling or tenderness. Normal range of motion.      Cervical back: Normal range of motion and neck supple. No tenderness.      Right lower leg: No edema.      Left lower leg: No edema.   Lymphadenopathy:      Cervical: No cervical adenopathy.   Skin:     General: Skin is warm and dry.      Coloration: Skin is pale. Skin is not jaundiced.   Neurological:      General: No focal deficit present.      Mental Status: She is alert and oriented to person, place, and time. Mental status is at baseline.      Cranial Nerves: No cranial nerve deficit.      Motor: No weakness.      Coordination: Coordination normal.      Gait: Gait normal.   Psychiatric:         Mood and Affect: Mood normal.         Behavior: Behavior normal.         Thought Content: Thought content normal.         Judgment: Judgment normal.           Significant Labs: All pertinent labs within the past 24 hours have been reviewed.    Significant Imaging: I have reviewed all pertinent imaging results/findings within the past 24 hours.      Assessment/Plan:      * Acute blood loss anemia  Monitor H&H NPO.  Consult Dr. Resendez.        Colitis  IV  antibiotics.  Repeat labs tomorrow.      Hypertension  Low-salt diet continue current medications      Generalized anxiety disorder  Continue current medications.      Depressive disorder  Patient has persistent depression which is mild and is currently controlled. Will Continue anti-depressant medications. We will not consult psychiatry at this time. Patient does not display psychosis at this time. Continue to monitor closely and adjust plan of care as needed.          VTE Risk Mitigation (From admission, onward)         Ordered     IP VTE HIGH RISK PATIENT  Once         07/03/23 2349     Place sequential compression device  Until discontinued         07/03/23 2349     Reason for No Pharmacological VTE Prophylaxis  Once        Question:  Reasons:  Answer:  Active Bleeding    07/03/23 2349                Discharge Planning   PARRIS:      Code Status: Full Code   Is the patient medically ready for discharge?:     Reason for patient still in hospital (select all that apply): Patient unstable  Discharge Plan A: Home Health                  Darren Meier MD  Department of Hospital Medicine   [facility]

## 2023-07-06 NOTE — CONSULTS
"Patient is a 72-year-old female presents with bloody diarrhea x3 hours.  She was recently in the hospital, preparing for a colonoscopy that never occurred.  She drank 2 gal of GoLYTELY during that time.  Started with diarrhea about 3 hours ago, and noticed blood in the bowl.  She does have hemorrhoids, and is on Plavix.  She is unsure whether the blood was in the stool, or from a hemorrhoid.  She is not feeling weak in any way.    Review of patients allergies indicates:  Allergens  Codeine reaction on patient is Syncope    Past Medical History   Diagnosis  Anxiety  Chronic renal disease stage 4  Colon cancer screening declined  Coronary Artery Disease(S/P stents -Angioplasty, Dr. Nagy, [facility])  Depression  Hypertension  Irregular Heart Rhythm(?Atrial Fib-not on blood thinners)  Medication Management  Obesity, Unspecified -Lost 20# Unexplained  Osteoarthritis of Knee  Right Joint pain shoulder region    Past Surgical History:  Procedure  No Colonoscopy  EGD-4-6 yrs ago, bleeding ulcer  Angioplasty (every 3-4 yrs)  Stent, Aorta  Stress test> 4yrs ago, Dr Nagy  Echocardiogram    Immunizations  Dtap vaccine> 5 yrs ago  No Flu vaccine  No pneumonia vaccine  No shingles vaccine  No covid 19 vaccine  No hepatitis vaccine    Family History  Mother  of Pancreatic Ca  Father  of MI(Myocardial Infarct)  Brother in remission from Lympha carcinoma  Brother is bipolar schizophrenic     Social History  Smoker smokes 1/2 lise cigaretter per day for 55 yrs  Alcohol use Myrtle  No drugs  No  service  No retirement time  No rehab  Employment  at [employer]  Unmarried   XX 2   [employer]  1 son, pharmacy [employer]  Resides in [place]  Pcp is JASWINDER Rdz    Review of Systems  Patient is positive for leg swelling  Positive for blood in stool and diarrhea    Objective  Vital Signs     2023   1935  BP is 141/55  Pulse is 49  Temp is 97.4 F(36..3C)  SpO2 is 97%  Height is 5' 8" (172.7 " cm)  Weight is 78.2 kg(172 lb 4.8 oz)  Body Mass Index is 26.20 kg/m2    Physical Exam  Patient appears well nourished and is cooperative  Head is atraumatic and Oropharynx is clear and moist  Pupils are equal, round and reactive to light.   Neck supple  Normal range of motion  Heart rate and rhythm is normal with intact distal pulses  Breath sounds normal  Abdomen is soft and bowel sounds normal  Patient has several external hemorrhoids, but no obvious bleeding site  Edema is present, extremities  Patient is alert and oriented to person, place and time  Skin is warm, dry and intact  Patient has a normal mood and affect    Laboratory findings  CBC   07/05/2023    0434  5.39 >   8.8 / 27.5  < 288    80.4 / 25.7  Cmp  07/05/2023  0434    138/3.7    107/23      5.0/1.52    <  98    8.2  /      Assessment :  Patient is a 72-year-old female presents with bloody diarrhea x3 hours.  She was recently in the hospital, preparing for a colonoscopy that never occurred.  She drank 2 gal of GoLYTELY during that time.  Started with diarrhea about 3 hours ago, and noticed blood in the bowl.  She does have hemorrhoids, and is on Plavix.  She is unsure whether the blood was in the stool, or from a hemorrhoid.  She is not feeling weak in any way.    Plan:  1. EGD with biopsy  2. Colonoscopy if occult stools are positive   3. Further treatment pending above including stool PCRs and stools for occult blood

## 2023-07-06 NOTE — SUBJECTIVE & OBJECTIVE
Interval History:     Review of Systems   Constitutional:  Negative for activity change, appetite change, chills, diaphoresis, fatigue and fever.   HENT:  Negative for congestion, ear pain, rhinorrhea and sore throat.    Eyes:  Negative for pain, discharge and redness.   Respiratory:  Negative for apnea, cough, choking, chest tightness, shortness of breath and wheezing.    Cardiovascular:  Negative for chest pain, palpitations and leg swelling.   Gastrointestinal:  Positive for blood in stool. Negative for abdominal distention, abdominal pain, constipation, diarrhea, nausea and vomiting.   Endocrine: Negative for cold intolerance, heat intolerance and polyuria.   Genitourinary:  Negative for difficulty urinating, dysuria, frequency, hematuria and pelvic pain.   Musculoskeletal:  Negative for arthralgias, back pain, gait problem, joint swelling and myalgias.   Skin:  Negative for color change, pallor, rash and wound.   Neurological:  Negative for seizures, syncope, speech difficulty, numbness and headaches.   Psychiatric/Behavioral:  Negative for agitation, confusion, hallucinations and sleep disturbance. The patient is not nervous/anxious.    Objective:     Vital Signs (Most Recent):  Temp: 98.2 °F (36.8 °C) (07/06/23 1057)  Pulse: 60 (07/06/23 1057)  Resp: 16 (07/06/23 0812)  BP: (!) 120/59 (07/06/23 1057)  SpO2: 98 % (07/06/23 1057) Vital Signs (24h Range):  Temp:  [97.1 °F (36.2 °C)-98.8 °F (37.1 °C)] 98.2 °F (36.8 °C)  Pulse:  [48-60] 60  Resp:  [16-20] 16  SpO2:  [96 %-99 %] 98 %  BP: (118-143)/(47-61) 120/59     Weight: 78.2 kg (172 lb 4.8 oz)  Body mass index is 26.2 kg/m².    Intake/Output Summary (Last 24 hours) at 7/6/2023 1424  Last data filed at 7/6/2023 1334  Gross per 24 hour   Intake 1800 ml   Output --   Net 1800 ml           Physical Exam  Constitutional:       General: She is not in acute distress.     Appearance: Normal appearance. She is ill-appearing. She is not toxic-appearing or diaphoretic.    HENT:      Head: Normocephalic and atraumatic.      Right Ear: External ear normal.      Left Ear: External ear normal.      Nose: Nose normal. No congestion or rhinorrhea.      Mouth/Throat:      Mouth: Mucous membranes are moist.      Pharynx: Oropharynx is clear.   Eyes:      Extraocular Movements: Extraocular movements intact.      Conjunctiva/sclera: Conjunctivae normal.      Pupils: Pupils are equal, round, and reactive to light.   Neck:      Vascular: No carotid bruit.   Cardiovascular:      Rate and Rhythm: Normal rate and regular rhythm.      Pulses: Normal pulses.      Heart sounds: Normal heart sounds. No murmur heard.  Pulmonary:      Effort: Pulmonary effort is normal. No respiratory distress.      Breath sounds: Normal breath sounds. No wheezing, rhonchi or rales.   Abdominal:      General: Abdomen is flat. Bowel sounds are normal. There is no distension.      Palpations: Abdomen is soft.      Tenderness: There is no abdominal tenderness. There is no guarding.   Musculoskeletal:         General: No swelling or tenderness. Normal range of motion.      Cervical back: Normal range of motion and neck supple. No tenderness.      Right lower leg: No edema.      Left lower leg: No edema.   Lymphadenopathy:      Cervical: No cervical adenopathy.   Skin:     General: Skin is warm and dry.      Coloration: Skin is pale. Skin is not jaundiced.   Neurological:      General: No focal deficit present.      Mental Status: She is alert and oriented to person, place, and time. Mental status is at baseline.      Cranial Nerves: No cranial nerve deficit.      Motor: No weakness.      Coordination: Coordination normal.      Gait: Gait normal.   Psychiatric:         Mood and Affect: Mood normal.         Behavior: Behavior normal.         Thought Content: Thought content normal.         Judgment: Judgment normal.           Significant Labs: All pertinent labs within the past 24 hours have been reviewed.    Significant  Imaging: I have reviewed all pertinent imaging results/findings within the past 24 hours.

## 2023-07-06 NOTE — UM SECONDARY REVIEW
Physician Advisor External  [facility]Dr.Kelly House    Observation > 48 hours    Approved Inpatient    7/3/23: Outpatient  7/4/23: No Recommendation-Insufficient  7/5/23: Inpatient

## 2023-07-07 VITALS
BODY MASS INDEX: 25.92 KG/M2 | OXYGEN SATURATION: 96 % | HEIGHT: 68 IN | DIASTOLIC BLOOD PRESSURE: 57 MMHG | RESPIRATION RATE: 20 BRPM | TEMPERATURE: 98 F | HEART RATE: 65 BPM | WEIGHT: 171.06 LBS | SYSTOLIC BLOOD PRESSURE: 138 MMHG

## 2023-07-07 LAB
ALBUMIN SERPL-MCNC: 2.9 G/DL (ref 3.4–5)
ALBUMIN/GLOB SERPL: 1.2 RATIO
ALP SERPL-CCNC: 90 UNIT/L (ref 50–144)
ALT SERPL-CCNC: 11 UNIT/L (ref 1–45)
ANION GAP SERPL CALC-SCNC: 4 MEQ/L (ref 2–13)
AST SERPL-CCNC: 14 UNIT/L (ref 14–36)
BASOPHILS # BLD AUTO: 0.04 X10(3)/MCL (ref 0.01–0.08)
BASOPHILS NFR BLD AUTO: 0.8 % (ref 0.1–1.2)
BILIRUBIN DIRECT+TOT PNL SERPL-MCNC: 0.4 MG/DL (ref 0–1)
BUN SERPL-MCNC: 8 MG/DL (ref 7–20)
CALCIUM SERPL-MCNC: 8.4 MG/DL (ref 8.4–10.2)
CHLORIDE SERPL-SCNC: 107 MMOL/L (ref 98–110)
CO2 SERPL-SCNC: 23 MMOL/L (ref 21–32)
CREAT SERPL-MCNC: 1.6 MG/DL (ref 0.66–1.25)
CREAT/UREA NIT SERPL: 5 (ref 12–20)
EOSINOPHIL # BLD AUTO: 0.3 X10(3)/MCL (ref 0.04–0.36)
EOSINOPHIL NFR BLD AUTO: 6 % (ref 0.7–7)
ERYTHROCYTE [DISTWIDTH] IN BLOOD BY AUTOMATED COUNT: 18.2 % (ref 11–14.5)
GFR SERPLBLD CREATININE-BSD FMLA CKD-EPI: 34 MLS/MIN/1.73/M2
GLOBULIN SER-MCNC: 2.5 GM/DL (ref 2–3.9)
GLUCOSE SERPL-MCNC: 107 MG/DL (ref 70–115)
HCT VFR BLD AUTO: 27.8 % (ref 36–48)
HGB BLD-MCNC: 8.9 G/DL (ref 11.8–16)
IMM GRANULOCYTES # BLD AUTO: 0.02 X10(3)/MCL (ref 0–0.03)
IMM GRANULOCYTES NFR BLD AUTO: 0.4 % (ref 0–0.5)
LYMPHOCYTES # BLD AUTO: 0.95 X10(3)/MCL (ref 1.16–3.74)
LYMPHOCYTES NFR BLD AUTO: 19.2 % (ref 20–55)
MCH RBC QN AUTO: 25.7 PG (ref 27–34)
MCHC RBC AUTO-ENTMCNC: 32 G/DL (ref 31–37)
MCV RBC AUTO: 80.3 FL (ref 79–99)
MONOCYTES # BLD AUTO: 0.55 X10(3)/MCL (ref 0.24–0.36)
MONOCYTES NFR BLD AUTO: 11.1 % (ref 4.7–12.5)
NEUTROPHILS # BLD AUTO: 3.1 X10(3)/MCL (ref 1.56–6.13)
NEUTROPHILS NFR BLD AUTO: 62.5 % (ref 37–73)
NRBC BLD AUTO-RTO: 0 %
PLATELET # BLD AUTO: 283 X10(3)/MCL (ref 140–371)
PMV BLD AUTO: 9.9 FL (ref 9.4–12.4)
POTASSIUM SERPL-SCNC: 4.2 MMOL/L (ref 3.5–5.1)
PROT SERPL-MCNC: 5.4 GM/DL (ref 6.3–8.2)
RBC # BLD AUTO: 3.46 X10(6)/MCL (ref 4–5.1)
SODIUM SERPL-SCNC: 134 MMOL/L (ref 135–145)
WBC # SPEC AUTO: 4.96 X10(3)/MCL (ref 4–11.5)

## 2023-07-07 PROCEDURE — 94761 N-INVAS EAR/PLS OXIMETRY MLT: CPT

## 2023-07-07 PROCEDURE — 36415 COLL VENOUS BLD VENIPUNCTURE: CPT | Performed by: INTERNAL MEDICINE

## 2023-07-07 PROCEDURE — 94799 UNLISTED PULMONARY SVC/PX: CPT

## 2023-07-07 PROCEDURE — 25000003 PHARM REV CODE 250: Performed by: FAMILY MEDICINE

## 2023-07-07 PROCEDURE — 80053 COMPREHEN METABOLIC PANEL: CPT | Performed by: INTERNAL MEDICINE

## 2023-07-07 PROCEDURE — 25000003 PHARM REV CODE 250

## 2023-07-07 PROCEDURE — 85025 COMPLETE CBC W/AUTO DIFF WBC: CPT | Performed by: INTERNAL MEDICINE

## 2023-07-07 PROCEDURE — 25000003 PHARM REV CODE 250: Performed by: SURGERY

## 2023-07-07 RX ORDER — FAMOTIDINE 20 MG/1
20 TABLET, FILM COATED ORAL DAILY
Qty: 30 TABLET | Refills: 11 | Status: SHIPPED | OUTPATIENT
Start: 2023-07-08 | End: 2024-07-07

## 2023-07-07 RX ORDER — CIPROFLOXACIN 500 MG/1
500 TABLET ORAL EVERY 12 HOURS
Qty: 20 TABLET | Refills: 0 | Status: SHIPPED | OUTPATIENT
Start: 2023-07-07 | End: 2023-07-17 | Stop reason: ALTCHOICE

## 2023-07-07 RX ORDER — METRONIDAZOLE 500 MG/1
500 TABLET ORAL EVERY 8 HOURS
Qty: 30 TABLET | Refills: 0 | Status: SHIPPED | OUTPATIENT
Start: 2023-07-07 | End: 2023-07-17 | Stop reason: ALTCHOICE

## 2023-07-07 RX ORDER — POTASSIUM CHLORIDE 20 MEQ/1
40 TABLET, EXTENDED RELEASE ORAL DAILY
Qty: 30 TABLET | Refills: 1 | Status: SHIPPED | OUTPATIENT
Start: 2023-07-07 | End: 2023-07-17 | Stop reason: ALTCHOICE

## 2023-07-07 RX ADMIN — LOSARTAN POTASSIUM 50 MG: 50 TABLET, FILM COATED ORAL at 08:07

## 2023-07-07 RX ADMIN — FAMOTIDINE 20 MG: 20 TABLET, FILM COATED ORAL at 08:07

## 2023-07-07 RX ADMIN — MUPIROCIN 1 G: 20 OINTMENT TOPICAL at 08:07

## 2023-07-07 RX ADMIN — HYDRALAZINE HYDROCHLORIDE 50 MG: 50 TABLET ORAL at 06:07

## 2023-07-07 RX ADMIN — FERROUS SULFATE TAB 325 MG (65 MG ELEMENTAL FE) 1 EACH: 325 (65 FE) TAB at 08:07

## 2023-07-07 RX ADMIN — METRONIDAZOLE 500 MG: 250 TABLET ORAL at 06:07

## 2023-07-07 RX ADMIN — CIPROFLOXACIN HYDROCHLORIDE 500 MG: 500 TABLET, FILM COATED ORAL at 08:07

## 2023-07-07 RX ADMIN — METOPROLOL SUCCINATE 25 MG: 25 TABLET, EXTENDED RELEASE ORAL at 08:07

## 2023-07-07 RX ADMIN — ESCITALOPRAM OXALATE 20 MG: 10 TABLET ORAL at 08:07

## 2023-07-07 RX ADMIN — CLONIDINE HYDROCHLORIDE 0.2 MG: 0.1 TABLET ORAL at 08:07

## 2023-07-07 RX ADMIN — POTASSIUM CHLORIDE 40 MEQ: 1500 TABLET, EXTENDED RELEASE ORAL at 08:07

## 2023-07-07 RX ADMIN — BUSPIRONE HYDROCHLORIDE 15 MG: 15 TABLET ORAL at 08:07

## 2023-07-07 RX ADMIN — AMLODIPINE BESYLATE 5 MG: 5 TABLET ORAL at 08:07

## 2023-07-07 NOTE — NURSING
PATIENT DISCHARGED OFF UNIT VIA WHEELCHAIR BY GALA ALBERT IN GOOD CONDITION WITH NO S/S OF DISTRESS NOTED.

## 2023-07-07 NOTE — PROGRESS NOTES
[facility]  Progress Note    Patient Name: Rosalie Campbell  MRN: [id number]  Patient Class: IP- Inpatient   Admission Date: 7/3/2023  Length of Stay: 1 days  Attending Physician: Sekou Shah MD  Primary Care Provider: JASWINDER Mosqueda        Subjective:     Principal Problem:Acute blood loss anemia        HPI:  HPI: 72 year old female with pmh HTN, CAD on Plavix presenting to ED with complaints of bright red blood per rectum with associated diarrhea earlier in the day prior to presentation. Patient was actually prepping for a colonoscopy tby drinking 2 gallons Golytely but never occurred as she noted blood in toilet bowl. She endorses history of hemorrhoids and is currently taking Plavix. She denies fever or chills.           Past Medical History:   Diagnosis Date    Anxiety      Chronic renal disease stage 4      Colon cancer screening declined      Coronary artery disease      Depression      Hypertension      Irregular heart rhythm      Medication management      Obesity, unspecified      Osteoarthritis of knee      Pain, joint, shoulder region, right      Refused influenza vaccine      Refused pneumococcal vaccination        Overview/Hospital Course:  07/04/2023 patient readmitted for GI bleeding.  Surgery is consulted and will probably do the colonoscopy they wanted to do prior to her leaving last time.  Continue to monitor H&H.  07/05/2023 patient undergoing colonoscopy today the workup for GI bleeding.  H&H is stable.  Repeat labs tomorrow.  Further care depending on scope results.  07/06/2023 patient underwent colonoscopy yesterday which showed colitis.  She is been started on antibiotics by the surgeon.  H&H is stable.  07/07/2023 patient currently being treated for severe colitis with antibiotics.  Surgery is following.  H&H is stable.  Possible discharge tomorrow on p.o. antibiotics.      Interval History:     Review of Systems    Constitutional:  Negative for activity change, appetite change, chills, diaphoresis, fatigue and fever.   HENT:  Negative for congestion, ear pain, rhinorrhea and sore throat.    Eyes:  Negative for pain, discharge and redness.   Respiratory:  Negative for apnea, cough, choking, chest tightness, shortness of breath and wheezing.    Cardiovascular:  Negative for chest pain, palpitations and leg swelling.   Gastrointestinal:  Positive for blood in stool. Negative for abdominal distention, abdominal pain, constipation, diarrhea, nausea and vomiting.   Endocrine: Negative for cold intolerance, heat intolerance and polyuria.   Genitourinary:  Negative for difficulty urinating, dysuria, frequency, hematuria and pelvic pain.   Musculoskeletal:  Negative for arthralgias, back pain, gait problem, joint swelling and myalgias.   Skin:  Negative for color change, pallor, rash and wound.   Neurological:  Negative for seizures, syncope, speech difficulty, numbness and headaches.   Psychiatric/Behavioral:  Negative for agitation, confusion, hallucinations and sleep disturbance. The patient is not nervous/anxious.    Objective:     Vital Signs (Most Recent):  Temp: 98.1 °F (36.7 °C) (07/07/23 0321)  Pulse: (!) 55 (07/07/23 0321)  Resp: 19 (07/07/23 0321)  BP: (!) 137/55 (07/07/23 0321)  SpO2: 96 % (07/07/23 0321) Vital Signs (24h Range):  Temp:  [97.6 °F (36.4 °C)-98.9 °F (37.2 °C)] 98.1 °F (36.7 °C)  Pulse:  [52-92] 55  Resp:  [16-20] 19  SpO2:  [96 %-98 %] 96 %  BP: (120-143)/(53-65) 137/55     Weight: 77.6 kg (171 lb 1.2 oz)  Body mass index is 26.01 kg/m².    Intake/Output Summary (Last 24 hours) at 7/7/2023 0702  Last data filed at 7/7/2023 0533  Gross per 24 hour   Intake 1820 ml   Output 480 ml   Net 1340 ml           Physical Exam  Constitutional:       General: She is not in acute distress.     Appearance: Normal appearance. She is ill-appearing. She is not toxic-appearing or diaphoretic.   HENT:      Head: Normocephalic  and atraumatic.      Right Ear: External ear normal.      Left Ear: External ear normal.      Nose: Nose normal. No congestion or rhinorrhea.      Mouth/Throat:      Mouth: Mucous membranes are moist.      Pharynx: Oropharynx is clear.   Eyes:      Extraocular Movements: Extraocular movements intact.      Conjunctiva/sclera: Conjunctivae normal.      Pupils: Pupils are equal, round, and reactive to light.   Neck:      Vascular: No carotid bruit.   Cardiovascular:      Rate and Rhythm: Normal rate and regular rhythm.      Pulses: Normal pulses.      Heart sounds: Normal heart sounds. No murmur heard.  Pulmonary:      Effort: Pulmonary effort is normal. No respiratory distress.      Breath sounds: Normal breath sounds. No wheezing, rhonchi or rales.   Abdominal:      General: Abdomen is flat. Bowel sounds are normal. There is no distension.      Palpations: Abdomen is soft.      Tenderness: There is no abdominal tenderness. There is no guarding.   Musculoskeletal:         General: No swelling or tenderness. Normal range of motion.      Cervical back: Normal range of motion and neck supple. No tenderness.      Right lower leg: No edema.      Left lower leg: No edema.   Lymphadenopathy:      Cervical: No cervical adenopathy.   Skin:     General: Skin is warm and dry.      Coloration: Skin is pale. Skin is not jaundiced.   Neurological:      General: No focal deficit present.      Mental Status: She is alert and oriented to person, place, and time. Mental status is at baseline.      Cranial Nerves: No cranial nerve deficit.      Motor: No weakness.      Coordination: Coordination normal.      Gait: Gait normal.   Psychiatric:         Mood and Affect: Mood normal.         Behavior: Behavior normal.         Thought Content: Thought content normal.         Judgment: Judgment normal.           Significant Labs: All pertinent labs within the past 24 hours have been reviewed.    Significant Imaging: I have reviewed all  pertinent imaging results/findings within the past 24 hours.      Assessment/Plan:      * Acute blood loss anemia  Monitor H&H NPO.  Consult Dr. Resendez.        Colitis  IV antibiotics.  Repeat labs tomorrow.      Hypertension  Low-salt diet continue current medications      Generalized anxiety disorder  Continue current medications.      Depressive disorder  Patient has persistent depression which is mild and is currently controlled. Will Continue anti-depressant medications. We will not consult psychiatry at this time. Patient does not display psychosis at this time. Continue to monitor closely and adjust plan of care as needed.          VTE Risk Mitigation (From admission, onward)         Ordered     IP VTE HIGH RISK PATIENT  Once         07/03/23 2349     Place sequential compression device  Until discontinued         07/03/23 2349     Reason for No Pharmacological VTE Prophylaxis  Once        Question:  Reasons:  Answer:  Active Bleeding    07/03/23 2349                Discharge Planning   PARRIS:      Code Status: Full Code   Is the patient medically ready for discharge?:     Reason for patient still in hospital (select all that apply): Patient unstable  Discharge Plan A: Home Health                  Darren Meier MD  Department of Hospital Medicine   [facility]

## 2023-07-07 NOTE — NURSING
PATIENT PROVIDED WITH DISCHARGE INSTRUCTIONS AND EDUCATION. PATIENT VERBALIZED UNDERSTANDING. PATIENT STATED THAT SHE WILL  DISCHARGE MEDS THIS AFTERNOON FROM THE [facility].

## 2023-07-07 NOTE — PLAN OF CARE
07/07/23 1012   Final Note   Assessment Type Final Discharge Note   Anticipated Discharge Disposition Home-Health  (Resume [facility] [place])   What phone number can be called within the next 1-3 days to see how you are doing after discharge? [phone]   Post-Acute Status   Post-Acute Authorization Home Health   Home Health Status Set-up Complete/Auth obtained   Discharge Delays None known at this time

## 2023-07-07 NOTE — PLAN OF CARE
Physician ordered to discharge patient to home. Pt's home health care provider ( [facility] ) was notified per phone/fax of pt's discharge, spoke with Ramya. [facility] nurse to resume home visits.

## 2023-07-07 NOTE — ASSESSMENT & PLAN NOTE
Continue current medications.     Patient last seen 12-12-22    Appointment 7-10-23    Rx Auth Request  Received: Yesterday  Carlos, Ssrefill In  [facility] Nurse [facility] Pool  Caller: Unspecified (Yesterday,  7:15 PM)         Requested Prescriptions     Name from pharmacy: SUMAtriptan Succinate 50 MG Oral Tablet         Will file in chart as: SUMAtriptan (IMITREX) 50 MG tablet    Sig: Take 1 tablet by mouth at onset of migraine. May repeat after 2 hours if needed.    Disp:  9 tablet    Refills:  0    Start: 3/2/2023    Class: Eprescribe    Non-formulary For: Migraine without status migrainosus, not intractable, unspecified migraine type    Last ordered: 2 months ago by Elma Hooevr MD Last refill: 12/12/2022    Rx #: [id number]    Serotonin Agonists (Triptans) Refill Protocol - 12 Month Protocol Passed 03/02/2023 07:15 PM   Protocol Details  Seen by prescribing provider or same department within the last 12 months or has a future appt in 3 months - IF FAILED PLEASE LOOK AT CHART REVIEW FOR LAST VISIT AND PROCEED ACCORDINGLY   Medication (including dose and sig) on current meds list

## 2023-07-07 NOTE — SUBJECTIVE & OBJECTIVE
Interval History:     Review of Systems   Constitutional:  Negative for activity change, appetite change, chills, diaphoresis, fatigue and fever.   HENT:  Negative for congestion, ear pain, rhinorrhea and sore throat.    Eyes:  Negative for pain, discharge and redness.   Respiratory:  Negative for apnea, cough, choking, chest tightness, shortness of breath and wheezing.    Cardiovascular:  Negative for chest pain, palpitations and leg swelling.   Gastrointestinal:  Positive for blood in stool. Negative for abdominal distention, abdominal pain, constipation, diarrhea, nausea and vomiting.   Endocrine: Negative for cold intolerance, heat intolerance and polyuria.   Genitourinary:  Negative for difficulty urinating, dysuria, frequency, hematuria and pelvic pain.   Musculoskeletal:  Negative for arthralgias, back pain, gait problem, joint swelling and myalgias.   Skin:  Negative for color change, pallor, rash and wound.   Neurological:  Negative for seizures, syncope, speech difficulty, numbness and headaches.   Psychiatric/Behavioral:  Negative for agitation, confusion, hallucinations and sleep disturbance. The patient is not nervous/anxious.    Objective:     Vital Signs (Most Recent):  Temp: 98.1 °F (36.7 °C) (07/07/23 0321)  Pulse: (!) 55 (07/07/23 0321)  Resp: 19 (07/07/23 0321)  BP: (!) 137/55 (07/07/23 0321)  SpO2: 96 % (07/07/23 0321) Vital Signs (24h Range):  Temp:  [97.6 °F (36.4 °C)-98.9 °F (37.2 °C)] 98.1 °F (36.7 °C)  Pulse:  [52-92] 55  Resp:  [16-20] 19  SpO2:  [96 %-98 %] 96 %  BP: (120-143)/(53-65) 137/55     Weight: 77.6 kg (171 lb 1.2 oz)  Body mass index is 26.01 kg/m².    Intake/Output Summary (Last 24 hours) at 7/7/2023 0702  Last data filed at 7/7/2023 0533  Gross per 24 hour   Intake 1820 ml   Output 480 ml   Net 1340 ml           Physical Exam  Constitutional:       General: She is not in acute distress.     Appearance: Normal appearance. She is ill-appearing. She is not toxic-appearing or  diaphoretic.   HENT:      Head: Normocephalic and atraumatic.      Right Ear: External ear normal.      Left Ear: External ear normal.      Nose: Nose normal. No congestion or rhinorrhea.      Mouth/Throat:      Mouth: Mucous membranes are moist.      Pharynx: Oropharynx is clear.   Eyes:      Extraocular Movements: Extraocular movements intact.      Conjunctiva/sclera: Conjunctivae normal.      Pupils: Pupils are equal, round, and reactive to light.   Neck:      Vascular: No carotid bruit.   Cardiovascular:      Rate and Rhythm: Normal rate and regular rhythm.      Pulses: Normal pulses.      Heart sounds: Normal heart sounds. No murmur heard.  Pulmonary:      Effort: Pulmonary effort is normal. No respiratory distress.      Breath sounds: Normal breath sounds. No wheezing, rhonchi or rales.   Abdominal:      General: Abdomen is flat. Bowel sounds are normal. There is no distension.      Palpations: Abdomen is soft.      Tenderness: There is no abdominal tenderness. There is no guarding.   Musculoskeletal:         General: No swelling or tenderness. Normal range of motion.      Cervical back: Normal range of motion and neck supple. No tenderness.      Right lower leg: No edema.      Left lower leg: No edema.   Lymphadenopathy:      Cervical: No cervical adenopathy.   Skin:     General: Skin is warm and dry.      Coloration: Skin is pale. Skin is not jaundiced.   Neurological:      General: No focal deficit present.      Mental Status: She is alert and oriented to person, place, and time. Mental status is at baseline.      Cranial Nerves: No cranial nerve deficit.      Motor: No weakness.      Coordination: Coordination normal.      Gait: Gait normal.   Psychiatric:         Mood and Affect: Mood normal.         Behavior: Behavior normal.         Thought Content: Thought content normal.         Judgment: Judgment normal.           Significant Labs: All pertinent labs within the past 24 hours have been  reviewed.    Significant Imaging: I have reviewed all pertinent imaging results/findings within the past 24 hours.

## 2023-07-07 NOTE — DISCHARGE SUMMARY
[facility]/Surg  [facility] Medicine  Discharge Summary      Patient Name: Rosalie Campbell  MRN: [id number]  NAS: [id number]  Patient Class: IP- Inpatient  Admission Date: 7/3/2023  Hospital Length of Stay: 1 days  Discharge Date and Time:  07/07/2023 10:35 AM  Attending Physician: Sekou Shah MD   Discharging Provider: Raquel Vogel MD  Primary Care Provider: JASWINDER Mosqueda    Primary Care Team: Networked reference to record PCT     HPI:   HPI: 72 year old female with pmh HTN, CAD on Plavix presenting to ED with complaints of bright red blood per rectum with associated diarrhea earlier in the day prior to presentation. Patient was actually prepping for a colonoscopy tby drinking 2 gallons Golytely but never occurred as she noted blood in toilet bowl. She endorses history of hemorrhoids and is currently taking Plavix. She denies fever or chills.           Past Medical History:   Diagnosis Date    Anxiety      Chronic renal disease stage 4      Colon cancer screening declined      Coronary artery disease      Depression      Hypertension      Irregular heart rhythm      Medication management      Obesity, unspecified      Osteoarthritis of knee      Pain, joint, shoulder region, right      Refused influenza vaccine      Refused pneumococcal vaccination        * No surgery found *      Hospital Course:   07/04/2023 patient readmitted for GI bleeding.  Surgery is consulted and will probably do the colonoscopy they wanted to do prior to her leaving last time.  Continue to monitor H&H.  07/05/2023 patient undergoing colonoscopy today the workup for GI bleeding.  H&H is stable.  Repeat labs tomorrow.  Further care depending on scope results.  07/06/2023 patient underwent colonoscopy yesterday which showed colitis.  She is been started on antibiotics by the surgeon.  H&H is stable.  07/07/2023 patient currently being treated for severe colitis with antibiotics.  Surgery is following.   H&H is stable.  Possible discharge tomorrow on p.o. antibiotics.    Addendum: 07/07/2023 pt requesting d/c home on po abx, tolerated food and would like to d/c so she can get all her meds today.  Grandson at bedside and pt is ready for d/c home.       Goals of Care Treatment Preferences:  Code Status: Full Code      Consults:   Consults (From admission, onward)        Status Ordering Provider     IP consult to case management  Once        Provider:  (Not yet assigned)    Acknowledged TAMEKA BURLESON     Inpatient consult to General surgery  Once        Provider:  Fabien Resendez MD    Acknowledged GONSALO CONTRERAS     Inpatient virtual consult to Hospital Medicine  Once        Provider:  Christian Coffey MD    Acknowledged GONSALO CONTRERAS          No new Assessment & Plan notes have been filed under this hospital service since the last note was generated.  Service: Hospital Medicine    Final Active Diagnoses:    Diagnosis Date Noted POA    PRINCIPAL PROBLEM:  Acute blood loss anemia [D62] 06/26/2023 Yes    Hypertension [I10] 01/05/2023 Yes    Colitis [K52.9] 07/06/2023 Yes    Rectal bleeding [K62.5] 07/03/2023 Yes    Hypokalemia [E87.6] 07/03/2023 Yes    Stage 4 chronic kidney disease [N18.4] 07/03/2023 Yes    Depressive disorder [F32.A] 01/05/2023 Yes    Generalized anxiety disorder [F41.1] 01/05/2023 Yes      Problems Resolved During this Admission:       Discharged Condition: good    Disposition: Home or Self Care    Follow Up:   Follow-up Information     JASWINDER Mosqueda Follow up.    Specialty: Family Medicine  Contact information:  [address]  [address]  [place] [address]  [phone]             Fabien Resendez MD Follow up.    Specialties: General Surgery, Cardiology  Contact information:  [address]  [address]  [place] [address] [phone]             AVERY SANDOVAL MD [facility] Follow up.    Specialties: Home Health Services, Home Therapy Services, Home Living Aide  Services  Contact information:  [address]  [facility] [address]  [phone]                     Patient Instructions:      Activity as tolerated       Significant Diagnostic Studies: Labs:   BMP:   Recent Labs   Lab 07/06/23 0420 07/07/23 0444    134*   K 3.8 4.2   CO2 25 23   BUN 7.0 8.0   CREATININE 1.51* 1.60*   CALCIUM 8.4 8.4    and CMP   Recent Labs   Lab 07/06/23 0420 07/07/23 0444    134*   K 3.8 4.2   CO2 25 23   BUN 7.0 8.0   CREATININE 1.51* 1.60*   CALCIUM 8.4 8.4   ALBUMIN  --  2.9*   BILITOT  --  0.4   ALKPHOS  --  90   AST  --  14   ALT  --  11       Pending Diagnostic Studies:     Procedure Component Value Units Date/Time    Specimen to Pathology [[id number]] Collected: 07/05/23 1524    Order Status: Sent Lab Status: No result     Specimen: Tissue from Intestine Large, Cecum; Tissue from Intestine Large, Transverse Colon; Tissue from Intestine Large, Transverse Colon; Tissue from Colon     Specimen to Pathology Gastrointestinal tract [[id number]] Collected: 07/05/23 1535    Order Status: Sent Lab Status: No result     Specimen: Tissue          Medications:  Reconciled Home Medications:      Medication List      START taking these medications    ciprofloxacin HCl 500 MG tablet  Commonly known as: CIPRO  Take 1 tablet (500 mg total) by mouth every 12 (twelve) hours. for 10 days     famotidine 20 MG tablet  Commonly known as: PEPCID  Take 1 tablet (20 mg total) by mouth once daily.  Start taking on: July 8, 2023     metroNIDAZOLE 500 MG tablet  Commonly known as: FLAGYL  Take 1 tablet (500 mg total) by mouth every 8 (eight) hours. for 10 days     potassium chloride SA 20 MEQ tablet  Commonly known as: K-DUR,KLOR-CON  Take 2 tablets (40 mEq total) by mouth once daily. for 30 doses        CONTINUE taking these medications    amLODIPine 5 MG tablet  Commonly known as: NORVASC  Take 1 tablet (5 mg total) by mouth once daily.     atorvastatin 40 MG tablet  Commonly known  as: LIPITOR  Take 1 tablet (40 mg total) by mouth Daily.     busPIRone 15 MG tablet  Commonly known as: BUSPAR  Take 1 tablet (15 mg total) by mouth 3 (three) times daily.     cloNIDine 0.2 MG tablet  Commonly known as: CATAPRES  Take 1 tablet (0.2 mg total) by mouth 2 (two) times daily.     clopidogreL 75 mg tablet  Commonly known as: PLAVIX  Take 1 tablet (75 mg total) by mouth Daily.     dapagliflozin propanediol 10 mg tablet  Commonly known as: FARXIGA  Take 1 tablet (10 mg total) by mouth once daily.     EScitalopram oxalate 20 MG tablet  Commonly known as: LEXAPRO  Take 1 tablet (20 mg total) by mouth once daily.     ferrous sulfate 324 mg (65 mg iron) Tbec  Take 1 tablet (324 mg total) by mouth every other day.     hydrALAZINE 50 MG tablet  Commonly known as: APRESOLINE  Take 1 tablet (50 mg total) by mouth every 8 (eight) hours.     losartan 50 MG tablet  Commonly known as: COZAAR  Take 50 mg by mouth once daily.     metoprolol succinate 25 MG 24 hr tablet  Commonly known as: TOPROL-XL  Take 1 tablet (25 mg total) by mouth once daily.     rOPINIRole 0.25 MG tablet  Commonly known as: REQUIP  Take 1 tablet (0.25 mg total) by mouth every evening.            Indwelling Lines/Drains at time of discharge:   Lines/Drains/Airways     None                 Time spent on the discharge of patient: 36 minutes    Physical Exam  Constitutional:       General: She is not in acute distress.     Appearance: Normal appearance. She is normal weight. She is not ill-appearing.   Cardiovascular:      Rate and Rhythm: Normal rate and regular rhythm.      Pulses: Normal pulses.      Heart sounds: Normal heart sounds.   Pulmonary:      Effort: Pulmonary effort is normal.      Breath sounds: Normal breath sounds.   Abdominal:      General: Abdomen is flat.      Palpations: Abdomen is soft.   Skin:     General: Skin is warm and dry.      Findings: No erythema, lesion or rash.   Neurological:      Mental Status: She is alert.    Psychiatric:         Behavior: Behavior normal.      Comments: I had a face to face encounter with this patient prior to d/c            Raquel Vogel MD  Department of Hospital Medicine  [facility]

## 2023-07-10 ENCOUNTER — PATIENT OUTREACH (OUTPATIENT)
Dept: ADMINISTRATIVE | Facility: CLINIC | Age: 73
End: 2023-07-10
Payer: MEDICARE

## 2023-07-10 NOTE — PROGRESS NOTES
C3 nurse spoke with Rosalie Campbell  for a TCC post hospital discharge follow up call. The patient has a scheduled [facility] appointment with JASWINDER Mosqueda  on 7/17/23 @ 1436.

## 2023-07-17 ENCOUNTER — OFFICE VISIT (OUTPATIENT)
Dept: FAMILY MEDICINE | Facility: CLINIC | Age: 73
End: 2023-07-17
Payer: MEDICARE

## 2023-07-17 VITALS
BODY MASS INDEX: 25.46 KG/M2 | OXYGEN SATURATION: 98 % | WEIGHT: 168 LBS | HEART RATE: 87 BPM | DIASTOLIC BLOOD PRESSURE: 60 MMHG | TEMPERATURE: 98 F | SYSTOLIC BLOOD PRESSURE: 110 MMHG | HEIGHT: 68 IN

## 2023-07-17 DIAGNOSIS — N18.31 STAGE 3A CHRONIC KIDNEY DISEASE: ICD-10-CM

## 2023-07-17 DIAGNOSIS — D50.8 OTHER IRON DEFICIENCY ANEMIA: ICD-10-CM

## 2023-07-17 DIAGNOSIS — Z09 HOSPITAL DISCHARGE FOLLOW-UP: Primary | ICD-10-CM

## 2023-07-17 LAB
ANION GAP SERPL CALC-SCNC: 9 MEQ/L (ref 2–13)
BASOPHILS # BLD AUTO: 0.07 X10(3)/MCL (ref 0.01–0.08)
BASOPHILS NFR BLD AUTO: 0.9 % (ref 0.1–1.2)
BUN SERPL-MCNC: 13 MG/DL (ref 7–20)
CALCIUM SERPL-MCNC: 8.5 MG/DL (ref 8.4–10.2)
CHLORIDE SERPL-SCNC: 103 MMOL/L (ref 98–110)
CO2 SERPL-SCNC: 23 MMOL/L (ref 21–32)
CREAT SERPL-MCNC: 2.15 MG/DL (ref 0.66–1.25)
CREAT/UREA NIT SERPL: 6 (ref 12–20)
EOSINOPHIL # BLD AUTO: 0.45 X10(3)/MCL (ref 0.04–0.36)
EOSINOPHIL NFR BLD AUTO: 5.6 % (ref 0.7–7)
ERYTHROCYTE [DISTWIDTH] IN BLOOD BY AUTOMATED COUNT: 18.9 % (ref 11–14.5)
GFR SERPLBLD CREATININE-BSD FMLA CKD-EPI: 24 MLS/MIN/1.73/M2
GLUCOSE SERPL-MCNC: 102 MG/DL (ref 70–115)
HCT VFR BLD AUTO: 30.6 % (ref 36–48)
HGB BLD-MCNC: 9.7 G/DL (ref 11.8–16)
IMM GRANULOCYTES # BLD AUTO: 0.04 X10(3)/MCL (ref 0–0.03)
IMM GRANULOCYTES NFR BLD AUTO: 0.5 % (ref 0–0.5)
LYMPHOCYTES # BLD AUTO: 1.79 X10(3)/MCL (ref 1.16–3.74)
LYMPHOCYTES NFR BLD AUTO: 22.1 % (ref 20–55)
MCH RBC QN AUTO: 25.5 PG (ref 27–34)
MCHC RBC AUTO-ENTMCNC: 31.7 G/DL (ref 31–37)
MCV RBC AUTO: 80.3 FL (ref 79–99)
MONOCYTES # BLD AUTO: 0.74 X10(3)/MCL (ref 0.24–0.36)
MONOCYTES NFR BLD AUTO: 9.1 % (ref 4.7–12.5)
NEUTROPHILS # BLD AUTO: 5 X10(3)/MCL (ref 1.56–6.13)
NEUTROPHILS NFR BLD AUTO: 61.8 % (ref 37–73)
NRBC BLD AUTO-RTO: 0 %
PLATELET # BLD AUTO: 354 X10(3)/MCL (ref 140–371)
PMV BLD AUTO: 9.9 FL (ref 9.4–12.4)
POTASSIUM SERPL-SCNC: 4.5 MMOL/L (ref 3.5–5.1)
RBC # BLD AUTO: 3.81 X10(6)/MCL (ref 4–5.1)
SODIUM SERPL-SCNC: 135 MMOL/L (ref 135–145)
WBC # SPEC AUTO: 8.09 X10(3)/MCL (ref 4–11.5)

## 2023-07-17 PROCEDURE — 99214 PR OFFICE/OUTPT VISIT, EST, LEVL IV, 30-39 MIN: ICD-10-PCS | Mod: ,,, | Performed by: NURSE PRACTITIONER

## 2023-07-17 PROCEDURE — 85025 COMPLETE CBC W/AUTO DIFF WBC: CPT | Performed by: NURSE PRACTITIONER

## 2023-07-17 PROCEDURE — 80048 BASIC METABOLIC PNL TOTAL CA: CPT | Performed by: NURSE PRACTITIONER

## 2023-07-17 PROCEDURE — 99214 OFFICE O/P EST MOD 30 MIN: CPT | Mod: ,,, | Performed by: NURSE PRACTITIONER

## 2023-07-17 NOTE — PROGRESS NOTES
Patient ID: Rosalie Campbell  : 1950    Chief Complaint: Hospital Follow Up    Allergies: Patient is allergic to codeine.     History of Present Illness:  The patient is a 72 y.o. White female who presents to clinic for follow up on Hospital Follow Up     Recently hospitalized for blood transfusion. Hx chronic LEYLA. She also reported occasional melena; EGD was done which did not show any evidence os bleeding. She was discharged and sent home to [facility] for colonoscopy and began having profound rectal bleeding; thus she was readmitted.  She is on Plavix as well as aspirin.  Ultimately the colonoscopy was performed while she was hospitalized.  Other than colitis everything else was noted to be within limits.  She was started antibiotics by the surgeon and d/c'd home on oral abx (Cipro and Flagyl-completed today). She is feeling well. She has continued taking oral iron supplement. No more loose stool.     Social History:  reports that she has been smoking cigarettes. She has been smoking an average of .5 packs per day. She has been exposed to tobacco smoke. She has never used smokeless tobacco. She reports that she does not drink alcohol and does not use drugs.    Past Medical History:  has a past medical history of Anxiety, Chronic renal disease stage 4, Colitis, Colon cancer screening declined, Coronary artery disease, Depression, Hypertension, Irregular heart rhythm, Medication management, Obesity, unspecified, Osteoarthritis of knee, Pain, joint, shoulder region, right, Refused influenza vaccine, Refused pneumococcal vaccination, and Smoker.    Current Medications:  Current Outpatient Medications   Medication Instructions    amLODIPine (NORVASC) 5 mg, Oral, Daily    atorvastatin (LIPITOR) 40 mg, Oral, Daily    busPIRone (BUSPAR) 15 mg, Oral, 3 times daily    cloNIDine (CATAPRES) 0.2 mg, Oral, 2 times daily    clopidogreL (PLAVIX) 75 mg, Oral, Daily    dapagliflozin propanediol (FARXIGA) 10 mg, Oral, Daily     "EScitalopram oxalate (LEXAPRO) 20 mg, Oral, Daily    famotidine (PEPCID) 20 mg, Oral, Daily    ferrous sulfate 324 mg, Oral, Every other day    hydrALAZINE (APRESOLINE) 50 mg, Oral, Every 8 hours    losartan (COZAAR) 50 mg, Oral, Daily    metoprolol succinate (TOPROL-XL) 25 mg, Oral, Daily    rOPINIRole (REQUIP) 0.25 mg, Oral, Nightly       Review of Systems   See HPI    Visit Vitals  /60 (BP Location: Left arm, Patient Position: Sitting)   Pulse 87   Temp 98.2 °F (36.8 °C) (Temporal)   Ht 5' 8" (1.727 m)   Wt 76.2 kg (167 lb 15.9 oz)   SpO2 98%   BMI 25.54 kg/m²       Physical Exam  Vitals reviewed.   Constitutional:       Appearance: Normal appearance.   Cardiovascular:      Heart sounds: Normal heart sounds.   Pulmonary:      Breath sounds: Normal breath sounds.   Skin:     General: Skin is warm and dry.   Neurological:      Mental Status: She is oriented to person, place, and time.          Labs Reviewed:  Chemistry:  Lab Results   Component Value Date     (L) 07/07/2023    K 4.2 07/07/2023    CHLORIDE 107 07/07/2023    BUN 8.0 07/07/2023    CREATININE 1.60 (H) 07/07/2023    EGFRNORACEVR 34 07/07/2023    GLUCOSE 107 07/07/2023    CALCIUM 8.4 07/07/2023    ALKPHOS 90 07/07/2023    LABPROT 5.4 (L) 07/07/2023    ALBUMIN 2.9 (L) 07/07/2023    AST 14 07/07/2023    ALT 11 07/07/2023    MG 1.60 (L) 06/27/2023    TSH 2.37 10/01/2021        Hematology:  Lab Results   Component Value Date    WBC 4.96 07/07/2023    RBC 3.46 (L) 07/07/2023    HGB 8.9 (L) 07/07/2023    HCT 27.8 (L) 07/07/2023    MCV 80.3 07/07/2023    MCH 25.7 (L) 07/07/2023    MCHC 32.0 07/07/2023    RDW 18.2 (H) 07/07/2023     07/07/2023    MPV 9.9 07/07/2023         Assessment & Plan:  1. Hospital discharge follow-up  Will redraw labs today to check on anemia and potassium level.  Was hypokalemic on 2nd hospitalization, likely secondary to severity of diarrhea at the time.  Has been taking p.o. potassium since discharge-advised to stop " supplement now; will notify of results.    2. Other iron deficiency anemia  Continue iron supplementation every other day.  Advised to take stool softener every other day as well to avoid constipation.    -     CBC Auto Differential; Future; Expected date: 07/17/2023    3. Stage 3a chronic kidney disease    -     Basic Metabolic Panel; Future; Expected date: 07/17/2023           Future Appointments   Date Time Provider Department Center   11/21/2023  8:10 AM LAB, [facility] LABORATORY DRAW STATION [facility]   11/28/2023  8:30 AM [facility]       Follow up if symptoms worsen or fail to improve. Call sooner if needed.    JASWINDER Mosqueda

## 2023-07-24 ENCOUNTER — APPOINTMENT (OUTPATIENT)
Dept: LAB | Facility: HOSPITAL | Age: 73
End: 2023-07-24
Attending: NURSE PRACTITIONER
Payer: MEDICARE

## 2023-07-24 PROCEDURE — 80048 BASIC METABOLIC PNL TOTAL CA: CPT | Performed by: NURSE PRACTITIONER

## 2023-07-28 ENCOUNTER — TELEPHONE (OUTPATIENT)
Dept: FAMILY MEDICINE | Facility: CLINIC | Age: 73
End: 2023-07-28
Payer: MEDICARE

## 2023-07-28 NOTE — TELEPHONE ENCOUNTER
----- Message from Jany Taveras, CLAUDIAP-C sent at 7/27/2023  5:39 PM CDT -----  Your sodium and potassium at within normal levels right now. No need to keep taking potassium supplement.

## 2023-08-29 ENCOUNTER — OFFICE VISIT (OUTPATIENT)
Dept: FAMILY MEDICINE | Facility: CLINIC | Age: 73
End: 2023-08-29
Payer: MEDICARE

## 2023-08-29 VITALS
DIASTOLIC BLOOD PRESSURE: 64 MMHG | BODY MASS INDEX: 23.64 KG/M2 | TEMPERATURE: 98 F | WEIGHT: 156 LBS | HEART RATE: 77 BPM | HEIGHT: 68 IN | SYSTOLIC BLOOD PRESSURE: 100 MMHG | OXYGEN SATURATION: 98 %

## 2023-08-29 DIAGNOSIS — R19.7 DIARRHEA, UNSPECIFIED TYPE: Primary | ICD-10-CM

## 2023-08-29 PROCEDURE — 99213 OFFICE O/P EST LOW 20 MIN: CPT | Mod: ,,, | Performed by: NURSE PRACTITIONER

## 2023-08-29 PROCEDURE — 99213 PR OFFICE/OUTPT VISIT, EST, LEVL III, 20-29 MIN: ICD-10-PCS | Mod: ,,, | Performed by: NURSE PRACTITIONER

## 2023-08-29 RX ORDER — DICYCLOMINE HYDROCHLORIDE 20 MG/1
20 TABLET ORAL EVERY 6 HOURS
Qty: 120 TABLET | Refills: 0 | Status: SHIPPED | OUTPATIENT
Start: 2023-08-29 | End: 2023-09-28

## 2023-08-29 NOTE — PROGRESS NOTES
Patient ID: Rosalie Campbell  : 1950    Chief Complaint: Colitis    Allergies: Patient is allergic to codeine.     History of Present Illness:  The patient is a 72 y.o. White female who presents to clinic for follow up on Colitis     Was recently hospitalized; was prepping for Colonoscopy and developed a bleed. Her colonoscopy was done during hospitalization and was told that she has colitis. She had two rounds of abx. Has been chronic diarrhea over the last month-since discharge. She continues to have multiple watery stools daily without blood. No black stool. Has been taking OTC immodium but still waking her up at night to use the bathroom. Denies fever, n/v, abdominal pain.     Social History:  reports that she has been smoking cigarettes. She has been exposed to tobacco smoke. She has never used smokeless tobacco. She reports that she does not drink alcohol and does not use drugs.    Past Medical History:  has a past medical history of Anxiety, Chronic renal disease stage 4, Colitis, Colon cancer screening declined, Coronary artery disease, Depression, Hypertension, Irregular heart rhythm, Medication management, Obesity, unspecified, Osteoarthritis of knee, Pain, joint, shoulder region, right, Refused influenza vaccine, Refused pneumococcal vaccination, and Smoker.    Current Medications:  Current Outpatient Medications   Medication Instructions    amLODIPine (NORVASC) 5 mg, Oral, Daily    atorvastatin (LIPITOR) 40 mg, Oral, Daily    busPIRone (BUSPAR) 15 mg, Oral, 3 times daily    cloNIDine (CATAPRES) 0.2 mg, Oral, 2 times daily    clopidogreL (PLAVIX) 75 mg, Oral, Daily    dapagliflozin propanediol (FARXIGA) 10 mg, Oral, Daily    dicyclomine (BENTYL) 20 mg, Oral, Every 6 hours    EScitalopram oxalate (LEXAPRO) 20 mg, Oral, Daily    famotidine (PEPCID) 20 mg, Oral, Daily    ferrous sulfate 324 mg, Oral, Every other day    hydrALAZINE (APRESOLINE) 50 mg, Oral, Every 8 hours    losartan (COZAAR) 50 mg, Oral,  "Daily    metoprolol succinate (TOPROL-XL) 25 mg, Oral, Daily    rOPINIRole (REQUIP) 0.25 mg, Oral, Nightly       Review of Systems   See HPI    Visit Vitals  /64   Pulse 77   Temp 98.1 °F (36.7 °C) (Temporal)   Ht 5' 8" (1.727 m)   Wt 70.8 kg (156 lb)   SpO2 98%   BMI 23.72 kg/m²       Physical Exam  Vitals reviewed.   Constitutional:       Appearance: Normal appearance.   Cardiovascular:      Heart sounds: Normal heart sounds.   Pulmonary:      Breath sounds: Normal breath sounds.   Abdominal:      General: Bowel sounds are normal. There is no distension.      Palpations: Abdomen is soft.      Tenderness: There is no abdominal tenderness. There is no guarding.   Skin:     General: Skin is warm and dry.            Labs Reviewed:  Chemistry:  Lab Results   Component Value Date     07/24/2023    K 4.9 07/24/2023    CHLORIDE 107 07/24/2023    BUN 17.0 07/24/2023    CREATININE 1.99 (H) 07/24/2023    EGFRNORACEVR 26 07/24/2023    GLUCOSE 98 07/24/2023    CALCIUM 9.2 07/24/2023    ALKPHOS 90 07/07/2023    LABPROT 5.4 (L) 07/07/2023    ALBUMIN 2.9 (L) 07/07/2023    AST 14 07/07/2023    ALT 11 07/07/2023    MG 1.60 (L) 06/27/2023    TSH 2.37 10/01/2021        Hematology:  Lab Results   Component Value Date    WBC 8.09 07/17/2023    RBC 3.81 (L) 07/17/2023    HGB 9.7 (L) 07/17/2023    HCT 30.6 (L) 07/17/2023    MCV 80.3 07/17/2023    MCH 25.5 (L) 07/17/2023    MCHC 31.7 07/17/2023    RDW 18.9 (H) 07/17/2023     07/17/2023    MPV 9.9 07/17/2023         Assessment & Plan:  1. Diarrhea, unspecified type  -     Clostridium Diff Toxin, A & B, EIA  -     Gastrointestinal Pathogens Panel, PCR; Future; Expected date: 08/29/2023  -     dicyclomine (BENTYL) 20 mg tablet; Take 1 tablet (20 mg total) by mouth every 6 (six) hours.  Dispense: 120 tablet; Refill: 0         Future Appointments   Date Time Provider Department Center   11/21/2023  8:10 AM LAB, [facility] LABORATORY DRAW STATION [facility] SHANA [place] "   11/28/2023  8:30 AM Jany Taveras, [facility] [place]       No follow-ups on file. Call sooner if needed.    JASWINDER Mosqueda

## 2023-08-30 LAB
ADV 40+41 DNA STL QL NAA+NON-PROBE: NOT DETECTED
ASTRO TYP 1-8 RNA STL QL NAA+NON-PROBE: NOT DETECTED
C CAYETANENSIS DNA STL QL NAA+NON-PROBE: NOT DETECTED
C COLI+JEJ+UPSA DNA STL QL NAA+NON-PROBE: NOT DETECTED
C DIFF TOX GENS STL QL NAA+PROBE: DETECTED
CONSISTENCY STL: NORMAL
CRYPTOSP DNA STL QL NAA+NON-PROBE: NOT DETECTED
E COLI O157 DNA STL QL NAA+NON-PROBE: NORMAL
E HISTOLYT DNA STL QL NAA+NON-PROBE: NOT DETECTED
EAEC PAA PLAS AGGR+AATA ST NAA+NON-PRB: NOT DETECTED
EC STX1+STX2 GENES STL QL NAA+NON-PROBE: NOT DETECTED
EPEC EAE GENE STL QL NAA+NON-PROBE: NOT DETECTED
ETEC LTA+ST1A+ST1B TOX ST NAA+NON-PROBE: NOT DETECTED
G LAMBLIA DNA STL QL NAA+NON-PROBE: NOT DETECTED
NOROVIRUS GI+II RNA STL QL NAA+NON-PROBE: NOT DETECTED
P SHIGELLOIDES DNA STL QL NAA+NON-PROBE: NOT DETECTED
RVA RNA STL QL NAA+NON-PROBE: NOT DETECTED
S ENT+BONG DNA STL QL NAA+NON-PROBE: NOT DETECTED
SAPO I+II+IV+V RNA STL QL NAA+NON-PROBE: NOT DETECTED
SHIGELLA SP+EIEC IPAH ST NAA+NON-PROBE: NOT DETECTED
V CHOL+PARA+VUL DNA STL QL NAA+NON-PROBE: NOT DETECTED
V CHOLERAE DNA STL QL NAA+NON-PROBE: NOT DETECTED
Y ENTEROCOL DNA STL QL NAA+NON-PROBE: NOT DETECTED

## 2023-08-30 PROCEDURE — 87507 IADNA-DNA/RNA PROBE TQ 12-25: CPT | Performed by: NURSE PRACTITIONER

## 2023-08-30 PROCEDURE — 87493 C DIFF AMPLIFIED PROBE: CPT | Mod: 59 | Performed by: NURSE PRACTITIONER

## 2023-09-11 ENCOUNTER — HOSPITAL ENCOUNTER (INPATIENT)
Facility: HOSPITAL | Age: 73
LOS: 11 days | Discharge: NURSING, REHAB OR LONG-TERM CARE | DRG: 356 | End: 2023-09-22
Attending: FAMILY MEDICINE | Admitting: FAMILY MEDICINE
Payer: MEDICARE

## 2023-09-11 DIAGNOSIS — R55 SYNCOPE: ICD-10-CM

## 2023-09-11 DIAGNOSIS — K80.10 CALCULUS OF GALLBLADDER WITH CHRONIC CHOLECYSTITIS WITHOUT OBSTRUCTION: ICD-10-CM

## 2023-09-11 DIAGNOSIS — A04.72 CLOSTRIDIUM DIFFICILE COLITIS: ICD-10-CM

## 2023-09-11 DIAGNOSIS — A04.72 C. DIFFICILE COLITIS: Primary | ICD-10-CM

## 2023-09-11 DIAGNOSIS — N17.9 ACUTE RENAL FAILURE, UNSPECIFIED ACUTE RENAL FAILURE TYPE: ICD-10-CM

## 2023-09-11 DIAGNOSIS — K81.9 CHOLECYSTITIS: ICD-10-CM

## 2023-09-11 LAB
ABS NEUT CALC (OHS): 26.73 X10(3)/MCL (ref 2.1–9.2)
ALBUMIN SERPL-MCNC: 2.8 G/DL (ref 3.4–5)
ALBUMIN/GLOB SERPL: 1 RATIO
ALP SERPL-CCNC: 113 UNIT/L (ref 50–144)
ALT SERPL-CCNC: 18 UNIT/L (ref 1–45)
ANION GAP SERPL CALC-SCNC: 11 MEQ/L (ref 2–13)
ANISOCYTOSIS BLD QL SMEAR: SLIGHT
AST SERPL-CCNC: 21 UNIT/L (ref 14–36)
BILIRUB SERPL-MCNC: 0.4 MG/DL (ref 0–1)
BUN SERPL-MCNC: 44 MG/DL (ref 7–20)
C DIFF TOX GENS STL QL NAA+PROBE: DETECTED
CALCIUM SERPL-MCNC: 7.9 MG/DL (ref 8.4–10.2)
CHLORIDE SERPL-SCNC: 106 MMOL/L (ref 98–110)
CK MB SERPL-MCNC: 0.31 NG/ML (ref 0–3.38)
CK SERPL-CCNC: <20 U/L (ref 30–135)
CO2 SERPL-SCNC: 16 MMOL/L (ref 21–32)
CREAT SERPL-MCNC: 3.23 MG/DL (ref 0.66–1.25)
CREAT/UREA NIT SERPL: 14 (ref 12–20)
ERYTHROCYTE [DISTWIDTH] IN BLOOD BY AUTOMATED COUNT: 19.8 % (ref 11–14.5)
GFR SERPLBLD CREATININE-BSD FMLA CKD-EPI: 15 MLS/MIN/1.73/M2
GLOBULIN SER-MCNC: 2.8 GM/DL (ref 2–3.9)
GLUCOSE SERPL-MCNC: 109 MG/DL (ref 70–115)
HCT VFR BLD AUTO: 26.3 % (ref 36–48)
HGB BLD-MCNC: 8.5 G/DL (ref 11.8–16)
HYPOCHROMIA BLD QL SMEAR: SLIGHT
LACTATE SERPL-SCNC: 1.2 MMOL/L (ref 0.4–2)
LYMPHOCYTES NFR BLD MANUAL: 1.5 X10(3)/MCL
LYMPHOCYTES NFR BLD MANUAL: 5 % (ref 20–55)
MCH RBC QN AUTO: 27.8 PG (ref 27–34)
MCHC RBC AUTO-ENTMCNC: 32.3 G/DL (ref 31–37)
MCV RBC AUTO: 85.9 FL (ref 79–99)
MICROCYTES BLD QL SMEAR: ABNORMAL
MONOCYTES NFR BLD MANUAL: 1.8 X10(3)/MCL (ref 0.1–1.3)
MONOCYTES NFR BLD MANUAL: 6 % (ref 0–10)
NEUTROPHILS NFR BLD MANUAL: 89 % (ref 37–73)
NRBC BLD AUTO-RTO: 0 %
OVALOCYTES (OLG): SLIGHT
PLATELET # BLD AUTO: 329 X10(3)/MCL (ref 140–371)
PLATELET # BLD EST: ADEQUATE 10*3/UL
PMV BLD AUTO: 9.8 FL (ref 9.4–12.4)
POIKILOCYTOSIS BLD QL SMEAR: SLIGHT
POTASSIUM SERPL-SCNC: 3.7 MMOL/L (ref 3.5–5.1)
PROT SERPL-MCNC: 5.6 GM/DL (ref 6.3–8.2)
RBC # BLD AUTO: 3.06 X10(6)/MCL (ref 4–5.1)
SODIUM SERPL-SCNC: 133 MMOL/L (ref 135–145)
TROPONIN I SERPL-MCNC: <0.012 NG/ML (ref 0–0.03)
WBC # SPEC AUTO: 30.03 X10(3)/MCL (ref 4–11.5)

## 2023-09-11 PROCEDURE — 96360 HYDRATION IV INFUSION INIT: CPT

## 2023-09-11 PROCEDURE — 63600175 PHARM REV CODE 636 W HCPCS: Performed by: FAMILY MEDICINE

## 2023-09-11 PROCEDURE — 93010 EKG 12-LEAD: ICD-10-PCS | Mod: ,,, | Performed by: INTERNAL MEDICINE

## 2023-09-11 PROCEDURE — 82550 ASSAY OF CK (CPK): CPT | Performed by: FAMILY MEDICINE

## 2023-09-11 PROCEDURE — 84484 ASSAY OF TROPONIN QUANT: CPT | Performed by: FAMILY MEDICINE

## 2023-09-11 PROCEDURE — 11000001 HC ACUTE MED/SURG PRIVATE ROOM

## 2023-09-11 PROCEDURE — 93010 ELECTROCARDIOGRAM REPORT: CPT | Mod: ,,, | Performed by: INTERNAL MEDICINE

## 2023-09-11 PROCEDURE — 99285 EMERGENCY DEPT VISIT HI MDM: CPT | Mod: 25

## 2023-09-11 PROCEDURE — 93005 ELECTROCARDIOGRAM TRACING: CPT

## 2023-09-11 PROCEDURE — 36415 COLL VENOUS BLD VENIPUNCTURE: CPT | Performed by: FAMILY MEDICINE

## 2023-09-11 PROCEDURE — 85027 COMPLETE CBC AUTOMATED: CPT | Performed by: FAMILY MEDICINE

## 2023-09-11 PROCEDURE — 83605 ASSAY OF LACTIC ACID: CPT | Performed by: FAMILY MEDICINE

## 2023-09-11 PROCEDURE — 82553 CREATINE MB FRACTION: CPT | Performed by: FAMILY MEDICINE

## 2023-09-11 PROCEDURE — 25000003 PHARM REV CODE 250: Performed by: FAMILY MEDICINE

## 2023-09-11 PROCEDURE — 21400001 HC TELEMETRY ROOM

## 2023-09-11 PROCEDURE — 87493 C DIFF AMPLIFIED PROBE: CPT | Performed by: FAMILY MEDICINE

## 2023-09-11 PROCEDURE — 80053 COMPREHEN METABOLIC PANEL: CPT | Performed by: FAMILY MEDICINE

## 2023-09-11 PROCEDURE — 94761 N-INVAS EAR/PLS OXIMETRY MLT: CPT

## 2023-09-11 PROCEDURE — 87040 BLOOD CULTURE FOR BACTERIA: CPT | Performed by: FAMILY MEDICINE

## 2023-09-11 RX ORDER — SODIUM CHLORIDE 9 MG/ML
INJECTION, SOLUTION INTRAVENOUS CONTINUOUS
Status: DISCONTINUED | OUTPATIENT
Start: 2023-09-11 | End: 2023-09-19

## 2023-09-11 RX ORDER — TALC
3 POWDER (GRAM) TOPICAL NIGHTLY PRN
Status: DISCONTINUED | OUTPATIENT
Start: 2023-09-11 | End: 2023-09-22 | Stop reason: HOSPADM

## 2023-09-11 RX ORDER — ONDANSETRON 2 MG/ML
4 INJECTION INTRAMUSCULAR; INTRAVENOUS EVERY 6 HOURS PRN
Status: DISCONTINUED | OUTPATIENT
Start: 2023-09-11 | End: 2023-09-22 | Stop reason: HOSPADM

## 2023-09-11 RX ORDER — VANCOMYCIN HYDROCHLORIDE 125 MG/1
125 CAPSULE ORAL 4 TIMES DAILY
Status: DISCONTINUED | OUTPATIENT
Start: 2023-09-11 | End: 2023-09-22 | Stop reason: HOSPADM

## 2023-09-11 RX ORDER — ACETAMINOPHEN 325 MG/1
650 TABLET ORAL EVERY 8 HOURS PRN
Status: DISCONTINUED | OUTPATIENT
Start: 2023-09-11 | End: 2023-09-22 | Stop reason: HOSPADM

## 2023-09-11 RX ADMIN — SODIUM CHLORIDE: 9 INJECTION, SOLUTION INTRAVENOUS at 07:09

## 2023-09-11 RX ADMIN — SODIUM CHLORIDE 1000 ML: 9 INJECTION, SOLUTION INTRAVENOUS at 02:09

## 2023-09-11 RX ADMIN — MELATONIN TAB 3 MG 3 MG: 3 TAB at 09:09

## 2023-09-11 RX ADMIN — PIPERACILLIN AND TAZOBACTAM 4.5 G: 4; .5 INJECTION, POWDER, LYOPHILIZED, FOR SOLUTION INTRAVENOUS; PARENTERAL at 05:09

## 2023-09-11 RX ADMIN — VANCOMYCIN HYDROCHLORIDE 125 MG: 125 CAPSULE ORAL at 09:09

## 2023-09-11 NOTE — NURSING
Pt arrived via stretcher to room 307, pt aao x 4 accompanied by ER tech and brother. Belongings given to brother, No s/s of distress noted.

## 2023-09-11 NOTE — ED PROVIDER NOTES
"Encounter Date: 9/11/2023       History     Chief Complaint   Patient presents with    Loss of Consciousness    Diarrhea     C/o diarrhea x 1 month syncopal on Saturday, and this am, dx w/ c diff last month, pt reports placed self on floor when felt like she was gonna pass out, denies hitting head     Patient presented to the emergency room after a fall while going to the bathroom.  She is been stooling 8 to 9 times a day for the past month at least.  So after trying to get off the toilet today, she felt so weak she fell to the ground.  She did not hit her head, but lowered herself to the ground and then "passed out".  Denies any chest pains or shortness a breath or abdominal cramping, just loose watery stools.  She notes that she has no appetite, has not been able to eat more than a few bites of food in a day.  She was diagnosed with colitis by an colonoscope in early July.  Patient noted recurrent symptoms since beginning of August.      The history is provided by the patient, the EMS personnel and a relative.     Review of patient's allergies indicates:   Allergen Reactions    Codeine Other (See Comments)     Syncope  Other reaction(s): Syncope     Past Medical History:   Diagnosis Date    Anxiety     Chronic renal disease stage 4     Colitis 7/6/2023    Colon cancer screening declined     Coronary artery disease     Depression     Hypertension     Irregular heart rhythm     Medication management     Obesity, unspecified     Osteoarthritis of knee     Pain, joint, shoulder region, right     Refused influenza vaccine     Refused pneumococcal vaccination     Smoker      Past Surgical History:   Procedure Laterality Date    ANGIOPLASTY  11/20/2019    COLONOSCOPY  07/05/2023    ESOPHAGOGASTRODUODENOSCOPY  10/29/2018    STENT, AORTA       History reviewed. No pertinent family history.  Social History     Tobacco Use    Smoking status: Every Day     Current packs/day: 0.50     Types: Cigarettes     Passive exposure: " Current    Smokeless tobacco: Never   Substance Use Topics    Alcohol use: Never    Drug use: Never     Review of Systems   Constitutional:  Positive for appetite change and fatigue. Negative for fever.   HENT:  Negative for sore throat.    Respiratory:  Negative for shortness of breath.    Cardiovascular:  Negative for chest pain.   Gastrointestinal:  Positive for diarrhea. Negative for nausea.   Genitourinary:  Negative for dysuria.   Musculoskeletal:  Negative for back pain.   Skin:  Negative for rash.   Neurological:  Negative for weakness.   Hematological:  Does not bruise/bleed easily.   All other systems reviewed and are negative.      Physical Exam     Initial Vitals [09/11/23 1154]   BP Pulse Resp Temp SpO2   (!) 94/48 68 20 98.1 °F (36.7 °C) 100 %      MAP       --         Physical Exam    Nursing note and vitals reviewed.  Constitutional: She appears well-developed and well-nourished.   HENT:   Head: Normocephalic and atraumatic.   Eyes: EOM are normal. Pupils are equal, round, and reactive to light.   Pale conjuctiva   Neck: Neck supple.   Normal range of motion.  Cardiovascular:  Normal rate, regular rhythm and normal heart sounds.           Pulmonary/Chest: Breath sounds normal.   Abdominal: Abdomen is soft. Bowel sounds are normal. There is no abdominal tenderness.   Musculoskeletal:         General: No edema. Normal range of motion.      Cervical back: Normal range of motion and neck supple.     Neurological: She is alert and oriented to person, place, and time.   Skin: Skin is warm and dry. Capillary refill takes less than 2 seconds. There is pallor.   Psychiatric: She has a normal mood and affect.         ED Course   Procedures  Labs Reviewed   C DIFF TOXIN BY PCR - Abnormal; Notable for the following components:       Result Value    Clostridium difficile toxin PCR Detected (*)     All other components within normal limits    Narrative:     The Portrait Toxigenic C. difficile assay has not been    evaluated in patients that are <2 years of age.      Contact isolation indicated   COMPREHENSIVE METABOLIC PANEL - Abnormal; Notable for the following components:    Sodium Level 133 (*)     Carbon Dioxide 16 (*)     Blood Urea Nitrogen 44.0 (*)     Creatinine 3.23 (*)     Calcium Level Total 7.9 (*)     Protein Total 5.6 (*)     Albumin Level 2.8 (*)     All other components within normal limits   CK - Abnormal; Notable for the following components:    Creatine Kinase <20 (*)     All other components within normal limits   CBC WITH DIFFERENTIAL - Abnormal; Notable for the following components:    WBC 30.03 (*)     RBC 3.06 (*)     Hgb 8.5 (*)     Hct 26.3 (*)     RDW 19.8 (*)     All other components within normal limits   MANUAL DIFFERENTIAL - Abnormal; Notable for the following components:    Neutrophils % 89 (*)     Lymphs % 5 (*)     Neutrophils Abs Calc 26.7267 (*)     Monocytes Abs 1.8018 (*)     Anisocytosis Slight (*)     Poikilocytosis Slight (*)     Microcytosis 1+ (*)     Hypochromasia Slight (*)     Ovalocytes Slight (*)     All other components within normal limits   TROPONIN I - Normal   CK-MB - Normal   LACTIC ACID, PLASMA - Normal   BLOOD CULTURE OLG   BLOOD CULTURE OLG   CBC W/ AUTO DIFFERENTIAL    Narrative:     The following orders were created for panel order CBC Auto Differential.  Procedure                               Abnormality         Status                     ---------                               -----------         ------                     CBC with Differential[[id number]]       Abnormal            Final result               Manual Differential[[id number]]         Abnormal            Final result                 Please view results for these tests on the individual orders.          Imaging Results              CT Abdomen Pelvis  Without Contrast (Final result)  Result time 09/11/23 14:49:53      Final result by Sienna Domínguez III, MD (09/11/23 14:49:53)                    Impression:      1. Cholelithiasis with subtle changes which are suspicious for cholecystitis.  See above comments.  2.  Mild mucosal thickening with adjacent inflammatory stranding is noted involving the cecum and ascending colon.  The appendix is not clearly delineated.  I suspect the changes represent focal colitis versus much less likely atypical appearing changes related to appendicitis.  Clinical correlation is recommended.  3. Chronic changes are present as described above.  See above comments.      Electronically signed by: Sienna Domínguez  Date:    09/11/2023  Time:    14:49               Narrative:    EXAMINATION:  CT ABDOMEN PELVIS WITHOUT CONTRAST    CLINICAL HISTORY AND TECHNIQUE:  Marci Travis, RT on 9/11/2023  2:36 PMPT STATUS: ER    PROCEDURE: CT ABD/PEL W/O    CLINICAL HX :  DIARRHEA X 1 MONTH, RECENT SYNCOPE    PMH: HTN, COLITIS, SMOKER, AORTIC STENT, CHRONIC RENAL DISEASE    IV CONTRAST: NONE  ORAL CONTRAST: NONE  RECTAL CONTRAST: NONE    AXIAL IMAGES @ 5MM INTERVALS WITH MULTIPLANAR RECONSTRUCTION  TOTAL IMAGE NUMBER: 152  NUMBER OF CT SCANS IN PAST 12 MONTHS: 1  CTDIvol(mGy): HEAD:     BODY: 6.00  DLP(mGycm): HEAD:     BODY: 331.10  TECH: AKG/DB  PT TRANSPORTED W/O INCIDENT    This patient has had 1 CT and nuclear medicine scans performed within the last 12 months.    The following DOSE REDUCTION TECHNIQUES are used for all CT scans at [facility]:    1. Automated exposure control.  2. Adjustment of the mA and/or kv according to patient size.  3. Use of iterative reconstruction technique.    COMPARISON:  None    FINDINGS:  Liver: No clinically significant abnormalities are noted.    Gallbladder/biliary system: The gallbladder is fairly well distended with what appear to be tiny, radiopaque stones layering dependently and mild haziness of the gallbladder wall as can be seen with low-grade inflammatory changes of cholecystitis.    Spleen: No clinically significant abnormalities  are noted.    Adrenal glands: No clinically significant abnormalities are noted.    Pancreas: No clinically significant abnormalities are noted.    Kidneys/ureters: Vascular calcifications are noted within both renal sanat.  No worrisome parenchymal abnormalities are noted.  Phleboliths within the pelvis make evaluation of the distal ureters much more difficult although I see no obvious ureteral stones or changes to suggest ureteral obstruction.    Urinary bladder: The urinary bladder is well distended with no worrisome abnormalities noted.    Uterus and ovaries: No clinically significant abnormalities are noted.    GI tract: Unopacified loops of large and small bowel as well as the gastric lumen are difficult to evaluate.  There appears to be at least mild mucosal thickening within the cecum and appendix with adjacent inflammatory as can be seen with focal colitis.  The appendix is not clearly delineated.    Vascular structures: Extensive atherosclerotic plaquing is noted throughout the abdominal aorta is primary branches.    Musculoskeletal structures: There is moderate demineralization of the skeletal structures with extensive degenerative changes noted throughout the lumbar spine and to a lesser extent involving both hips.    Miscellaneous: N/A                                       Medications   sodium chloride 0.9% bolus 1,000 mL 1,000 mL (0 mLs Intravenous Stopped 9/11/23 9264)     Medical Decision Making  Patient's blood pressure was initially on the low side, 90s systolic.  Responded very well to a fluid bolus and has been consistently above 110.  And she says she is feeling better after the fluids.    Stool positive for C diff, white blood cell count is 30 K. CT of the abdomen shows areas of focal colitis as well as suggestive of cholelithiasis with cholecystitis.    Discussed findings with Dr. Graves again, agrees that the focal colitis is probably not an appendicitis.  Has asked for a gallbladder ultrasound  in the morning, and continue Zosyn.  He will see the patient in consult.    Discussed with hospitalist, acute renal failure most likely due to dehydration.  No nephrology services needed at this time.  We will hydrate and follow the creatinine inpatient.  Po vanc.          Amount and/or Complexity of Data Reviewed  Labs: ordered.  Radiology: ordered.                               Clinical Impression:   Final diagnoses:  [R55] Syncope  [A04.72] C. difficile colitis (Primary)  [K81.9] Cholecystitis  [N17.9] Acute renal failure, unspecified acute renal failure type        ED Disposition Condition    Admit Stable                George Saleh MD  09/11/23 0061

## 2023-09-11 NOTE — H&P
Tele-Hospitalist History and Physical  Telemedicine Service was provided using HIPPA compliant web platform using SOC for audio/visual equipment.   Patient Location: [place]   Provider Location: [place], [place]  Participants on call: bedside RN, patient  Physician on call: Dr. Mckeon  Patient aware of remote access and use of Telemedicine and agrees to continue with care.        History & Physical  Department of Hospital Medicine      SUBJECTIVE:     CC/Reason for Admission to Hospital:   Chief Complaint   Patient presents with    Loss of Consciousness    Diarrhea     C/o diarrhea x 1 month syncopal on Saturday, and this am, dx w/ c diff last month, pt reports placed self on floor when felt like she was gonna pass out, denies hitting head       History of Present Illness: History obtained from Patient    Pt is a 72 y.o. female with hx of CLD 3/4, HTN, Depression/Anxiety, smoker, CAD presented to the ER with about a month of diarrhea.  Having multiple bouts a day.  She was doing to bathroom today and passed out.  She did not strike her head.  She states she fell on Saturday as well and landed on her buttocks.  She was C. Diff + in the ER.  She says she had some ABX about a month ago around the time she had a colonoscopy.  She denies fevers but has some obvious chills.  Denies CP, SOB, NV.  She denies any blood in stool.      Review of patient's allergies indicates:   Allergen Reactions    Codeine Other (See Comments)     Syncope  Other reaction(s): Syncope        Past Medical History:   Diagnosis Date    Anxiety     Chronic renal disease stage 4     Colitis 7/6/2023    Colon cancer screening declined     Coronary artery disease     Depression     Hypertension     Irregular heart rhythm     Medication management     Obesity, unspecified     Osteoarthritis of knee     Pain, joint, shoulder region, right     Refused influenza vaccine     Refused pneumococcal vaccination     Smoker      Past Surgical History:    Procedure Laterality Date    ANGIOPLASTY  11/20/2019    COLONOSCOPY  07/05/2023    ESOPHAGOGASTRODUODENOSCOPY  10/29/2018    STENT, AORTA       History reviewed. No pertinent family history.  Social History     Tobacco Use    Smoking status: Every Day     Current packs/day: 0.50     Types: Cigarettes     Passive exposure: Current    Smokeless tobacco: Never   Substance Use Topics    Alcohol use: Never    Drug use: Never         No current facility-administered medications on file prior to encounter.     Current Outpatient Medications on File Prior to Encounter   Medication Sig Dispense Refill    amLODIPine (NORVASC) 5 MG tablet Take 1 tablet (5 mg total) by mouth once daily. 30 tablet 11    atorvastatin (LIPITOR) 40 MG tablet Take 1 tablet (40 mg total) by mouth Daily. 30 tablet 11    busPIRone (BUSPAR) 15 MG tablet Take 1 tablet (15 mg total) by mouth 3 (three) times daily. 90 tablet 11    cloNIDine (CATAPRES) 0.2 MG tablet Take 1 tablet (0.2 mg total) by mouth 2 (two) times daily. 60 tablet 11    clopidogreL (PLAVIX) 75 mg tablet Take 1 tablet (75 mg total) by mouth Daily. 30 tablet 11    dicyclomine (BENTYL) 20 mg tablet Take 1 tablet (20 mg total) by mouth every 6 (six) hours. 120 tablet 0    EScitalopram oxalate (LEXAPRO) 20 MG tablet Take 1 tablet (20 mg total) by mouth once daily. 30 tablet 11    famotidine (PEPCID) 20 MG tablet Take 1 tablet (20 mg total) by mouth once daily. 30 tablet 11    ferrous sulfate 324 mg (65 mg iron) TbEC Take 1 tablet (324 mg total) by mouth every other day. 15 tablet 11    losartan (COZAAR) 50 MG tablet Take 50 mg by mouth once daily.      rOPINIRole (REQUIP) 0.25 MG tablet Take 1 tablet (0.25 mg total) by mouth every evening. 30 tablet 11    dapagliflozin (FARXIGA) 10 mg tablet Take 1 tablet (10 mg total) by mouth once daily. 30 tablet 11    hydrALAZINE (APRESOLINE) 50 MG tablet Take 1 tablet (50 mg total) by mouth every 8 (eight) hours. 90 tablet 11    metoprolol succinate  (TOPROL-XL) 25 MG 24 hr tablet Take 1 tablet (25 mg total) by mouth once daily. 30 tablet 11       Review of Systems:  Constitutional: No fever, chills, weight loss  ENT: No nasal congestion or sore throat  Respiratory: No cough or shortness of breath  Cardiovascular: No chest pain or palpitations  Gastrointestinal: see HPI  Genitourinary: No hematuria or dysuria, negative for frequency  Musculoskeletal: No arthralgias or myalgias  Neurological: No seizures or tremors, negative for dizziness and headaches  Psychiatric: No inappropriate thought content. No inappropriate behavior.      OBJECTIVE:     Vital Signs (Most Recent):  Temp: 98.1 °F (36.7 °C) (09/11/23 1154)  Pulse: 69 (09/11/23 1500)  Resp: (!) 21 (09/11/23 1500)  BP: (!) 113/49 (09/11/23 1500)  SpO2: 99 % (09/11/23 1500)       Physical Exam:  General - Resting comfortably in bed. Mild chill   HEENT - PERRLA. Extraocular movements intact. No scleral icterus.   Neck -  The JVP is not elevated.   CV - Regular rate and rhythm, No Murmur, rubs, or gallops.  Resp - Good inspiratory effort. The lungs are clear to auscultation no audible wheeze  GI - Soft. Non-tender to palpation.   Extrem -  No cyanosis, clubbing, edema.  Neuro - CN II through XII are grossly intact. Moves all ext well   PSYC - Alert and oriented times four. Normal affect   SKIN - No obvious rash    Data Review:  CBC:   Lab Results   Component Value Date    WBC 30.03 (H) 09/11/2023    RBC 3.06 (L) 09/11/2023    HGB 8.5 (L) 09/11/2023    HCT 26.3 (L) 09/11/2023     09/11/2023     BMP:   Lab Results   Component Value Date     (L) 09/11/2023    K 3.7 09/11/2023    CO2 16 (L) 09/11/2023    BUN 44.0 (H) 09/11/2023    CREATININE 3.23 (H) 09/11/2023    CALCIUM 7.9 (L) 09/11/2023         Imaging Results              CT Abdomen Pelvis  Without Contrast (Final result)  Result time 09/11/23 14:49:53      Final result by Sienna Domínguez III, MD (09/11/23 14:49:53)                   Impression:       1. Cholelithiasis with subtle changes which are suspicious for cholecystitis.  See above comments.  2.  Mild mucosal thickening with adjacent inflammatory stranding is noted involving the cecum and ascending colon.  The appendix is not clearly delineated.  I suspect the changes represent focal colitis versus much less likely atypical appearing changes related to appendicitis.  Clinical correlation is recommended.  3. Chronic changes are present as described above.  See above comments.      Electronically signed by: Sienna Domínguez  Date:    09/11/2023  Time:    14:49               Narrative:    EXAMINATION:  CT ABDOMEN PELVIS WITHOUT CONTRAST    CLINICAL HISTORY AND TECHNIQUE:  Marci Travis, RT on 9/11/2023  2:36 PMPT STATUS: ER    PROCEDURE: CT ABD/PEL W/O    CLINICAL HX :  DIARRHEA X 1 MONTH, RECENT SYNCOPE    PMH: HTN, COLITIS, SMOKER, AORTIC STENT, CHRONIC RENAL DISEASE    IV CONTRAST: NONE  ORAL CONTRAST: NONE  RECTAL CONTRAST: NONE    AXIAL IMAGES @ 5MM INTERVALS WITH MULTIPLANAR RECONSTRUCTION  TOTAL IMAGE NUMBER: 152  NUMBER OF CT SCANS IN PAST 12 MONTHS: 1  CTDIvol(mGy): HEAD:     BODY: 6.00  DLP(mGycm): HEAD:     BODY: 331.10  TECH: AKG/DB  PT TRANSPORTED W/O INCIDENT    This patient has had 1 CT and nuclear medicine scans performed within the last 12 months.    The following DOSE REDUCTION TECHNIQUES are used for all CT scans at [facility]:    1. Automated exposure control.  2. Adjustment of the mA and/or kv according to patient size.  3. Use of iterative reconstruction technique.    COMPARISON:  None    FINDINGS:  Liver: No clinically significant abnormalities are noted.    Gallbladder/biliary system: The gallbladder is fairly well distended with what appear to be tiny, radiopaque stones layering dependently and mild haziness of the gallbladder wall as can be seen with low-grade inflammatory changes of cholecystitis.    Spleen: No clinically significant abnormalities are  noted.    Adrenal glands: No clinically significant abnormalities are noted.    Pancreas: No clinically significant abnormalities are noted.    Kidneys/ureters: Vascular calcifications are noted within both renal santa.  No worrisome parenchymal abnormalities are noted.  Phleboliths within the pelvis make evaluation of the distal ureters much more difficult although I see no obvious ureteral stones or changes to suggest ureteral obstruction.    Urinary bladder: The urinary bladder is well distended with no worrisome abnormalities noted.    Uterus and ovaries: No clinically significant abnormalities are noted.    GI tract: Unopacified loops of large and small bowel as well as the gastric lumen are difficult to evaluate.  There appears to be at least mild mucosal thickening within the cecum and appendix with adjacent inflammatory as can be seen with focal colitis.  The appendix is not clearly delineated.    Vascular structures: Extensive atherosclerotic plaquing is noted throughout the abdominal aorta is primary branches.    Musculoskeletal structures: There is moderate demineralization of the skeletal structures with extensive degenerative changes noted throughout the lumbar spine and to a lesser extent involving both hips.    Miscellaneous: N/A                                        Acute medical issues and MDM for this admission:       Problem: Acute on chronic renal failure   Differential Dx: NA  Chronic issues affecting care: age  Radiology reports reviewed and/or personal interpretation: NA  Tests considered or ordered: labs  Plan of care: IVFs and daily labs.  Should correct with hydration.  NO AMS, volume overload or critical lab abnormality that would require dialysis.  Numbers appear to be pre-renal and due to volume depletion.     Problem: C difficile diarrhea/colitis   Differential DX: NA  Chronic issues affecting care: age, CKD  Radiology reports reviewed and/or personal interpretation: as above   Tests  considered or ordered: labs  Plan of care: PO Vanc and monitor for resolution of diarrhea     Problem: Syncope   Differential DX: Dehydration vs cardiac etiology   Chronic issues affecting care: CKD  Radiology reports reviewed and/or personal interpretation: as above   Tests considered or ordered: Tele, imaging, labs  Plan of care: Likely due to volume depletion, monitor closely with fall precautions     Problem: Leukocytosis  Differential DX: infectious etiology vs volume contraction   Chronic issues affecting care: CKD  Radiology reports reviewed and/or personal interpretation: CT as above   Tests considered or ordered: labs cultures   Plan of care: Covering with ABX    Problem: Metabolic acidosis   Differential DX: GI loss vs renal   Chronic issues affecting care: age, CKD  Radiology reports reviewed and/or personal interpretation: NA  Tests considered or ordered: labs  Plan of care: should improve with hydration and treatment of diarrhea.      Problem: Chronic anemia   Differential DX: acute vs chronic due to renal failure   Chronic issues affecting care: CKD  Radiology reports reviewed and/or personal interpretation:  Tests considered or ordered: labs  Plan of care: Monitor daily labs, no need for transfusion currently.     Problem: Hyponatremia   Differential DX: Dehydration vs SIADH vs CKD  Chronic issues affecting care: CKD  Radiology reports reviewed and/or personal interpretation:  Tests considered or ordered: labs  Plan of care: IVFs and monitor     Problem: Possible Cholecystitis   Differential DX: NA  Chronic issues affecting care: age, CKD  Radiology reports reviewed and/or personal interpretation: CT report reviewed as above   Tests considered or ordered: GB US  Plan of care: Cover with ABX.  GB US and surgery aware.          Chronic medical issues addressed during this admission and plan of care:  Anxiety/Depression - no behavioral issues   HTN - hold meds resume as tolerated   CAD - no CP  OA -  stable   Smoker - counseled       Further evaluation and treatment will be contingent on the response to the above therapy as well as the input from the consultants.  Findings for this admission discussed with patient/patient's family/ER physician.        Anticipate that the patient will require more than 2 midnights for this hospital stay and the patient will be admitted to Inpatient status.    Code Status: Full

## 2023-09-11 NOTE — PROGRESS NOTES
Pharmacist Renal Dose Adjustment Note    Rosalie Campbell is a 72 y.o. female being treated with the medication Zosyn    Patient Data:    Vital Signs (Most Recent):  Temp: 98.1 °F (36.7 °C) (09/11/23 1154)  Pulse: 84 (09/11/23 1720)  Resp: (!) 21 (09/11/23 1720)  BP: (!) 106/52 (09/11/23 1720)  SpO2: 98 % (09/11/23 1720) Vital Signs (72h Range):  Temp:  [98.1 °F (36.7 °C)]   Pulse:  [62-84]   Resp:  [16-24]   BP: ()/(45-59)   SpO2:  [98 %-100 %]      Recent Labs   Lab 09/11/23  1302   CREATININE 3.23*     Serum creatinine: 3.23 mg/dL (H) 09/11/23 1302  Estimated creatinine clearance: 16.1 mL/min (A)    Medication:Zosyn dose: 4.5gm frequency q8h will be changed to medication:Zosyn dose:4.5gm frequency:q12h    Frida Hernandez, [facility].  [phone]

## 2023-09-12 PROBLEM — E87.20 METABOLIC ACIDOSIS: Status: ACTIVE | Noted: 2023-09-12

## 2023-09-12 PROBLEM — N18.9 ACUTE KIDNEY INJURY SUPERIMPOSED ON CKD: Status: ACTIVE | Noted: 2023-06-26

## 2023-09-12 PROBLEM — A04.72 CLOSTRIDIUM DIFFICILE COLITIS: Status: ACTIVE | Noted: 2023-09-12

## 2023-09-12 PROBLEM — D72.829 LEUKOCYTOSIS: Status: ACTIVE | Noted: 2023-09-12

## 2023-09-12 PROBLEM — K80.10 CHOLELITHIASIS WITH CHRONIC CHOLECYSTITIS: Status: ACTIVE | Noted: 2023-09-12

## 2023-09-12 PROBLEM — S31.809A WOUND OF BUTTOCK: Status: ACTIVE | Noted: 2023-09-12

## 2023-09-12 PROBLEM — R55 SYNCOPE: Status: ACTIVE | Noted: 2023-09-12

## 2023-09-12 PROBLEM — E87.1 HYPONATREMIA: Status: ACTIVE | Noted: 2023-09-12

## 2023-09-12 LAB
ABS NEUT CALC (OHS): 26.66 X10(3)/MCL (ref 2.1–9.2)
ALBUMIN SERPL-MCNC: 2.2 G/DL (ref 3.4–5)
ALBUMIN/GLOB SERPL: 1 RATIO
ALP SERPL-CCNC: 98 UNIT/L (ref 50–144)
ALT SERPL-CCNC: 10 UNIT/L (ref 1–45)
ANION GAP SERPL CALC-SCNC: 9 MEQ/L (ref 2–13)
ANISOCYTOSIS BLD QL SMEAR: ABNORMAL
AST SERPL-CCNC: 14 UNIT/L (ref 14–36)
BILIRUB SERPL-MCNC: 0.3 MG/DL (ref 0–1)
BUN SERPL-MCNC: 39 MG/DL (ref 7–20)
CALCIUM SERPL-MCNC: 7.2 MG/DL (ref 8.4–10.2)
CHLORIDE SERPL-SCNC: 111 MMOL/L (ref 98–110)
CO2 SERPL-SCNC: 14 MMOL/L (ref 21–32)
CREAT SERPL-MCNC: 2.68 MG/DL (ref 0.66–1.25)
CREAT/UREA NIT SERPL: 15 (ref 12–20)
ELLIPTOCYTOSIS (OHS): ABNORMAL
ERYTHROCYTE [DISTWIDTH] IN BLOOD BY AUTOMATED COUNT: 19.2 % (ref 11–14.5)
GFR SERPLBLD CREATININE-BSD FMLA CKD-EPI: 18 MLS/MIN/1.73/M2
GLOBULIN SER-MCNC: 2.3 GM/DL (ref 2–3.9)
GLUCOSE SERPL-MCNC: 105 MG/DL (ref 70–115)
HCT VFR BLD AUTO: 25 % (ref 36–48)
HGB BLD-MCNC: 8.2 G/DL (ref 11.8–16)
LYMPHOCYTES NFR BLD MANUAL: 0.56 X10(3)/MCL
LYMPHOCYTES NFR BLD MANUAL: 2 % (ref 20–55)
MCH RBC QN AUTO: 27.8 PG (ref 27–34)
MCHC RBC AUTO-ENTMCNC: 32.8 G/DL (ref 31–37)
MCV RBC AUTO: 84.7 FL (ref 79–99)
MONOCYTES NFR BLD MANUAL: 0.84 X10(3)/MCL (ref 0.1–1.3)
MONOCYTES NFR BLD MANUAL: 3 % (ref 0–10)
NEUTROPHILS NFR BLD MANUAL: 93 % (ref 37–73)
NEUTS BAND NFR BLD MANUAL: 2 % (ref 0–5)
NRBC BLD AUTO-RTO: 0 %
PLATELET # BLD AUTO: 317 X10(3)/MCL (ref 140–371)
PLATELET # BLD EST: NORMAL 10*3/UL
PMV BLD AUTO: 9.6 FL (ref 9.4–12.4)
POIKILOCYTOSIS BLD QL SMEAR: ABNORMAL
POTASSIUM SERPL-SCNC: 3.5 MMOL/L (ref 3.5–5.1)
PROT SERPL-MCNC: 4.5 GM/DL (ref 6.3–8.2)
RBC # BLD AUTO: 2.95 X10(6)/MCL (ref 4–5.1)
RBC MORPH BLD: ABNORMAL
SODIUM SERPL-SCNC: 134 MMOL/L (ref 135–145)
TARGETS BLD QL SMEAR: SLIGHT
WBC # SPEC AUTO: 28.06 X10(3)/MCL (ref 4–11.5)

## 2023-09-12 PROCEDURE — 80053 COMPREHEN METABOLIC PANEL: CPT | Performed by: FAMILY MEDICINE

## 2023-09-12 PROCEDURE — 94761 N-INVAS EAR/PLS OXIMETRY MLT: CPT

## 2023-09-12 PROCEDURE — 25000003 PHARM REV CODE 250: Performed by: FAMILY MEDICINE

## 2023-09-12 PROCEDURE — 63600175 PHARM REV CODE 636 W HCPCS: Performed by: FAMILY MEDICINE

## 2023-09-12 PROCEDURE — 21400001 HC TELEMETRY ROOM

## 2023-09-12 PROCEDURE — 85027 COMPLETE CBC AUTOMATED: CPT | Performed by: FAMILY MEDICINE

## 2023-09-12 PROCEDURE — 11000001 HC ACUTE MED/SURG PRIVATE ROOM

## 2023-09-12 PROCEDURE — 36415 COLL VENOUS BLD VENIPUNCTURE: CPT | Performed by: FAMILY MEDICINE

## 2023-09-12 RX ORDER — BUSPIRONE HYDROCHLORIDE 15 MG/1
15 TABLET ORAL 3 TIMES DAILY
Status: DISCONTINUED | OUTPATIENT
Start: 2023-09-12 | End: 2023-09-18

## 2023-09-12 RX ORDER — CLOPIDOGREL BISULFATE 75 MG/1
75 TABLET ORAL DAILY
Status: DISCONTINUED | OUTPATIENT
Start: 2023-09-12 | End: 2023-09-22 | Stop reason: HOSPADM

## 2023-09-12 RX ORDER — LANOLIN ALCOHOL/MO/W.PET/CERES
1 CREAM (GRAM) TOPICAL 2 TIMES DAILY
Status: DISCONTINUED | OUTPATIENT
Start: 2023-09-12 | End: 2023-09-18

## 2023-09-12 RX ORDER — FAMOTIDINE 20 MG/1
20 TABLET, FILM COATED ORAL DAILY
Status: DISCONTINUED | OUTPATIENT
Start: 2023-09-12 | End: 2023-09-22 | Stop reason: HOSPADM

## 2023-09-12 RX ORDER — ROPINIROLE 0.25 MG/1
0.25 TABLET, FILM COATED ORAL NIGHTLY
Status: DISCONTINUED | OUTPATIENT
Start: 2023-09-12 | End: 2023-09-22 | Stop reason: HOSPADM

## 2023-09-12 RX ORDER — ESCITALOPRAM OXALATE 10 MG/1
20 TABLET ORAL DAILY
Status: DISCONTINUED | OUTPATIENT
Start: 2023-09-12 | End: 2023-09-22 | Stop reason: HOSPADM

## 2023-09-12 RX ADMIN — SODIUM CHLORIDE: 9 INJECTION, SOLUTION INTRAVENOUS at 11:09

## 2023-09-12 RX ADMIN — VANCOMYCIN HYDROCHLORIDE 125 MG: 125 CAPSULE ORAL at 08:09

## 2023-09-12 RX ADMIN — VANCOMYCIN HYDROCHLORIDE 125 MG: 125 CAPSULE ORAL at 09:09

## 2023-09-12 RX ADMIN — ESCITALOPRAM OXALATE 20 MG: 10 TABLET ORAL at 02:09

## 2023-09-12 RX ADMIN — PIPERACILLIN AND TAZOBACTAM 4.5 G: 4; .5 INJECTION, POWDER, LYOPHILIZED, FOR SOLUTION INTRAVENOUS; PARENTERAL at 08:09

## 2023-09-12 RX ADMIN — CLOPIDOGREL BISULFATE 75 MG: 75 TABLET ORAL at 04:09

## 2023-09-12 RX ADMIN — VANCOMYCIN HYDROCHLORIDE 125 MG: 125 CAPSULE ORAL at 04:09

## 2023-09-12 RX ADMIN — VANCOMYCIN HYDROCHLORIDE 125 MG: 125 CAPSULE ORAL at 12:09

## 2023-09-12 RX ADMIN — FAMOTIDINE 20 MG: 20 TABLET ORAL at 02:09

## 2023-09-12 RX ADMIN — BUSPIRONE HYDROCHLORIDE 15 MG: 15 TABLET ORAL at 02:09

## 2023-09-12 RX ADMIN — FERROUS SULFATE TAB 325 MG (65 MG ELEMENTAL FE) 1 EACH: 325 (65 FE) TAB at 08:09

## 2023-09-12 RX ADMIN — ROPINIROLE HYDROCHLORIDE 0.25 MG: 0.25 TABLET, FILM COATED ORAL at 08:09

## 2023-09-12 RX ADMIN — BUSPIRONE HYDROCHLORIDE 15 MG: 15 TABLET ORAL at 08:09

## 2023-09-12 RX ADMIN — PIPERACILLIN AND TAZOBACTAM 4.5 G: 4; .5 INJECTION, POWDER, LYOPHILIZED, FOR SOLUTION INTRAVENOUS; PARENTERAL at 06:09

## 2023-09-12 NOTE — PLAN OF CARE
09/12/23 1015   Discharge Assessment   Assessment Type Discharge Planning Assessment   Confirmed/corrected address, phone number and insurance Yes   Confirmed Demographics Correct on Facesheet   Source of Information patient   When was your last doctors appointment? 08/29/23   Communicated PARRIS with patient/caregiver Yes   Reason For Admission C.Diff Colitis   People in Home alone   Do you expect to return to your current living situation? Yes   Do you have help at home or someone to help you manage your care at home? Yes   Who are your caregiver(s) and their phone number(s)? Chitra Snell-Son  [phone]   Prior to hospitilization cognitive status: Alert/Oriented   Current cognitive status: Alert/Oriented   Walking or Climbing Stairs ambulation difficulty, requires equipment   Mobility Management Rollator   Equipment Currently Used at Home cane, straight;raised toilet;bath bench;rollator   Readmission within 30 days? No   Patient currently being followed by outpatient case management? No   Do you currently have service(s) that help you manage your care at home? No   Is the pt/caregiver preference to resume services with current agency Yes  (Patient would like to have [facility] at discharge)   Do you take prescription medications? Yes   Do you have prescription coverage? Yes   Coverage MCR   Do you have any problems affording any of your prescribed medications? No   Is the patient taking medications as prescribed? yes   Who is going to help you get home at discharge? Son   How do you get to doctors appointments? car, drives self;family or friend will provide   Are you on dialysis? No   Do you take coumadin? No   DME Needed Upon Discharge  none   Discharge Plan discussed with: Patient   Transition of Care Barriers None   Discharge Plan A Home Health   Discharge Plan B Rehab

## 2023-09-12 NOTE — PROGRESS NOTES
Inpatient Nutrition Assessment    Admit Date: 9/11/2023   Total duration of encounter: 1 day     Nutrition Recommendation/Prescription     Continue Clear Liquid Diet with Isolation precaution as medically appropriate.     ADAT to Renal, Low Fiber once able to advance diet.     RD to add Banatrol TID once diet advanced with pudding/yogurt to help with diarrhea.     Once diet advanced add Novasource 1x/day (475 kcal, 22 gm pro).     Closely monitor Renal function/GI function.     Dietitian will monitor Electrolytes, Energy Intake, Food and Beverage Intake, Gastrointestinal Profile, Renal Function, Weight, and Weight Change and adjust MNT as needed.     Monitoring & Evaluation     Communication of Recommendations: reviewed with nurse, reviewed with patient, and reviewed with family    Reason Seen: continuous nutrition monitoring    Nutrition Risk/Follow-Up: moderate (follow-up in 3-5 days)   Please consult if re-assessment needed sooner.    2  Nutrition Assessment     Malnutrition Assessment/Nutrition-Focused Physical Exam  NFPE not appropriate at this time. Unable to diagnose malnutrition at this time due to not enough criteria known.            Weight Loss (Malnutrition): greater than 7.5% in 3 months (22.88# (12/3%) since 6/30/23, ~2.3 months.)                                                  A minimum of two characteristics is recommended for diagnosis of either severe or non-severe malnutrition.    Chart Review    Malnutrition Screening Tool Results   Have you recently lost weight without trying?: No  Have you been eating poorly because of a decreased appetite?: No   MST Score: 0     Diagnosis:  Acute on Chronic Renal Failure, C.diff, Syncope, Leukocytosis, Metabolic acidosis, Chronic Anemia, Hyponatremia, possible cholecystitis.     Past Medical History:   Diagnosis Date    Anxiety     Chronic renal disease stage 4     Colitis 7/6/2023    Colon cancer screening declined     Coronary artery disease     Depression      Hypertension     Irregular heart rhythm     Medication management     Obesity, unspecified     Osteoarthritis of knee     Pain, joint, shoulder region, right     Refused influenza vaccine     Refused pneumococcal vaccination     Smoker      Past Surgical History:   Procedure Laterality Date    ANGIOPLASTY  11/20/2019    COLONOSCOPY  07/05/2023    ESOPHAGOGASTRODUODENOSCOPY  10/29/2018    STENT, AORTA         Nutrition-Related Medications: Zosyn, IVF @ 125 ml/hr.  Calorie Containing IV Medications: no significant kcals from medications at this time    Nutrition-Related Labs:   Lab Results   Component Value Date    WBC 28.06 (H) 09/12/2023    WBC 7.8 03/18/2021     Lab Results   Component Value Date    RBC 2.95 (L) 09/12/2023    RBC 3.60 (L) 03/18/2021     Lab Results   Component Value Date    HGB 8.2 (L) 09/12/2023    HGB 9.5 (L) 03/18/2021     Lab Results   Component Value Date    HCT 25.0 (L) 09/12/2023    HCT 31.2 (L) 03/18/2021     Lab Results   Component Value Date     (L) 09/12/2023     03/18/2021     Lab Results   Component Value Date    CHLORIDE 111 (H) 09/12/2023    CHLORIDE 107 03/18/2021     Lab Results   Component Value Date    CO2 14 (L) 09/12/2023    CO2 24 03/18/2021     Lab Results   Component Value Date    BUN 39.0 (H) 09/12/2023    BUN 32 (H) 03/18/2021     Lab Results   Component Value Date    CREATININE 2.68 (H) 09/12/2023    CREATININE 1.80 (H) 03/18/2021     Lab Results   Component Value Date    EGFRNORACEVR 18 09/12/2023    EGFRNORACEVR 27 04/13/2023     Lab Results   Component Value Date    POCTGLUCOSE 120 (H) 06/30/2023     Lab Results   Component Value Date    CALCIUM 7.2 (L) 09/12/2023    CALCIUM 9.3 03/18/2021     Lab Results   Component Value Date    MG 1.60 (L) 06/27/2023    MG 1.60 (L) 06/26/2023     Lab Results   Component Value Date    ALBUMIN 2.2 (L) 09/12/2023    ALBUMIN 4.0 03/18/2021     CrCl:    Lab Value: 19.4    Date: 9/12    Diet/PN Order: Diet clear liquid  "Isolation Tray - Styrofoam  Oral Supplement Order: none  Tube Feeding Order: none  Factors Affecting Nutritional Intake: altered gastrointestinal function, clear liquid diet, decreased appetite, and early satiety  Food/Baptist/Cultural Preferences:  Dislike all vegetables except: Lettuce, Tomato, Cucumber, and Corn.   Food Allergies: none reported and no known food allergies    Skin Integrity: excoriation, skin tear  Wound(s):      Altered Skin Integrity 09/11/23 Left Buttocks #2 Partial thickness tissue loss. Shallow open ulcer with a red or pink wound bed, without slough. Intact or Open/Ruptured Serum-filled blister.-Tissue loss description: Partial thickness  [REMOVED]      Altered Skin Integrity 09/11/23 1836 Left Buttocks #3 Partial thickness tissue loss. Shallow open ulcer with a red or pink wound bed, without slough. Intact or Open/Ruptured Serum-filled blister.-Tissue loss description: Partial thickness       Altered Skin Integrity 09/11/23 1838 Right Buttocks #2 Partial thickness tissue loss. Shallow open ulcer with a red or pink wound bed, without slough. Intact or Open/Ruptured Serum-filled blister.-Tissue loss description: Partial thickness     Overview/Hospital Course:    (9/12): Venancio reports poor appetite for a while d/t early satiety, and reported similar issue on last admit. Per EMR patient averaged ~10%-1 meal on CLD d/t multiple bouts of diarrhea over past month and pending consult for surgery. Per EMR she weighed 84 kg on 6/30/23, CBW- 73.6 kg showing a 22.88# (12.3%) weight loss. Patient does not like apple sauce and has not had yogurt but willing to try if it will help with diarrhea. GI: ND/TENDER/DIARRHEA/LBM-9/12, NUTR:3, BRDN: 18, NO EDEMA NOTED, 24 HR I/O: 2900/300.    Anthropometrics    Height: 5' 8" (172.7 cm) Height Method: Stated  Last Weight: 73.6 kg (162 lb 4.8 oz) (09/11/23 1818) Weight Method: Bed Scale  BMI (Calculated): 24.7  BMI Classification: normal (BMI 18.5-24.9)   "   Ideal Body Weight (IBW), Female: 140 lb     % Ideal Body Weight, Female (lb): 115.93 %                             Usual Weight Provided By: EMR weight history    Wt Readings from Last 5 Encounters:   23 73.6 kg (162 lb 4.8 oz)   23 70.8 kg (156 lb)   23 76.2 kg (167 lb 15.9 oz)   23 77.6 kg (171 lb 1.2 oz)   23 84 kg (185 lb 3.2 oz)     Weight Change(s) Since Admission:  Admit Weight: 68.9 kg (152 lb) (23 1154)      Estimated Needs    Weight Used For Calorie Calculations: 73.6 kg (162 lb 4.1 oz)  Energy Calorie Requirements (kcal): 6830-4200 KCAL (25-29 KCAL/KG CBW)  Energy Need Method: Kcal/kg  Weight Used For Protein Calculations: 73.6 kg (162 lb 4.1 oz)  Protein Requirements: 44-59 GM PRO (0.6-0.8 GM/KG CBW)  Fluid Requirements (mL): 1000 ML H2O + OUTPUT.  Temp (24hrs), Av.2 °F (36.8 °C), Min:97.2 °F (36.2 °C), Max:99.5 °F (37.5 °C)         Enteral Nutrition    Patient not receiving enteral nutrition at this time.    Parenteral Nutrition    Patient not receiving parenteral nutrition support at this time.    Evaluation of Received Nutrient Intake    Calories: not meeting estimated needs  Protein: not meeting estimated needs    Patient Education    Not applicable.         Nutrition Diagnosis     PES: Inadequate energy intake related to altered GI function as evidenced by estimated energy intake from diet less than estimated energy needs. (new)    Interventions/Goals     Intervention(s): general/healthful diet, modified composition of meals/snacks, commercial beverage, commercial food, and collaboration with other providers    Goal: Meet Greater than 75% of nutritional needs by discharge. (new)

## 2023-09-12 NOTE — H&P (VIEW-ONLY)
"Patient is a 72 yr-old female that presents to the emergency room after a fall while going to the bathroom.  She is been stooling 8 to 9 times a day for the past month at least.  So after trying to get off the toilet today, she felt so weak she fell to the ground.  She did not hit her head, but lowered herself to the ground and then "passed out".  Denies any chest pains or shortness a breath or abdominal cramping, just loose watery stools.  She notes that she has no appetite, has not been able to eat more than a few bites of food in a day.  She was diagnosed with colitis by an colonoscope in early July.  Patient noted recurrent symptoms since beginning of August.  Patient has been diagnosed with C diff colitis.  She is receiving vancomycin orally.  I was consulted with regard to her gallbladder and symptomatology.    Review of patient's allergies indicated  Allergens  Codeine reaction on patient is she Passes Out      Past Medical History   Diagnosis  Anxiety/Depression  Chronic Renal Disease Stage 4  Colitis /Colon Cancer screening declined  Coronary Artery Disease/Irregular heart rhythm  Hypertension   Medication Management  7.   Osteoarthritis of knee  8.   Pain, joint shoulder region, RT  9.   Refused influenza vaccine and Pneumococcal vaccine   10. Smoker    Past Surgical History   Procedures  Angioplasty - 2019  Colonoscopy/ EGD - 2023  Esophagogastroduodenoscopy - 10/29/2018  Stent, Aorta- 2019  5.   Angiogram - 2019  6.   Stress Test - 2019  7.   Echocardiogram - 2019    Immunizations  No Covid 19 vaccine  No Pneumonia or Flu vaccine  No DTAP/Tetnus vaccine  No Shingles vaccine  No Hepatitis vaccine    Family History  Mother  in  from Pancreatic Ca  Father , 3 months later, in  from a Massive Heart Attack    Social History   Patient is a smoker, smokes 1/2 PPD for 53 yrs  Drinks 1 Myrtle per month  No Drugs  No  Service  No halfway Time  No " "Rehab   X 2   X 2   AB0, 1 Girl, Employed at [employer]   Employed at [employer] for 16 yrs, Retired 2 yrs   Resides in [place]  PCP is CELSA Burt    Review of Systems  Patient is positive for appetite change and fatigue.   Positive for diarrhea      Objective   Vital Signs   2023   11:54  1 BP is 94/84  2. Pulse is 68  3. Respirations is 20  4. Temp is 98.1 F( 36.7 C)  5.  Spo2 is 100%  Weight is 73.6 kg(162 lb 4.8 oz)  Height is 5' 8" (172.7 cm)  Body Mass Index is 24.68 kg/m2    Physical Exam  Patient appears well-developed and well-nourished  Head is atraumatic  Pupils are equal, round and reactive to light  Pale Conjunctiva  Neck is supple with normal range of motion  Normal rate, regular rhythm and normal heart sounds  Abdomen is soft, bowel sounds are normal and there is no abdominal tenderness  Patient is alert and oriented to person, place and time  Skin is warm and dry but there is pallor  She has a normal mood and affect      Laboratory Findings   CBC     2023  13:02  30.03>      8.5/26.3    < 329   85.9/27.8  CMP   2023  13:02      133/3.7       106/16       44.0/3.23     < 109      7.9/         CT Abdomen Pelvis Without contrast   Impression  1. Cholelithiasis with subtle changes which are suspicious for cholecystitis.  See above comments.  2.  Mild mucosal thickening with adjacent inflammatory stranding is noted involving the cecum and ascending colon.  The appendix is not clearly delineated.  I suspect the changes represent focal colitis versus much less likely atypical appearing changes related to appendicitis.  Clinical correlation is recommended.  3. Chronic changes are present as described above.  See above comments.  The gallbladder is fairly well distended with what appear to be tiny, radiopaque stones layering dependently and mild haziness of the gallbladder wall as can be seen with low-grade infl There appears to be at least " "mild mucosal thickening within the cecum and appendix with adjacent inflammatory as can be seen with focal colitis.  The appendix is not clearly delineated.   ammatory changes of cholecystitis. Vascular calcifications are noted within both renal santa.  Extensive atherosclerotic plaquing is noted throughout the abdominal aorta is primary branches.      US Abdomen Limited   Impression:  1. Several images demonstrate what appears to be a small amount of echogenic sludge layering dependently with no changes to suggest cholecystitis.  See above comments.  The gallbladder is adequately distended with what appears to be a small amount of echogenic sludge layering dependently on several images with no posterior shadowing      Assessment  Patient is a 72 yr-old female that presents to the emergency room after a fall while going to the bathroom.  She is been stooling 8 to 9 times a day for the past month at least.  So after trying to get off the toilet today, she felt so weak she fell to the ground.  She did not hit her head, but lowered herself to the ground and then "passed out".  Denies any chest pains or shortness a breath or abdominal cramping, just loose watery stools.  She notes that she has no appetite, has not been able to eat more than a few bites of food in a day.  She was diagnosed with colitis by an colonoscope in early July.  Patient noted recurrent symptoms since beginning of August.          Plan   C Diff treated with vancomycin   Cardiac Evaluation   Zithromax for treatment of pneumonia   Lap Irene on Friday for CT findings consistent with acute cholecystitis  "

## 2023-09-12 NOTE — CONSULTS
"Patient is a 72 yr-old female that presents to the emergency room after a fall while going to the bathroom.  She is been stooling 8 to 9 times a day for the past month at least.  So after trying to get off the toilet today, she felt so weak she fell to the ground.  She did not hit her head, but lowered herself to the ground and then "passed out".  Denies any chest pains or shortness a breath or abdominal cramping, just loose watery stools.  She notes that she has no appetite, has not been able to eat more than a few bites of food in a day.  She was diagnosed with colitis by an colonoscope in early July.  Patient noted recurrent symptoms since beginning of August.  Patient has been diagnosed with C diff colitis.  She is receiving vancomycin orally.  I was consulted with regard to her gallbladder and symptomatology.    Review of patient's allergies indicated  Allergens  Codeine reaction on patient is she Passes Out      Past Medical History   Diagnosis  Anxiety/Depression  Chronic Renal Disease Stage 4  Colitis /Colon Cancer screening declined  Coronary Artery Disease/Irregular heart rhythm  Hypertension   Medication Management  7.   Osteoarthritis of knee  8.   Pain, joint shoulder region, RT  9.   Refused influenza vaccine and Pneumococcal vaccine   10. Smoker    Past Surgical History   Procedures  Angioplasty - 2019  Colonoscopy/ EGD - 2023  Esophagogastroduodenoscopy - 10/29/2018  Stent, Aorta- 2019  5.   Angiogram - 2019  6.   Stress Test - 2019  7.   Echocardiogram - 2019    Immunizations  No Covid 19 vaccine  No Pneumonia or Flu vaccine  No DTAP/Tetnus vaccine  No Shingles vaccine  No Hepatitis vaccine    Family History  Mother  in  from Pancreatic Ca  Father , 3 months later, in  from a Massive Heart Attack    Social History   Patient is a smoker, smokes 1/2 PPD for 53 yrs  Drinks 1 Myrtle per month  No Drugs  No  Service  No long-term Time  No " "Rehab   X 2   X 2   AB0, 1 Girl, Employed at [employer]   Employed at [employer] for 16 yrs, Retired 2 yrs   Resides in [place]  PCP is CELSA Burt    Review of Systems  Patient is positive for appetite change and fatigue.   Positive for diarrhea      Objective   Vital Signs   2023   11:54  1 BP is 94/84  2. Pulse is 68  3. Respirations is 20  4. Temp is 98.1 F( 36.7 C)  5.  Spo2 is 100%  Weight is 73.6 kg(162 lb 4.8 oz)  Height is 5' 8" (172.7 cm)  Body Mass Index is 24.68 kg/m2    Physical Exam  Patient appears well-developed and well-nourished  Head is atraumatic  Pupils are equal, round and reactive to light  Pale Conjunctiva  Neck is supple with normal range of motion  Normal rate, regular rhythm and normal heart sounds  Abdomen is soft, bowel sounds are normal and there is no abdominal tenderness  Patient is alert and oriented to person, place and time  Skin is warm and dry but there is pallor  She has a normal mood and affect      Laboratory Findings   CBC     2023  13:02  30.03>      8.5/26.3    < 329   85.9/27.8  CMP   2023  13:02      133/3.7       106/16       44.0/3.23     < 109      7.9/         CT Abdomen Pelvis Without contrast   Impression  1. Cholelithiasis with subtle changes which are suspicious for cholecystitis.  See above comments.  2.  Mild mucosal thickening with adjacent inflammatory stranding is noted involving the cecum and ascending colon.  The appendix is not clearly delineated.  I suspect the changes represent focal colitis versus much less likely atypical appearing changes related to appendicitis.  Clinical correlation is recommended.  3. Chronic changes are present as described above.  See above comments.  The gallbladder is fairly well distended with what appear to be tiny, radiopaque stones layering dependently and mild haziness of the gallbladder wall as can be seen with low-grade infl There appears to be at least " "mild mucosal thickening within the cecum and appendix with adjacent inflammatory as can be seen with focal colitis.  The appendix is not clearly delineated.   ammatory changes of cholecystitis. Vascular calcifications are noted within both renal santa.  Extensive atherosclerotic plaquing is noted throughout the abdominal aorta is primary branches.      US Abdomen Limited   Impression:  1. Several images demonstrate what appears to be a small amount of echogenic sludge layering dependently with no changes to suggest cholecystitis.  See above comments.  The gallbladder is adequately distended with what appears to be a small amount of echogenic sludge layering dependently on several images with no posterior shadowing      Assessment  Patient is a 72 yr-old female that presents to the emergency room after a fall while going to the bathroom.  She is been stooling 8 to 9 times a day for the past month at least.  So after trying to get off the toilet today, she felt so weak she fell to the ground.  She did not hit her head, but lowered herself to the ground and then "passed out".  Denies any chest pains or shortness a breath or abdominal cramping, just loose watery stools.  She notes that she has no appetite, has not been able to eat more than a few bites of food in a day.  She was diagnosed with colitis by an colonoscope in early July.  Patient noted recurrent symptoms since beginning of August.          Plan   C Diff treated with vancomycin   Cardiac Evaluation   Zithromax for treatment of pneumonia   Lap Irene on Friday for CT findings consistent with acute cholecystitis  "

## 2023-09-12 NOTE — HPI
Loss of Consciousness    Diarrhea       C/o diarrhea x 1 month syncopal on Saturday, and this am, dx w/ c diff last month, pt reports placed self on floor when felt like she was gonna pass out, denies hitting head         History of Present Illness: History obtained from Patient     Pt is a 72 y.o. female with hx of CLD 3/4, HTN, Depression/Anxiety, smoker, CAD presented to the ER with about a month of diarrhea.  Having multiple bouts a day.  She was doing to bathroom today and passed out.  She did not strike her head.  She states she fell on Saturday as well and landed on her buttocks.  She was C. Diff + in the ER.  She says she had some ABX about a month ago around the time she had a colonoscopy.  She denies fevers but has some obvious chills.  Denies CP, SOB, NV.  She denies any blood in stool.

## 2023-09-12 NOTE — ASSESSMENT & PLAN NOTE
Patient with acute kidney injury/acute renal failure likely due to pre-renal azotemia due to dehydration NEYDA is currently improving. Baseline creatinine 1.6-2.0 - Labs reviewed- Renal function/electrolytes with Estimated Creatinine Clearance: 19.4 mL/min (A) (based on SCr of 2.68 mg/dL (H)). according to latest data. Monitor urine output and serial BMP and adjust therapy as needed. Avoid nephrotoxins and renally dose meds for GFR listed above.    Continue ivf  Follow labs

## 2023-09-12 NOTE — ASSESSMENT & PLAN NOTE
Patient has hyponatremia which is uncontrolled,We will aim to correct the sodium by 4-6mEq in 24 hours. We will monitor sodium Daily. The hyponatremia is due to Dehydration/hypovolemia. We will obtain the following studies: TSH, T4. We will treat the hyponatremia with IV fluids as follows: 0.9% NS. The patient's sodium results have been reviewed and are listed below.  Recent Labs   Lab 09/12/23  0439   *

## 2023-09-12 NOTE — PROGRESS NOTES
[facility]/Surg  [facility] Medicine  Progress Note    Patient Name: Rosalie Campbell  MRN: [id number]  Patient Class: IP- Inpatient   Admission Date: 9/11/2023  Length of Stay: 1 days  Attending Physician: Raquel Vogel MD  Primary Care Provider: Jany Taveras FNP-C        Subjective:     Principal Problem:Clostridium difficile colitis        HPI:   Loss of Consciousness    Diarrhea       C/o diarrhea x 1 month syncopal on Saturday, and this am, dx w/ c diff last month, pt reports placed self on floor when felt like she was gonna pass out, denies hitting head         History of Present Illness: History obtained from Patient     Pt is a 72 y.o. female with hx of CLD 3/4, HTN, Depression/Anxiety, smoker, CAD presented to the ER with about a month of diarrhea.  Having multiple bouts a day.  She was doing to bathroom today and passed out.  She did not strike her head.  She states she fell on Saturday as well and landed on her buttocks.  She was C. Diff + in the ER.  She says she had some ABX about a month ago around the time she had a colonoscopy.  She denies fevers but has some obvious chills.  Denies CP, SOB, NV.  She denies any blood in stool.           Overview/Hospital Course:  09/12/2023 pt presented with 2 months of diarrhea + c. Diff toxin, admitted for dehydration and c. Diff + diarrhea  Mike on ckd usually Cr runs 1.6-2.0 was 3.2 on admit and is 2.6 this am.  WBC was 30,000.  Pt had c. Diff ordered on 08/30 but does not think she was started on any abx.  Was in hospitale here back 07/07/2023 with colitis and was d/c home with cipro and flagyl.  Diarrhea started after hospitalizations.  Wound care nurses has evaluated and pt has some excoriated and broken areas multiple on bilateral sacral/buttocks areas present on admit.  US and CT showed stones and slude in the gallbladder but no acute cholecystitis.  CT shows collitis likely due to c. Diff, cannot rule out appendicitis, pt does not have  acute abdominal pain.surgery has been consulted for evaluation      Interval History:      Review of Systems   Constitutional:  Negative for activity change, appetite change and fever.   Respiratory:  Negative for chest tightness, shortness of breath and wheezing.    Cardiovascular:  Negative for chest pain.   Gastrointestinal:  Negative for abdominal pain, constipation, diarrhea, nausea and vomiting.   Genitourinary:  Negative for dysuria.   Skin:  Negative for rash and wound.   Neurological:  Negative for tremors and headaches.     Objective:     Vital Signs (Most Recent):  Temp: 98.4 °F (36.9 °C) (09/12/23 1049)  Pulse: 64 (09/12/23 1049)  Resp: 20 (09/12/23 1049)  BP: (!) 112/43 (09/12/23 1049)  SpO2: 95 % (09/12/23 1049) Vital Signs (24h Range):  Temp:  [97.2 °F (36.2 °C)-99.5 °F (37.5 °C)] 98.4 °F (36.9 °C)  Pulse:  [] 64  Resp:  [16-24] 20  SpO2:  [95 %-99 %] 95 %  BP: ()/(43-67) 112/43     Weight: 73.6 kg (162 lb 4.8 oz)  Body mass index is 24.68 kg/m².    Intake/Output Summary (Last 24 hours) at 9/12/2023 1313  Last data filed at 9/12/2023 1143  Gross per 24 hour   Intake 3140 ml   Output 300 ml   Net 2840 ml         Physical Exam  Constitutional:       General: She is not in acute distress.     Appearance: Normal appearance. She is normal weight. She is not ill-appearing.   HENT:      Head: Normocephalic and atraumatic.   Eyes:      General: No scleral icterus.     Extraocular Movements: Extraocular movements intact.   Cardiovascular:      Rate and Rhythm: Normal rate and regular rhythm.      Pulses: Normal pulses.      Heart sounds: Normal heart sounds.   Pulmonary:      Effort: Pulmonary effort is normal.      Breath sounds: Normal breath sounds.   Abdominal:      General: Abdomen is flat. Bowel sounds are normal. There is no distension.      Palpations: Abdomen is soft. There is no mass.      Tenderness: There is no abdominal tenderness.   Skin:     General: Skin is warm and dry.       Capillary Refill: Capillary refill takes less than 2 seconds.      Findings: Rash present. No erythema or lesion.      Comments: See nurses' notes and wound care notes  Wounds POA   Neurological:      General: No focal deficit present.      Mental Status: She is alert and oriented to person, place, and time.   Psychiatric:         Mood and Affect: Mood normal.         Behavior: Behavior normal.         Thought Content: Thought content normal.             Significant Labs: All pertinent labs within the past 24 hours have been reviewed.  CBC:   Recent Labs   Lab 09/11/23  1302 09/12/23  0439   WBC 30.03* 28.06*   HGB 8.5* 8.2*   HCT 26.3* 25.0*    317     CMP:   Recent Labs   Lab 09/11/23  1302 09/12/23  0439   * 134*   K 3.7 3.5   CO2 16* 14*   BUN 44.0* 39.0*   CREATININE 3.23* 2.68*   CALCIUM 7.9* 7.2*   ALBUMIN 2.8* 2.2*   BILITOT 0.4 0.3   ALKPHOS 113 98   AST 21 14   ALT 18 10       Significant Imaging: I have reviewed all pertinent imaging results/findings within the past 24 hours.      Assessment/Plan:      * Clostridium difficile colitis  Continue po vancomycin      Acute kidney injury superimposed on CKD  Patient with acute kidney injury/acute renal failure likely due to pre-renal azotemia due to dehydration NEYDA is currently improving. Baseline creatinine 1.6-2.0 - Labs reviewed- Renal function/electrolytes with Estimated Creatinine Clearance: 19.4 mL/min (A) (based on SCr of 2.68 mg/dL (H)). according to latest data. Monitor urine output and serial BMP and adjust therapy as needed. Avoid nephrotoxins and renally dose meds for GFR listed above.    Continue ivf  Follow labs    Hyponatremia  Patient has hyponatremia which is uncontrolled,We will aim to correct the sodium by 4-6mEq in 24 hours. We will monitor sodium Daily. The hyponatremia is due to Dehydration/hypovolemia. We will obtain the following studies: TSH, T4. We will treat the hyponatremia with IV fluids as follows: 0.9% NS. The  patient's sodium results have been reviewed and are listed below.  Recent Labs   Lab 09/12/23  0439   *       Syncope  Likely due to volume depletion  Continue ivf      Metabolic acidosis  Due to dehydration  Continue ivf  monitor      Leukocytosis  liekly due to c. Diff        Wound of buttock  Wound care consulted  Present on admission      Hypertension  Holding BP meds due to relative hypotension  Will resume po meds when appropriate    Cholelithiasis with chronic cholecystitis  Surgery to follow        VTE Risk Mitigation (From admission, onward)         Ordered     IP VTE HIGH RISK PATIENT  Once         09/11/23 1841     Place sequential compression device  Until discontinued         09/11/23 1841                Discharge Planning   PARRIS: 9/15/2023     Code Status: Full Code   Is the patient medically ready for discharge?:     Reason for patient still in hospital (select all that apply): Patient trending condition and Treatment  Discharge Plan A: Home Health                  Raquel Vogel MD  Department of Hospital Medicine   [facility]/Surg

## 2023-09-12 NOTE — HOSPITAL COURSE
09/12/2023 pt presented with 2 months of diarrhea + c. Diff toxin, admitted for dehydration and c. Diff + diarrhea  Mike on ckd usually Cr runs 1.6-2.0 was 3.2 on admit and is 2.6 this am.  WBC was 30,000.  Pt had c. Diff ordered on 08/30 but does not think she was started on any abx.  Was in hospitale here back 07/07/2023 with colitis and was d/c home with cipro and flagyl.  Diarrhea started after hospitalizations.  Wound care nurses has evaluated and pt has some excoriated and broken areas multiple on bilateral sacral/buttocks areas present on admit.  US and CT showed stones and slude in the gallbladder but no acute cholecystitis.  CT shows collitis likely due to c. Diff, cannot rule out appendicitis, pt does not have acute abdominal pain.surgery has been consulted for evaluation  09/13/2023 pt still with significant diarrhea, cramps have improved a bit.  CT shows cholelithiasis.  Surgery consult is pending  09/14/2023 diarrhea is less this am and cramping is less.  Only 3 BM so far this am.  Surgery plans for lap kehinde in am.

## 2023-09-12 NOTE — SUBJECTIVE & OBJECTIVE
Interval History:      Review of Systems   Constitutional:  Negative for activity change, appetite change and fever.   Respiratory:  Negative for chest tightness, shortness of breath and wheezing.    Cardiovascular:  Negative for chest pain.   Gastrointestinal:  Negative for abdominal pain, constipation, diarrhea, nausea and vomiting.   Genitourinary:  Negative for dysuria.   Skin:  Negative for rash and wound.   Neurological:  Negative for tremors and headaches.     Objective:     Vital Signs (Most Recent):  Temp: 98.4 °F (36.9 °C) (09/12/23 1049)  Pulse: 64 (09/12/23 1049)  Resp: 20 (09/12/23 1049)  BP: (!) 112/43 (09/12/23 1049)  SpO2: 95 % (09/12/23 1049) Vital Signs (24h Range):  Temp:  [97.2 °F (36.2 °C)-99.5 °F (37.5 °C)] 98.4 °F (36.9 °C)  Pulse:  [] 64  Resp:  [16-24] 20  SpO2:  [95 %-99 %] 95 %  BP: ()/(43-67) 112/43     Weight: 73.6 kg (162 lb 4.8 oz)  Body mass index is 24.68 kg/m².    Intake/Output Summary (Last 24 hours) at 9/12/2023 1313  Last data filed at 9/12/2023 1143  Gross per 24 hour   Intake 3140 ml   Output 300 ml   Net 2840 ml         Physical Exam  Constitutional:       General: She is not in acute distress.     Appearance: Normal appearance. She is normal weight. She is not ill-appearing.   HENT:      Head: Normocephalic and atraumatic.   Eyes:      General: No scleral icterus.     Extraocular Movements: Extraocular movements intact.   Cardiovascular:      Rate and Rhythm: Normal rate and regular rhythm.      Pulses: Normal pulses.      Heart sounds: Normal heart sounds.   Pulmonary:      Effort: Pulmonary effort is normal.      Breath sounds: Normal breath sounds.   Abdominal:      General: Abdomen is flat. Bowel sounds are normal. There is no distension.      Palpations: Abdomen is soft. There is no mass.      Tenderness: There is no abdominal tenderness.   Skin:     General: Skin is warm and dry.      Capillary Refill: Capillary refill takes less than 2 seconds.       Findings: Rash present. No erythema or lesion.      Comments: See nurses' notes and wound care notes  Wounds POA   Neurological:      General: No focal deficit present.      Mental Status: She is alert and oriented to person, place, and time.   Psychiatric:         Mood and Affect: Mood normal.         Behavior: Behavior normal.         Thought Content: Thought content normal.             Significant Labs: All pertinent labs within the past 24 hours have been reviewed.  CBC:   Recent Labs   Lab 09/11/23  1302 09/12/23  0439   WBC 30.03* 28.06*   HGB 8.5* 8.2*   HCT 26.3* 25.0*    317     CMP:   Recent Labs   Lab 09/11/23  1302 09/12/23  0439   * 134*   K 3.7 3.5   CO2 16* 14*   BUN 44.0* 39.0*   CREATININE 3.23* 2.68*   CALCIUM 7.9* 7.2*   ALBUMIN 2.8* 2.2*   BILITOT 0.4 0.3   ALKPHOS 113 98   AST 21 14   ALT 18 10       Significant Imaging: I have reviewed all pertinent imaging results/findings within the past 24 hours.

## 2023-09-12 NOTE — PLAN OF CARE
Problem: Adult Inpatient Plan of Care  Goal: Plan of Care Review  Outcome: Ongoing, Progressing  Flowsheets (Taken 9/11/2023 8976)  Plan of Care Reviewed With: patient  Goal: Patient-Specific Goal (Individualized)  Outcome: Ongoing, Progressing  Goal: Absence of Hospital-Acquired Illness or Injury  Outcome: Ongoing, Progressing  Goal: Optimal Comfort and Wellbeing  Outcome: Ongoing, Progressing  Goal: Readiness for Transition of Care  Outcome: Ongoing, Progressing     Problem: Fluid and Electrolyte Imbalance (Acute Kidney Injury/Impairment)  Goal: Fluid and Electrolyte Balance  Outcome: Ongoing, Progressing     Problem: Oral Intake Inadequate (Acute Kidney Injury/Impairment)  Goal: Optimal Nutrition Intake  Outcome: Ongoing, Progressing     Problem: Renal Function Impairment (Acute Kidney Injury/Impairment)  Goal: Effective Renal Function  Outcome: Ongoing, Progressing     Problem: Impaired Wound Healing  Goal: Optimal Wound Healing  Outcome: Ongoing, Progressing     Problem: Skin Injury Risk Increased  Goal: Skin Health and Integrity  Outcome: Ongoing, Progressing

## 2023-09-12 NOTE — PROGRESS NOTES
[facility]/Surg  Wound Care    Patient Name:  Rosalie Campbell   MRN:  [id number]  Date: 9/12/2023  Diagnosis: <principal problem not specified>    History:     Past Medical History:   Diagnosis Date    Anxiety     Chronic renal disease stage 4     Colitis 7/6/2023    Colon cancer screening declined     Coronary artery disease     Depression     Hypertension     Irregular heart rhythm     Medication management     Obesity, unspecified     Osteoarthritis of knee     Pain, joint, shoulder region, right     Refused influenza vaccine     Refused pneumococcal vaccination     Smoker        Social History     Socioeconomic History    Marital status:    Tobacco Use    Smoking status: Every Day     Current packs/day: 0.50     Types: Cigarettes     Passive exposure: Current    Smokeless tobacco: Never   Substance and Sexual Activity    Alcohol use: Never    Drug use: Never    Sexual activity: Not Currently     Social Determinants of Health     Financial Resource Strain: Low Risk  (6/26/2023)    Overall Financial Resource Strain (CARDIA)     Difficulty of Paying Living Expenses: Not very hard   Food Insecurity: No Food Insecurity (6/26/2023)    Hunger Vital Sign     Worried About Running Out of Food in the Last Year: Never true     Ran Out of Food in the Last Year: Never true   Transportation Needs: No Transportation Needs (6/26/2023)    PRAPARE - Transportation     Lack of Transportation (Medical): No     Lack of Transportation (Non-Medical): No   Physical Activity: Insufficiently Active (6/26/2023)    Exercise Vital Sign     Days of Exercise per Week: 3 days     Minutes of Exercise per Session: 10 min   Stress: Stress Concern Present (6/26/2023)    [employer] - Occupational Stress Questionnaire     Feeling of Stress : Rather much   Social Connections: Moderately Isolated (6/26/2023)    Social Connection and Isolation Panel [NHANES]     Frequency of Communication with Friends  and Family: Twice a week     Frequency of Social Gatherings with Friends and Family: More than three times a week     Attends Restorationism Services: More than 4 times per year     Active Member of Clubs or Organizations: No     Attends Club or Organization Meetings: Never     Marital Status:    Housing Stability: Low Risk  (6/26/2023)    Housing Stability Vital Sign     Unable to Pay for Housing in the Last Year: No     Number of Places Lived in the Last Year: 1     Unstable Housing in the Last Year: No       Precautions:     Allergies as of 09/11/2023 - Reviewed 09/11/2023   Allergen Reaction Noted    Codeine Other (See Comments) 01/05/2023       WOC Assessment Details/Treatment        09/12/23 1014   WOCN Assessment   WOCN Total Time (mins) 30   Visit Date 09/12/23   Visit Time 0945   Consult Type New   WOCN Speciality Wound   Number of Wounds 4   Intervention assessed;applied;changed   Teaching on-going   Skin Interventions   Pressure Reduction Devices pressure-redistributing mattress utilized   Pressure Reduction Techniques frequent weight shift encouraged   Positioning   Head of Bed (HOB) Positioning HOB at 30-45 degrees   Positioning/Transfer Devices pillows;wedge   Pressure Injury Prevention    Check Moisture Management Pad Done   Sacral Foam Dressing Replace        Altered Skin Integrity 09/11/23 1831 Left Buttocks #1 Partial thickness tissue loss. Shallow open ulcer with a red or pink wound bed, without slough. Intact or Open/Ruptured Serum-filled blister.   Date First Assessed/Time First Assessed: 09/11/23 1831   Altered Skin Integrity Present on Admission - Did Patient arrive to the hospital with altered skin?: yes  Side: Left  Location: Buttocks  Wound Number: #1  Is this injury device related?: No  De...   Wound Image    Description of Altered Skin Integrity Partial thickness tissue loss. Shallow open ulcer with a red or pink wound bed, without slough. Intact or Open/Ruptured Serum-filled blister.    Dressing Appearance Open to air   Drainage Amount None   Appearance [place];Moist   Periwound Area   (dark discoloration and dry skin)   Wound Edges Defined   Wound Length (cm) 0.5 cm   Wound Width (cm) 0.5 cm   Wound Depth (cm) 0.1 cm   Wound Volume (cm^3) 0.025 cm^3   Wound Surface Area (cm^2) 0.25 cm^2   Care Cleansed with:;Sterile normal saline;Applied:;Skin Barrier   Dressing Applied  (barrier cream and left open to air)        Altered Skin Integrity 09/11/23 Left Buttocks #2 Partial thickness tissue loss. Shallow open ulcer with a red or pink wound bed, without slough. Intact or Open/Ruptured Serum-filled blister.   Date First Assessed: 09/11/23   Altered Skin Integrity Present on Admission - Did Patient arrive to the hospital with altered skin?: yes  Side: Left  Location: Buttocks  Wound Number: #2  Description of Altered Skin Integrity: Partial thickness tissue...   Wound Image    Description of Altered Skin Integrity Partial thickness tissue loss. Shallow open ulcer with a red or pink wound bed, without slough. Intact or Open/Ruptured Serum-filled blister.   Dressing Appearance Open to air   Drainage Amount None   Appearance [place];Moist   Periwound Area   (discolored skin, dry and flaky)   Wound Edges Defined   Wound Length (cm) 0.4 cm   Wound Width (cm) 0.5 cm   Wound Depth (cm) 0.1 cm   Wound Volume (cm^3) 0.02 cm^3   Wound Surface Area (cm^2) 0.2 cm^2   Care Cleansed with:;Sterile normal saline;Applied:;Skin Barrier   Dressing Applied  (barrier cream and left open to air)        Altered Skin Integrity 09/11/23 1838 Right Buttocks #1 Partial thickness tissue loss. Shallow open ulcer with a red or pink wound bed, without slough. Intact or Open/Ruptured Serum-filled blister.   Date First Assessed/Time First Assessed: 09/11/23 1838   Altered Skin Integrity Present on Admission - Did Patient arrive to the hospital with altered skin?: yes  Side: Right  Location: Buttocks  Wound Number: #1  Description of Altered  Skin Integrity...   Wound Image    Description of Altered Skin Integrity Partial thickness tissue loss. Shallow open ulcer with a red or pink wound bed, without slough. Intact or Open/Ruptured Serum-filled blister.   Dressing Appearance Open to air   Drainage Amount None   Appearance [place];Moist   Periwound Area   (dark discoloration, dry and flaky)   Wound Edges Defined   Wound Length (cm) 1.2 cm   Wound Width (cm) 0.7 cm   Wound Depth (cm) 0.1 cm   Wound Volume (cm^3) 0.084 cm^3   Wound Surface Area (cm^2) 0.84 cm^2   Care Cleansed with:;Sterile normal saline;Applied:;Skin Barrier   Dressing   (barrier cream and left open to air)        Altered Skin Integrity 09/11/23 1838 Right Buttocks #2 Partial thickness tissue loss. Shallow open ulcer with a red or pink wound bed, without slough. Intact or Open/Ruptured Serum-filled blister.   Date First Assessed/Time First Assessed: 09/11/23 1838   Altered Skin Integrity Present on Admission - Did Patient arrive to the hospital with altered skin?: yes  Side: Right  Location: Buttocks  Wound Number: #2  Description of Altered Skin Integrity...   Wound Image    Description of Altered Skin Integrity Partial thickness tissue loss. Shallow open ulcer with a red or pink wound bed, without slough. Intact or Open/Ruptured Serum-filled blister.   Drainage Amount None   Appearance [place];Moist   Periwound Area   (dark discolored skin, dry and flaky)   Wound Edges Defined   Wound Length (cm) 0.7 cm   Wound Width (cm) 0.7 cm   Wound Depth (cm) 0.1 cm   Wound Volume (cm^3) 0.049 cm^3   Wound Surface Area (cm^2) 0.49 cm^2   Care Cleansed with:;Sterile normal saline;Applied:;Skin Barrier   Dressing Applied  (barrier cream and left open to air)   Orders placed.   Patient states she resides at home.  States she stays in a recliner majority of the day.  Does state she gets up and washes dishes, etc but spends most of the day in the chair.    Bilateral buttocks appear to have repeated friction  injury resulting in dark discoloration and dry flaky skin.  The areas of breakdown appear to be due to pressure.  Breakdown is localized, not diffuse like it should appear with MASD or IAD.  Patient reports lots of diarrhea and states she repeatedly wipes and it hurts.  Redness also noted to jesse area.  Will treat breakdown and redness with barrier cream Q6 hours and PRN.  Chux pad x2 in place.  No brief.      09/12/2023

## 2023-09-13 LAB
ABS NEUT CALC (OHS): 21.59 X10(3)/MCL (ref 2.1–9.2)
ANISOCYTOSIS BLD QL SMEAR: ABNORMAL
CREAT SERPL-MCNC: 2.2 MG/DL (ref 0.66–1.25)
ELLIPTOCYTOSIS (OHS): ABNORMAL
EOSINOPHIL NFR BLD MANUAL: 0.48 X10(3)/MCL (ref 0–0.9)
EOSINOPHIL NFR BLD MANUAL: 2 % (ref 0–3)
ERYTHROCYTE [DISTWIDTH] IN BLOOD BY AUTOMATED COUNT: 19.7 % (ref 11–14.5)
GFR SERPLBLD CREATININE-BSD FMLA CKD-EPI: 23 MLS/MIN/1.73/M2
HCT VFR BLD AUTO: 26.3 % (ref 36–48)
HGB BLD-MCNC: 8.5 G/DL (ref 11.8–16)
LYMPHOCYTES NFR BLD MANUAL: 0.96 X10(3)/MCL
LYMPHOCYTES NFR BLD MANUAL: 4 % (ref 20–55)
MCH RBC QN AUTO: 27.5 PG (ref 27–34)
MCHC RBC AUTO-ENTMCNC: 32.3 G/DL (ref 31–37)
MCV RBC AUTO: 85.1 FL (ref 79–99)
MONOCYTES NFR BLD MANUAL: 0.96 X10(3)/MCL (ref 0.1–1.3)
MONOCYTES NFR BLD MANUAL: 4 % (ref 0–10)
NEUTROPHILS NFR BLD MANUAL: 90 % (ref 37–73)
NRBC BLD AUTO-RTO: 0 %
PLATELET # BLD AUTO: 357 X10(3)/MCL (ref 140–371)
PLATELET # BLD EST: NORMAL 10*3/UL
PMV BLD AUTO: 9.6 FL (ref 9.4–12.4)
POIKILOCYTOSIS BLD QL SMEAR: ABNORMAL
RBC # BLD AUTO: 3.09 X10(6)/MCL (ref 4–5.1)
RBC MORPH BLD: ABNORMAL
T4 FREE SERPL-MCNC: 1.33 NG/DL (ref 0.78–2.19)
TSH SERPL-ACNC: 1.29 UIU/ML (ref 0.36–3.74)
WBC # SPEC AUTO: 23.99 X10(3)/MCL (ref 4–11.5)

## 2023-09-13 PROCEDURE — 97530 THERAPEUTIC ACTIVITIES: CPT

## 2023-09-13 PROCEDURE — 11000001 HC ACUTE MED/SURG PRIVATE ROOM

## 2023-09-13 PROCEDURE — 94761 N-INVAS EAR/PLS OXIMETRY MLT: CPT

## 2023-09-13 PROCEDURE — 36415 COLL VENOUS BLD VENIPUNCTURE: CPT | Performed by: FAMILY MEDICINE

## 2023-09-13 PROCEDURE — 25000003 PHARM REV CODE 250: Performed by: FAMILY MEDICINE

## 2023-09-13 PROCEDURE — 63600175 PHARM REV CODE 636 W HCPCS: Performed by: FAMILY MEDICINE

## 2023-09-13 PROCEDURE — 21400001 HC TELEMETRY ROOM

## 2023-09-13 PROCEDURE — 84439 ASSAY OF FREE THYROXINE: CPT | Performed by: FAMILY MEDICINE

## 2023-09-13 PROCEDURE — 99900035 HC TECH TIME PER 15 MIN (STAT)

## 2023-09-13 PROCEDURE — 85027 COMPLETE CBC AUTOMATED: CPT | Performed by: FAMILY MEDICINE

## 2023-09-13 PROCEDURE — 97161 PT EVAL LOW COMPLEX 20 MIN: CPT

## 2023-09-13 PROCEDURE — 82565 ASSAY OF CREATININE: CPT | Performed by: FAMILY MEDICINE

## 2023-09-13 PROCEDURE — 84443 ASSAY THYROID STIM HORMONE: CPT | Performed by: FAMILY MEDICINE

## 2023-09-13 RX ADMIN — ESCITALOPRAM OXALATE 20 MG: 10 TABLET ORAL at 08:09

## 2023-09-13 RX ADMIN — VANCOMYCIN HYDROCHLORIDE 125 MG: 125 CAPSULE ORAL at 08:09

## 2023-09-13 RX ADMIN — VANCOMYCIN HYDROCHLORIDE 125 MG: 125 CAPSULE ORAL at 12:09

## 2023-09-13 RX ADMIN — PIPERACILLIN AND TAZOBACTAM 4.5 G: 4; .5 INJECTION, POWDER, FOR SOLUTION INTRAVENOUS; PARENTERAL at 08:09

## 2023-09-13 RX ADMIN — VANCOMYCIN HYDROCHLORIDE 125 MG: 125 CAPSULE ORAL at 04:09

## 2023-09-13 RX ADMIN — BUSPIRONE HYDROCHLORIDE 15 MG: 15 TABLET ORAL at 02:09

## 2023-09-13 RX ADMIN — MELATONIN TAB 3 MG 3 MG: 3 TAB at 12:09

## 2023-09-13 RX ADMIN — BUSPIRONE HYDROCHLORIDE 15 MG: 15 TABLET ORAL at 08:09

## 2023-09-13 RX ADMIN — FERROUS SULFATE TAB 325 MG (65 MG ELEMENTAL FE) 1 EACH: 325 (65 FE) TAB at 08:09

## 2023-09-13 RX ADMIN — PIPERACILLIN AND TAZOBACTAM 4.5 G: 4; .5 INJECTION, POWDER, FOR SOLUTION INTRAVENOUS; PARENTERAL at 04:09

## 2023-09-13 RX ADMIN — MELATONIN TAB 3 MG 3 MG: 3 TAB at 08:09

## 2023-09-13 RX ADMIN — CLOPIDOGREL BISULFATE 75 MG: 75 TABLET ORAL at 04:09

## 2023-09-13 RX ADMIN — ROPINIROLE HYDROCHLORIDE 0.25 MG: 0.25 TABLET, FILM COATED ORAL at 08:09

## 2023-09-13 RX ADMIN — SODIUM CHLORIDE: 9 INJECTION, SOLUTION INTRAVENOUS at 06:09

## 2023-09-13 RX ADMIN — SODIUM CHLORIDE: 9 INJECTION, SOLUTION INTRAVENOUS at 11:09

## 2023-09-13 RX ADMIN — FAMOTIDINE 20 MG: 20 TABLET ORAL at 09:09

## 2023-09-13 NOTE — ASSESSMENT & PLAN NOTE
"Patient has hyponatremia which is uncontrolled,We will aim to correct the sodium by 4-6mEq in 24 hours. We will monitor sodium Daily. The hyponatremia is due to Dehydration/hypovolemia. We will obtain the following studies: TSH, T4. We will treat the hyponatremia with IV fluids as follows: 0.9% NS. The patient's sodium results have been reviewed and are listed below.  No results for input(s): "NA" in the last 24 hours.  "

## 2023-09-13 NOTE — PLAN OF CARE
09/13/23 0907   Discharge Reassessment   Assessment Type Discharge Planning Reassessment   Did the patient's condition or plan change since previous assessment? No   Discharge Plan discussed with: Patient;Adult children   Communicated PARRIS with patient/caregiver Yes   Discharge Plan A Home Health   Discharge Plan B Rehab   DME Needed Upon Discharge  none   Transition of Care Barriers None   Post-Acute Status   Coverage MCR   Discharge Delays None known at this time

## 2023-09-13 NOTE — PLAN OF CARE
Problem: Physical Therapy  Goal: Physical Therapy Goal  Description: Goals to be met by: discharge     Patient will increase functional independence with mobility by performin. Supine to sit with Contact Guard Assistance  2. Sit to stand transfer with Contact Guard Assistance  3. Gait  x 75 feet with Contact Guard Assistance using Rolling Walker.     Outcome: Ongoing, Progressing

## 2023-09-13 NOTE — SUBJECTIVE & OBJECTIVE
Interval History:      Review of Systems   Constitutional:  Negative for activity change, appetite change and fever.   Respiratory:  Negative for chest tightness, shortness of breath and wheezing.    Cardiovascular:  Negative for chest pain.   Gastrointestinal:  Negative for abdominal pain, constipation, diarrhea, nausea and vomiting.   Genitourinary:  Negative for dysuria.   Skin:  Negative for rash and wound.   Neurological:  Negative for tremors and headaches.     Objective:     Vital Signs (Most Recent):  Temp: 97.8 °F (36.6 °C) (09/13/23 1050)  Pulse: 67 (09/13/23 1050)  Resp: 20 (09/13/23 1050)  BP: (!) 119/58 (09/13/23 1050)  SpO2: 97 % (09/13/23 1050) Vital Signs (24h Range):  Temp:  [97.3 °F (36.3 °C)-99.1 °F (37.3 °C)] 97.8 °F (36.6 °C)  Pulse:  [67-87] 67  Resp:  [20] 20  SpO2:  [95 %-97 %] 97 %  BP: (116-129)/(48-83) 119/58     Weight: 72.5 kg (159 lb 12.8 oz)  Body mass index is 24.3 kg/m².    Intake/Output Summary (Last 24 hours) at 9/13/2023 1154  Last data filed at 9/13/2023 0616  Gross per 24 hour   Intake 1895 ml   Output 2 ml   Net 1893 ml           Physical Exam  Constitutional:       General: She is not in acute distress.     Appearance: Normal appearance. She is normal weight. She is not ill-appearing.   HENT:      Head: Normocephalic and atraumatic.   Eyes:      General: No scleral icterus.     Extraocular Movements: Extraocular movements intact.   Cardiovascular:      Rate and Rhythm: Normal rate and regular rhythm.      Pulses: Normal pulses.      Heart sounds: Normal heart sounds.   Pulmonary:      Effort: Pulmonary effort is normal.      Breath sounds: Normal breath sounds.   Abdominal:      General: Abdomen is flat. Bowel sounds are normal. There is no distension.      Palpations: Abdomen is soft. There is no mass.      Tenderness: There is no abdominal tenderness.   Skin:     General: Skin is warm and dry.      Capillary Refill: Capillary refill takes less than 2 seconds.      Findings:  Rash present. No erythema or lesion.      Comments: See nurses' notes and wound care notes  Wounds POA   Neurological:      General: No focal deficit present.      Mental Status: She is alert and oriented to person, place, and time.   Psychiatric:         Mood and Affect: Mood normal.         Behavior: Behavior normal.         Thought Content: Thought content normal.             Significant Labs: All pertinent labs within the past 24 hours have been reviewed.  CBC:   Recent Labs   Lab 09/11/23  1302 09/12/23  0439 09/13/23  0450   WBC 30.03* 28.06* 23.99*   HGB 8.5* 8.2* 8.5*   HCT 26.3* 25.0* 26.3*    317 357       CMP:   Recent Labs   Lab 09/11/23  1302 09/12/23  0439 09/13/23  0450   * 134*  --    K 3.7 3.5  --    CO2 16* 14*  --    BUN 44.0* 39.0*  --    CREATININE 3.23* 2.68* 2.20*   CALCIUM 7.9* 7.2*  --    ALBUMIN 2.8* 2.2*  --    BILITOT 0.4 0.3  --    ALKPHOS 113 98  --    AST 21 14  --    ALT 18 10  --          Significant Imaging: I have reviewed all pertinent imaging results/findings within the past 24 hours.

## 2023-09-13 NOTE — ASSESSMENT & PLAN NOTE
Patient with acute kidney injury/acute renal failure likely due to pre-renal azotemia due to dehydration NEYDA is currently improving. Baseline creatinine 1.6-2.0 - Labs reviewed- Renal function/electrolytes with Estimated Creatinine Clearance: 23.7 mL/min (A) (based on SCr of 2.2 mg/dL (H)). according to latest data. Monitor urine output and serial BMP and adjust therapy as needed. Avoid nephrotoxins and renally dose meds for GFR listed above.    Continue ivf  Follow labs

## 2023-09-13 NOTE — PROGRESS NOTES
Pharmacist Renal Dose Adjustment Note    Rosalie Campbell is a 72 y.o. female being treated with the medication pip/tazo    Patient Data:    Vital Signs (Most Recent):  Temp: 97.5 °F (36.4 °C) (09/13/23 0334)  Pulse: 87 (09/13/23 0334)  Resp: 20 (09/13/23 0334)  BP: 120/83 (09/13/23 0334)  SpO2: 96 % (09/13/23 0334) Vital Signs (72h Range):  Temp:  [97.2 °F (36.2 °C)-99.5 °F (37.5 °C)]   Pulse:  []   Resp:  [16-24]   BP: ()/(43-83)   SpO2:  [95 %-100 %]      Recent Labs   Lab 09/11/23  1302 09/12/23  0439 09/13/23  0450   CREATININE 3.23* 2.68* 2.20*     Serum creatinine: 2.2 mg/dL (H) 09/13/23 0450  Estimated creatinine clearance: 23.7 mL/min (A)    Medication:pip/tazo dose: 4.5gm frequency q12h will be changed to medication:pip/tazo dose:4.5gm frequency:q8h    Pharmacist's Name: Lorena Grayson  Pharmacist's Extension: [phone]

## 2023-09-13 NOTE — PT/OT/SLP EVAL
Physical Therapy Evaluation    Patient Name:  Rosalie Campbell   MRN:  [id number]    Recommendations:     Discharge Recommendations: home with home health, rehabilitation facility   Discharge Equipment Recommendations: none   Barriers to discharge:  current medical status    Assessment:     Rosalie Campbell is a 72 y.o. female admitted with a medical diagnosis of Clostridium difficile colitis.  She presents with the following impairments/functional limitations: weakness, impaired endurance, impaired functional mobility, gait instability, impaired balance, decreased lower extremity function, decreased safety awareness     Patient tolerated rolling in bed to be cleaned then sat EOB to perform BLE therex. Patient lightheaded/woozy throughout treatement and only able to tolerated sitting 15 mins before requesting to lay back down. .    Rehab Prognosis: Good and Fair; patient would benefit from acute skilled PT services to address these deficits and reach maximum level of function.    Recent Surgery: * No surgery found *      Plan:     During this hospitalization, patient to be seen 5 x/week (5-6x weekly/ 1-2x daily) to address the identified rehab impairments via gait training, therapeutic activities, therapeutic exercises and progress toward the following goals:    Plan of Care Expires:  10/13/23    Subjective     Chief Complaint: weakness/diarrhea  Patient/Family Comments/goals: to get stronger  Pain/Comfort:  Pain Rating 1: 0/10    Patients cultural, spiritual, Latter day conflicts given the current situation:      Living Environment:  Lives home alone  Prior to admission, patients level of function was independent.  Equipment used at home: cane, straight, raised toilet, bath bench, rollator.  DME owned (not currently used): none.  Upon discharge, patient will have assistance from family/friends.    Objective:     Communicated with nursing prior to session.  Patient found HOB elevated with peripheral IV  upon PT entry to  "room.    General Precautions: Standard, fall  Orthopedic Precautions:N/A   Braces: N/A  Respiratory Status: Room air    Exams:  RLE Strength: grossly 3+/5  LLE Strength: grossly 3+/5    Functional Mobility:  Bed Mobility:     Rolling Left:  minimum assistance and moderate assistance  Rolling Right: minimum assistance and moderate assistance  Supine to Sit: moderate assistance  Sit to Supine: moderate assistance      AM-PAC 6 CLICK MOBILITY  Total Score:        Treatment & Education:  See above. Patient complained of feeling "woozy" most of the treatment    Patient left HOB elevated with all lines intact, call button in reach, bed alarm on, and family present.    GOALS:   Multidisciplinary Problems       Physical Therapy Goals          Problem: Physical Therapy    Goal Priority Disciplines Outcome Goal Variances Interventions   Physical Therapy Goal     PT, PT/OT Ongoing, Progressing     Description: Goals to be met by: discharge     Patient will increase functional independence with mobility by performin. Supine to sit with Contact Guard Assistance  2. Sit to stand transfer with Contact Guard Assistance  3. Gait  x 75 feet with Contact Guard Assistance using Rolling Walker.                          History:     Past Medical History:   Diagnosis Date    Anxiety     Chronic renal disease stage 4     Colitis 2023    Colon cancer screening declined     Coronary artery disease     Depression     Hypertension     Irregular heart rhythm     Medication management     Obesity, unspecified     Osteoarthritis of knee     Pain, joint, shoulder region, right     Refused influenza vaccine     Refused pneumococcal vaccination     Smoker        Past Surgical History:   Procedure Laterality Date    ANGIOPLASTY  2019    COLONOSCOPY  2023    ESOPHAGOGASTRODUODENOSCOPY  10/29/2018    STENT, AORTA         Time Tracking:     PT Received On: 23  PT Start Time: 1245     PT Stop Time: 1315  PT Total Time (min): " 30 min     Billable Minutes: Evaluation 15 and Therapeutic Activity 15      09/13/2023

## 2023-09-13 NOTE — PROGRESS NOTES
[facility]/Surg  [facility] Medicine  Progress Note    Patient Name: Rosalie Campbell  MRN: [id number]  Patient Class: IP- Inpatient   Admission Date: 9/11/2023  Length of Stay: 2 days  Attending Physician: Raquel Vogel MD  Primary Care Provider: Jany Taveras FNP-C        Subjective:     Principal Problem:Clostridium difficile colitis        HPI:   Loss of Consciousness    Diarrhea       C/o diarrhea x 1 month syncopal on Saturday, and this am, dx w/ c diff last month, pt reports placed self on floor when felt like she was gonna pass out, denies hitting head         History of Present Illness: History obtained from Patient     Pt is a 72 y.o. female with hx of CLD 3/4, HTN, Depression/Anxiety, smoker, CAD presented to the ER with about a month of diarrhea.  Having multiple bouts a day.  She was doing to bathroom today and passed out.  She did not strike her head.  She states she fell on Saturday as well and landed on her buttocks.  She was C. Diff + in the ER.  She says she had some ABX about a month ago around the time she had a colonoscopy.  She denies fevers but has some obvious chills.  Denies CP, SOB, NV.  She denies any blood in stool.           Overview/Hospital Course:  09/12/2023 pt presented with 2 months of diarrhea + c. Diff toxin, admitted for dehydration and c. Diff + diarrhea  Mike on ckd usually Cr runs 1.6-2.0 was 3.2 on admit and is 2.6 this am.  WBC was 30,000.  Pt had c. Diff ordered on 08/30 but does not think she was started on any abx.  Was in hospitale here back 07/07/2023 with colitis and was d/c home with cipro and flagyl.  Diarrhea started after hospitalizations.  Wound care nurses has evaluated and pt has some excoriated and broken areas multiple on bilateral sacral/buttocks areas present on admit.  US and CT showed stones and slude in the gallbladder but no acute cholecystitis.  CT shows collitis likely due to c. Diff, cannot rule out appendicitis, pt does not have  acute abdominal pain.surgery has been consulted for evaluation  09/13/2023 pt still with significant diarrhea, cramps have improved a bit.  CT shows cholelithiasis.  Surgery consult is pending      Interval History:      Review of Systems   Constitutional:  Negative for activity change, appetite change and fever.   Respiratory:  Negative for chest tightness, shortness of breath and wheezing.    Cardiovascular:  Negative for chest pain.   Gastrointestinal:  Negative for abdominal pain, constipation, diarrhea, nausea and vomiting.   Genitourinary:  Negative for dysuria.   Skin:  Negative for rash and wound.   Neurological:  Negative for tremors and headaches.     Objective:     Vital Signs (Most Recent):  Temp: 97.8 °F (36.6 °C) (09/13/23 1050)  Pulse: 67 (09/13/23 1050)  Resp: 20 (09/13/23 1050)  BP: (!) 119/58 (09/13/23 1050)  SpO2: 97 % (09/13/23 1050) Vital Signs (24h Range):  Temp:  [97.3 °F (36.3 °C)-99.1 °F (37.3 °C)] 97.8 °F (36.6 °C)  Pulse:  [67-87] 67  Resp:  [20] 20  SpO2:  [95 %-97 %] 97 %  BP: (116-129)/(48-83) 119/58     Weight: 72.5 kg (159 lb 12.8 oz)  Body mass index is 24.3 kg/m².    Intake/Output Summary (Last 24 hours) at 9/13/2023 1154  Last data filed at 9/13/2023 0616  Gross per 24 hour   Intake 1895 ml   Output 2 ml   Net 1893 ml           Physical Exam  Constitutional:       General: She is not in acute distress.     Appearance: Normal appearance. She is normal weight. She is not ill-appearing.   HENT:      Head: Normocephalic and atraumatic.   Eyes:      General: No scleral icterus.     Extraocular Movements: Extraocular movements intact.   Cardiovascular:      Rate and Rhythm: Normal rate and regular rhythm.      Pulses: Normal pulses.      Heart sounds: Normal heart sounds.   Pulmonary:      Effort: Pulmonary effort is normal.      Breath sounds: Normal breath sounds.   Abdominal:      General: Abdomen is flat. Bowel sounds are normal. There is no distension.      Palpations: Abdomen is  soft. There is no mass.      Tenderness: There is no abdominal tenderness.   Skin:     General: Skin is warm and dry.      Capillary Refill: Capillary refill takes less than 2 seconds.      Findings: Rash present. No erythema or lesion.      Comments: See nurses' notes and wound care notes  Wounds POA   Neurological:      General: No focal deficit present.      Mental Status: She is alert and oriented to person, place, and time.   Psychiatric:         Mood and Affect: Mood normal.         Behavior: Behavior normal.         Thought Content: Thought content normal.             Significant Labs: All pertinent labs within the past 24 hours have been reviewed.  CBC:   Recent Labs   Lab 09/11/23  1302 09/12/23  0439 09/13/23  0450   WBC 30.03* 28.06* 23.99*   HGB 8.5* 8.2* 8.5*   HCT 26.3* 25.0* 26.3*    317 357       CMP:   Recent Labs   Lab 09/11/23  1302 09/12/23  0439 09/13/23  0450   * 134*  --    K 3.7 3.5  --    CO2 16* 14*  --    BUN 44.0* 39.0*  --    CREATININE 3.23* 2.68* 2.20*   CALCIUM 7.9* 7.2*  --    ALBUMIN 2.8* 2.2*  --    BILITOT 0.4 0.3  --    ALKPHOS 113 98  --    AST 21 14  --    ALT 18 10  --          Significant Imaging: I have reviewed all pertinent imaging results/findings within the past 24 hours.      Assessment/Plan:      * Clostridium difficile colitis  Continue po vancomycin      Acute kidney injury superimposed on CKD  Patient with acute kidney injury/acute renal failure likely due to pre-renal azotemia due to dehydration NEYDA is currently improving. Baseline creatinine 1.6-2.0 - Labs reviewed- Renal function/electrolytes with Estimated Creatinine Clearance: 23.7 mL/min (A) (based on SCr of 2.2 mg/dL (H)). according to latest data. Monitor urine output and serial BMP and adjust therapy as needed. Avoid nephrotoxins and renally dose meds for GFR listed above.    Continue ivf  Follow labs    Hyponatremia  Patient has hyponatremia which is uncontrolled,We will aim to correct the  "sodium by 4-6mEq in 24 hours. We will monitor sodium Daily. The hyponatremia is due to Dehydration/hypovolemia. We will obtain the following studies: TSH, T4. We will treat the hyponatremia with IV fluids as follows: 0.9% NS. The patient's sodium results have been reviewed and are listed below.  No results for input(s): "NA" in the last 24 hours.    Syncope  Likely due to volume depletion  Continue ivf      Metabolic acidosis  Due to dehydration  Continue ivf  monitor      Leukocytosis  liekly due to c. Diff        Wound of buttock  Wound care consulted  Present on admission      Hypertension  Holding BP meds due to relative hypotension  Will resume po meds when appropriate    Cholelithiasis with chronic cholecystitis  Surgery to follow        VTE Risk Mitigation (From admission, onward)         Ordered     IP VTE HIGH RISK PATIENT  Once         09/11/23 1841     Place sequential compression device  Until discontinued         09/11/23 1841                Discharge Planning   PARRIS: 9/15/2023     Code Status: Full Code   Is the patient medically ready for discharge?:     Reason for patient still in hospital (select all that apply): Patient trending condition and Treatment  Discharge Plan A: Home Health   Discharge Delays: None known at this time              Raquel Vogel MD  Department of Hospital Medicine   [facility]  "

## 2023-09-14 LAB
ABS NEUT CALC (OHS): 16.62 X10(3)/MCL (ref 2.1–9.2)
ANISOCYTOSIS BLD QL SMEAR: ABNORMAL
CREAT SERPL-MCNC: 2.02 MG/DL (ref 0.66–1.25)
ELLIPTOCYTOSIS (OHS): ABNORMAL
EOSINOPHIL NFR BLD MANUAL: 0.37 X10(3)/MCL (ref 0–0.9)
EOSINOPHIL NFR BLD MANUAL: 2 % (ref 0–3)
ERYTHROCYTE [DISTWIDTH] IN BLOOD BY AUTOMATED COUNT: 19.8 % (ref 11–14.5)
GFR SERPLBLD CREATININE-BSD FMLA CKD-EPI: 26 MLS/MIN/1.73/M2
HCT VFR BLD AUTO: 27.7 % (ref 36–48)
HGB BLD-MCNC: 9 G/DL (ref 11.8–16)
LYMPHOCYTES NFR BLD MANUAL: 1.12 X10(3)/MCL
LYMPHOCYTES NFR BLD MANUAL: 6 % (ref 20–55)
MCH RBC QN AUTO: 27.8 PG (ref 27–34)
MCHC RBC AUTO-ENTMCNC: 32.5 G/DL (ref 31–37)
MCV RBC AUTO: 85.5 FL (ref 79–99)
MONOCYTES NFR BLD MANUAL: 0.56 X10(3)/MCL (ref 0.1–1.3)
MONOCYTES NFR BLD MANUAL: 3 % (ref 0–10)
NEUTROPHILS NFR BLD MANUAL: 89 % (ref 37–73)
NRBC BLD AUTO-RTO: 0 %
PLATELET # BLD AUTO: 387 X10(3)/MCL (ref 140–371)
PLATELET # BLD EST: ABNORMAL 10*3/UL
PMV BLD AUTO: 9.4 FL (ref 9.4–12.4)
POIKILOCYTOSIS BLD QL SMEAR: ABNORMAL
RBC # BLD AUTO: 3.24 X10(6)/MCL (ref 4–5.1)
RBC MORPH BLD: ABNORMAL
WBC # SPEC AUTO: 18.67 X10(3)/MCL (ref 4–11.5)

## 2023-09-14 PROCEDURE — 63600175 PHARM REV CODE 636 W HCPCS: Performed by: FAMILY MEDICINE

## 2023-09-14 PROCEDURE — 94761 N-INVAS EAR/PLS OXIMETRY MLT: CPT

## 2023-09-14 PROCEDURE — 25000003 PHARM REV CODE 250: Performed by: FAMILY MEDICINE

## 2023-09-14 PROCEDURE — 97530 THERAPEUTIC ACTIVITIES: CPT

## 2023-09-14 PROCEDURE — 82565 ASSAY OF CREATININE: CPT | Performed by: FAMILY MEDICINE

## 2023-09-14 PROCEDURE — 97116 GAIT TRAINING THERAPY: CPT

## 2023-09-14 PROCEDURE — 36415 COLL VENOUS BLD VENIPUNCTURE: CPT | Performed by: FAMILY MEDICINE

## 2023-09-14 PROCEDURE — 85027 COMPLETE CBC AUTOMATED: CPT | Performed by: FAMILY MEDICINE

## 2023-09-14 PROCEDURE — 11000001 HC ACUTE MED/SURG PRIVATE ROOM

## 2023-09-14 PROCEDURE — 21400001 HC TELEMETRY ROOM

## 2023-09-14 RX ADMIN — CLOPIDOGREL BISULFATE 75 MG: 75 TABLET ORAL at 04:09

## 2023-09-14 RX ADMIN — MELATONIN TAB 3 MG 3 MG: 3 TAB at 08:09

## 2023-09-14 RX ADMIN — ROPINIROLE HYDROCHLORIDE 0.25 MG: 0.25 TABLET, FILM COATED ORAL at 08:09

## 2023-09-14 RX ADMIN — PIPERACILLIN AND TAZOBACTAM 4.5 G: 4; .5 INJECTION, POWDER, FOR SOLUTION INTRAVENOUS; PARENTERAL at 01:09

## 2023-09-14 RX ADMIN — ESCITALOPRAM OXALATE 20 MG: 10 TABLET ORAL at 08:09

## 2023-09-14 RX ADMIN — VANCOMYCIN HYDROCHLORIDE 125 MG: 125 CAPSULE ORAL at 08:09

## 2023-09-14 RX ADMIN — BUSPIRONE HYDROCHLORIDE 15 MG: 15 TABLET ORAL at 02:09

## 2023-09-14 RX ADMIN — VANCOMYCIN HYDROCHLORIDE 125 MG: 125 CAPSULE ORAL at 04:09

## 2023-09-14 RX ADMIN — SODIUM CHLORIDE: 9 INJECTION, SOLUTION INTRAVENOUS at 01:09

## 2023-09-14 RX ADMIN — SODIUM CHLORIDE: 9 INJECTION, SOLUTION INTRAVENOUS at 10:09

## 2023-09-14 RX ADMIN — ONDANSETRON 4 MG: 2 INJECTION INTRAMUSCULAR; INTRAVENOUS at 08:09

## 2023-09-14 RX ADMIN — PIPERACILLIN AND TAZOBACTAM 4.5 G: 4; .5 INJECTION, POWDER, FOR SOLUTION INTRAVENOUS; PARENTERAL at 04:09

## 2023-09-14 RX ADMIN — BUSPIRONE HYDROCHLORIDE 15 MG: 15 TABLET ORAL at 08:09

## 2023-09-14 RX ADMIN — VANCOMYCIN HYDROCHLORIDE 125 MG: 125 CAPSULE ORAL at 12:09

## 2023-09-14 RX ADMIN — FERROUS SULFATE TAB 325 MG (65 MG ELEMENTAL FE) 1 EACH: 325 (65 FE) TAB at 08:09

## 2023-09-14 RX ADMIN — FAMOTIDINE 20 MG: 20 TABLET ORAL at 08:09

## 2023-09-14 RX ADMIN — PIPERACILLIN AND TAZOBACTAM 4.5 G: 4; .5 INJECTION, POWDER, FOR SOLUTION INTRAVENOUS; PARENTERAL at 08:09

## 2023-09-14 RX ADMIN — SODIUM CHLORIDE: 9 INJECTION, SOLUTION INTRAVENOUS at 12:09

## 2023-09-14 NOTE — PROGRESS NOTES
"Patient is a 72 yr-old female that presents to the emergency room after a fall while going to the bathroom.  She is been stooling 8 to 9 times a day for the past month at least.  So after trying to get off the toilet today, she felt so weak she fell to the ground.  She did not hit her head, but lowered herself to the ground and then "passed out".  Denies any chest pains or shortness a breath or abdominal cramping, just loose watery stools.  She notes that she has no appetite, has not been able to eat more than a few bites of food in a day.  She was diagnosed with colitis by an colonoscope in early July.  Patient noted recurrent symptoms since beginning of August.             Plan   C Diff treated with vancomycin   Cardiac Evaluation   Zithromax for treatment of pneumonia   Lap Irene on Friday for CT findings consistent with acute cholecystitis      09/12/2023  Patient is in stable condition receiving oral vancomycin C diff colitis has been diagnosed.  Patient is having runs of diarrhea white count [id number] I see no evidence of any appendicitis at this time based on exam Patient in addition to this will have cholecystectomy on Friday.  Exam and remainder of treatment plan is not changed from above.  "

## 2023-09-14 NOTE — PLAN OF CARE
Problem: Adult Inpatient Plan of Care  Goal: Plan of Care Review  Outcome: Ongoing, Not Progressing  Goal: Patient-Specific Goal (Individualized)  Outcome: Ongoing, Not Progressing  Goal: Absence of Hospital-Acquired Illness or Injury  Outcome: Ongoing, Not Progressing  Goal: Optimal Comfort and Wellbeing  Outcome: Ongoing, Not Progressing  Goal: Readiness for Transition of Care  Outcome: Ongoing, Not Progressing     Problem: Fluid and Electrolyte Imbalance (Acute Kidney Injury/Impairment)  Goal: Fluid and Electrolyte Balance  Outcome: Ongoing, Not Progressing     Problem: Oral Intake Inadequate (Acute Kidney Injury/Impairment)  Goal: Optimal Nutrition Intake  Outcome: Ongoing, Not Progressing     Problem: Renal Function Impairment (Acute Kidney Injury/Impairment)  Goal: Effective Renal Function  Outcome: Ongoing, Not Progressing     Problem: Impaired Wound Healing  Goal: Optimal Wound Healing  Outcome: Ongoing, Not Progressing     Problem: Skin Injury Risk Increased  Goal: Skin Health and Integrity  Outcome: Ongoing, Not Progressing     Problem: Fluid Volume Deficit  Goal: Fluid Balance  Outcome: Ongoing, Not Progressing

## 2023-09-14 NOTE — PROGRESS NOTES
"Patient is a 72 yr-old female that presents to the emergency room after a fall while going to the bathroom.  She is been stooling 8 to 9 times a day for the past month at least.  So after trying to get off the toilet today, she felt so weak she fell to the ground.  She did not hit her head, but lowered herself to the ground and then "passed out".  Denies any chest pains or shortness a breath or abdominal cramping, just loose watery stools.  She notes that she has no appetite, has not been able to eat more than a few bites of food in a day.  She was diagnosed with colitis by an colonoscope in early July.  Patient noted recurrent symptoms since beginning of August.             Plan   C Diff treated with vancomycin   Cardiac Evaluation   Zithromax for treatment of pneumonia   Lap Irene on Friday for CT findings consistent with acute cholecystitis      09/12/2023  Patient is in stable condition receiving oral vancomycin C diff colitis has been diagnosed.  Patient is having runs of diarrhea white count [id number] I see no evidence of any appendicitis at this time based on exam Patient in addition to this will have cholecystectomy on Friday.  Exam and remainder of treatment plan is not changed from above.    09/13/2023  Patient is still has significant diarrhea and associated cramps but they have improved.  CT shows gallstones.  Surgical interventions pending resolution of C diff colitis that is presently her primary problem.  "

## 2023-09-14 NOTE — SUBJECTIVE & OBJECTIVE
Interval History:      Review of Systems   Constitutional:  Negative for activity change, appetite change and fever.   Respiratory:  Negative for chest tightness, shortness of breath and wheezing.    Cardiovascular:  Negative for chest pain.   Gastrointestinal:  Negative for abdominal pain, constipation, diarrhea, nausea and vomiting.   Skin:  Negative for rash and wound.     Objective:     Vital Signs (Most Recent):  Temp: 98.8 °F (37.1 °C) (09/14/23 1119)  Pulse: 78 (09/14/23 1119)  Resp: 20 (09/14/23 1119)  BP: (!) 155/70 (09/14/23 1119)  SpO2: 100 % (09/14/23 1119) Vital Signs (24h Range):  Temp:  [97.3 °F (36.3 °C)-98.8 °F (37.1 °C)] 98.8 °F (37.1 °C)  Pulse:  [63-78] 78  Resp:  [20] 20  SpO2:  [95 %-100 %] 100 %  BP: (126-155)/(58-70) 155/70     Weight: 77.2 kg (170 lb 1.6 oz)  Body mass index is 25.86 kg/m².    Intake/Output Summary (Last 24 hours) at 9/14/2023 1304  Last data filed at 9/14/2023 0906  Gross per 24 hour   Intake 4366.33 ml   Output --   Net 4366.33 ml           Physical Exam  Constitutional:       General: She is not in acute distress.     Appearance: Normal appearance. She is normal weight. She is not ill-appearing.   Eyes:      General: No scleral icterus.  Cardiovascular:      Rate and Rhythm: Normal rate and regular rhythm.      Pulses: Normal pulses.      Heart sounds: Normal heart sounds.   Pulmonary:      Effort: Pulmonary effort is normal.      Breath sounds: Normal breath sounds.   Abdominal:      General: There is no distension.      Palpations: Abdomen is soft. There is no mass.      Tenderness: There is no abdominal tenderness.   Musculoskeletal:      Right lower leg: No edema.      Left lower leg: No edema.   Skin:     General: Skin is warm and dry.      Capillary Refill: Capillary refill takes less than 2 seconds.      Findings: Rash present. No erythema or lesion.      Comments: See nurses' notes and wound care notes  Wounds POA   Neurological:      General: No focal deficit  present.      Mental Status: She is alert. Mental status is at baseline.   Psychiatric:         Mood and Affect: Mood normal.         Behavior: Behavior normal.             Significant Labs: All pertinent labs within the past 24 hours have been reviewed.  CBC:   Recent Labs   Lab 09/13/23 0450 09/14/23 0452   WBC 23.99* 18.67*   HGB 8.5* 9.0*   HCT 26.3* 27.7*    387*       CMP:   Recent Labs   Lab 09/13/23 0450 09/14/23 0452   CREATININE 2.20* 2.02*         Significant Imaging: I have reviewed all pertinent imaging results/findings within the past 24 hours.

## 2023-09-14 NOTE — PLAN OF CARE
Physician ordered to consult [facility] to al. Patient aware of consult thru discussion with MD. Referral made to [facility] per phone/fax, spoke with Holly.

## 2023-09-14 NOTE — PLAN OF CARE
Problem: Adult Inpatient Plan of Care  Goal: Plan of Care Review  Outcome: Ongoing, Progressing  Goal: Patient-Specific Goal (Individualized)  Outcome: Ongoing, Progressing  Goal: Absence of Hospital-Acquired Illness or Injury  Outcome: Ongoing, Progressing  Goal: Optimal Comfort and Wellbeing  Outcome: Ongoing, Progressing  Goal: Readiness for Transition of Care  Outcome: Ongoing, Progressing     Problem: Fluid and Electrolyte Imbalance (Acute Kidney Injury/Impairment)  Goal: Fluid and Electrolyte Balance  Outcome: Ongoing, Progressing     Problem: Oral Intake Inadequate (Acute Kidney Injury/Impairment)  Goal: Optimal Nutrition Intake  Outcome: Ongoing, Progressing     Problem: Renal Function Impairment (Acute Kidney Injury/Impairment)  Goal: Effective Renal Function  Outcome: Ongoing, Progressing     Problem: Impaired Wound Healing  Goal: Optimal Wound Healing  Outcome: Ongoing, Progressing     Problem: Skin Injury Risk Increased  Goal: Skin Health and Integrity  Outcome: Ongoing, Progressing     Problem: Fluid Volume Deficit  Goal: Fluid Balance  Outcome: Ongoing, Progressing

## 2023-09-14 NOTE — PROGRESS NOTES
Inpatient Nutrition Follow-up    Admit Date: 9/11/2023   Total duration of encounter: 3 days     Nutrition Recommendation/Prescription     Continue Clear Liquid Diet with Isolation precaution as medically appropriate. ADAT to Renal, Low Fiber once able to advance diet.     Recommend adding Boost Breeze TID (250 kcal, 9 gm pro) while on CLD as medically appropriate. Once diet advanced add Novasource 1x/day (475 kcal, 22 gm pro).     RD to add Banatrol TID once diet advanced with pudding/yogurt to help with diarrhea.     Closely monitor Renal function/GI function.     If unable to advance diet within 48-72 hours, consider alternate nutrition via TPN.    Dietitian will monitor Electrolytes, Energy Intake, Food and Beverage Intake, Gastrointestinal Profile, Renal Function, Weight, and Weight Change and adjust MNT as needed.     Monitoring & Evaluation     Communication of Recommendations: reviewed with nurse, reviewed with patient, and reviewed with family    Reason Seen: continuous nutrition monitoring    Nutrition Risk/Follow-Up: high (follow-up in 1-4 days)   Please consult if re-assessment needed sooner.      Nutrition Assessment     Malnutrition Assessment/Nutrition-Focused Physical Exam  NFPE not appropriate at this time. Unable to diagnose malnutrition at this time due to not enough criteria known.            Weight Loss (Malnutrition): greater than 7.5% in 3 months (22.88# (12/3%) since 6/30/23, ~2.3 months.)                                                  A minimum of two characteristics is recommended for diagnosis of either severe or non-severe malnutrition.    Chart Review    Malnutrition Screening Tool Results   Have you recently lost weight without trying?: No  Have you been eating poorly because of a decreased appetite?: No   MST Score: 0     Diagnosis:  Acute on Chronic Renal Failure, C.diff, Syncope, Leukocytosis, Metabolic acidosis, Chronic Anemia, Hyponatremia, Cholelithiasis with chronic  cholecystitis.     Past Medical History:   Diagnosis Date    Anxiety     Chronic renal disease stage 4     Colitis 7/6/2023    Colon cancer screening declined     Coronary artery disease     Depression     Hypertension     Irregular heart rhythm     Medication management     Obesity, unspecified     Osteoarthritis of knee     Pain, joint, shoulder region, right     Refused influenza vaccine     Refused pneumococcal vaccination     Smoker      Past Surgical History:   Procedure Laterality Date    ANGIOPLASTY  11/20/2019    COLONOSCOPY  07/05/2023    ESOPHAGOGASTRODUODENOSCOPY  10/29/2018    STENT, AORTA         Nutrition-Related Medications: Vancomycin, IVF @ 125 ml/hr, Ferrous sulfate   Calorie Containing IV Medications: no significant kcals from medications at this time    Nutrition-Related Labs:   Lab Results   Component Value Date    WBC 18.67 (H) 09/14/2023    WBC 7.8 03/18/2021     Lab Results   Component Value Date    RBC 3.24 (L) 09/14/2023    RBC 3.60 (L) 03/18/2021     Lab Results   Component Value Date    HGB 9.0 (L) 09/14/2023    HGB 9.5 (L) 03/18/2021     Lab Results   Component Value Date    HCT 27.7 (L) 09/14/2023    HCT 31.2 (L) 03/18/2021     Lab Results   Component Value Date     (L) 09/12/2023     03/18/2021     Lab Results   Component Value Date    CHLORIDE 111 (H) 09/12/2023    CHLORIDE 107 03/18/2021     Lab Results   Component Value Date    CO2 14 (L) 09/12/2023    CO2 24 03/18/2021     Lab Results   Component Value Date    BUN 39.0 (H) 09/12/2023    BUN 32 (H) 03/18/2021     Lab Results   Component Value Date    CREATININE 2.02 (H) 09/14/2023    CREATININE 1.80 (H) 03/18/2021     Lab Results   Component Value Date    EGFRNORACEVR 26 09/14/2023    EGFRNORACEVR 27 04/13/2023     Lab Results   Component Value Date    POCTGLUCOSE 120 (H) 06/30/2023     Lab Results   Component Value Date    CALCIUM 7.2 (L) 09/12/2023    CALCIUM 9.3 03/18/2021     Lab Results   Component Value Date    MG  1.60 (L) 06/27/2023    MG 1.60 (L) 06/26/2023     Lab Results   Component Value Date    ALBUMIN 2.2 (L) 09/12/2023    ALBUMIN 4.0 03/18/2021     CrCl:    Lab Value: 19.4    Date: 9/12  CrCl:    Lab Value: 27.9    Date: 9/14     Diet/PN Order: Diet clear liquid  Oral Supplement Order: none  Tube Feeding Order: none  Factors Affecting Nutritional Intake: altered gastrointestinal function, clear liquid diet, decreased appetite, and early satiety  Food/Sikh/Cultural Preferences:  Dislike all vegetables except: Lettuce, Tomato, Cucumber, and Corn.   Food Allergies: none reported and no known food allergies    Skin Integrity: wound  Wound(s):      Altered Skin Integrity 09/11/23 1831 Left Buttocks #1 Partial thickness tissue loss. Shallow open ulcer with a red or pink wound bed, without slough. Intact or Open/Ruptured Serum-filled blister.-Tissue loss description: Partial thickness       Altered Skin Integrity 09/11/23 Left Buttocks #2 Partial thickness tissue loss. Shallow open ulcer with a red or pink wound bed, without slough. Intact or Open/Ruptured Serum-filled blister.-Tissue loss description: Partial thickness  [REMOVED]      Altered Skin Integrity 09/11/23 1836 Left Buttocks #3 Partial thickness tissue loss. Shallow open ulcer with a red or pink wound bed, without slough. Intact or Open/Ruptured Serum-filled blister.-Tissue loss description: Partial thickness       Altered Skin Integrity 09/11/23 1838 Right Buttocks #1 Partial thickness tissue loss. Shallow open ulcer with a red or pink wound bed, without slough. Intact or Open/Ruptured Serum-filled blister.-Tissue loss description: Partial thickness       Altered Skin Integrity 09/11/23 1838 Right Buttocks #2 Partial thickness tissue loss. Shallow open ulcer with a red or pink wound bed, without slough. Intact or Open/Ruptured Serum-filled blister.-Tissue loss description: Partial thickness     Overview/Hospital Course:    (9/12): Patient reports poor  "appetite for a while d/t early satiety, and reported similar issue on last admit. Per EMR patient averaged ~10%-1 meal on CLD d/t multiple bouts of diarrhea over past month and pending consult for surgery. Per EMR she weighed 84 kg on 6/30/23, CBW- 73.6 kg showing a 22.88# (12.3%) weight loss. Patient does not like apple sauce and has not had yogurt but willing to try if it will help with diarrhea. GI: ND/TENDER/DIARRHEA/LBM-9/12, NUTR:3, BRDN: 18, NO EDEMA NOTED, 24 HR I/O: 2900/300.    (9/14): Patient reports nausea and poor appetite with little improvement since 9/12. Patient was willing to try ONS. Per EMR patient averaged 50%- 2 meals on CLD d/t multiple bouts of diarrhea over the past month and pending consult for surgery for gallbladder. Per EMR patient has gained 7.92# since 9/12, likely related to fluid retention since NPO/CLD day 3. GI: TAUT/DIARRHEA/LBM-9/14, NUTR: 3, BRDN: 19, NO EDEMA NOTED, 24 HR I/O: 4366/0.    Anthropometrics    Height: 5' 8" (172.7 cm) Height Method: Stated  Last Weight: 77.2 kg (170 lb 1.6 oz) (09/13/23 1858) Weight Method: Bed Scale  BMI (Calculated): 25.9  BMI Classification: overweight (BMI 25-29.9)     Ideal Body Weight (IBW), Female: 140 lb     % Ideal Body Weight, Female (lb): 115.93 %                             Usual Weight Provided By: EMR weight history    Wt Readings from Last 5 Encounters:   09/13/23 77.2 kg (170 lb 1.6 oz)   08/29/23 70.8 kg (156 lb)   07/17/23 76.2 kg (167 lb 15.9 oz)   07/06/23 77.6 kg (171 lb 1.2 oz)   06/30/23 84 kg (185 lb 3.2 oz)     Weight Change(s) Since Admission:  Admit Weight: 68.9 kg (152 lb) (09/11/23 1154)      Estimated Needs    Weight Used For Calorie Calculations: 73.6 kg (162 lb 4.1 oz)  Energy Calorie Requirements (kcal): 1463-7190 KCAL (25-29 KCAL/KG CBW)  Energy Need Method: Kcal/kg  Weight Used For Protein Calculations: 73.6 kg (162 lb 4.1 oz)  Protein Requirements: 44-59 GM PRO (0.6-0.8 GM/KG CBW)  Fluid Requirements (mL): 1000 " ML H2O + OUTPUT.  Temp (24hrs), Av.9 °F (36.6 °C), Min:97.3 °F (36.3 °C), Max:98.6 °F (37 °C)         Enteral Nutrition    Patient not receiving enteral nutrition at this time.    Parenteral Nutrition    Patient not receiving parenteral nutrition support at this time.    Evaluation of Received Nutrient Intake    Calories: not meeting estimated needs  Protein: not meeting estimated needs    Patient Education    Not applicable.         Nutrition Diagnosis     PES: Inadequate energy intake related to altered GI function as evidenced by estimated energy intake from diet less than estimated energy needs. (continues)    Interventions/Goals     Intervention(s): general/healthful diet, modified composition of meals/snacks, commercial beverage, commercial food, and collaboration with other providers    Goal: Meet Greater than 75% of nutritional needs by discharge. (goal not met)

## 2023-09-14 NOTE — PT/OT/SLP PROGRESS
Physical Therapy Treatment    Patient Name:  Rosalie Campbell   MRN:  [id number]    Recommendations:     Discharge Recommendations: home with home health, rehabilitation facility  Discharge Equipment Recommendations: none  Barriers to discharge:  current medical status    Assessment:     Rosalie Campbell is a 72 y.o. female admitted with a medical diagnosis of Clostridium difficile colitis.  She presents with the following impairments/functional limitations: weakness, impaired endurance, impaired functional mobility, gait instability, impaired balance, decreased lower extremity function, decreased safety awareness     Patient tolerated sitting EOB performing BLE therex, then performed gait training to restroom, then back to bed. Required assistance x 2 people due to not feeling well. .    Rehab Prognosis: Good and Fair; patient would benefit from acute skilled PT services to address these deficits and reach maximum level of function.    Recent Surgery: * No surgery found *      Plan:     During this hospitalization, patient to be seen 5 x/week (5-6x weekly/ 1-2x daily) to address the identified rehab impairments via gait training, therapeutic activities, therapeutic exercises and progress toward the following goals:    Plan of Care Expires:  10/13/23    Subjective     Chief Complaint: weakness  Patient/Family Comments/goals: to get stronger  Pain/Comfort:         Objective:     Communicated with nursing prior to session.  Patient found HOB elevated with peripheral IV upon PT entry to room.     General Precautions: Standard, fall  Orthopedic Precautions: N/A  Braces: N/A  Respiratory Status: Room air     Functional Mobility:  Bed Mobility:     Supine to Sit: minimum assistance  Sit to Supine: minimum assistance  Transfers:     Sit to Stand:  minimum assistance and moderate assistance with rolling walker  Gait: 12 feet with RW with min A x 2 people for safety due to increased weakness after using restroom.       AM-PAC 6 CLICK  MOBILITY          Treatment & Education:  See above.     Patient left HOB elevated with all lines intact, call button in reach, and bed alarm on..    GOALS:   Multidisciplinary Problems       Physical Therapy Goals          Problem: Physical Therapy    Goal Priority Disciplines Outcome Goal Variances Interventions   Physical Therapy Goal     PT, PT/OT Ongoing, Progressing     Description: Goals to be met by: discharge     Patient will increase functional independence with mobility by performin. Supine to sit with Contact Guard Assistance  2. Sit to stand transfer with Contact Guard Assistance  3. Gait  x 75 feet with Contact Guard Assistance using Rolling Walker.                          Time Tracking:     PT Received On: 23  PT Start Time: 1330     PT Stop Time: 1400  PT Total Time (min): 30 min     Billable Minutes: Gait Training 15 and Therapeutic Activity 15    Treatment Type: Treatment  PT/PTA: PT           2023

## 2023-09-14 NOTE — PROGRESS NOTES
"Patient is a 72 yr-old female that presents to the emergency room after a fall while going to the bathroom.  She is been stooling 8 to 9 times a day for the past month at least.  So after trying to get off the toilet today, she felt so weak she fell to the ground.  She did not hit her head, but lowered herself to the ground and then "passed out".  Denies any chest pains or shortness a breath or abdominal cramping, just loose watery stools.  She notes that she has no appetite, has not been able to eat more than a few bites of food in a day.  She was diagnosed with colitis by an colonoscope in early July.  Patient noted recurrent symptoms since beginning of August.             Plan   C Diff treated with vancomycin   Cardiac Evaluation   Zithromax for treatment of pneumonia   Lap Irene on Friday for CT findings consistent with acute cholecystitis      09/12/2023  Patient is in stable condition receiving oral vancomycin C diff colitis has been diagnosed.  Patient is having runs of diarrhea white count [id number] I see no evidence of any appendicitis at this time based on exam Patient in addition to this will have cholecystectomy on Friday.  Exam and remainder of treatment plan is not changed from above.    09/13/2023  Patient is still has significant diarrhea and associated cramps but they have improved.  CT shows gallstones.  Surgical interventions pending resolution of C diff colitis that is presently her primary problem.    09/14/2023  Colitis appears to be improving   My intention will be to perform cholecystectomy in the morning  "

## 2023-09-15 ENCOUNTER — ANESTHESIA EVENT (OUTPATIENT)
Dept: SURGERY | Facility: HOSPITAL | Age: 73
DRG: 356 | End: 2023-09-15
Payer: MEDICARE

## 2023-09-15 ENCOUNTER — ANESTHESIA (OUTPATIENT)
Dept: SURGERY | Facility: HOSPITAL | Age: 73
DRG: 356 | End: 2023-09-15
Payer: MEDICARE

## 2023-09-15 PROBLEM — R53.81 DEBILITY: Status: ACTIVE | Noted: 2023-09-15

## 2023-09-15 LAB
BASOPHILS # BLD AUTO: 0.03 X10(3)/MCL (ref 0.01–0.08)
BASOPHILS NFR BLD AUTO: 0.2 % (ref 0.1–1.2)
CREAT SERPL-MCNC: 1.91 MG/DL (ref 0.66–1.25)
EOSINOPHIL # BLD AUTO: 0.37 X10(3)/MCL (ref 0.04–0.36)
EOSINOPHIL NFR BLD AUTO: 2.3 % (ref 0.7–7)
ERYTHROCYTE [DISTWIDTH] IN BLOOD BY AUTOMATED COUNT: 19.9 % (ref 11–14.5)
GFR SERPLBLD CREATININE-BSD FMLA CKD-EPI: 28 MLS/MIN/1.73/M2
HCT VFR BLD AUTO: 29.2 % (ref 36–48)
HGB BLD-MCNC: 9.3 G/DL (ref 11.8–16)
IMM GRANULOCYTES # BLD AUTO: 0.12 X10(3)/MCL (ref 0–0.03)
IMM GRANULOCYTES NFR BLD AUTO: 0.8 % (ref 0–0.5)
LYMPHOCYTES # BLD AUTO: 1.12 X10(3)/MCL (ref 1.16–3.74)
LYMPHOCYTES NFR BLD AUTO: 7 % (ref 20–55)
MCH RBC QN AUTO: 27.4 PG (ref 27–34)
MCHC RBC AUTO-ENTMCNC: 31.8 G/DL (ref 31–37)
MCV RBC AUTO: 85.9 FL (ref 79–99)
MONOCYTES # BLD AUTO: 0.86 X10(3)/MCL (ref 0.24–0.36)
MONOCYTES NFR BLD AUTO: 5.4 % (ref 4.7–12.5)
NEUTROPHILS # BLD AUTO: 13.39 X10(3)/MCL (ref 1.56–6.13)
NEUTROPHILS NFR BLD AUTO: 84.3 % (ref 37–73)
NRBC BLD AUTO-RTO: 0 %
PLATELET # BLD AUTO: 411 X10(3)/MCL (ref 140–371)
PMV BLD AUTO: 9.6 FL (ref 9.4–12.4)
RBC # BLD AUTO: 3.4 X10(6)/MCL (ref 4–5.1)
WBC # SPEC AUTO: 15.89 X10(3)/MCL (ref 4–11.5)

## 2023-09-15 PROCEDURE — 25000003 PHARM REV CODE 250: Performed by: SURGERY

## 2023-09-15 PROCEDURE — 25000003 PHARM REV CODE 250: Performed by: FAMILY MEDICINE

## 2023-09-15 PROCEDURE — 63600175 PHARM REV CODE 636 W HCPCS: Performed by: NURSE ANESTHETIST, CERTIFIED REGISTERED

## 2023-09-15 PROCEDURE — 21400001 HC TELEMETRY ROOM

## 2023-09-15 PROCEDURE — 11000001 HC ACUTE MED/SURG PRIVATE ROOM

## 2023-09-15 PROCEDURE — C1713 ANCHOR/SCREW BN/BN,TIS/BN: HCPCS | Performed by: SURGERY

## 2023-09-15 PROCEDURE — 36000708 HC OR TIME LEV III 1ST 15 MIN: Performed by: SURGERY

## 2023-09-15 PROCEDURE — 82565 ASSAY OF CREATININE: CPT | Performed by: FAMILY MEDICINE

## 2023-09-15 PROCEDURE — D9220A PRA ANESTHESIA: Mod: ,,, | Performed by: NURSE ANESTHETIST, CERTIFIED REGISTERED

## 2023-09-15 PROCEDURE — 37000008 HC ANESTHESIA 1ST 15 MINUTES: Performed by: SURGERY

## 2023-09-15 PROCEDURE — 71000033 HC RECOVERY, INTIAL HOUR: Performed by: SURGERY

## 2023-09-15 PROCEDURE — 63600175 PHARM REV CODE 636 W HCPCS: Performed by: FAMILY MEDICINE

## 2023-09-15 PROCEDURE — 97110 THERAPEUTIC EXERCISES: CPT

## 2023-09-15 PROCEDURE — 25500020 PHARM REV CODE 255: Performed by: SURGERY

## 2023-09-15 PROCEDURE — 27201423 OPTIME MED/SURG SUP & DEVICES STERILE SUPPLY: Performed by: SURGERY

## 2023-09-15 PROCEDURE — D9220A PRA ANESTHESIA: ICD-10-PCS | Mod: ,,, | Performed by: NURSE ANESTHETIST, CERTIFIED REGISTERED

## 2023-09-15 PROCEDURE — 97116 GAIT TRAINING THERAPY: CPT

## 2023-09-15 PROCEDURE — 25000003 PHARM REV CODE 250: Performed by: NURSE ANESTHETIST, CERTIFIED REGISTERED

## 2023-09-15 PROCEDURE — 63600175 PHARM REV CODE 636 W HCPCS: Performed by: INTERNAL MEDICINE

## 2023-09-15 PROCEDURE — 36415 COLL VENOUS BLD VENIPUNCTURE: CPT | Performed by: FAMILY MEDICINE

## 2023-09-15 PROCEDURE — 94761 N-INVAS EAR/PLS OXIMETRY MLT: CPT

## 2023-09-15 PROCEDURE — 36000709 HC OR TIME LEV III EA ADD 15 MIN: Performed by: SURGERY

## 2023-09-15 PROCEDURE — 85025 COMPLETE CBC W/AUTO DIFF WBC: CPT | Performed by: FAMILY MEDICINE

## 2023-09-15 PROCEDURE — 63600175 PHARM REV CODE 636 W HCPCS: Performed by: SURGERY

## 2023-09-15 PROCEDURE — 37000009 HC ANESTHESIA EA ADD 15 MINS: Performed by: SURGERY

## 2023-09-15 RX ORDER — MIDAZOLAM HYDROCHLORIDE 1 MG/ML
1 INJECTION INTRAMUSCULAR; INTRAVENOUS
Status: COMPLETED | OUTPATIENT
Start: 2023-09-15 | End: 2023-09-15

## 2023-09-15 RX ORDER — MIDAZOLAM HYDROCHLORIDE 1 MG/ML
1 INJECTION INTRAMUSCULAR; INTRAVENOUS
Status: DISCONTINUED | OUTPATIENT
Start: 2023-09-16 | End: 2023-09-15

## 2023-09-15 RX ORDER — VECURONIUM BROMIDE FOR INJECTION 1 MG/ML
INJECTION, POWDER, LYOPHILIZED, FOR SOLUTION INTRAVENOUS
Status: DISCONTINUED | OUTPATIENT
Start: 2023-09-15 | End: 2023-09-15

## 2023-09-15 RX ORDER — PROPOFOL 10 MG/ML
VIAL (ML) INTRAVENOUS
Status: DISCONTINUED | OUTPATIENT
Start: 2023-09-15 | End: 2023-09-15

## 2023-09-15 RX ORDER — FAMOTIDINE 20 MG/1
20 TABLET, FILM COATED ORAL
Status: COMPLETED | OUTPATIENT
Start: 2023-09-15 | End: 2023-09-15

## 2023-09-15 RX ORDER — FAMOTIDINE 20 MG/1
20 TABLET, FILM COATED ORAL
Status: DISCONTINUED | OUTPATIENT
Start: 2023-09-16 | End: 2023-09-15

## 2023-09-15 RX ORDER — NEOSTIGMINE METHYLSULFATE 1 MG/ML
INJECTION, SOLUTION INTRAVENOUS
Status: DISCONTINUED | OUTPATIENT
Start: 2023-09-15 | End: 2023-09-15

## 2023-09-15 RX ORDER — MORPHINE SULFATE 2 MG/ML
2 INJECTION, SOLUTION INTRAMUSCULAR; INTRAVENOUS EVERY 6 HOURS PRN
Status: DISCONTINUED | OUTPATIENT
Start: 2023-09-15 | End: 2023-09-16

## 2023-09-15 RX ORDER — BUPIVACAINE HYDROCHLORIDE 5 MG/ML
INJECTION, SOLUTION EPIDURAL; INTRACAUDAL
Status: DISCONTINUED | OUTPATIENT
Start: 2023-09-15 | End: 2023-09-15 | Stop reason: HOSPADM

## 2023-09-15 RX ORDER — FENTANYL CITRATE 50 UG/ML
INJECTION, SOLUTION INTRAMUSCULAR; INTRAVENOUS
Status: DISCONTINUED | OUTPATIENT
Start: 2023-09-15 | End: 2023-09-15

## 2023-09-15 RX ORDER — LIDOCAINE HYDROCHLORIDE 10 MG/ML
INJECTION INFILTRATION; PERINEURAL
Status: DISCONTINUED | OUTPATIENT
Start: 2023-09-15 | End: 2023-09-15 | Stop reason: HOSPADM

## 2023-09-15 RX ADMIN — PIPERACILLIN AND TAZOBACTAM 4.5 G: 4; .5 INJECTION, POWDER, FOR SOLUTION INTRAVENOUS; PARENTERAL at 09:09

## 2023-09-15 RX ADMIN — ROPINIROLE HYDROCHLORIDE 0.25 MG: 0.25 TABLET, FILM COATED ORAL at 08:09

## 2023-09-15 RX ADMIN — VANCOMYCIN HYDROCHLORIDE 125 MG: 125 CAPSULE ORAL at 08:09

## 2023-09-15 RX ADMIN — GLYCOPYRROLATE 0.1 MG: 0.2 INJECTION, SOLUTION INTRAMUSCULAR; INTRAVITREAL at 03:09

## 2023-09-15 RX ADMIN — FERROUS SULFATE TAB 325 MG (65 MG ELEMENTAL FE) 1 EACH: 325 (65 FE) TAB at 08:09

## 2023-09-15 RX ADMIN — NEOSTIGMINE METHYLSULFATE 2.5 MG: 0.5 INJECTION INTRAVENOUS at 05:09

## 2023-09-15 RX ADMIN — PROPOFOL 70 MG: 10 INJECTION, EMULSION INTRAVENOUS at 04:09

## 2023-09-15 RX ADMIN — MIDAZOLAM HYDROCHLORIDE 1 MG: 1 INJECTION, SOLUTION INTRAMUSCULAR; INTRAVENOUS at 03:09

## 2023-09-15 RX ADMIN — GLYCOPYRROLATE 0.2 MG: 0.2 INJECTION, SOLUTION INTRAMUSCULAR; INTRAVITREAL at 05:09

## 2023-09-15 RX ADMIN — FENTANYL CITRATE 50 MCG: 50 INJECTION, SOLUTION INTRAMUSCULAR; INTRAVENOUS at 04:09

## 2023-09-15 RX ADMIN — FENTANYL CITRATE 50 MCG: 50 INJECTION, SOLUTION INTRAMUSCULAR; INTRAVENOUS at 03:09

## 2023-09-15 RX ADMIN — SODIUM CHLORIDE: 9 INJECTION, SOLUTION INTRAVENOUS at 08:09

## 2023-09-15 RX ADMIN — MORPHINE SULFATE 2 MG: 2 INJECTION, SOLUTION INTRAMUSCULAR; INTRAVENOUS at 07:09

## 2023-09-15 RX ADMIN — SODIUM CHLORIDE: 9 INJECTION, SOLUTION INTRAVENOUS at 02:09

## 2023-09-15 RX ADMIN — PIPERACILLIN AND TAZOBACTAM 4.5 G: 4; .5 INJECTION, POWDER, FOR SOLUTION INTRAVENOUS; PARENTERAL at 05:09

## 2023-09-15 RX ADMIN — VECURONIUM BROMIDE 4 MG: 10 INJECTION, POWDER, FOR SOLUTION INTRAVENOUS at 04:09

## 2023-09-15 RX ADMIN — BUSPIRONE HYDROCHLORIDE 15 MG: 15 TABLET ORAL at 08:09

## 2023-09-15 RX ADMIN — SODIUM CHLORIDE: 9 INJECTION, SOLUTION INTRAVENOUS at 06:09

## 2023-09-15 RX ADMIN — PIPERACILLIN AND TAZOBACTAM 4.5 G: 4; .5 INJECTION, POWDER, FOR SOLUTION INTRAVENOUS; PARENTERAL at 12:09

## 2023-09-15 RX ADMIN — FAMOTIDINE 20 MG: 20 TABLET ORAL at 03:09

## 2023-09-15 NOTE — ANESTHESIA PREPROCEDURE EVALUATION
09/15/2023  Rosalie Campbell is a 72 y.o., female.      Pre-op Assessment    I have reviewed the Patient Summary Reports.     I have reviewed the Nursing Notes. I have reviewed the NPO Status.   I have reviewed the Medications.     Review of Systems  Anesthesia Hx:  No problems with previous Anesthesia  Denies Family Hx of Anesthesia complications.   Denies Personal Hx of Anesthesia complications.   Social:  Smoker    Hematology/Oncology:     Oncology Normal    -- Anemia:   EENT/Dental:EENT/Dental Normal   Cardiovascular:   Exercise tolerance: poor Hypertension CAD  CABG/stent Dysrhythmias     Pulmonary:   COPD    Renal/:   Chronic Renal Disease, CKD    Hepatic/GI:  Hepatic/GI Normal    Musculoskeletal:   Arthritis     Neurological:  Neurology Normal    Endocrine:  Endocrine Normal    Dermatological:  Skin Normal    Psych:   Psychiatric History anxiety          Physical Exam  General: Well nourished, Cooperative, Alert and Oriented    Airway:  Mallampati: II / II  Mouth Opening: Small, but > 3cm  TM Distance: Normal  Tongue: Normal  Neck ROM: Normal ROM    Dental:  Intact        Anesthesia Plan  Type of Anesthesia, risks & benefits discussed:    Anesthesia Type: Gen ETT  Intra-op Monitoring Plan: Standard ASA Monitors  Post Op Pain Control Plan: multimodal analgesia  Induction:  IV  Airway Plan: Direct  Informed Consent: Informed consent signed with the Patient and all parties understand the risks and agree with anesthesia plan.  All questions answered. Patient consented to blood products? Yes  ASA Score: 4  Day of Surgery Review of History & Physical: H&P Update referred to the surgeon/provider.I have interviewed and examined the patient. I have reviewed the patient's H&P dated: There are no significant changes.     Ready For Surgery From Anesthesia Perspective.     .

## 2023-09-15 NOTE — PLAN OF CARE
09/15/23 0902   Discharge Reassessment   Assessment Type Discharge Planning Reassessment   Did the patient's condition or plan change since previous assessment? No   Discharge Plan discussed with: Patient   Communicated PARRIS with patient/caregiver Yes   Discharge Plan [facility]   DME Needed Upon Discharge  none   Transition of Care Barriers None   Why the patient remains in the hospital Requires continued medical care   Post-Acute Status   Post-Acute Authorization Placement   Post-Acute Placement Status Set-up Complete/Auth obtained   Coverage [facility]   Hospital Resources/Appts/Education Provided Post-Acute resouces added to AVS   Discharge Delays None known at this time

## 2023-09-15 NOTE — PROGRESS NOTES
[facility]/Surg  [facility] Medicine  Progress Note    Patient Name: Rosalie Campbell  MRN: [id number]  Patient Class: IP- Inpatient   Admission Date: 9/11/2023  Length of Stay: 4 days  Attending Physician: Raquel Vogel MD  Primary Care Provider: aJny Taveras FNP-C        Subjective:     Principal Problem:Clostridium difficile colitis        HPI:   Loss of Consciousness    Diarrhea       C/o diarrhea x 1 month syncopal on Saturday, and this am, dx w/ c diff last month, pt reports placed self on floor when felt like she was gonna pass out, denies hitting head         History of Present Illness: History obtained from Patient     Pt is a 72 y.o. female with hx of CLD 3/4, HTN, Depression/Anxiety, smoker, CAD presented to the ER with about a month of diarrhea.  Having multiple bouts a day.  She was doing to bathroom today and passed out.  She did not strike her head.  She states she fell on Saturday as well and landed on her buttocks.  She was C. Diff + in the ER.  She says she had some ABX about a month ago around the time she had a colonoscopy.  She denies fevers but has some obvious chills.  Denies CP, SOB, NV.  She denies any blood in stool.           Overview/Hospital Course:  09/12/2023 pt presented with 2 months of diarrhea + c. Diff toxin, admitted for dehydration and c. Diff + diarrhea  Mike on ckd usually Cr runs 1.6-2.0 was 3.2 on admit and is 2.6 this am.  WBC was 30,000.  Pt had c. Diff ordered on 08/30 but does not think she was started on any abx.  Was in hospitale here back 07/07/2023 with colitis and was d/c home with cipro and flagyl.  Diarrhea started after hospitalizations.  Wound care nurses has evaluated and pt has some excoriated and broken areas multiple on bilateral sacral/buttocks areas present on admit.  US and CT showed stones and slude in the gallbladder but no acute cholecystitis.  CT shows collitis likely due to c. Diff, cannot rule out appendicitis, pt does not have  acute abdominal pain.surgery has been consulted for evaluation  09/13/2023 pt still with significant diarrhea, cramps have improved a bit.  CT shows cholelithiasis.  Surgery consult is pending  09/14/2023 diarrhea is less this am and cramping is less.  Only 3 BM so far this am.  Surgery plans for lap kehinde in am.    09/15/2023 diarrhea is less, plans for lap kehinde today per surgery, continues with PT and case management working on rehab placement for Monday or Tuesday      Interval History:      Review of Systems   Constitutional:  Negative for activity change, appetite change and fever.   Respiratory:  Negative for chest tightness, shortness of breath and wheezing.    Cardiovascular:  Negative for chest pain.   Gastrointestinal:  Negative for abdominal pain, constipation, diarrhea, nausea and vomiting.   Skin:  Negative for rash and wound.     Objective:     Vital Signs (Most Recent):  Temp: 98.8 °F (37.1 °C) (09/14/23 1119)  Pulse: 78 (09/14/23 1119)  Resp: 20 (09/14/23 1119)  BP: (!) 155/70 (09/14/23 1119)  SpO2: 100 % (09/14/23 1119) Vital Signs (24h Range):  Temp:  [97.3 °F (36.3 °C)-98.8 °F (37.1 °C)] 98.8 °F (37.1 °C)  Pulse:  [63-78] 78  Resp:  [20] 20  SpO2:  [95 %-100 %] 100 %  BP: (126-155)/(58-70) 155/70     Weight: 77.2 kg (170 lb 1.6 oz)  Body mass index is 25.86 kg/m².    Intake/Output Summary (Last 24 hours) at 9/14/2023 1304  Last data filed at 9/14/2023 0906  Gross per 24 hour   Intake 4366.33 ml   Output --   Net 4366.33 ml           Physical Exam  Constitutional:       General: She is not in acute distress.     Appearance: Normal appearance. She is normal weight. She is not ill-appearing.   Eyes:      General: No scleral icterus.  Cardiovascular:      Rate and Rhythm: Normal rate and regular rhythm.      Pulses: Normal pulses.      Heart sounds: Normal heart sounds.   Pulmonary:      Effort: Pulmonary effort is normal.      Breath sounds: Normal breath sounds.   Abdominal:      General: There is  no distension.      Palpations: Abdomen is soft. There is no mass.      Tenderness: There is no abdominal tenderness.   Musculoskeletal:      Right lower leg: No edema.      Left lower leg: No edema.   Skin:     General: Skin is warm and dry.      Capillary Refill: Capillary refill takes less than 2 seconds.      Findings: Rash present. No erythema or lesion.      Comments: See nurses' notes and wound care notes  Wounds POA   Neurological:      General: No focal deficit present.      Mental Status: She is alert. Mental status is at baseline.   Psychiatric:         Mood and Affect: Mood normal.         Behavior: Behavior normal.             Significant Labs: All pertinent labs within the past 24 hours have been reviewed.  CBC:   Recent Labs   Lab 09/13/23 0450 09/14/23 0452   WBC 23.99* 18.67*   HGB 8.5* 9.0*   HCT 26.3* 27.7*    387*       CMP:   Recent Labs   Lab 09/13/23 0450 09/14/23 0452   CREATININE 2.20* 2.02*         Significant Imaging: I have reviewed all pertinent imaging results/findings within the past 24 hours.      Assessment/Plan:      * Clostridium difficile colitis  Continue po vancomycin  improving      Acute kidney injury superimposed on CKD  Patient with acute kidney injury/acute renal failure likely due to pre-renal azotemia due to dehydration NEYDA is currently improving. Baseline creatinine 1.6-2.0 - Labs reviewed- Renal function/electrolytes with Estimated Creatinine Clearance: 27.3 mL/min (A) (based on SCr of 1.91 mg/dL (H)). according to latest data. Monitor urine output and serial BMP and adjust therapy as needed. Avoid nephrotoxins and renally dose meds for GFR listed above.    Cr down to 1.9, imrpoving    Hyponatremia  Patient has hyponatremia which is uncontrolled,We will aim to correct the sodium by 4-6mEq in 24 hours. We will monitor sodium Daily. The hyponatremia is due to Dehydration/hypovolemia. We will obtain the following studies: TSH, T4. We will treat the hyponatremia  "with IV fluids as follows: 0.9% NS. The patient's sodium results have been reviewed and are listed below.  No results for input(s): "NA" in the last 24 hours.    Syncope  Likely due to volume depletion  Continue ivf      Cholelithiasis with chronic cholecystitis  Surgery to follow  NPO for surgery today      Metabolic acidosis  Due to dehydration  Continue ivf  monitor      Leukocytosis  liekly due to c. Diff        Wound of buttock  Wound care consulted  Present on admission      Hypertension  Holding BP meds due to relative hypotension  Will resume po meds when appropriate    Obesity  Body mass index is 25.67 kg/m². Morbid obesity complicates all aspects of disease management from diagnostic modalities to treatment. Weight loss encouraged and health benefits explained to patient.         Debility  Continue PT  Case management working on Rehab        VTE Risk Mitigation (From admission, onward)         Ordered     IP VTE HIGH RISK PATIENT  Once         09/11/23 1841     Place sequential compression device  Until discontinued         09/11/23 1841                Discharge Planning   PARRIS: 9/18/2023     Code Status: Full Code   Is the patient medically ready for discharge?:     Reason for patient still in hospital (select all that apply): Patient trending condition and Treatment  Discharge Plan A: [facility] ([facility]   Discharge Delays: None known at this time              Raquel Vogel MD  Department of Hospital Medicine   [facility]  "

## 2023-09-15 NOTE — ASSESSMENT & PLAN NOTE
Patient with acute kidney injury/acute renal failure likely due to pre-renal azotemia due to dehydration NEYDA is currently improving. Baseline creatinine 1.6-2.0 - Labs reviewed- Renal function/electrolytes with Estimated Creatinine Clearance: 27.3 mL/min (A) (based on SCr of 1.91 mg/dL (H)). according to latest data. Monitor urine output and serial BMP and adjust therapy as needed. Avoid nephrotoxins and renally dose meds for GFR listed above.    Cr down to 1.9, imrpoving

## 2023-09-15 NOTE — PROGRESS NOTES
HISTORY OF PRESENT ILLNESS  Jazz Torres is a 55 deficit present. Mental Status: She is alert and oriented to person, place, and time. Psychiatric:         Mood and Affect: Mood normal.         Behavior: Behavior normal.         Thought Content: Thought content normal.         Judgment: Judgment normal.         ASSESSMENT and PLAN  Diagnoses and all orders for this visit:    1.  Paronychia of great toe of right [facility]-Med/Surg  Wound Care    Patient Name:  Rosalie Campbell   MRN:  [id number]  Date: 9/15/2023  Diagnosis: Clostridium difficile colitis    History:     Past Medical History:   Diagnosis Date    Anxiety     Chronic renal disease stage 4     Colitis 7/6/2023    Colon cancer screening declined     Coronary artery disease     Depression     Hypertension     Irregular heart rhythm     Medication management     Obesity, unspecified     Osteoarthritis of knee     Pain, joint, shoulder region, right     Refused influenza vaccine     Refused pneumococcal vaccination     Smoker        Social History     Socioeconomic History    Marital status:    Tobacco Use    Smoking status: Every Day     Current packs/day: 0.50     Types: Cigarettes     Passive exposure: Current    Smokeless tobacco: Never   Substance and Sexual Activity    Alcohol use: Never    Drug use: Never    Sexual activity: Not Currently     Social Determinants of Health     Financial Resource Strain: Low Risk  (6/26/2023)    Overall Financial Resource Strain (CARDIA)     Difficulty of Paying Living Expenses: Not very hard   Food Insecurity: No Food Insecurity (6/26/2023)    Hunger Vital Sign     Worried About Running Out of Food in the Last Year: Never true     Ran Out of Food in the Last Year: Never true   Transportation Needs: No Transportation Needs (6/26/2023)    PRAPARE - Transportation     Lack of Transportation (Medical): No     Lack of Transportation (Non-Medical): No   Physical Activity: Insufficiently Active (6/26/2023)    Exercise Vital Sign     Days of Exercise per Week: 3 days     Minutes of Exercise per Session: 10 min   Stress: Stress Concern Present (6/26/2023)    [place] [employer] - Occupational Stress Questionnaire     Feeling of Stress : Rather much   Social Connections: Moderately Isolated (6/26/2023)    Social Connection and Isolation Panel [NHANES]     Frequency of Communication with Friends and  Family: Twice a week     Frequency of Social Gatherings with Friends and Family: More than three times a week     Attends [employer] Services: More than 4 times per year     Active Member of Clubs or Organizations: No     Attends Club or Organization Meetings: Never     Marital Status:    Housing Stability: Low Risk  (6/26/2023)    Housing Stability Vital Sign     Unable to Pay for Housing in the Last Year: No     Number of Places Lived in the Last Year: 1     Unstable Housing in the Last Year: No       Precautions:     Allergies as of 09/11/2023 - Reviewed 09/11/2023   Allergen Reaction Noted    Codeine Other (See Comments) 01/05/2023       WOC Assessment Details/Treatment        09/15/23 0959   WOCN Assessment   WOCN Total Time (mins) 30   Visit Date 09/15/23   Visit Time 0932   Consult Type Follow Up   WOCN Speciality Wound   Number of Wounds 4   Intervention assessed;applied   Teaching on-going        Altered Skin Integrity 09/11/23 1831 Left Buttocks #1 Partial thickness tissue loss. Shallow open ulcer with a red or pink wound bed, without slough. Intact or Open/Ruptured Serum-filled blister.   Date First Assessed/Time First Assessed: 09/11/23 1831   Altered Skin Integrity Present on Admission - Did Patient arrive to the hospital with altered skin?: yes  Side: Left  Location: Buttocks  Wound Number: #1  Is this injury device related?: No  De...   Wound Image    Description of Altered Skin Integrity Partial thickness tissue loss. Shallow open ulcer with a red or pink wound bed, without slough. Intact or Open/Ruptured Serum-filled blister.   Dressing Appearance Open to air   Drainage Amount None   Appearance [place];Dry   Periwound Area   (dark discoloration)   Wound Edges Defined   Wound Length (cm) 0.3 cm   Wound Width (cm) 0.3 cm   Wound Depth (cm) 0.1 cm   Wound Volume (cm^3) 0.009 cm^3   Wound Surface Area (cm^2) 0.09 cm^2   Care Cleansed with:;Sterile normal saline   Dressing Applied  (barrier cream)     Altered Skin Integrity 09/11/23 Left Buttocks #2 Partial thickness tissue loss. Shallow open ulcer with a red or pink wound bed, without slough. Intact or Open/Ruptured Serum-filled blister.   Date First Assessed: 09/11/23   Altered Skin Integrity Present on Admission - Did Patient arrive to the hospital with altered skin?: yes  Side: Left  Location: Buttocks  Wound Number: #2  Description of Altered Skin Integrity: Partial thickness tissue...   Wound Image    Description of Altered Skin Integrity Partial thickness tissue loss. Shallow open ulcer with a red or pink wound bed, without slough. Intact or Open/Ruptured Serum-filled blister.   Dressing Appearance Open to air   Drainage Amount None   Appearance [place];Dry   Periwound Area   (dark discoloration)   Wound Edges Defined   Wound Length (cm) 0.2 cm   Wound Width (cm) 0.2 cm   Wound Depth (cm) 0.1 cm   Wound Volume (cm^3) 0.004 cm^3   Wound Surface Area (cm^2) 0.04 cm^2   Care Cleansed with:;Sterile normal saline   Dressing Applied  (barrier cream)        Altered Skin Integrity 09/11/23 1838 Right Buttocks #1 Partial thickness tissue loss. Shallow open ulcer with a red or pink wound bed, without slough. Intact or Open/Ruptured Serum-filled blister.   Date First Assessed/Time First Assessed: 09/11/23 1838   Altered Skin Integrity Present on Admission - Did Patient arrive to the hospital with altered skin?: yes  Side: Right  Location: Buttocks  Wound Number: #1  Description of Altered Skin Integrity...   Wound Image    Description of Altered Skin Integrity Partial thickness tissue loss. Shallow open ulcer with a red or pink wound bed, without slough. Intact or Open/Ruptured Serum-filled blister.   Dressing Appearance Open to air   Drainage Amount None   Appearance [place];Dry   Periwound Area   (dark discoloration)   Wound Edges Defined   Wound Length (cm) 0.8 cm   Wound Width (cm) 0.5 cm   Wound Depth (cm) 0.1 cm   Wound Volume (cm^3) 0.04 cm^3   Wound Surface  Area (cm^2) 0.4 cm^2   Care Cleansed with:;Sterile normal saline   Dressing Applied  (barrier cream)        Altered Skin Integrity 09/11/23 1838 Right Buttocks #2 Partial thickness tissue loss. Shallow open ulcer with a red or pink wound bed, without slough. Intact or Open/Ruptured Serum-filled blister.   Date First Assessed/Time First Assessed: 09/11/23 1838   Altered Skin Integrity Present on Admission - Did Patient arrive to the hospital with altered skin?: yes  Side: Right  Location: Buttocks  Wound Number: #2  Description of Altered Skin Integrity...   Wound Image    Description of Altered Skin Integrity Partial thickness tissue loss. Shallow open ulcer with a red or pink wound bed, without slough. Intact or Open/Ruptured Serum-filled blister.   Dressing Appearance Open to air   Drainage Amount None   Appearance [place];Dry   Periwound Area   (dark discoloration)   Wound Edges Defined   Wound Length (cm) 0.5 cm   Wound Width (cm) 0.7 cm   Wound Depth (cm) 0.1 cm   Wound Volume (cm^3) 0.035 cm^3   Wound Surface Area (cm^2) 0.35 cm^2   Care Cleansed with:;Sterile normal saline   Dressing Applied  (barrier cream)   09/15/2023

## 2023-09-15 NOTE — PT/OT/SLP PROGRESS
Physical Therapy Treatment    Patient Name:  Rosalie Campbell   MRN:  [id number]    Recommendations:     Discharge Recommendations: home with home health, rehabilitation facility  Discharge Equipment Recommendations: none  Barriers to discharge:  current medical status    Assessment:     Rosalie Campbell is a 72 y.o. female admitted with a medical diagnosis of Clostridium difficile colitis.  She presents with the following impairments/functional limitations: weakness, impaired endurance, impaired functional mobility, gait instability, impaired balance, decreased lower extremity function, decreased safety awareness     Patient tolerated sitting EOB performing BLE therex, then performed gait training to restroom, then back to bed. Required assistance x 2 people due to feeling weak .    Rehab Prognosis: Good and Fair; patient would benefit from acute skilled PT services to address these deficits and reach maximum level of function.    Recent Surgery: Procedure(s) (LRB):  CHOLECYSTECTOMY, LAPAROSCOPIC, WITH CHOLANGIOGRAM (N/A) Day of Surgery    Plan:     During this hospitalization, patient to be seen 5 x/week (5-6x weekly/ 1-2x daily) to address the identified rehab impairments via gait training, therapeutic activities, therapeutic exercises and progress toward the following goals:    Plan of Care Expires:  10/13/23    Subjective     Chief Complaint: weakness  Patient/Family Comments/goals: to get stronger  Pain/Comfort:         Objective:     Communicated with nursing prior to session.  Patient found HOB elevated with peripheral IV upon PT entry to room.     General Precautions: Standard, fall  Orthopedic Precautions: N/A  Braces: N/A  Respiratory Status: Room air     Functional Mobility:  Bed Mobility:     Supine to Sit: minimum assistance  Sit to Supine: minimum assistance  Transfers:     Sit to Stand:  minimum assistance and moderate assistance with rolling walker  Gait: 12 feet with RW with min A x 2 people for safety due  to increased weakness after using restroom.       AM-PAC 6 CLICK MOBILITY          Treatment & Education:  See above.     Patient left HOB elevated with all lines intact, call button in reach, and bed alarm on..    GOALS:   Multidisciplinary Problems       Physical Therapy Goals          Problem: Physical Therapy    Goal Priority Disciplines Outcome Goal Variances Interventions   Physical Therapy Goal     PT, PT/OT Ongoing, Progressing     Description: Goals to be met by: discharge     Patient will increase functional independence with mobility by performin. Supine to sit with Contact Guard Assistance  2. Sit to stand transfer with Contact Guard Assistance  3. Gait  x 75 feet with Contact Guard Assistance using Rolling Walker.                          Time Tracking:     PT Received On: 09/15/23  PT Start Time: 0900     PT Stop Time: 930  PT Total Time (min): 30 min     Billable Minutes: Gait Training 15 and Therapeutic Activity 15    Treatment Type: Treatment  PT/PTA: PT           09/15/2023

## 2023-09-15 NOTE — ANESTHESIA PROCEDURE NOTES
Intubation    Date/Time: 9/15/2023 4:02 PM    Performed by: Meng Alcaraz CRNA  Authorized by: Meng Alcaraz CRNA    Intubation:     Induction:  Intravenous    Intubated:  Postinduction    Mask Ventilation:  Easy mask    Attempts:  1    Attempted By:  CRNA    Method of Intubation:  Direct    Blade:  Gal[place] 2    Laryngeal View Grade: Grade I - full view of cords      Difficult Airway Encountered?: No      Airway Device:  Oral endotracheal tube    Airway Device Size:  7.0    Style/Cuff Inflation:  Cuffed    Tube secured:  20    Secured at:  The lips    Placement Verified By:  Capnometry    Complicating Factors:  None    Findings Post-Intubation:  BS equal bilateral and atraumatic/condition of teeth unchanged

## 2023-09-15 NOTE — ASSESSMENT & PLAN NOTE
Body mass index is 25.67 kg/m². Morbid obesity complicates all aspects of disease management from diagnostic modalities to treatment. Weight loss encouraged and health benefits explained to patient.

## 2023-09-15 NOTE — ANESTHESIA POSTPROCEDURE EVALUATION
Anesthesia Post Evaluation    Patient: Rosalie Campbell    Procedure(s) Performed: Procedure(s) (LRB):  CHOLECYSTECTOMY, LAPAROSCOPIC, WITH CHOLANGIOGRAM (N/A)    Final Anesthesia Type: general      Patient location during evaluation: PACU  Patient participation: Yes- Able to Participate  Level of consciousness: awake and alert, awake and oriented  Post-procedure vital signs: reviewed and stable  Pain management: adequate  Airway patency: patent    PONV status at discharge: No PONV  Anesthetic complications: no      Cardiovascular status: blood pressure returned to baseline  Respiratory status: unassisted, room air and spontaneous ventilation  Hydration status: euvolemic  Follow-up not needed.          Vitals Value Taken Time   /69 09/15/23 1708   Temp 97.5 09/15/23 1709   Pulse 85 09/15/23 1709   Resp 16 09/15/23 1709   SpO2 100 % 09/15/23 1709   Vitals shown include unvalidated device data.      No case tracking events are documented in the log.      Pain/Amy Score: No data recorded

## 2023-09-15 NOTE — INTERVAL H&P NOTE
The patient has been examined and the H&P has been reviewed:    I concur with the findings and no changes have occurred since H&P was written.    Surgery risks, benefits and alternative options discussed and understood by patient/family.          Active Hospital Problems    Diagnosis  POA    *Clostridium difficile colitis [A04.72]  Yes    Wound of buttock [S31.809A]  Yes    Leukocytosis [D72.829]  Yes    Cholelithiasis with chronic cholecystitis [K80.10]  Yes    Syncope [R55]  Yes    Hyponatremia [E87.1]  Yes    Metabolic acidosis [E87.20]  Yes    Acute kidney injury superimposed on CKD [N17.9, N18.9]  Yes    Hypertension [I10]  Yes    Obesity [E66.9]  Yes      Resolved Hospital Problems   No resolved problems to display.

## 2023-09-16 LAB
BACTERIA BLD CULT: NORMAL
BASOPHILS # BLD AUTO: 0.02 X10(3)/MCL (ref 0.01–0.08)
BASOPHILS NFR BLD AUTO: 0.1 % (ref 0.1–1.2)
CREAT SERPL-MCNC: 1.92 MG/DL (ref 0.66–1.25)
EOSINOPHIL # BLD AUTO: 0.04 X10(3)/MCL (ref 0.04–0.36)
EOSINOPHIL NFR BLD AUTO: 0.3 % (ref 0.7–7)
ERYTHROCYTE [DISTWIDTH] IN BLOOD BY AUTOMATED COUNT: 19.6 % (ref 11–14.5)
GFR SERPLBLD CREATININE-BSD FMLA CKD-EPI: 28 MLS/MIN/1.73/M2
HCT VFR BLD AUTO: 29.2 % (ref 36–48)
HGB BLD-MCNC: 9.4 G/DL (ref 11.8–16)
IMM GRANULOCYTES # BLD AUTO: 0.18 X10(3)/MCL (ref 0–0.03)
IMM GRANULOCYTES NFR BLD AUTO: 1.1 % (ref 0–0.5)
LYMPHOCYTES # BLD AUTO: 1.14 X10(3)/MCL (ref 1.16–3.74)
LYMPHOCYTES NFR BLD AUTO: 7.2 % (ref 20–55)
MCH RBC QN AUTO: 27.6 PG (ref 27–34)
MCHC RBC AUTO-ENTMCNC: 32.2 G/DL (ref 31–37)
MCV RBC AUTO: 85.9 FL (ref 79–99)
MONOCYTES # BLD AUTO: 0.9 X10(3)/MCL (ref 0.24–0.36)
MONOCYTES NFR BLD AUTO: 5.7 % (ref 4.7–12.5)
NEUTROPHILS # BLD AUTO: 13.6 X10(3)/MCL (ref 1.56–6.13)
NEUTROPHILS NFR BLD AUTO: 85.6 % (ref 37–73)
NRBC BLD AUTO-RTO: 0 %
PLATELET # BLD AUTO: 394 X10(3)/MCL (ref 140–371)
PMV BLD AUTO: 9.6 FL (ref 9.4–12.4)
RBC # BLD AUTO: 3.4 X10(6)/MCL (ref 4–5.1)
WBC # SPEC AUTO: 15.88 X10(3)/MCL (ref 4–11.5)

## 2023-09-16 PROCEDURE — 94761 N-INVAS EAR/PLS OXIMETRY MLT: CPT

## 2023-09-16 PROCEDURE — 25000003 PHARM REV CODE 250: Performed by: FAMILY MEDICINE

## 2023-09-16 PROCEDURE — 63600175 PHARM REV CODE 636 W HCPCS: Performed by: FAMILY MEDICINE

## 2023-09-16 PROCEDURE — 63600175 PHARM REV CODE 636 W HCPCS: Performed by: INTERNAL MEDICINE

## 2023-09-16 PROCEDURE — 97530 THERAPEUTIC ACTIVITIES: CPT

## 2023-09-16 PROCEDURE — 21400001 HC TELEMETRY ROOM

## 2023-09-16 PROCEDURE — 36415 COLL VENOUS BLD VENIPUNCTURE: CPT | Performed by: FAMILY MEDICINE

## 2023-09-16 PROCEDURE — 85025 COMPLETE CBC W/AUTO DIFF WBC: CPT | Performed by: FAMILY MEDICINE

## 2023-09-16 PROCEDURE — 82565 ASSAY OF CREATININE: CPT | Performed by: FAMILY MEDICINE

## 2023-09-16 RX ORDER — MORPHINE SULFATE 2 MG/ML
2 INJECTION, SOLUTION INTRAMUSCULAR; INTRAVENOUS EVERY 6 HOURS PRN
Status: DISCONTINUED | OUTPATIENT
Start: 2023-09-16 | End: 2023-09-22 | Stop reason: HOSPADM

## 2023-09-16 RX ORDER — HYDROCODONE BITARTRATE AND ACETAMINOPHEN 5; 325 MG/1; MG/1
1 TABLET ORAL EVERY 6 HOURS PRN
Status: DISCONTINUED | OUTPATIENT
Start: 2023-09-16 | End: 2023-09-22 | Stop reason: HOSPADM

## 2023-09-16 RX ADMIN — VANCOMYCIN HYDROCHLORIDE 125 MG: 125 CAPSULE ORAL at 04:09

## 2023-09-16 RX ADMIN — SODIUM CHLORIDE: 9 INJECTION, SOLUTION INTRAVENOUS at 08:09

## 2023-09-16 RX ADMIN — HYDROCODONE BITARTRATE AND ACETAMINOPHEN 1 TABLET: 5; 325 TABLET ORAL at 04:09

## 2023-09-16 RX ADMIN — PIPERACILLIN AND TAZOBACTAM 4.5 G: 4; .5 INJECTION, POWDER, FOR SOLUTION INTRAVENOUS; PARENTERAL at 02:09

## 2023-09-16 RX ADMIN — PIPERACILLIN AND TAZOBACTAM 4.5 G: 4; .5 INJECTION, POWDER, FOR SOLUTION INTRAVENOUS; PARENTERAL at 04:09

## 2023-09-16 RX ADMIN — VANCOMYCIN HYDROCHLORIDE 125 MG: 125 CAPSULE ORAL at 01:09

## 2023-09-16 RX ADMIN — MORPHINE SULFATE 2 MG: 2 INJECTION, SOLUTION INTRAMUSCULAR; INTRAVENOUS at 02:09

## 2023-09-16 RX ADMIN — SODIUM CHLORIDE: 9 INJECTION, SOLUTION INTRAVENOUS at 04:09

## 2023-09-16 RX ADMIN — MORPHINE SULFATE 2 MG: 2 INJECTION, SOLUTION INTRAMUSCULAR; INTRAVENOUS at 09:09

## 2023-09-16 RX ADMIN — CLOPIDOGREL BISULFATE 75 MG: 75 TABLET ORAL at 04:09

## 2023-09-16 RX ADMIN — BUSPIRONE HYDROCHLORIDE 15 MG: 15 TABLET ORAL at 04:09

## 2023-09-16 RX ADMIN — FAMOTIDINE 20 MG: 20 TABLET ORAL at 09:09

## 2023-09-16 RX ADMIN — MELATONIN TAB 3 MG 3 MG: 3 TAB at 08:09

## 2023-09-16 RX ADMIN — FERROUS SULFATE TAB 325 MG (65 MG ELEMENTAL FE) 1 EACH: 325 (65 FE) TAB at 08:09

## 2023-09-16 RX ADMIN — ACETAMINOPHEN 650 MG: 325 TABLET, FILM COATED ORAL at 08:09

## 2023-09-16 RX ADMIN — BUSPIRONE HYDROCHLORIDE 15 MG: 15 TABLET ORAL at 08:09

## 2023-09-16 RX ADMIN — PIPERACILLIN AND TAZOBACTAM 4.5 G: 4; .5 INJECTION, POWDER, FOR SOLUTION INTRAVENOUS; PARENTERAL at 09:09

## 2023-09-16 RX ADMIN — ROPINIROLE HYDROCHLORIDE 0.25 MG: 0.25 TABLET, FILM COATED ORAL at 08:09

## 2023-09-16 RX ADMIN — VANCOMYCIN HYDROCHLORIDE 125 MG: 125 CAPSULE ORAL at 09:09

## 2023-09-16 RX ADMIN — ESCITALOPRAM OXALATE 20 MG: 10 TABLET ORAL at 09:09

## 2023-09-16 RX ADMIN — BUSPIRONE HYDROCHLORIDE 15 MG: 15 TABLET ORAL at 09:09

## 2023-09-16 RX ADMIN — VANCOMYCIN HYDROCHLORIDE 125 MG: 125 CAPSULE ORAL at 08:09

## 2023-09-16 RX ADMIN — FERROUS SULFATE TAB 325 MG (65 MG ELEMENTAL FE) 1 EACH: 325 (65 FE) TAB at 09:09

## 2023-09-16 NOTE — SUBJECTIVE & OBJECTIVE
Interval History:      Review of Systems   Constitutional:  Negative for activity change, appetite change and fever.   Respiratory:  Negative for chest tightness, shortness of breath and wheezing.    Cardiovascular:  Negative for chest pain.   Gastrointestinal:  Negative for abdominal pain, constipation, diarrhea, nausea and vomiting.   Skin:  Negative for rash and wound.     Objective:     Vital Signs (Most Recent):  Temp: 98 °F (36.7 °C) (09/16/23 0708)  Pulse: 72 (09/16/23 0745)  Resp: 20 (09/16/23 0912)  BP: (!) 164/81 (09/16/23 0708)  SpO2: 95 % (09/16/23 0745) Vital Signs (24h Range):  Temp:  [96.3 °F (35.7 °C)-98 °F (36.7 °C)] 98 °F (36.7 °C)  Pulse:  [61-83] 72  Resp:  [16-22] 20  SpO2:  [95 %-100 %] 95 %  BP: (129-179)/(66-94) 164/81     Weight: 76.5 kg (168 lb 10.4 oz)  Body mass index is 25.64 kg/m².    Intake/Output Summary (Last 24 hours) at 9/16/2023 1032  Last data filed at 9/16/2023 0612  Gross per 24 hour   Intake 2210 ml   Output --   Net 2210 ml           Physical Exam  Constitutional:       General: She is not in acute distress.     Appearance: Normal appearance. She is normal weight. She is not ill-appearing.   Eyes:      General: No scleral icterus.  Cardiovascular:      Rate and Rhythm: Normal rate and regular rhythm.      Pulses: Normal pulses.      Heart sounds: Normal heart sounds.   Pulmonary:      Effort: Pulmonary effort is normal.      Breath sounds: Normal breath sounds.   Abdominal:      General: There is no distension.      Palpations: Abdomen is soft. There is no mass.      Tenderness: There is no abdominal tenderness.   Musculoskeletal:      Right lower leg: No edema.      Left lower leg: No edema.   Skin:     General: Skin is warm and dry.      Capillary Refill: Capillary refill takes less than 2 seconds.      Findings: Rash present. No erythema or lesion.      Comments: See nurses' notes and wound care notes  Wounds POA   Neurological:      General: No focal deficit present.       Mental Status: She is alert. Mental status is at baseline.   Psychiatric:         Mood and Affect: Mood normal.         Behavior: Behavior normal.             Significant Labs: All pertinent labs within the past 24 hours have been reviewed.  CBC:   Recent Labs   Lab 09/15/23  0503 09/16/23  0448   WBC 15.89* 15.88*   HGB 9.3* 9.4*   HCT 29.2* 29.2*   * 394*       CMP:   Recent Labs   Lab 09/15/23  0503 09/16/23 0448   CREATININE 1.91* 1.92*         Significant Imaging: I have reviewed all pertinent imaging results/findings within the past 24 hours.

## 2023-09-16 NOTE — PROGRESS NOTES
[facility]/Surg  [facility] Medicine  Progress Note    Patient Name: Rosalie Campbell  MRN: [id number]  Patient Class: IP- Inpatient   Admission Date: 9/11/2023  Length of Stay: 5 days  Attending Physician: Raquel Vogel MD  Primary Care Provider: Jany Taveras FNP-C        Subjective:     Principal Problem:Clostridium difficile colitis        HPI:   Loss of Consciousness    Diarrhea       C/o diarrhea x 1 month syncopal on Saturday, and this am, dx w/ c diff last month, pt reports placed self on floor when felt like she was gonna pass out, denies hitting head         History of Present Illness: History obtained from Patient     Pt is a 72 y.o. female with hx of CLD 3/4, HTN, Depression/Anxiety, smoker, CAD presented to the ER with about a month of diarrhea.  Having multiple bouts a day.  She was doing to bathroom today and passed out.  She did not strike her head.  She states she fell on Saturday as well and landed on her buttocks.  She was C. Diff + in the ER.  She says she had some ABX about a month ago around the time she had a colonoscopy.  She denies fevers but has some obvious chills.  Denies CP, SOB, NV.  She denies any blood in stool.           Overview/Hospital Course:  09/12/2023 pt presented with 2 months of diarrhea + c. Diff toxin, admitted for dehydration and c. Diff + diarrhea  Mike on ckd usually Cr runs 1.6-2.0 was 3.2 on admit and is 2.6 this am.  WBC was 30,000.  Pt had c. Diff ordered on 08/30 but does not think she was started on any abx.  Was in hospitale here back 07/07/2023 with colitis and was d/c home with cipro and flagyl.  Diarrhea started after hospitalizations.  Wound care nurses has evaluated and pt has some excoriated and broken areas multiple on bilateral sacral/buttocks areas present on admit.  US and CT showed stones and slude in the gallbladder but no acute cholecystitis.  CT shows collitis likely due to c. Diff, cannot rule out appendicitis, pt does not have  acute abdominal pain.surgery has been consulted for evaluation  09/13/2023 pt still with significant diarrhea, cramps have improved a bit.  CT shows cholelithiasis.  Surgery consult is pending  09/14/2023 diarrhea is less this am and cramping is less.  Only 3 BM so far this am.  Surgery plans for lap kehinde in am.    09/15/2023 diarrhea is less, plans for lap kehinde today per surgery, continues with PT and case management working on rehab placement for Monday or Tuesday 09/16/2023 pt s/p lap kehinde, mild pain in RUQ area soreness, incision sites look good.  No diarrhea this am      Interval History:      Review of Systems   Constitutional:  Negative for activity change, appetite change and fever.   Respiratory:  Negative for chest tightness, shortness of breath and wheezing.    Cardiovascular:  Negative for chest pain.   Gastrointestinal:  Negative for abdominal pain, constipation, diarrhea, nausea and vomiting.   Skin:  Negative for rash and wound.     Objective:     Vital Signs (Most Recent):  Temp: 98 °F (36.7 °C) (09/16/23 0708)  Pulse: 72 (09/16/23 0745)  Resp: 20 (09/16/23 0912)  BP: (!) 164/81 (09/16/23 0708)  SpO2: 95 % (09/16/23 0745) Vital Signs (24h Range):  Temp:  [96.3 °F (35.7 °C)-98 °F (36.7 °C)] 98 °F (36.7 °C)  Pulse:  [61-83] 72  Resp:  [16-22] 20  SpO2:  [95 %-100 %] 95 %  BP: (129-179)/(66-94) 164/81     Weight: 76.5 kg (168 lb 10.4 oz)  Body mass index is 25.64 kg/m².    Intake/Output Summary (Last 24 hours) at 9/16/2023 1032  Last data filed at 9/16/2023 0612  Gross per 24 hour   Intake 2210 ml   Output --   Net 2210 ml           Physical Exam  Constitutional:       General: She is not in acute distress.     Appearance: Normal appearance. She is normal weight. She is not ill-appearing.   Eyes:      General: No scleral icterus.  Cardiovascular:      Rate and Rhythm: Normal rate and regular rhythm.      Pulses: Normal pulses.      Heart sounds: Normal heart sounds.   Pulmonary:      Effort:  Pulmonary effort is normal.      Breath sounds: Normal breath sounds.   Abdominal:      General: There is no distension.      Palpations: Abdomen is soft. There is no mass.      Tenderness: There is no abdominal tenderness.   Musculoskeletal:      Right lower leg: No edema.      Left lower leg: No edema.   Skin:     General: Skin is warm and dry.      Capillary Refill: Capillary refill takes less than 2 seconds.      Findings: Rash present. No erythema or lesion.      Comments: See nurses' notes and wound care notes  Wounds POA   Neurological:      General: No focal deficit present.      Mental Status: She is alert. Mental status is at baseline.   Psychiatric:         Mood and Affect: Mood normal.         Behavior: Behavior normal.             Significant Labs: All pertinent labs within the past 24 hours have been reviewed.  CBC:   Recent Labs   Lab 09/15/23  0503 09/16/23  0448   WBC 15.89* 15.88*   HGB 9.3* 9.4*   HCT 29.2* 29.2*   * 394*       CMP:   Recent Labs   Lab 09/15/23  0503 09/16/23  0448   CREATININE 1.91* 1.92*         Significant Imaging: I have reviewed all pertinent imaging results/findings within the past 24 hours.      Assessment/Plan:      * Clostridium difficile colitis  Continue po vancomycin  improving      Acute kidney injury superimposed on CKD  Patient with acute kidney injury/acute renal failure likely due to pre-renal azotemia due to dehydration NEYDA is currently improving. Baseline creatinine 1.6-2.0 - Labs reviewed- Renal function/electrolytes with Estimated Creatinine Clearance: 27.1 mL/min (A) (based on SCr of 1.92 mg/dL (H)). according to latest data. Monitor urine output and serial BMP and adjust therapy as needed. Avoid nephrotoxins and renally dose meds for GFR listed above.    Cr  imrpoving    Hyponatremia  Patient has hyponatremia which is uncontrolled,We will aim to correct the sodium by 4-6mEq in 24 hours. We will monitor sodium Daily. The hyponatremia is due to  "Dehydration/hypovolemia. We will obtain the following studies: TSH, T4. We will treat the hyponatremia with IV fluids as follows: 0.9% NS. The patient's sodium results have been reviewed and are listed below.  No results for input(s): "NA" in the last 24 hours.    Syncope  Likely due to volume depletion  Continue ivf      Cholelithiasis with chronic cholecystitis  POD #1      Metabolic acidosis  Due to dehydration  Continue ivf  monitor      Leukocytosis  liekly due to c. Diff        Wound of buttock  Wound care consulted  Present on admission      Hypertension  Holding BP meds due to relative hypotension  Will resume po meds when appropriate    Obesity  Body mass index is 25.67 kg/m². Morbid obesity complicates all aspects of disease management from diagnostic modalities to treatment. Weight loss encouraged and health benefits explained to patient.         Debility  Continue PT  Case management working on Rehab        VTE Risk Mitigation (From admission, onward)         Ordered     IP VTE HIGH RISK PATIENT  Once         09/11/23 1841     Place sequential compression device  Until discontinued         09/11/23 1841                Discharge Planning   PARRIS: 9/18/2023     Code Status: Full Code   Is the patient medically ready for discharge?:     Reason for patient still in hospital (select all that apply): Patient trending condition and Treatment  Discharge Plan A: Rehab ([facility])   Discharge Delays: None known at this time              Raquel Vogel MD  Department of Hospital Medicine   [facility]  "

## 2023-09-16 NOTE — PT/OT/SLP PROGRESS
Physical Therapy Treatment    Patient Name:  Rosalie Campbell   MRN:  [id number]    Recommendations:     Discharge Recommendations: home with home health, rehabilitation facility  Discharge Equipment Recommendations: none  Barriers to discharge:  current medical status    Assessment:     Rosalie Campbell is a 72 y.o. female admitted with a medical diagnosis of Clostridium difficile colitis.  She presents with the following impairments/functional limitations: weakness, impaired endurance, impaired functional mobility, gait instability, impaired balance, decreased lower extremity function, decreased safety awareness     Patient tolerated rolling to left and right to be cleaned by nurse and aide. Unable to sit today due to increases pain and discomfort. Nurse reports she will try and sit patient up for meals today.     Rehab Prognosis: Good and Fair; patient would benefit from acute skilled PT services to address these deficits and reach maximum level of function.    Recent Surgery: Procedure(s) (LRB):  CHOLECYSTECTOMY, LAPAROSCOPIC, WITH CHOLANGIOGRAM (N/A) 1 Day Post-Op    Plan:     During this hospitalization, patient to be seen 5 x/week (5-6x weekly/ 1-2x daily) to address the identified rehab impairments via gait training, therapeutic activities, therapeutic exercises and progress toward the following goals:    Plan of Care Expires:  10/13/23    Subjective     Chief Complaint: weakness  Patient/Family Comments/goals: to get stronger  Pain/Comfort:         Objective:     Communicated with nursing prior to session.  Patient found HOB elevated with peripheral IV upon PT entry to room.     General Precautions: Standard, fall  Orthopedic Precautions: N/A  Braces: N/A  Respiratory Status: Room air     Functional Mobility:  Bed Mobility:     Rolling Left:  moderate assistance  Rolling Right: moderate assistance      AM-PAC 6 CLICK MOBILITY          Treatment & Education:  See above.     Patient left HOB elevated with all lines  intact, call button in reach, and bed alarm on..    GOALS:   Multidisciplinary Problems       Physical Therapy Goals          Problem: Physical Therapy    Goal Priority Disciplines Outcome Goal Variances Interventions   Physical Therapy Goal     PT, PT/OT Ongoing, Progressing     Description: Goals to be met by: discharge     Patient will increase functional independence with mobility by performin. Supine to sit with Contact Guard Assistance  2. Sit to stand transfer with Contact Guard Assistance  3. Gait  x 75 feet with Contact Guard Assistance using Rolling Walker.                          Time Tracking:     PT Received On: 23  PT Start Time: 1000     PT Stop Time: 1015  PT Total Time (min): 15 min     Billable Minutes: Therapeutic Activity 15    Treatment Type: Treatment  PT/PTA: PT           2023

## 2023-09-16 NOTE — ASSESSMENT & PLAN NOTE
Patient with acute kidney injury/acute renal failure likely due to pre-renal azotemia due to dehydration NEYDA is currently improving. Baseline creatinine 1.6-2.0 - Labs reviewed- Renal function/electrolytes with Estimated Creatinine Clearance: 27.1 mL/min (A) (based on SCr of 1.92 mg/dL (H)). according to latest data. Monitor urine output and serial BMP and adjust therapy as needed. Avoid nephrotoxins and renally dose meds for GFR listed above.    Cr  imrpoving

## 2023-09-17 LAB
BASOPHILS # BLD AUTO: 0.03 X10(3)/MCL (ref 0.01–0.08)
BASOPHILS NFR BLD AUTO: 0.2 % (ref 0.1–1.2)
CREAT SERPL-MCNC: 2.14 MG/DL (ref 0.66–1.25)
EOSINOPHIL # BLD AUTO: 0.43 X10(3)/MCL (ref 0.04–0.36)
EOSINOPHIL NFR BLD AUTO: 2.4 % (ref 0.7–7)
ERYTHROCYTE [DISTWIDTH] IN BLOOD BY AUTOMATED COUNT: 19.6 % (ref 11–14.5)
GFR SERPLBLD CREATININE-BSD FMLA CKD-EPI: 24 MLS/MIN/1.73/M2
HCT VFR BLD AUTO: 30.9 % (ref 36–48)
HGB BLD-MCNC: 9.7 G/DL (ref 11.8–16)
IMM GRANULOCYTES # BLD AUTO: 0.15 X10(3)/MCL (ref 0–0.03)
IMM GRANULOCYTES NFR BLD AUTO: 0.8 % (ref 0–0.5)
LYMPHOCYTES # BLD AUTO: 1.06 X10(3)/MCL (ref 1.16–3.74)
LYMPHOCYTES NFR BLD AUTO: 6 % (ref 20–55)
MCH RBC QN AUTO: 27.2 PG (ref 27–34)
MCHC RBC AUTO-ENTMCNC: 31.4 G/DL (ref 31–37)
MCV RBC AUTO: 86.8 FL (ref 79–99)
MONOCYTES # BLD AUTO: 1.02 X10(3)/MCL (ref 0.24–0.36)
MONOCYTES NFR BLD AUTO: 5.8 % (ref 4.7–12.5)
NEUTROPHILS # BLD AUTO: 14.98 X10(3)/MCL (ref 1.56–6.13)
NEUTROPHILS NFR BLD AUTO: 84.8 % (ref 37–73)
NRBC BLD AUTO-RTO: 0 %
PLATELET # BLD AUTO: 384 X10(3)/MCL (ref 140–371)
PMV BLD AUTO: 9.7 FL (ref 9.4–12.4)
RBC # BLD AUTO: 3.56 X10(6)/MCL (ref 4–5.1)
WBC # SPEC AUTO: 17.67 X10(3)/MCL (ref 4–11.5)

## 2023-09-17 PROCEDURE — 36415 COLL VENOUS BLD VENIPUNCTURE: CPT | Performed by: FAMILY MEDICINE

## 2023-09-17 PROCEDURE — 94761 N-INVAS EAR/PLS OXIMETRY MLT: CPT

## 2023-09-17 PROCEDURE — 25000003 PHARM REV CODE 250: Performed by: FAMILY MEDICINE

## 2023-09-17 PROCEDURE — 21400001 HC TELEMETRY ROOM

## 2023-09-17 PROCEDURE — 85025 COMPLETE CBC W/AUTO DIFF WBC: CPT | Performed by: FAMILY MEDICINE

## 2023-09-17 PROCEDURE — 63600175 PHARM REV CODE 636 W HCPCS: Performed by: FAMILY MEDICINE

## 2023-09-17 PROCEDURE — 82565 ASSAY OF CREATININE: CPT | Performed by: FAMILY MEDICINE

## 2023-09-17 RX ORDER — PANTOPRAZOLE SODIUM 40 MG/1
40 TABLET, DELAYED RELEASE ORAL DAILY
Status: DISCONTINUED | OUTPATIENT
Start: 2023-09-17 | End: 2023-09-22 | Stop reason: HOSPADM

## 2023-09-17 RX ADMIN — BUSPIRONE HYDROCHLORIDE 15 MG: 15 TABLET ORAL at 08:09

## 2023-09-17 RX ADMIN — SODIUM CHLORIDE: 9 INJECTION, SOLUTION INTRAVENOUS at 05:09

## 2023-09-17 RX ADMIN — PIPERACILLIN AND TAZOBACTAM 4.5 G: 4; .5 INJECTION, POWDER, FOR SOLUTION INTRAVENOUS; PARENTERAL at 05:09

## 2023-09-17 RX ADMIN — VANCOMYCIN HYDROCHLORIDE 125 MG: 125 CAPSULE ORAL at 08:09

## 2023-09-17 RX ADMIN — ONDANSETRON 4 MG: 2 INJECTION INTRAMUSCULAR; INTRAVENOUS at 05:09

## 2023-09-17 RX ADMIN — HYDROCODONE BITARTRATE AND ACETAMINOPHEN 1 TABLET: 5; 325 TABLET ORAL at 02:09

## 2023-09-17 RX ADMIN — PANTOPRAZOLE SODIUM 40 MG: 40 TABLET, DELAYED RELEASE ORAL at 03:09

## 2023-09-17 RX ADMIN — PIPERACILLIN AND TAZOBACTAM 4.5 G: 4; .5 INJECTION, POWDER, FOR SOLUTION INTRAVENOUS; PARENTERAL at 01:09

## 2023-09-17 RX ADMIN — FERROUS SULFATE TAB 325 MG (65 MG ELEMENTAL FE) 1 EACH: 325 (65 FE) TAB at 08:09

## 2023-09-17 RX ADMIN — VANCOMYCIN HYDROCHLORIDE 125 MG: 125 CAPSULE ORAL at 09:09

## 2023-09-17 RX ADMIN — ROPINIROLE HYDROCHLORIDE 0.25 MG: 0.25 TABLET, FILM COATED ORAL at 08:09

## 2023-09-17 RX ADMIN — ESCITALOPRAM OXALATE 20 MG: 10 TABLET ORAL at 09:09

## 2023-09-17 RX ADMIN — BUSPIRONE HYDROCHLORIDE 15 MG: 15 TABLET ORAL at 03:09

## 2023-09-17 RX ADMIN — FAMOTIDINE 20 MG: 20 TABLET ORAL at 09:09

## 2023-09-17 RX ADMIN — VANCOMYCIN HYDROCHLORIDE 125 MG: 125 CAPSULE ORAL at 01:09

## 2023-09-17 RX ADMIN — FERROUS SULFATE TAB 325 MG (65 MG ELEMENTAL FE) 1 EACH: 325 (65 FE) TAB at 09:09

## 2023-09-17 RX ADMIN — PIPERACILLIN AND TAZOBACTAM 4.5 G: 4; .5 INJECTION, POWDER, FOR SOLUTION INTRAVENOUS; PARENTERAL at 09:09

## 2023-09-17 RX ADMIN — SODIUM CHLORIDE: 9 INJECTION, SOLUTION INTRAVENOUS at 01:09

## 2023-09-17 RX ADMIN — BUSPIRONE HYDROCHLORIDE 15 MG: 15 TABLET ORAL at 09:09

## 2023-09-17 NOTE — PLAN OF CARE
Problem: Adult Inpatient Plan of Care  Goal: Plan of Care Review  Outcome: Ongoing, Progressing  Goal: Patient-Specific Goal (Individualized)  Outcome: Ongoing, Progressing  Goal: Absence of Hospital-Acquired Illness or Injury  Outcome: Ongoing, Progressing  Goal: Optimal Comfort and Wellbeing  Outcome: Ongoing, Progressing  Goal: Readiness for Transition of Care  Outcome: Ongoing, Progressing     Problem: Fluid and Electrolyte Imbalance (Acute Kidney Injury/Impairment)  Goal: Fluid and Electrolyte Balance  Outcome: Ongoing, Progressing     Problem: Oral Intake Inadequate (Acute Kidney Injury/Impairment)  Goal: Optimal Nutrition Intake  Outcome: Ongoing, Progressing     Problem: Renal Function Impairment (Acute Kidney Injury/Impairment)  Goal: Effective Renal Function  Outcome: Ongoing, Progressing     Problem: Impaired Wound Healing  Goal: Optimal Wound Healing  Outcome: Ongoing, Progressing     Problem: Skin Injury Risk Increased  Goal: Skin Health and Integrity  Outcome: Ongoing, Progressing     Problem: Fluid Volume Deficit  Goal: Fluid Balance  Outcome: Ongoing, Progressing     Problem: Fall Injury Risk  Goal: Absence of Fall and Fall-Related Injury  Outcome: Ongoing, Progressing     Problem: Bleeding (Cholecystectomy)  Goal: Absence of Bleeding  Outcome: Ongoing, Progressing     Problem: Bowel Motility Impaired (Cholecystectomy)  Goal: Effective Bowel Elimination  Outcome: Ongoing, Progressing     Problem: Fluid and Electrolyte Imbalance (Cholecystectomy)  Goal: Fluid and Electrolyte Balance  Outcome: Ongoing, Progressing     Problem: Infection (Cholecystectomy)  Goal: Absence of Infection Signs and Symptoms  Outcome: Ongoing, Progressing     Problem: Ongoing Anesthesia Effects (Cholecystectomy)  Goal: Anesthesia/Sedation Recovery  Outcome: Ongoing, Progressing     Problem: Pain (Cholecystectomy)  Goal: Acceptable Pain Control  Outcome: Ongoing, Progressing     Problem: Postoperative Nausea and Vomiting  (Cholecystectomy)  Goal: Nausea and Vomiting Relief  Outcome: Ongoing, Progressing     Problem: Postoperative Urinary Retention (Cholecystectomy)  Goal: Effective Urinary Elimination  Outcome: Ongoing, Progressing     Problem: Respiratory Compromise (Cholecystectomy)  Goal: Effective Oxygenation and Ventilation  Outcome: Ongoing, Progressing

## 2023-09-17 NOTE — SUBJECTIVE & OBJECTIVE
Interval History:      Review of Systems   Constitutional:  Negative for activity change, appetite change and fever.   Respiratory:  Negative for chest tightness, shortness of breath and wheezing.    Cardiovascular:  Negative for chest pain.   Gastrointestinal:  Negative for abdominal pain, constipation, diarrhea, nausea and vomiting.   Skin:  Negative for rash and wound.     Objective:     Vital Signs (Most Recent):  Temp: 97.5 °F (36.4 °C) (09/17/23 1047)  Pulse: 72 (09/17/23 1047)  Resp: 20 (09/17/23 1047)  BP: (!) 153/81 (09/17/23 1047)  SpO2: 98 % (09/17/23 1047) Vital Signs (24h Range):  Temp:  [97.3 °F (36.3 °C)-97.7 °F (36.5 °C)] 97.5 °F (36.4 °C)  Pulse:  [69-76] 72  Resp:  [16-20] 20  SpO2:  [94 %-99 %] 98 %  BP: (149-169)/(70-83) 153/81     Weight: 76.7 kg (169 lb 1.5 oz)  Body mass index is 25.71 kg/m².    Intake/Output Summary (Last 24 hours) at 9/17/2023 1318  Last data filed at 9/17/2023 0628  Gross per 24 hour   Intake 400 ml   Output --   Net 400 ml           Physical Exam  Constitutional:       General: She is not in acute distress.     Appearance: Normal appearance. She is normal weight. She is not ill-appearing.   Eyes:      General: No scleral icterus.  Cardiovascular:      Rate and Rhythm: Normal rate and regular rhythm.      Pulses: Normal pulses.      Heart sounds: Normal heart sounds.   Pulmonary:      Effort: Pulmonary effort is normal.      Breath sounds: Normal breath sounds.   Abdominal:      General: There is no distension.      Palpations: Abdomen is soft. There is no mass.      Tenderness: There is no abdominal tenderness.   Musculoskeletal:      Right lower leg: No edema.      Left lower leg: No edema.   Skin:     General: Skin is warm and dry.      Capillary Refill: Capillary refill takes less than 2 seconds.      Findings: Rash present. No erythema or lesion.      Comments: See nurses' notes and wound care notes  Wounds POA   Neurological:      General: No focal deficit present.       Mental Status: She is alert. Mental status is at baseline.   Psychiatric:         Mood and Affect: Mood normal.         Behavior: Behavior normal.             Significant Labs: All pertinent labs within the past 24 hours have been reviewed.  CBC:   Recent Labs   Lab 09/16/23 0448 09/17/23 0458   WBC 15.88* 17.67*   HGB 9.4* 9.7*   HCT 29.2* 30.9*   * 384*       CMP:   Recent Labs   Lab 09/16/23 0448 09/17/23 0458   CREATININE 1.92* 2.14*         Significant Imaging: I have reviewed all pertinent imaging results/findings within the past 24 hours.

## 2023-09-17 NOTE — ASSESSMENT & PLAN NOTE
Patient with acute kidney injury/acute renal failure likely due to pre-renal azotemia due to dehydration NEYDA is currently improving. Baseline creatinine 1.6-2.0 - Labs reviewed- Renal function/electrolytes with Estimated Creatinine Clearance: 26.3 mL/min (A) (based on SCr of 2.14 mg/dL (H)). according to latest data. Monitor urine output and serial BMP and adjust therapy as needed. Avoid nephrotoxins and renally dose meds for GFR listed above.    Cr worsened today

## 2023-09-17 NOTE — PROGRESS NOTES
[facility]/Surg  [facility] Medicine  Progress Note    Patient Name: Rosalie Campbell  MRN: [id number]  Patient Class: IP- Inpatient   Admission Date: 9/11/2023  Length of Stay: 6 days  Attending Physician: Raquel Vogel MD  Primary Care Provider: Jany Taveras FNP-C        Subjective:     Principal Problem:Clostridium difficile colitis        HPI:   Loss of Consciousness    Diarrhea       C/o diarrhea x 1 month syncopal on Saturday, and this am, dx w/ c diff last month, pt reports placed self on floor when felt like she was gonna pass out, denies hitting head         History of Present Illness: History obtained from Patient     Pt is a 72 y.o. female with hx of CLD 3/4, HTN, Depression/Anxiety, smoker, CAD presented to the ER with about a month of diarrhea.  Having multiple bouts a day.  She was doing to bathroom today and passed out.  She did not strike her head.  She states she fell on Saturday as well and landed on her buttocks.  She was C. Diff + in the ER.  She says she had some ABX about a month ago around the time she had a colonoscopy.  She denies fevers but has some obvious chills.  Denies CP, SOB, NV.  She denies any blood in stool.           Overview/Hospital Course:  09/12/2023 pt presented with 2 months of diarrhea + c. Diff toxin, admitted for dehydration and c. Diff + diarrhea  Mike on ckd usually Cr runs 1.6-2.0 was 3.2 on admit and is 2.6 this am.  WBC was 30,000.  Pt had c. Diff ordered on 08/30 but does not think she was started on any abx.  Was in hospitale here back 07/07/2023 with colitis and was d/c home with cipro and flagyl.  Diarrhea started after hospitalizations.  Wound care nurses has evaluated and pt has some excoriated and broken areas multiple on bilateral sacral/buttocks areas present on admit.  US and CT showed stones and slude in the gallbladder but no acute cholecystitis.  CT shows collitis likely due to c. Diff, cannot rule out appendicitis, pt does not have  acute abdominal pain.surgery has been consulted for evaluation  09/13/2023 pt still with significant diarrhea, cramps have improved a bit.  CT shows cholelithiasis.  Surgery consult is pending  09/14/2023 diarrhea is less this am and cramping is less.  Only 3 BM so far this am.  Surgery plans for lap kehinde in am.    09/15/2023 diarrhea is less, plans for lap kehinde today per surgery, continues with PT and case management working on rehab placement for Monday or Tuesday 09/16/2023 pt s/p lap kehinde, mild pain in RUQ area soreness, incision sites look good.  No diarrhea this am  09/17/2023 still with signigicant diarrhea, minimal po intake      Interval History:      Review of Systems   Constitutional:  Negative for activity change, appetite change and fever.   Respiratory:  Negative for chest tightness, shortness of breath and wheezing.    Cardiovascular:  Negative for chest pain.   Gastrointestinal:  Negative for abdominal pain, constipation, diarrhea, nausea and vomiting.   Skin:  Negative for rash and wound.     Objective:     Vital Signs (Most Recent):  Temp: 97.5 °F (36.4 °C) (09/17/23 1047)  Pulse: 72 (09/17/23 1047)  Resp: 20 (09/17/23 1047)  BP: (!) 153/81 (09/17/23 1047)  SpO2: 98 % (09/17/23 1047) Vital Signs (24h Range):  Temp:  [97.3 °F (36.3 °C)-97.7 °F (36.5 °C)] 97.5 °F (36.4 °C)  Pulse:  [69-76] 72  Resp:  [16-20] 20  SpO2:  [94 %-99 %] 98 %  BP: (149-169)/(70-83) 153/81     Weight: 76.7 kg (169 lb 1.5 oz)  Body mass index is 25.71 kg/m².    Intake/Output Summary (Last 24 hours) at 9/17/2023 1318  Last data filed at 9/17/2023 0628  Gross per 24 hour   Intake 400 ml   Output --   Net 400 ml           Physical Exam  Constitutional:       General: She is not in acute distress.     Appearance: Normal appearance. She is normal weight. She is not ill-appearing.   Eyes:      General: No scleral icterus.  Cardiovascular:      Rate and Rhythm: Normal rate and regular rhythm.      Pulses: Normal pulses.       Heart sounds: Normal heart sounds.   Pulmonary:      Effort: Pulmonary effort is normal.      Breath sounds: Normal breath sounds.   Abdominal:      General: There is no distension.      Palpations: Abdomen is soft. There is no mass.      Tenderness: There is no abdominal tenderness.   Musculoskeletal:      Right lower leg: No edema.      Left lower leg: No edema.   Skin:     General: Skin is warm and dry.      Capillary Refill: Capillary refill takes less than 2 seconds.      Findings: Rash present. No erythema or lesion.      Comments: See nurses' notes and wound care notes  Wounds POA   Neurological:      General: No focal deficit present.      Mental Status: She is alert. Mental status is at baseline.   Psychiatric:         Mood and Affect: Mood normal.         Behavior: Behavior normal.             Significant Labs: All pertinent labs within the past 24 hours have been reviewed.  CBC:   Recent Labs   Lab 09/16/23 0448 09/17/23 0458   WBC 15.88* 17.67*   HGB 9.4* 9.7*   HCT 29.2* 30.9*   * 384*       CMP:   Recent Labs   Lab 09/16/23 0448 09/17/23 0458   CREATININE 1.92* 2.14*         Significant Imaging: I have reviewed all pertinent imaging results/findings within the past 24 hours.      Assessment/Plan:      * Clostridium difficile colitis  Continue po vancomycin  Still with significant diarrhea      Acute kidney injury superimposed on CKD  Patient with acute kidney injury/acute renal failure likely due to pre-renal azotemia due to dehydration NEYDA is currently improving. Baseline creatinine 1.6-2.0 - Labs reviewed- Renal function/electrolytes with Estimated Creatinine Clearance: 26.3 mL/min (A) (based on SCr of 2.14 mg/dL (H)). according to latest data. Monitor urine output and serial BMP and adjust therapy as needed. Avoid nephrotoxins and renally dose meds for GFR listed above.    Cr worsened today    Hyponatremia  Patient has hyponatremia which is uncontrolled,We will aim to correct the sodium  "by 4-6mEq in 24 hours. We will monitor sodium Daily. The hyponatremia is due to Dehydration/hypovolemia. We will obtain the following studies: TSH, T4. We will treat the hyponatremia with IV fluids as follows: 0.9% NS. The patient's sodium results have been reviewed and are listed below.  No results for input(s): "NA" in the last 24 hours.    Syncope  Likely due to volume depletion  Continue ivf      Cholelithiasis with chronic cholecystitis  POD #2      Metabolic acidosis  Due to dehydration  Continue ivf  monitor      Leukocytosis  liekly due to c. Diff        Wound of buttock  Wound care consulted  Present on admission      Hypertension  Holding BP meds due to relative hypotension  Will resume po meds when appropriate    Obesity  Body mass index is 25.67 kg/m². Morbid obesity complicates all aspects of disease management from diagnostic modalities to treatment. Weight loss encouraged and health benefits explained to patient.         Debility  Continue PT  Case management working on Rehab        VTE Risk Mitigation (From admission, onward)         Ordered     IP VTE HIGH RISK PATIENT  Once         09/11/23 1841     Place sequential compression device  Until discontinued         09/11/23 1841                Discharge Planning   PARRIS: 9/18/2023     Code Status: Full Code   Is the patient medically ready for discharge?:     Reason for patient still in hospital (select all that apply): Treatment, Consult recommendations and PT / OT recommendations  Discharge Plan A: Rehab ([facility])   Discharge Delays: None known at this time              Raquel Vogel MD  Department of Hospital Medicine   [facility]  "

## 2023-09-18 LAB
ALBUMIN SERPL-MCNC: 2.4 G/DL (ref 3.4–5)
ALBUMIN/GLOB SERPL: 1 RATIO
ALP SERPL-CCNC: 73 UNIT/L (ref 50–144)
ALT SERPL-CCNC: 14 UNIT/L (ref 1–45)
ANION GAP SERPL CALC-SCNC: 9 MEQ/L (ref 2–13)
APPEARANCE UR: CLEAR
AST SERPL-CCNC: 17 UNIT/L (ref 14–36)
BACTERIA #/AREA URNS AUTO: ABNORMAL /HPF
BACTERIA BLD CULT: NORMAL
BASOPHILS # BLD AUTO: 0.03 X10(3)/MCL (ref 0.01–0.08)
BASOPHILS NFR BLD AUTO: 0.2 % (ref 0.1–1.2)
BILIRUB SERPL-MCNC: 0.3 MG/DL (ref 0–1)
BILIRUB UR QL STRIP.AUTO: NEGATIVE
BUN SERPL-MCNC: 23 MG/DL (ref 7–20)
CALCIUM SERPL-MCNC: 7.8 MG/DL (ref 8.4–10.2)
CHLORIDE SERPL-SCNC: 119 MMOL/L (ref 98–110)
CO2 SERPL-SCNC: 14 MMOL/L (ref 21–32)
COLOR UR: YELLOW
CREAT SERPL-MCNC: 2.28 MG/DL (ref 0.66–1.25)
CREAT SERPL-MCNC: 2.33 MG/DL (ref 0.66–1.25)
CREAT/UREA NIT SERPL: 10 (ref 12–20)
EOSINOPHIL # BLD AUTO: 1.48 X10(3)/MCL (ref 0.04–0.36)
EOSINOPHIL NFR BLD AUTO: 9 % (ref 0.7–7)
ERYTHROCYTE [DISTWIDTH] IN BLOOD BY AUTOMATED COUNT: 19.4 % (ref 11–14.5)
GFR SERPLBLD CREATININE-BSD FMLA CKD-EPI: 22 MLS/MIN/1.73/M2
GFR SERPLBLD CREATININE-BSD FMLA CKD-EPI: 22 MLS/MIN/1.73/M2
GLOBULIN SER-MCNC: 2.4 GM/DL (ref 2–3.9)
GLUCOSE SERPL-MCNC: 111 MG/DL (ref 70–115)
GLUCOSE UR QL STRIP.AUTO: NEGATIVE
HCT VFR BLD AUTO: 30.5 % (ref 36–48)
HGB BLD-MCNC: 9.5 G/DL (ref 11.8–16)
IMM GRANULOCYTES # BLD AUTO: 0.18 X10(3)/MCL (ref 0–0.03)
IMM GRANULOCYTES NFR BLD AUTO: 1.1 % (ref 0–0.5)
KETONES UR QL STRIP.AUTO: NEGATIVE
LACTATE SERPL-SCNC: 1.3 MMOL/L (ref 0.4–2)
LEUKOCYTE ESTERASE UR QL STRIP.AUTO: ABNORMAL
LYMPHOCYTES # BLD AUTO: 1.23 X10(3)/MCL (ref 1.16–3.74)
LYMPHOCYTES NFR BLD AUTO: 7.5 % (ref 20–55)
MCH RBC QN AUTO: 27.1 PG (ref 27–34)
MCHC RBC AUTO-ENTMCNC: 31.1 G/DL (ref 31–37)
MCV RBC AUTO: 87.1 FL (ref 79–99)
MONOCYTES # BLD AUTO: 0.93 X10(3)/MCL (ref 0.24–0.36)
MONOCYTES NFR BLD AUTO: 5.7 % (ref 4.7–12.5)
NEUTROPHILS # BLD AUTO: 12.6 X10(3)/MCL (ref 1.56–6.13)
NEUTROPHILS NFR BLD AUTO: 76.5 % (ref 37–73)
NITRITE UR QL STRIP.AUTO: NEGATIVE
NRBC BLD AUTO-RTO: 0 %
PH UR STRIP.AUTO: 6 [PH]
PLATELET # BLD AUTO: 348 X10(3)/MCL (ref 140–371)
PMV BLD AUTO: 9.7 FL (ref 9.4–12.4)
POTASSIUM SERPL-SCNC: 3 MMOL/L (ref 3.5–5.1)
PROT SERPL-MCNC: 4.8 GM/DL (ref 6.3–8.2)
PROT UR QL STRIP.AUTO: NEGATIVE
RBC # BLD AUTO: 3.5 X10(6)/MCL (ref 4–5.1)
RBC #/AREA URNS AUTO: ABNORMAL /HPF
RBC UR QL AUTO: ABNORMAL
SODIUM SERPL-SCNC: 142 MMOL/L (ref 135–145)
SP GR UR STRIP.AUTO: 1.01 (ref 1–1.03)
SQUAMOUS #/AREA URNS AUTO: ABNORMAL /HPF
UROBILINOGEN UR STRIP-ACNC: 0.2
WBC # SPEC AUTO: 16.45 X10(3)/MCL (ref 4–11.5)
WBC #/AREA URNS AUTO: ABNORMAL /HPF

## 2023-09-18 PROCEDURE — 85025 COMPLETE CBC W/AUTO DIFF WBC: CPT | Performed by: FAMILY MEDICINE

## 2023-09-18 PROCEDURE — 83605 ASSAY OF LACTIC ACID: CPT | Performed by: FAMILY MEDICINE

## 2023-09-18 PROCEDURE — 25000003 PHARM REV CODE 250: Performed by: FAMILY MEDICINE

## 2023-09-18 PROCEDURE — 63600175 PHARM REV CODE 636 W HCPCS: Performed by: FAMILY MEDICINE

## 2023-09-18 PROCEDURE — 82565 ASSAY OF CREATININE: CPT | Performed by: FAMILY MEDICINE

## 2023-09-18 PROCEDURE — 81001 URINALYSIS AUTO W/SCOPE: CPT | Performed by: FAMILY MEDICINE

## 2023-09-18 PROCEDURE — 21400001 HC TELEMETRY ROOM

## 2023-09-18 PROCEDURE — 87088 URINE BACTERIA CULTURE: CPT | Performed by: FAMILY MEDICINE

## 2023-09-18 PROCEDURE — 80053 COMPREHEN METABOLIC PANEL: CPT | Performed by: FAMILY MEDICINE

## 2023-09-18 PROCEDURE — 87186 SC STD MICRODIL/AGAR DIL: CPT | Performed by: FAMILY MEDICINE

## 2023-09-18 PROCEDURE — 97110 THERAPEUTIC EXERCISES: CPT

## 2023-09-18 PROCEDURE — 94761 N-INVAS EAR/PLS OXIMETRY MLT: CPT

## 2023-09-18 PROCEDURE — 97530 THERAPEUTIC ACTIVITIES: CPT

## 2023-09-18 PROCEDURE — 36415 COLL VENOUS BLD VENIPUNCTURE: CPT | Performed by: FAMILY MEDICINE

## 2023-09-18 RX ORDER — AMLODIPINE BESYLATE 5 MG/1
5 TABLET ORAL DAILY
Status: CANCELLED | OUTPATIENT
Start: 2023-09-18

## 2023-09-18 RX ORDER — BUSPIRONE HYDROCHLORIDE 15 MG/1
15 TABLET ORAL NIGHTLY
Status: DISCONTINUED | OUTPATIENT
Start: 2023-09-18 | End: 2023-09-22 | Stop reason: HOSPADM

## 2023-09-18 RX ORDER — HYDRALAZINE HYDROCHLORIDE 20 MG/ML
10 INJECTION INTRAMUSCULAR; INTRAVENOUS EVERY 4 HOURS PRN
Status: DISCONTINUED | OUTPATIENT
Start: 2023-09-18 | End: 2023-09-22 | Stop reason: HOSPADM

## 2023-09-18 RX ORDER — LANOLIN ALCOHOL/MO/W.PET/CERES
1 CREAM (GRAM) TOPICAL DAILY
Status: DISCONTINUED | OUTPATIENT
Start: 2023-09-19 | End: 2023-09-22 | Stop reason: HOSPADM

## 2023-09-18 RX ORDER — ENOXAPARIN SODIUM 100 MG/ML
30 INJECTION SUBCUTANEOUS EVERY 24 HOURS
Status: DISCONTINUED | OUTPATIENT
Start: 2023-09-18 | End: 2023-09-22 | Stop reason: HOSPADM

## 2023-09-18 RX ADMIN — ONDANSETRON 4 MG: 2 INJECTION INTRAMUSCULAR; INTRAVENOUS at 08:09

## 2023-09-18 RX ADMIN — BUSPIRONE HYDROCHLORIDE 15 MG: 15 TABLET ORAL at 08:09

## 2023-09-18 RX ADMIN — ESCITALOPRAM OXALATE 20 MG: 10 TABLET ORAL at 08:09

## 2023-09-18 RX ADMIN — VANCOMYCIN HYDROCHLORIDE 125 MG: 125 CAPSULE ORAL at 08:09

## 2023-09-18 RX ADMIN — ENOXAPARIN SODIUM 30 MG: 40 INJECTION SUBCUTANEOUS at 01:09

## 2023-09-18 RX ADMIN — SODIUM CHLORIDE: 9 INJECTION, SOLUTION INTRAVENOUS at 01:09

## 2023-09-18 RX ADMIN — FAMOTIDINE 20 MG: 20 TABLET ORAL at 08:09

## 2023-09-18 RX ADMIN — HYDROCODONE BITARTRATE AND ACETAMINOPHEN 1 TABLET: 5; 325 TABLET ORAL at 11:09

## 2023-09-18 RX ADMIN — VANCOMYCIN HYDROCHLORIDE 125 MG: 125 CAPSULE ORAL at 01:09

## 2023-09-18 RX ADMIN — PANTOPRAZOLE SODIUM 40 MG: 40 TABLET, DELAYED RELEASE ORAL at 08:09

## 2023-09-18 RX ADMIN — ROPINIROLE HYDROCHLORIDE 0.25 MG: 0.25 TABLET, FILM COATED ORAL at 08:09

## 2023-09-18 RX ADMIN — FERROUS SULFATE TAB 325 MG (65 MG ELEMENTAL FE) 1 EACH: 325 (65 FE) TAB at 08:09

## 2023-09-18 RX ADMIN — PIPERACILLIN AND TAZOBACTAM 4.5 G: 4; .5 INJECTION, POWDER, FOR SOLUTION INTRAVENOUS; PARENTERAL at 08:09

## 2023-09-18 RX ADMIN — CLOPIDOGREL BISULFATE 75 MG: 75 TABLET ORAL at 05:09

## 2023-09-18 RX ADMIN — PIPERACILLIN AND TAZOBACTAM 4.5 G: 4; .5 INJECTION, POWDER, FOR SOLUTION INTRAVENOUS; PARENTERAL at 01:09

## 2023-09-18 RX ADMIN — HYDROCODONE BITARTRATE AND ACETAMINOPHEN 1 TABLET: 5; 325 TABLET ORAL at 08:09

## 2023-09-18 RX ADMIN — VANCOMYCIN HYDROCHLORIDE 125 MG: 125 CAPSULE ORAL at 05:09

## 2023-09-18 NOTE — SUBJECTIVE & OBJECTIVE
Interval History:      Review of Systems   Constitutional:  Negative for activity change, appetite change and fever.   Respiratory:  Negative for chest tightness, shortness of breath and wheezing.    Cardiovascular:  Negative for chest pain.   Gastrointestinal:  Negative for abdominal pain, constipation, diarrhea, nausea and vomiting.   Skin:  Negative for rash and wound.     Objective:     Vital Signs (Most Recent):  Temp: 97.6 °F (36.4 °C) (09/18/23 1101)  Pulse: 75 (09/18/23 1101)  Resp: 18 (09/18/23 1113)  BP: (!) 156/75 (09/18/23 1101)  SpO2: 98 % (09/18/23 1101) Vital Signs (24h Range):  Temp:  [97.2 °F (36.2 °C)-98.6 °F (37 °C)] 97.6 °F (36.4 °C)  Pulse:  [74-80] 75  Resp:  [16-20] 18  SpO2:  [96 %-98 %] 98 %  BP: (140-177)/(68-86) 156/75     Weight: 76.8 kg (169 lb 5 oz)  Body mass index is 25.74 kg/m².    Intake/Output Summary (Last 24 hours) at 9/18/2023 1245  Last data filed at 9/18/2023 0924  Gross per 24 hour   Intake 2148 ml   Output --   Net 2148 ml           Physical Exam  Constitutional:       General: She is not in acute distress.     Appearance: Normal appearance. She is normal weight. She is not ill-appearing.   Eyes:      General: No scleral icterus.  Cardiovascular:      Rate and Rhythm: Normal rate and regular rhythm.      Pulses: Normal pulses.      Heart sounds: Normal heart sounds.   Pulmonary:      Effort: Pulmonary effort is normal.      Breath sounds: Normal breath sounds.   Abdominal:      General: There is no distension.      Palpations: Abdomen is soft. There is no mass.      Tenderness: There is no abdominal tenderness.   Musculoskeletal:      Right lower leg: No edema.      Left lower leg: No edema.   Skin:     General: Skin is warm and dry.      Capillary Refill: Capillary refill takes less than 2 seconds.      Findings: Rash present. No erythema or lesion.      Comments: See nurses' notes and wound care notes  Wounds POA   Neurological:      General: No focal deficit present.       Mental Status: She is alert. Mental status is at baseline.   Psychiatric:         Mood and Affect: Mood normal.         Behavior: Behavior normal.             Significant Labs: All pertinent labs within the past 24 hours have been reviewed.  CBC:   Recent Labs   Lab 09/17/23 0458 09/18/23  0504   WBC 17.67* 16.45*   HGB 9.7* 9.5*   HCT 30.9* 30.5*   * 348       CMP:   Recent Labs   Lab 09/17/23 0458 09/18/23  0504   CREATININE 2.14* 2.33*         Significant Imaging: I have reviewed all pertinent imaging results/findings within the past 24 hours.

## 2023-09-18 NOTE — PHYSICIAN QUERY
PT Name: Rosalie Campbell  MR #: [id number]     DOCUMENTATION CLARIFICATION     CDS/: Mary Pryor RN             Contact information: [email or web address]  This form is a permanent document in the medical record.     Query Date: September 18, 2023    By submitting this query, we are merely seeking further clarification of documentation.  Please utilize your independent clinical judgment when addressing the question(s) below.  Provider, please provide the integumentary diagnosis related to the documentation of (CDI: IDENTIFY SITE):   The Medical Record contains the following:    Altered Skin Integrity 09/11/23 1831   Left Buttocks #1 Partial thickness tissue loss.   Shallow open ulcer with a red or pink wound bed, without slough. Intact or Open/Ruptured Serum-filled blister.        Bilateral buttocks appear to have repeated friction injury resulting in dark discoloration and dry flaky skin.    The areas of breakdown appear to be due to pressure       The clinical guidelines noted are only a system guideline. It does not replace the providers clinical judgment.    Per the National Pressure Injury Advisory Panel:   A pressure injury is localized damage to the skin and underlying soft tissue usually over a bony prominence or related to a medical or other device. The injury can present as intact skin or an open ulcer and may be painful. The injury occurs as a result of intense and/or prolonged pressure or pressure in combination with shear. The tolerance of soft tissue for pressure and shear may also be affected by microclimate, nutrition, perfusion, co-morbidities and condition of the soft tissue.       Stage 1 Pressure Injury:  Intact skin with a localized area of non-blanchable erythema, which may appear differently in darkly pigmented skin. Color changes do not include purple or maroon discoloration; these may indicate deep tissue pressure injury.    Stage 2 Pressure Injury:  Partial-thickness loss  of skin with exposed dermis. The wound bed is viable, pink or red, moist, and may also present as an intact or ruptured serum-filled blister.    Stage 3 Pressure Injury:  Full-thickness loss of skin, in which adipose (fat) is visible in the ulcer and granulation tissue and epibole (rolled wound edges) are often present. Slough and/or eschar may be visible. Undermining and tunneling may occur.    Stage 4 Pressure Injury:  Full-thickness skin and tissue loss with exposed or directly palpable fascia, muscle, tendon, ligament, cartilage or bone in the ulcer. Slough and/or eschar may be visible. Epibole (rolled edges), undermining and/or tunneling often occur.    Unstageable Pressure Injury:  Full-thickness skin and tissue loss in which the extent of tissue damage within the ulcer cannot be confirmed because it is obscured by slough or eschar. If slough or eschar is removed, a Stage 3 or Stage 4 pressure injury will be revealed.        Provider, please provide the integumentary diagnosis related to the documentation of (Left Buttocks ):     x Pressure Injury/Decubitus Ulcer, Stage 2    Non-pressure ulcer, other depth (please specify): ___________    Other Integumentary Diagnosis (please specify):______________       Please document in your progress notes daily for the duration of treatment until resolved and include in your discharge summary.    Reference:    YOANNA Acosta., NORMA Sol., Goldberg, M., MYRA Malin., MYRA Campbell., & ROB Birto. (2016). Revised National Pressure Ulcer Advisory Panel Pressure Injury Staging System: Revised Pressure Injury Staging System. J Wound Ostomy Continence Nurs, 43(6), 585-597. doi:10.1097/[id number]    Form No.23302

## 2023-09-18 NOTE — PROGRESS NOTES
Inpatient Nutrition Follow-up    Admit Date: 9/11/2023   Total duration of encounter: 7 days     Nutrition Recommendation/Prescription     Continue diet from Regular Isolation to Renal, Low Fiber Isolation.    Change Boost Breeze TID to Novasource BID (475 kcal, 22 gm pro each).     RD to add Banatrol TID once diet advanced with pudding to help with diarrhea.     Closely monitor Renal function/GI function.     Dietitian will monitor Electrolytes, Energy Intake, Food and Beverage Intake, Gastrointestinal Profile, Renal Function, Weight, and Weight Change and adjust MNT as needed.     Monitoring & Evaluation     Communication of Recommendations: reviewed with nurse, reviewed with patient, and reviewed with family    Reason Seen: follow-up    Nutrition Risk/Follow-Up: high (follow-up in 1-4 days)   Please consult if re-assessment needed sooner.      Nutrition Assessment     Malnutrition Assessment/Nutrition-Focused Physical Exam  NFPE not appropriate at this time. Unable to diagnose malnutrition at this time due to not enough criteria known.            Weight Loss (Malnutrition): greater than 7.5% in 3 months (22.88# (12/3%) since 6/30/23, ~2.3 months.)                                                  A minimum of two characteristics is recommended for diagnosis of either severe or non-severe malnutrition.    Chart Review    Malnutrition Screening Tool Results   Have you recently lost weight without trying?: No  Have you been eating poorly because of a decreased appetite?: No   MST Score: 0     Diagnosis:  Acute on Chronic Renal Failure, C.diff, Syncope, Leukocytosis, Metabolic acidosis, Chronic Anemia, Hyponatremia, Cholelithiasis with chronic cholecystitis.     Past Medical History:   Diagnosis Date    Anxiety     Chronic renal disease stage 4     Colitis 7/6/2023    Colon cancer screening declined     Coronary artery disease     Depression     Hypertension     Irregular heart rhythm     Medication management      Obesity, unspecified     Osteoarthritis of knee     Pain, joint, shoulder region, right     Refused influenza vaccine     Refused pneumococcal vaccination     Smoker      Past Surgical History:   Procedure Laterality Date    ANGIOPLASTY  11/20/2019    COLONOSCOPY  07/05/2023    ESOPHAGOGASTRODUODENOSCOPY  10/29/2018    LAPAROSCOPIC CHOLECYSTECTOMY WITH CHOLANGIOGRAPHY N/A 9/15/2023    Procedure: CHOLECYSTECTOMY, LAPAROSCOPIC, WITH CHOLANGIOGRAM;  Surgeon: Fabien Resendez MD;  Location: [facility] OR;  Service: General;  Laterality: N/A;    STENT, AORTA         Nutrition-Related Medications: Zosyn, Vancomycin, IVF @ 125 ml/hr, Ferrous sulfate   Calorie Containing IV Medications: no significant kcals from medications at this time    Nutrition-Related Labs:   Lab Results   Component Value Date    WBC 16.45 (H) 09/18/2023    WBC 7.8 03/18/2021     Lab Results   Component Value Date    RBC 3.50 (L) 09/18/2023    RBC 3.60 (L) 03/18/2021     Lab Results   Component Value Date    HGB 9.5 (L) 09/18/2023    HGB 9.5 (L) 03/18/2021     Lab Results   Component Value Date    HCT 30.5 (L) 09/18/2023    HCT 31.2 (L) 03/18/2021     Lab Results   Component Value Date     (L) 09/12/2023     03/18/2021     Lab Results   Component Value Date    CHLORIDE 111 (H) 09/12/2023    CHLORIDE 107 03/18/2021     Lab Results   Component Value Date    CO2 14 (L) 09/12/2023    CO2 24 03/18/2021     Lab Results   Component Value Date    BUN 39.0 (H) 09/12/2023    BUN 32 (H) 03/18/2021     Lab Results   Component Value Date    CREATININE 2.33 (H) 09/18/2023    CREATININE 1.80 (H) 03/18/2021     Lab Results   Component Value Date    EGFRNORACEVR 22 09/18/2023    EGFRNORACEVR 27 04/13/2023     Lab Results   Component Value Date    POCTGLUCOSE 120 (H) 06/30/2023     Lab Results   Component Value Date    CALCIUM 7.2 (L) 09/12/2023    CALCIUM 9.3 03/18/2021     Lab Results   Component Value Date    MG 1.60 (L) 06/27/2023    MG 1.60 (L) 06/26/2023      Lab Results   Component Value Date    ALBUMIN 2.2 (L) 09/12/2023    ALBUMIN 4.0 03/18/2021     CrCl:    Lab Value: 19.4    Date: 9/12  CrCl:    Lab Value: 27.9    Date: 9/14   CrCl:    Lab Value: 24.2    Date: 9/18      Diet/PN Order: Diet Adult Regular (IDDSI Level 7) Isolation Tray - Styrofoam  Oral Supplement Order: Boost Breeze  Tube Feeding Order: none  Factors Affecting Nutritional Intake: altered gastrointestinal function, clear liquid diet, decreased appetite, diarrhea, and early satiety  Food/Advent/Cultural Preferences:  Dislike all vegetables except: Lettuce, Tomato, Cucumber, and Corn.   Food Allergies: none reported and no known food allergies    Skin Integrity: wound, incision  Wound(s):      Altered Skin Integrity 09/11/23 1831 Left Buttocks #1 Partial thickness tissue loss. Shallow open ulcer with a red or pink wound bed, without slough. Intact or Open/Ruptured Serum-filled blister.-Tissue loss description: Partial thickness       Altered Skin Integrity 09/11/23 Left Buttocks #2 Partial thickness tissue loss. Shallow open ulcer with a red or pink wound bed, without slough. Intact or Open/Ruptured Serum-filled blister.-Tissue loss description: Partial thickness  [REMOVED]      Altered Skin Integrity 09/11/23 1836 Left Buttocks #3 Partial thickness tissue loss. Shallow open ulcer with a red or pink wound bed, without slough. Intact or Open/Ruptured Serum-filled blister.-Tissue loss description: Partial thickness       Altered Skin Integrity 09/11/23 1838 Right Buttocks #1 Partial thickness tissue loss. Shallow open ulcer with a red or pink wound bed, without slough. Intact or Open/Ruptured Serum-filled blister.-Tissue loss description: Partial thickness       Altered Skin Integrity 09/11/23 1838 Right Buttocks #2 Partial thickness tissue loss. Shallow open ulcer with a red or pink wound bed, without slough. Intact or Open/Ruptured Serum-filled blister.-Tissue loss description: Partial thickness  "    Overview/Hospital Course:    (9/12): Patient reports poor appetite for a while d/t early satiety, and reported similar issue on last admit. Per EMR patient averaged ~10%-1 meal on CLD d/t multiple bouts of diarrhea over past month and pending consult for surgery. Per EMR she weighed 84 kg on 6/30/23, CBW- 73.6 kg showing a 22.88# (12.3%) weight loss. Patient does not like apple sauce and has not had yogurt but willing to try if it will help with diarrhea. GI: ND/TENDER/DIARRHEA/LBM-9/12, NUTR:3, BRDN: 18, NO EDEMA NOTED, 24 HR I/O: 2900/300.    (9/14): Patient reports nausea and poor appetite with little improvement since 9/12. Patient was willing to try ONS. Per EMR patient averaged 50%- 2 meals on CLD d/t multiple bouts of diarrhea over the past month and pending consult for surgery for gallbladder. Per EMR patient has gained 7.92# since 9/12, likely related to fluid retention since NPO/CLD day 3. GI: TAUT/DIARRHEA/LBM-9/14, NUTR: 3, BRDN: 19, NO EDEMA NOTED, 24 HR I/O: 4366/0.    (9/18): Patient reports appetite is still not the best and does not like the boost breeze. Patient still with c/o diarrhea. Patient willing to try ONS for extra calories and to help with diarrhea. Patient averaged 50%- 2 meals on CLD and 0%-1 on Regular. Patient weighed 73.6 kg on 9/12, CBW- 76.8 kg showing a 7.04# weight gain likely d/t fluid retention. GI: ND/TENDER/LBM-9/17, NUTR:3, BRDN:19, NO EDEMA NOTED, 24 HR I/O: 2028/0.    Anthropometrics    Height: 5' 8" (172.7 cm) Height Method: Stated  Last Weight: 76.8 kg (169 lb 5 oz) (09/17/23 1353) Weight Method: Bed Scale  BMI (Calculated): 25.7  BMI Classification: overweight (BMI 25-29.9)     Ideal Body Weight (IBW), Female: 140 lb     % Ideal Body Weight, Female (lb): 115.93 %                             Usual Weight Provided By: EMR weight history    Wt Readings from Last 5 Encounters:   09/17/23 76.8 kg (169 lb 5 oz)   08/29/23 70.8 kg (156 lb)   07/17/23 76.2 kg (167 lb 15.9 " oz)   23 77.6 kg (171 lb 1.2 oz)   23 84 kg (185 lb 3.2 oz)     Weight Change(s) Since Admission:  Admit Weight: 68.9 kg (152 lb) (23 1154)      Estimated Needs    Weight Used For Calorie Calculations: 77.2 kg (170 lb 3.1 oz)  Energy Calorie Requirements (kcal): 1,930 - 2,240 KCAL (25-29 KCAL/KG CBW)  Energy Need Method: Kcal/kg  Weight Used For Protein Calculations: 77.2 kg (170 lb 3.1 oz)  Protein Requirements: 62 - 77.4 GM PRO (0.8-1.0 GM/KG CBW)  Fluid Requirements (mL): 1000 ML H2O + OUTPUT  Temp (24hrs), Av.9 °F (36.6 °C), Min:97.2 °F (36.2 °C), Max:98.6 °F (37 °C)         Enteral Nutrition    Patient not receiving enteral nutrition at this time.    Parenteral Nutrition    Patient not receiving parenteral nutrition support at this time.    Evaluation of Received Nutrient Intake    Calories: not meeting estimated needs  Protein: not meeting estimated needs    Patient Education    Not applicable.         Nutrition Diagnosis     PES: Inadequate energy intake related to altered GI function as evidenced by estimated energy intake from diet less than estimated energy needs. (continues)    Interventions/Goals     Intervention(s): general/healthful diet, modified composition of meals/snacks, commercial beverage, commercial food, and collaboration with other providers    Goal: Meet Greater than 75% of nutritional needs by discharge. (goal not met)

## 2023-09-18 NOTE — PROGRESS NOTES
[facility]/Surg  [facility] Medicine  Progress Note    Patient Name: Rosalie Campbell  MRN: [id number]  Patient Class: IP- Inpatient   Admission Date: 9/11/2023  Length of Stay: 7 days  Attending Physician: Raquel Vogel MD  Primary Care Provider: Jany Taveras FNP-C        Subjective:     Principal Problem:Clostridium difficile colitis        HPI:   Loss of Consciousness    Diarrhea       C/o diarrhea x 1 month syncopal on Saturday, and this am, dx w/ c diff last month, pt reports placed self on floor when felt like she was gonna pass out, denies hitting head         History of Present Illness: History obtained from Patient     Pt is a 72 y.o. female with hx of CLD 3/4, HTN, Depression/Anxiety, smoker, CAD presented to the ER with about a month of diarrhea.  Having multiple bouts a day.  She was doing to bathroom today and passed out.  She did not strike her head.  She states she fell on Saturday as well and landed on her buttocks.  She was C. Diff + in the ER.  She says she had some ABX about a month ago around the time she had a colonoscopy.  She denies fevers but has some obvious chills.  Denies CP, SOB, NV.  She denies any blood in stool.           Overview/Hospital Course:  09/12/2023 pt presented with 2 months of diarrhea + c. Diff toxin, admitted for dehydration and c. Diff + diarrhea  Mike on ckd usually Cr runs 1.6-2.0 was 3.2 on admit and is 2.6 this am.  WBC was 30,000.  Pt had c. Diff ordered on 08/30 but does not think she was started on any abx.  Was in hospitale here back 07/07/2023 with colitis and was d/c home with cipro and flagyl.  Diarrhea started after hospitalizations.  Wound care nurses has evaluated and pt has some excoriated and broken areas multiple on bilateral sacral/buttocks areas present on admit.  US and CT showed stones and slude in the gallbladder but no acute cholecystitis.  CT shows collitis likely due to c. Diff, cannot rule out appendicitis, pt does not have  "acute abdominal pain.surgery has been consulted for evaluation  09/13/2023 pt still with significant diarrhea, cramps have improved a bit.  CT shows cholelithiasis.  Surgery consult is pending  09/14/2023 diarrhea is less this am and cramping is less.  Only 3 BM so far this am.  Surgery plans for lap kehinde in am.    09/15/2023 diarrhea is less, plans for lap kehinde today per surgery, continues with PT and case management working on rehab placement for Monday or Tuesday 09/16/2023 pt s/p lap kehinde, mild pain in RUQ area soreness, incision sites look good.  No diarrhea this am  09/17/2023 still with signigicant diarrhea, minimal po intake  09/18/2023 POD #3 lap kehinde, c. Diff collitis day #7 of po vanc, diarrhea is less, but not resolved.  Pt had 2 episodes of "vomiting" this am says she gets nauseated and mostly spits up.  + 2-3 BM loose this am and denies dysuria.        Interval History:      Review of Systems   Constitutional:  Negative for activity change, appetite change and fever.   Respiratory:  Negative for chest tightness, shortness of breath and wheezing.    Cardiovascular:  Negative for chest pain.   Gastrointestinal:  Negative for abdominal pain, constipation, diarrhea, nausea and vomiting.   Skin:  Negative for rash and wound.     Objective:     Vital Signs (Most Recent):  Temp: 97.6 °F (36.4 °C) (09/18/23 1101)  Pulse: 75 (09/18/23 1101)  Resp: 18 (09/18/23 1113)  BP: (!) 156/75 (09/18/23 1101)  SpO2: 98 % (09/18/23 1101) Vital Signs (24h Range):  Temp:  [97.2 °F (36.2 °C)-98.6 °F (37 °C)] 97.6 °F (36.4 °C)  Pulse:  [74-80] 75  Resp:  [16-20] 18  SpO2:  [96 %-98 %] 98 %  BP: (140-177)/(68-86) 156/75     Weight: 76.8 kg (169 lb 5 oz)  Body mass index is 25.74 kg/m².    Intake/Output Summary (Last 24 hours) at 9/18/2023 1245  Last data filed at 9/18/2023 0924  Gross per 24 hour   Intake 2148 ml   Output --   Net 2148 ml           Physical Exam  Constitutional:       General: She is not in acute " distress.     Appearance: Normal appearance. She is normal weight. She is not ill-appearing.   Eyes:      General: No scleral icterus.  Cardiovascular:      Rate and Rhythm: Normal rate and regular rhythm.      Pulses: Normal pulses.      Heart sounds: Normal heart sounds.   Pulmonary:      Effort: Pulmonary effort is normal.      Breath sounds: Normal breath sounds.   Abdominal:      General: There is no distension.      Palpations: Abdomen is soft. There is no mass.      Tenderness: There is no abdominal tenderness.   Musculoskeletal:      Right lower leg: No edema.      Left lower leg: No edema.   Skin:     General: Skin is warm and dry.      Capillary Refill: Capillary refill takes less than 2 seconds.      Findings: Rash present. No erythema or lesion.      Comments: See nurses' notes and wound care notes  Wounds POA   Neurological:      General: No focal deficit present.      Mental Status: She is alert. Mental status is at baseline.   Psychiatric:         Mood and Affect: Mood normal.         Behavior: Behavior normal.             Significant Labs: All pertinent labs within the past 24 hours have been reviewed.  CBC:   Recent Labs   Lab 09/17/23  0458 09/18/23  0504   WBC 17.67* 16.45*   HGB 9.7* 9.5*   HCT 30.9* 30.5*   * 348       CMP:   Recent Labs   Lab 09/17/23  0458 09/18/23  0504   CREATININE 2.14* 2.33*         Significant Imaging: I have reviewed all pertinent imaging results/findings within the past 24 hours.      Assessment/Plan:      * Clostridium difficile colitis  Continue po vancomycin  Still with frequent loose stool, has improved but not resolved  Check lactic acid level  WBC decreasing  Get flat and erect abdomen        Syncope  Likely due to volume depletion  Continue ivf  No further issues      Cholelithiasis with chronic cholecystitis  POD #3      Leukocytosis  liekly due to c. Diff  WBC has come down with plateau 15-17  Will get CXR and UA r/o secondary infections        Acute  "kidney injury superimposed on CKD  Patient with acute kidney injury/acute renal failure likely due to pre-renal azotemia due to dehydration NEYDA is currently improving. Baseline creatinine 1.6-2.0 - Labs reviewed- Renal function/electrolytes with Estimated Creatinine Clearance: 24.2 mL/min (A) (based on SCr of 2.33 mg/dL (H)). according to latest data. Monitor urine output and serial BMP and adjust therapy as needed. Avoid nephrotoxins and renally dose meds for GFR listed above.    Cr worsened again, will d/c zosyn  Get repeat UA and CXR r/o underlying infection  May need to change abx  Continue vanc po low likelyhood of worsening NEYDA  Repeat labs in am    Metabolic acidosis  Due to dehydration  Continue ivf  Repeat lactic acid this am      Hyponatremia  Patient has hyponatremia which is uncontrolled,We will aim to correct the sodium by 4-6mEq in 24 hours. We will monitor sodium Daily. The hyponatremia is due to Dehydration/hypovolemia. We will obtain the following studies: TSH, T4. We will treat the hyponatremia with IV fluids as follows: 0.9% NS. The patient's sodium results have been reviewed and are listed below.  No results for input(s): "NA" in the last 24 hours.    Wound of buttock  Wound care consulted  Present on admission      Hypertension  Resume norvasc today  Hydralazine prn for elevated BP       Obesity  Body mass index is 25.74 kg/m². Morbid obesity complicates all aspects of disease management from diagnostic modalities to treatment. Weight loss encouraged and health benefits explained to patient.         Debility  Continue PT  Case management working on Rehab        VTE Risk Mitigation (From admission, onward)         Ordered     enoxaparin injection 30 mg  Daily         09/18/23 1244     IP VTE HIGH RISK PATIENT  Once         09/11/23 1841     Place sequential compression device  Until discontinued         09/11/23 1841                Discharge Planning   PARRIS: 9/18/2023     Code Status: Full Code "   Is the patient medically ready for discharge?:     Reason for patient still in hospital (select all that apply): Patient trending condition, Treatment and Consult recommendations  Discharge Plan A: Rehab ([facility])   Discharge Delays: None known at this time              Raquel Vogel MD  Department of Hospital Medicine   [facility]/Surg

## 2023-09-18 NOTE — PHYSICIAN QUERY
PT Name: Rosalie Campbell  MR #: [id number]     DOCUMENTATION CLARIFICATION     CDS/: Mary Pryor RN             Contact information: [email or web address]  This form is a permanent document in the medical record.     Query Date: September 18, 2023    By submitting this query, we are merely seeking further clarification of documentation.  Please utilize your independent clinical judgment when addressing the question(s) below.  Provider, please provide the integumentary diagnosis related to the documentation of (CDI: IDENTIFY SITE):   The Medical Record contains the following:      Altered Skin Integrity 09/11/23 1838   Right Buttocks #1 Partial thickness tissue loss.   Shallow open ulcer with a red or pink wound bed, without slough. Intact or Open/Ruptured Serum-filled blister.        Bilateral buttocks appear to have repeated friction injury resulting in dark discoloration and dry flaky skin.  The areas of breakdown appear to be due to pressure       The clinical guidelines noted are only a system guideline. It does not replace the providers clinical judgment.    Per the National Pressure Injury Advisory Panel:   A pressure injury is localized damage to the skin and underlying soft tissue usually over a bony prominence or related to a medical or other device. The injury can present as intact skin or an open ulcer and may be painful. The injury occurs as a result of intense and/or prolonged pressure or pressure in combination with shear. The tolerance of soft tissue for pressure and shear may also be affected by microclimate, nutrition, perfusion, co-morbidities and condition of the soft tissue.       Stage 1 Pressure Injury:  Intact skin with a localized area of non-blanchable erythema, which may appear differently in darkly pigmented skin. Color changes do not include purple or maroon discoloration; these may indicate deep tissue pressure injury.    Stage 2 Pressure Injury:  Partial-thickness loss  of skin with exposed dermis. The wound bed is viable, pink or red, moist, and may also present as an intact or ruptured serum-filled blister.    Stage 3 Pressure Injury:  Full-thickness loss of skin, in which adipose (fat) is visible in the ulcer and granulation tissue and epibole (rolled wound edges) are often present. Slough and/or eschar may be visible. Undermining and tunneling may occur.    Stage 4 Pressure Injury:  Full-thickness skin and tissue loss with exposed or directly palpable fascia, muscle, tendon, ligament, cartilage or bone in the ulcer. Slough and/or eschar may be visible. Epibole (rolled edges), undermining and/or tunneling often occur.    Unstageable Pressure Injury:  Full-thickness skin and tissue loss in which the extent of tissue damage within the ulcer cannot be confirmed because it is obscured by slough or eschar. If slough or eschar is removed, a Stage 3 or Stage 4 pressure injury will be revealed.        Provider, please provide the integumentary diagnosis related to the documentation of (Right  Buttocks ):     [   x] Pressure Injury/Decubitus Ulcer, Stage 2   [   ] Non-pressure ulcer, other depth (please specify): ___________   [   ] Other Integumentary Diagnosis (please specify):______________       Please document in your progress notes daily for the duration of treatment until resolved and include in your discharge summary.    Reference:    YOANNA Acosta., NORMA Sol., Goldberg, M., MYRA Malin., MYRA Campbell., & ROB Brito. (2016). Revised National Pressure Ulcer Advisory Panel Pressure Injury Staging System: Revised Pressure Injury Staging System. J Wound Ostomy Continence Nurs, 43(6), 585-597. doi:10.1097/won.4233216774989145    Form No.31441

## 2023-09-18 NOTE — ASSESSMENT & PLAN NOTE
Body mass index is 25.74 kg/m². Morbid obesity complicates all aspects of disease management from diagnostic modalities to treatment. Weight loss encouraged and health benefits explained to patient.

## 2023-09-18 NOTE — PT/OT/SLP PROGRESS
Physical Therapy Treatment    Patient Name:  Rosalie Campbell   MRN:  [id number]    Recommendations:     Discharge Recommendations: home with home health, rehabilitation facility  Discharge Equipment Recommendations: none  Barriers to discharge:  current medical status    Assessment:     Rosalie Campbell is a 72 y.o. female admitted with a medical diagnosis of Clostridium difficile colitis.  She presents with the following impairments/functional limitations: weakness, impaired endurance, impaired functional mobility, gait instability, impaired balance, decreased lower extremity function, decreased safety awareness     Patient tolerated up to BSC to attempt to urinate with RW with min/mod A Patient tolerated transfer back to bed with min/mod A. Patient sat EOB and performed BLE seated tx, then returned to supine.     Rehab Prognosis: Good and Fair; patient would benefit from acute skilled PT services to address these deficits and reach maximum level of function.    Recent Surgery: Procedure(s) (LRB):  CHOLECYSTECTOMY, LAPAROSCOPIC, WITH CHOLANGIOGRAM (N/A) 3 Days Post-Op    Plan:     During this hospitalization, patient to be seen 5 x/week (5-6x weekly/ 1-2x daily) to address the identified rehab impairments via gait training, therapeutic activities, therapeutic exercises and progress toward the following goals:    Plan of Care Expires:  10/13/23    Subjective     Chief Complaint: weakness  Patient/Family Comments/goals: to get stronger  Pain/Comfort:         Objective:     Communicated with nursing prior to session.  Patient found HOB elevated with peripheral IV upon PT entry to room.     General Precautions: Standard, fall  Orthopedic Precautions: N/A  Braces: N/A  Respiratory Status: Room air     Functional Mobility:  Bed Mobility:     Supine to Sit: moderate assistance  Sit to Supine: minimum assistance and moderate assistance  Transfers:     Sit to Stand:  minimum assistance and moderate assistance with rolling  walker  Bed to Chair: minimum assistance and moderate assistance with  rolling walker  using  Stand Pivot      AM-PAC 6 CLICK MOBILITY          Treatment & Education:  See above.     Patient left HOB elevated with all lines intact, call button in reach, and bed alarm on..    GOALS:   Multidisciplinary Problems       Physical Therapy Goals          Problem: Physical Therapy    Goal Priority Disciplines Outcome Goal Variances Interventions   Physical Therapy Goal     PT, PT/OT Ongoing, Progressing     Description: Goals to be met by: discharge     Patient will increase functional independence with mobility by performin. Supine to sit with Contact Guard Assistance  2. Sit to stand transfer with Contact Guard Assistance  3. Gait  x 75 feet with Contact Guard Assistance using Rolling Walker.                          Time Tracking:     PT Received On: 23  PT Start Time: 1020     PT Stop Time: 1050  PT Total Time (min): 30 min     Billable Minutes: Therapeutic Activity 15 and Therapeutic Exercise 15    Treatment Type: Treatment  PT/PTA: PT           2023

## 2023-09-18 NOTE — ASSESSMENT & PLAN NOTE
Patient with acute kidney injury/acute renal failure likely due to pre-renal azotemia due to dehydration NEYDA is currently improving. Baseline creatinine 1.6-2.0 - Labs reviewed- Renal function/electrolytes with Estimated Creatinine Clearance: 24.2 mL/min (A) (based on SCr of 2.33 mg/dL (H)). according to latest data. Monitor urine output and serial BMP and adjust therapy as needed. Avoid nephrotoxins and renally dose meds for GFR listed above.    Cr worsened again, will d/c zosyn  Get repeat UA and CXR r/o underlying infection  May need to change abx  Continue vanc po low likelyhood of worsening NEYDA  Repeat labs in am

## 2023-09-18 NOTE — ASSESSMENT & PLAN NOTE
liekly due to c. Diff  WBC has come down with plateau 15-17  Will get CXR and UA r/o secondary infections

## 2023-09-18 NOTE — ASSESSMENT & PLAN NOTE
Continue po vancomycin  Still with frequent loose stool, has improved but not resolved  Check lactic acid level  WBC decreasing  Get flat and erect abdomen

## 2023-09-18 NOTE — NURSING
Telemetry called and reported patient had a 30 beat run of PVC's, message [facility] ist no new orders were given, will continue to monitor patient

## 2023-09-19 LAB
ALBUMIN SERPL-MCNC: 2.2 G/DL (ref 3.4–5)
ALBUMIN/GLOB SERPL: 0.9 RATIO
ALP SERPL-CCNC: 67 UNIT/L (ref 50–144)
ALT SERPL-CCNC: 13 UNIT/L (ref 1–45)
ANION GAP SERPL CALC-SCNC: 9 MEQ/L (ref 2–13)
AST SERPL-CCNC: 17 UNIT/L (ref 14–36)
BASOPHILS # BLD AUTO: 0.04 X10(3)/MCL (ref 0.01–0.08)
BASOPHILS NFR BLD AUTO: 0.3 % (ref 0.1–1.2)
BILIRUB SERPL-MCNC: 0.3 MG/DL (ref 0–1)
BUN SERPL-MCNC: 25 MG/DL (ref 7–20)
CALCIUM SERPL-MCNC: 7.7 MG/DL (ref 8.4–10.2)
CHLORIDE SERPL-SCNC: 120 MMOL/L (ref 98–110)
CO2 SERPL-SCNC: 12 MMOL/L (ref 21–32)
CREAT SERPL-MCNC: 2.51 MG/DL (ref 0.66–1.25)
CREAT/UREA NIT SERPL: 10 (ref 12–20)
EOSINOPHIL # BLD AUTO: 1.12 X10(3)/MCL (ref 0.04–0.36)
EOSINOPHIL NFR BLD AUTO: 8.1 % (ref 0.7–7)
ERYTHROCYTE [DISTWIDTH] IN BLOOD BY AUTOMATED COUNT: 19.4 % (ref 11–14.5)
GFR SERPLBLD CREATININE-BSD FMLA CKD-EPI: 20 MLS/MIN/1.73/M2
GLOBULIN SER-MCNC: 2.4 GM/DL (ref 2–3.9)
GLUCOSE SERPL-MCNC: 98 MG/DL (ref 70–115)
HCT VFR BLD AUTO: 28.3 % (ref 36–48)
HGB BLD-MCNC: 9 G/DL (ref 11.8–16)
IMM GRANULOCYTES # BLD AUTO: 0.12 X10(3)/MCL (ref 0–0.03)
IMM GRANULOCYTES NFR BLD AUTO: 0.9 % (ref 0–0.5)
LYMPHOCYTES # BLD AUTO: 1.25 X10(3)/MCL (ref 1.16–3.74)
LYMPHOCYTES NFR BLD AUTO: 9 % (ref 20–55)
MAGNESIUM SERPL-MCNC: 1.6 MG/DL (ref 1.8–2.4)
MCH RBC QN AUTO: 27.3 PG (ref 27–34)
MCHC RBC AUTO-ENTMCNC: 31.8 G/DL (ref 31–37)
MCV RBC AUTO: 85.8 FL (ref 79–99)
MONOCYTES # BLD AUTO: 0.83 X10(3)/MCL (ref 0.24–0.36)
MONOCYTES NFR BLD AUTO: 6 % (ref 4.7–12.5)
NEUTROPHILS # BLD AUTO: 10.47 X10(3)/MCL (ref 1.56–6.13)
NEUTROPHILS NFR BLD AUTO: 75.7 % (ref 37–73)
NRBC BLD AUTO-RTO: 0 %
PLATELET # BLD AUTO: 354 X10(3)/MCL (ref 140–371)
PMV BLD AUTO: 10.4 FL (ref 9.4–12.4)
POTASSIUM SERPL-SCNC: 2.5 MMOL/L (ref 3.5–5.1)
PROT SERPL-MCNC: 4.6 GM/DL (ref 6.3–8.2)
RBC # BLD AUTO: 3.3 X10(6)/MCL (ref 4–5.1)
SODIUM SERPL-SCNC: 141 MMOL/L (ref 135–145)
WBC # SPEC AUTO: 13.83 X10(3)/MCL (ref 4–11.5)

## 2023-09-19 PROCEDURE — 97110 THERAPEUTIC EXERCISES: CPT

## 2023-09-19 PROCEDURE — 94761 N-INVAS EAR/PLS OXIMETRY MLT: CPT

## 2023-09-19 PROCEDURE — 63600175 PHARM REV CODE 636 W HCPCS: Performed by: FAMILY MEDICINE

## 2023-09-19 PROCEDURE — 21400001 HC TELEMETRY ROOM

## 2023-09-19 PROCEDURE — 80053 COMPREHEN METABOLIC PANEL: CPT | Performed by: FAMILY MEDICINE

## 2023-09-19 PROCEDURE — 25000003 PHARM REV CODE 250: Performed by: FAMILY MEDICINE

## 2023-09-19 PROCEDURE — 85025 COMPLETE CBC W/AUTO DIFF WBC: CPT | Performed by: FAMILY MEDICINE

## 2023-09-19 PROCEDURE — 83735 ASSAY OF MAGNESIUM: CPT | Performed by: FAMILY MEDICINE

## 2023-09-19 PROCEDURE — 25000003 PHARM REV CODE 250: Performed by: INTERNAL MEDICINE

## 2023-09-19 PROCEDURE — 36415 COLL VENOUS BLD VENIPUNCTURE: CPT | Performed by: FAMILY MEDICINE

## 2023-09-19 PROCEDURE — 97530 THERAPEUTIC ACTIVITIES: CPT

## 2023-09-19 RX ORDER — POTASSIUM CHLORIDE 20 MEQ/1
40 TABLET, EXTENDED RELEASE ORAL EVERY 4 HOURS
Status: DISCONTINUED | OUTPATIENT
Start: 2023-09-19 | End: 2023-09-19

## 2023-09-19 RX ORDER — LANOLIN ALCOHOL/MO/W.PET/CERES
400 CREAM (GRAM) TOPICAL 2 TIMES DAILY
Status: DISCONTINUED | OUTPATIENT
Start: 2023-09-19 | End: 2023-09-22 | Stop reason: HOSPADM

## 2023-09-19 RX ORDER — DEXTROSE MONOHYDRATE, SODIUM CHLORIDE, AND POTASSIUM CHLORIDE 50; 1.49; 4.5 G/1000ML; G/1000ML; G/1000ML
INJECTION, SOLUTION INTRAVENOUS CONTINUOUS
Status: DISCONTINUED | OUTPATIENT
Start: 2023-09-19 | End: 2023-09-22 | Stop reason: HOSPADM

## 2023-09-19 RX ADMIN — FAMOTIDINE 20 MG: 20 TABLET ORAL at 08:09

## 2023-09-19 RX ADMIN — VANCOMYCIN HYDROCHLORIDE 125 MG: 125 CAPSULE ORAL at 08:09

## 2023-09-19 RX ADMIN — PANTOPRAZOLE SODIUM 40 MG: 40 TABLET, DELAYED RELEASE ORAL at 08:09

## 2023-09-19 RX ADMIN — FERROUS SULFATE TAB 325 MG (65 MG ELEMENTAL FE) 1 EACH: 325 (65 FE) TAB at 08:09

## 2023-09-19 RX ADMIN — SODIUM CHLORIDE: 9 INJECTION, SOLUTION INTRAVENOUS at 11:09

## 2023-09-19 RX ADMIN — CLOPIDOGREL BISULFATE 75 MG: 75 TABLET ORAL at 04:09

## 2023-09-19 RX ADMIN — ENOXAPARIN SODIUM 30 MG: 40 INJECTION SUBCUTANEOUS at 08:09

## 2023-09-19 RX ADMIN — VANCOMYCIN HYDROCHLORIDE 125 MG: 125 CAPSULE ORAL at 12:09

## 2023-09-19 RX ADMIN — Medication: at 05:09

## 2023-09-19 RX ADMIN — POTASSIUM CHLORIDE 40 MEQ: 1500 TABLET, EXTENDED RELEASE ORAL at 12:09

## 2023-09-19 RX ADMIN — Medication: at 12:09

## 2023-09-19 RX ADMIN — VANCOMYCIN HYDROCHLORIDE 125 MG: 125 CAPSULE ORAL at 09:09

## 2023-09-19 RX ADMIN — SODIUM CHLORIDE: 9 INJECTION, SOLUTION INTRAVENOUS at 03:09

## 2023-09-19 RX ADMIN — ROPINIROLE HYDROCHLORIDE 0.25 MG: 0.25 TABLET, FILM COATED ORAL at 09:09

## 2023-09-19 RX ADMIN — CEFTRIAXONE SODIUM 1 G: 1 INJECTION, POWDER, FOR SOLUTION INTRAMUSCULAR; INTRAVENOUS at 01:09

## 2023-09-19 RX ADMIN — VANCOMYCIN HYDROCHLORIDE 125 MG: 125 CAPSULE ORAL at 04:09

## 2023-09-19 RX ADMIN — ESCITALOPRAM OXALATE 20 MG: 10 TABLET ORAL at 08:09

## 2023-09-19 RX ADMIN — BUSPIRONE HYDROCHLORIDE 15 MG: 15 TABLET ORAL at 09:09

## 2023-09-19 RX ADMIN — MAGNESIUM OXIDE TAB 400 MG (241.3 MG ELEMENTAL MG) 400 MG: 400 (241.3 MG) TAB at 09:09

## 2023-09-19 RX ADMIN — HYDROCODONE BITARTRATE AND ACETAMINOPHEN 1 TABLET: 5; 325 TABLET ORAL at 09:09

## 2023-09-19 RX ADMIN — DEXTROSE MONOHYDRATE, SODIUM CHLORIDE, AND POTASSIUM CHLORIDE: 50; 4.5; 1.49 INJECTION, SOLUTION INTRAVENOUS at 01:09

## 2023-09-19 RX ADMIN — POTASSIUM CHLORIDE 40 MEQ: 1500 TABLET, EXTENDED RELEASE ORAL at 06:09

## 2023-09-19 NOTE — ASSESSMENT & PLAN NOTE
Patient with acute kidney injury/acute renal failure likely due to pre-renal azotemia due to dehydration NEYDA is currently improving. Baseline creatinine 1.6-2.0 - Labs reviewed- Renal function/electrolytes with Estimated Creatinine Clearance: 23.2 mL/min (A) (based on SCr of 2.51 mg/dL (H)). according to latest data. Monitor urine output and serial BMP and adjust therapy as needed. Avoid nephrotoxins and renally dose meds for GFR listed above.    Cr worsened again, will d/c zosyn  Get repeat UA and CXR r/o underlying infection  May need to change abx  Continue vanc po low likelyhood of worsening NEYDA  Cr 2.5 today trend up slight from 2.3  Repeat labs in am

## 2023-09-19 NOTE — PROGRESS NOTES
[facility]-Med/Surg  Wound Care    Patient Name:  Rosalie Campbell   MRN:  [id number]  Date: 9/19/2023  Diagnosis: Clostridium difficile colitis    History:     Past Medical History:   Diagnosis Date    Anxiety     Chronic renal disease stage 4     Colitis 7/6/2023    Colon cancer screening declined     Coronary artery disease     Depression     Hypertension     Irregular heart rhythm     Medication management     Obesity, unspecified     Osteoarthritis of knee     Pain, joint, shoulder region, right     Refused influenza vaccine     Refused pneumococcal vaccination     Smoker        Social History     Socioeconomic History    Marital status:    Tobacco Use    Smoking status: Every Day     Current packs/day: 0.50     Types: Cigarettes     Passive exposure: Current    Smokeless tobacco: Never   Substance and Sexual Activity    Alcohol use: Never    Drug use: Never    Sexual activity: Not Currently     Social Determinants of Health     Financial Resource Strain: Low Risk  (6/26/2023)    Overall Financial Resource Strain (CARDIA)     Difficulty of Paying Living Expenses: Not very hard   Food Insecurity: No Food Insecurity (6/26/2023)    Hunger Vital Sign     Worried About Running Out of Food in the Last Year: Never true     Ran Out of Food in the Last Year: Never true   Transportation Needs: No Transportation Needs (6/26/2023)    PRAPARE - Transportation     Lack of Transportation (Medical): No     Lack of Transportation (Non-Medical): No   Physical Activity: Insufficiently Active (6/26/2023)    Exercise Vital Sign     Days of Exercise per Week: 3 days     Minutes of Exercise per Session: 10 min   Stress: Stress Concern Present (6/26/2023)    [employer] [place] [employer] - Occupational Stress Questionnaire     Feeling of Stress : Rather much   Social Connections: Moderately Isolated (6/26/2023)    Social Connection and Isolation Panel [NHANES]     Frequency of Communication with Friends and  Family: Twice a week     Frequency of Social Gatherings with Friends and Family: More than three times a week     Attends Congregational Services: More than 4 times per year     Active Member of Clubs or Organizations: No     Attends Club or Organization Meetings: Never     Marital Status:    Housing Stability: Low Risk  (6/26/2023)    Housing Stability Vital Sign     Unable to Pay for Housing in the Last Year: No     Number of Places Lived in the Last Year: 1     Unstable Housing in the Last Year: No       Precautions:     Allergies as of 09/11/2023 - Reviewed 09/11/2023   Allergen Reaction Noted    Codeine Other (See Comments) 01/05/2023       WOC Assessment Details/Treatment        09/19/23 1051   WOCN Assessment   WOCN Total Time (mins) 30   Visit Date 09/19/23   Visit Time 1000   Consult Type Follow Up   WOCN Speciality Wound   Number of Wounds 3   Intervention assessed;applied   Teaching on-going        Altered Skin Integrity 09/11/23 Left Buttocks #2 Partial thickness tissue loss. Shallow open ulcer with a red or pink wound bed, without slough. Intact or Open/Ruptured Serum-filled blister.   Date First Assessed: 09/11/23   Altered Skin Integrity Present on Admission - Did Patient arrive to the hospital with altered skin?: yes  Side: Left  Location: Buttocks  Wound Number: #2  Description of Altered Skin Integrity: Partial thickness tissue...   Wound Image    Description of Altered Skin Integrity Partial thickness tissue loss. Shallow open ulcer with a red or pink wound bed, without slough. Intact or Open/Ruptured Serum-filled blister.   Dressing Appearance Open to air   Drainage Amount None   Appearance [place];Dry   Periwound Area   (dark discoloration, blanchable)   Wound Edges Defined   Wound Length (cm) 0.2 cm   Wound Width (cm) 0.1 cm   Wound Depth (cm) 0.1 cm   Wound Volume (cm^3) 0.002 cm^3   Wound Surface Area (cm^2) 0.02 cm^2   Care Applied:  (barrier cream. Order noted for aquaphor/questran.)         Altered Skin Integrity 09/11/23 1838 Right Buttocks #1 Partial thickness tissue loss. Shallow open ulcer with a red or pink wound bed, without slough. Intact or Open/Ruptured Serum-filled blister.   Date First Assessed/Time First Assessed: 09/11/23 1838   Altered Skin Integrity Present on Admission - Did Patient arrive to the hospital with altered skin?: yes  Side: Right  Location: Buttocks  Wound Number: #1  Description of Altered Skin Integrity...   Wound Image    Description of Altered Skin Integrity Partial thickness tissue loss. Shallow open ulcer with a red or pink wound bed, without slough. Intact or Open/Ruptured Serum-filled blister.   Dressing Appearance Open to air   Drainage Amount None   Appearance [place];Dry   Periwound Area   (dark discoloration, blanchable)   Wound Edges Defined   Wound Length (cm) 0.6 cm   Wound Width (cm) 0.4 cm   Wound Depth (cm) 0.1 cm   Wound Volume (cm^3) 0.024 cm^3   Wound Surface Area (cm^2) 0.24 cm^2   Care Applied:  (barrier cream/  order noted for quaphor/questran)        Altered Skin Integrity 09/11/23 1838 Right Buttocks #2 Partial thickness tissue loss. Shallow open ulcer with a red or pink wound bed, without slough. Intact or Open/Ruptured Serum-filled blister.   Date First Assessed/Time First Assessed: 09/11/23 1838   Altered Skin Integrity Present on Admission - Did Patient arrive to the hospital with altered skin?: yes  Side: Right  Location: Buttocks  Wound Number: #2  Description of Altered Skin Integrity...   Wound Image    Description of Altered Skin Integrity Partial thickness tissue loss. Shallow open ulcer with a red or pink wound bed, without slough. Intact or Open/Ruptured Serum-filled blister.   Dressing Appearance Open to air   Drainage Amount None   Appearance [place];Dry   Periwound Area   (dark discoloration, blanchable)   Wound Edges Defined   Wound Length (cm) 0.4 cm   Wound Width (cm) 0.5 cm   Wound Depth (cm) 0.1 cm   Wound Volume (cm^3) 0.02 cm^3    Wound Surface Area (cm^2) 0.2 cm^2   Care Applied:  (barrier cream.  order noted for aquaphor/questran)      Orders placed.     09/19/2023

## 2023-09-19 NOTE — SUBJECTIVE & OBJECTIVE
Interval History:      Review of Systems   Constitutional:  Negative for activity change, appetite change and fever.   Respiratory:  Negative for chest tightness, shortness of breath and wheezing.    Cardiovascular:  Negative for chest pain.   Gastrointestinal:  Negative for abdominal pain, constipation, diarrhea, nausea and vomiting.   Skin:  Negative for rash and wound.     Objective:     Vital Signs (Most Recent):  Temp: 97 °F (36.1 °C) (09/19/23 1101)  Pulse: 86 (09/19/23 1101)  Resp: 19 (09/19/23 1101)  BP: (!) 151/66 (09/19/23 1101)  SpO2: 97 % (09/19/23 1101) Vital Signs (24h Range):  Temp:  [97 °F (36.1 °C)-98.7 °F (37.1 °C)] 97 °F (36.1 °C)  Pulse:  [72-90] 86  Resp:  [16-20] 19  SpO2:  [96 %-97 %] 97 %  BP: (142-162)/(66-84) 151/66     Weight: 83.1 kg (183 lb 1.6 oz)  Body mass index is 27.84 kg/m².    Intake/Output Summary (Last 24 hours) at 9/19/2023 1323  Last data filed at 9/18/2023 1836  Gross per 24 hour   Intake 2444.17 ml   Output --   Net 2444.17 ml           Physical Exam  Constitutional:       General: She is not in acute distress.     Appearance: Normal appearance. She is normal weight. She is not ill-appearing.   Eyes:      General: No scleral icterus.  Cardiovascular:      Rate and Rhythm: Normal rate and regular rhythm.      Pulses: Normal pulses.      Heart sounds: Normal heart sounds.   Pulmonary:      Effort: Pulmonary effort is normal.      Breath sounds: Normal breath sounds.   Abdominal:      General: There is no distension.      Palpations: Abdomen is soft. There is no mass.      Tenderness: There is no abdominal tenderness.   Musculoskeletal:      Right lower leg: No edema.      Left lower leg: No edema.   Skin:     General: Skin is warm and dry.      Capillary Refill: Capillary refill takes less than 2 seconds.      Findings: Rash present. No erythema or lesion.      Comments: See nurses' notes and wound care notes  Wounds POA   Neurological:      General: No focal deficit present.       Mental Status: She is alert. Mental status is at baseline.   Psychiatric:         Mood and Affect: Mood normal.         Behavior: Behavior normal.             Significant Labs: All pertinent labs within the past 24 hours have been reviewed.  CBC:   Recent Labs   Lab 09/18/23  0504 09/19/23 0444   WBC 16.45* 13.83*   HGB 9.5* 9.0*   HCT 30.5* 28.3*    354       CMP:   Recent Labs   Lab 09/18/23  0504 09/19/23 0444    141   K 3.0* 2.5*   CO2 14* 12*   BUN 23.0* 25.0*   CREATININE 2.28*  2.33* 2.51*   CALCIUM 7.8* 7.7*   ALBUMIN 2.4* 2.2*   BILITOT 0.3 0.3   ALKPHOS 73 67   AST 17 17   ALT 14 13         Significant Imaging: I have reviewed all pertinent imaging results/findings within the past 24 hours.

## 2023-09-19 NOTE — ASSESSMENT & PLAN NOTE
Continue po vancomycin  Still with frequent loose stool, has improved but not resolved  Check lactic acid level- normal   WBC decreasing  Get flat and erect abdomen- no acute findings

## 2023-09-19 NOTE — PHYSICIAN QUERY
"PT Name: Rosalie Campbell  MR #: [id number]    DOCUMENTATION CLARIFICATION     CDS/: Mary Pryor RN              Contact information:  [email or web address]    This form is a permanent document in the medical record.     Query Date: September 19, 2023    By submitting this query, we are merely seeking further clarification of documentation.. Please utilize your independent clinical judgment when addressing the question(s) below.    The medical record contains the following:   Indicators  Supporting Clinical Findings Location in Medical Record   x Registered Dietician Diagnosis  PES: Inadequate energy intake related to altered GI function as evidenced by estimated energy intake from diet less than estimated energy needs. (continues)   9/18 Dietitian Consult PN   x Energy Intake PES: Inadequate energy intake related to altered GI function as evidenced by estimated energy intake from diet less than estimated energy needs. (continues)    9/12): Patient reports poor appetite for a while d/t early satiety, and reported similar issue on last admit. Per EMR patient averaged ~10%-1 meal on CLD d/t multiple bouts of diarrhea over past month and pending consult for surgery.     (9/14): Patient reports nausea and poor appetite with little improvement since 9/12.    (9/18): Patient reports appetite is still not the best and does not like the boost breeze.     9/18 Dietitian Consult PN     x Weight Loss Weight Loss (Malnutrition): greater than 7.5% in 3 months (22.88# (12/3%) since 6/30/23, ~2.3 months.)   9/18 Dietitian Consult PN   x Fat Loss NFPE not appropriate at this time.  9/14 Dietitian consult PN     x Muscle Loss NFPE not appropriate at this time.  9/14 Dietitian consult PN    Edema/Fluid Accumulation      Reduced  Strength (by dynamometer)     x Weight, BMI, Usual Body Weight BMI= 25.7  Wt= 76.8kg  Ht 5'8"   9/18 Dietitian Consult PN   x Delayed Wound Healing Wound(s):          Altered Skin Integrity " 09/11/23 1831 Left Buttocks #1 Partial thickness tissue loss. Shallow open ulcer with a red or pink wound bed, without slough. Intact or Open/Ruptured Serum-filled blister.-Tissue loss description: Partial thickness         Altered Skin Integrity 09/11/23 Left Buttocks #2 Partial thickness tissue loss. Shallow open ulcer with a red or pink wound bed, without slough. Intact or Open/Ruptured Serum-filled blister.-Tissue loss description: Partial thickness    Altered Skin Integrity 09/11/23 1836 Left Buttocks #3 Partial thickness tissue loss. Shallow open ulcer with a red or pink wound bed, without slough. Intact or Open/Ruptured Serum-filled blister.-Tissue loss description: Partial thickness         Altered Skin Integrity 09/11/23 1838 Right Buttocks #1 Partial thickness tissue loss. Shallow open ulcer with a red or pink wound bed, without slough. Intact or Open/Ruptured Serum-filled blister.-Tissue loss description: Partial thickness         Altered Skin Integrity 09/11/23 1838 Right Buttocks #2 Partial thickness tissue loss. Shallow open ulcer with a red or pink wound bed, without slough. Intact or Open/Ruptured Serum-filled blister.-Tissue loss description: Partial thickness    9/18 Dietitian Consult PN   x Acute or Chronic Illness hx of CKD 3/4, HTN, Depression/Anxiety, smoker, CAD presented to the ER with about a mo of diarrhea    Cdiff  Debility  Metabolic acidosis  Hyponatremia  Syncope  Cholelithiasis with chronic cholecystitis  Leukocytosis  Wound of buttock  NEYDA on CKD    9/19 Hosp Med MD PN    Social or Environmental Circumstances     x Treatment Intervention(s): general/healthful diet, modified composition of meals/snacks, commercial beverage, commercial food, and collaboration with other providers    Dietary nutrition supplements:  Novasource Renal  Dietary supplements:  Banatrol Plus with Bimuno prebiotic     9/18 Dietitian Consult PN      9/19 Orders   x Other   Recommendedations:   Continue diet from  Regular Isolation to Renal, Low Fiber Isolation.  Change Boost Breeze TID to Novasource BID (475 kcal, 22 gm pro each).   RD to add Banatrol TID once diet advanced with pudding to help with diarrhea.   Closely monitor Renal function/GI function.   Dietitian will monitor Electrolytes, Energy Intake, Food and Beverage Intake, Gastrointestinal Profile, Renal Function, Weight, and Weight Change and adjust MNT as needed      9/18 Dietitian Consult PN     Academy of Nutrition and Dietetics (Academy) and the American Society for Parenteral and Enteral Nutrition (A.S.P.E.N.) Clinical Characteristics to support Malnutrition      Criteria for mild malnutrition is defined as 1 characteristic outlined above within the established moderate or severe parameters.  A minimum of 2 out of the 6 characteristics noted above are recommended for a diagnosis of moderate or severe malnutrition.  Chronic illness/injury is a disease/condition lasting 3 months or longer.    The noted clinical guidelines are only system guidelines and do not replace the providers clinical judgment.    Provider, please specify diagnosis or diagnoses associated with above clinical findings.    [  x] Mild Malnutrition - 1 characteristic outlined above within the established moderate or severe parameters     [  ] Other Nutritional Diagnosis (please specify): _______     [  ] Malnutrition ruled out     Present on admission (POA) status:  [ x ] Yes (Y)   [  ] No (N)   [  ] Documentation insufficient to determine if condition is POA (U)   [  ] Clinically Undetermined (W)     Please document in your progress notes daily for the duration of treatment until resolved and  include in your discharge summary.      References:    HUA Sprague, & JEN Howell (2022, April). Assessment and management of anorexia and cachexia in palliative care. Retrieved May 23, 2022, from  [email or web address]-[email or web address]-[email or web address]     NORMA Davalos, PhD, RD, Roberto BRAUN P., PhD, RN, SUKHWINDER Duarte MD, PhD, Helen FOX A., MS, RD, [facility], KHALIDA Quintero, MS, RD, The Academy Malnutrition Work Group, The A.S.P.E.N. Board of Directors. (2012). Consensus Statement: Academy of Nutrition and Dietetics and American Society for Parenteral and Enteral Nutrition: Characteristics Recommended for the Identification and Documentation of Adult Malnutrition (Undernutrition). Journal of Parenteral and Enteral Nutrition, 36(3), 275-283. doi:10.1177/7680321983598401     Form No. 00210

## 2023-09-19 NOTE — PT/OT/SLP PROGRESS
Physical Therapy Treatment    Patient Name:  Rosalie Campbell   MRN:  [id number]    Recommendations:     Discharge Recommendations: home with home health, rehabilitation facility  Discharge Equipment Recommendations: none  Barriers to discharge:  current medical status    Assessment:     Rosalie Campbell is a 72 y.o. female admitted with a medical diagnosis of Clostridium difficile colitis.  She presents with the following impairments/functional limitations: weakness, impaired endurance, impaired functional mobility, gait instability, impaired balance, decreased lower extremity function, decreased safety awareness     Patient tolerated sitting EOB performing BLE therex, then returned to bed due to having a BM accident and rolled to be cleaned and changed    Rehab Prognosis: Good and Fair; patient would benefit from acute skilled PT services to address these deficits and reach maximum level of function.    Recent Surgery: Procedure(s) (LRB):  CHOLECYSTECTOMY, LAPAROSCOPIC, WITH CHOLANGIOGRAM (N/A) 4 Days Post-Op    Plan:     During this hospitalization, patient to be seen 5 x/week (5-6x weekly/ 1-2x daily) to address the identified rehab impairments via gait training, therapeutic activities, therapeutic exercises and progress toward the following goals:    Plan of Care Expires:  10/13/23    Subjective     Chief Complaint: weakness  Patient/Family Comments/goals: to get stronger  Pain/Comfort:         Objective:     Communicated with nursing prior to session.  Patient found HOB elevated with peripheral IV upon PT entry to room.     General Precautions: Standard, fall  Orthopedic Precautions: N/A  Braces: N/A  Respiratory Status: Room air     Functional Mobility:  Bed Mobility:     Supine to Sit: minimum assistance and moderate assistance  Sit to Supine: minimum assistance and moderate assistance      AM-PAC 6 CLICK MOBILITY          Treatment & Education:  See above.     Patient left HOB elevated with all lines intact, call  button in reach, and bed alarm on..    GOALS:   Multidisciplinary Problems       Physical Therapy Goals          Problem: Physical Therapy    Goal Priority Disciplines Outcome Goal Variances Interventions   Physical Therapy Goal     PT, PT/OT Ongoing, Progressing     Description: Goals to be met by: discharge     Patient will increase functional independence with mobility by performin. Supine to sit with Contact Guard Assistance  2. Sit to stand transfer with Contact Guard Assistance  3. Gait  x 75 feet with Contact Guard Assistance using Rolling Walker.                          Time Tracking:     PT Received On: 23  PT Start Time: 1230     PT Stop Time: 1300  PT Total Time (min): 30 min     Billable Minutes: Therapeutic Activity 15 and Therapeutic Exercise 15    Treatment Type: Treatment  PT/PTA: PT           2023

## 2023-09-19 NOTE — PLAN OF CARE
09/18/23 1510   Discharge Reassessment   Assessment Type Discharge Planning Reassessment   Did the patient's condition or plan change since previous assessment? Yes   Discharge Plan discussed with: Patient;Adult children   Communicated PARRIS with patient/caregiver Yes   Discharge Plan A Rehab   Discharge Plan B Skilled Nursing Facility   DME Needed Upon Discharge  none   Transition of Care Barriers None   Why the patient remains in the hospital Requires continued medical care   Post-Acute Status   [facility]   Post-Acute Placement Status Set-up Complete/Auth obtained   Coverage [facility]   Hospital Resources/Appts/Education Provided Post-Acute resouces added to AVS   Discharge Delays None known at this time

## 2023-09-19 NOTE — PT/OT/SLP PROGRESS
Physical Therapy Treatment    Patient Name:  Rosalie Campbell   MRN:  [id number]    Recommendations:     Discharge Recommendations: home with home health, rehabilitation facility  Discharge Equipment Recommendations: none  Barriers to discharge: None    Assessment:     Rosalie Campbell is a 72 y.o. female admitted with a medical diagnosis of Clostridium difficile colitis.  She presents with the following impairments/functional limitations: weakness, impaired endurance, impaired functional mobility, gait instability, impaired balance, decreased lower extremity function, decreased safety awareness Pt received with HOB elevated and verb she needs to be cleaned. Pt states if she is not too fatigued, she would like to try and walk this morning. Pt tolerated bed mobility well, but too fatigued to sit EOB and ambulate.     Rehab Prognosis: Good and Fair; patient would benefit from acute skilled PT services to address these deficits and reach maximum level of function.    Recent Surgery: Procedure(s) (LRB):  CHOLECYSTECTOMY, LAPAROSCOPIC, WITH CHOLANGIOGRAM (N/A) 4 Days Post-Op    Plan:     During this hospitalization, patient to be seen 5 x/week (5-6x weekly/ 1-2x daily) to address the identified rehab impairments via gait training, therapeutic activities, therapeutic exercises and progress toward the following goals:    Plan of Care Expires:  10/13/23    Subjective     Chief Complaint: abdominal pain to incision site, weakness  Patient/Family Comments/goals: return to PLOF  Pain/Comfort:         Objective:     Communicated with pt prior to session.  Patient found HOB elevated with   upon PT entry to room.     General Precautions: Standard, fall  Orthopedic Precautions: N/A  Braces: N/A  Respiratory Status: Room air     Functional Mobility:  Bed Mobility:     Rolling Left:  minimum assistance and moderate assistance  Rolling Right: minimum assistance and moderate assistance  Scooting: moderate assistance      AM-PAC 6 CLICK  MOBILITY  Turning over in bed (including adjusting bedclothes, sheets and blankets)?: 2  Sitting down on and standing up from a chair with arms (e.g., wheelchair, bedside commode, etc.): 2  Moving from lying on back to sitting on the side of the bed?: 2  Moving to and from a bed to a chair (including a wheelchair)?: 2  Need to walk in hospital room?: 2  Climbing 3-5 steps with a railing?: 1  Basic Mobility Total Score: 11       Treatment & Education:  Pt edu on moving BLE while in bed when too fatigued to sit or walk.    Patient left HOB elevated with all lines intact, call button in reach, and bed alarm on..    GOALS:   Multidisciplinary Problems       Physical Therapy Goals          Problem: Physical Therapy    Goal Priority Disciplines Outcome Goal Variances Interventions   Physical Therapy Goal     PT, PT/OT Ongoing, Progressing     Description: Goals to be met by: discharge     Patient will increase functional independence with mobility by performin. Supine to sit with Contact Guard Assistance  2. Sit to stand transfer with Contact Guard Assistance  3. Gait  x 75 feet with Contact Guard Assistance using Rolling Walker.                          Time Tracking:     PT Received On: 23  PT Start Time: 900     PT Stop Time: 925  PT Total Time (min): 25 min     Billable Minutes: Therapeutic Activity 25    Treatment Type: Treatment  PT/PTA: PT           2023

## 2023-09-19 NOTE — PROGRESS NOTES
[facility]/Surg  [facility] Medicine  Progress Note    Patient Name: Rosalie Campbell  MRN: [id number]  Patient Class: IP- Inpatient   Admission Date: 9/11/2023  Length of Stay: 8 days  Attending Physician: Raquel Vogel MD  Primary Care Provider: Jany Taveras FNP-C        Subjective:     Principal Problem:Clostridium difficile colitis        HPI:   Loss of Consciousness    Diarrhea       C/o diarrhea x 1 month syncopal on Saturday, and this am, dx w/ c diff last month, pt reports placed self on floor when felt like she was gonna pass out, denies hitting head         History of Present Illness: History obtained from Patient     Pt is a 72 y.o. female with hx of CLD 3/4, HTN, Depression/Anxiety, smoker, CAD presented to the ER with about a month of diarrhea.  Having multiple bouts a day.  She was doing to bathroom today and passed out.  She did not strike her head.  She states she fell on Saturday as well and landed on her buttocks.  She was C. Diff + in the ER.  She says she had some ABX about a month ago around the time she had a colonoscopy.  She denies fevers but has some obvious chills.  Denies CP, SOB, NV.  She denies any blood in stool.           Overview/Hospital Course:  09/12/2023 pt presented with 2 months of diarrhea + c. Diff toxin, admitted for dehydration and c. Diff + diarrhea  Mike on ckd usually Cr runs 1.6-2.0 was 3.2 on admit and is 2.6 this am.  WBC was 30,000.  Pt had c. Diff ordered on 08/30 but does not think she was started on any abx.  Was in hospitale here back 07/07/2023 with colitis and was d/c home with cipro and flagyl.  Diarrhea started after hospitalizations.  Wound care nurses has evaluated and pt has some excoriated and broken areas multiple on bilateral sacral/buttocks areas present on admit.  US and CT showed stones and slude in the gallbladder but no acute cholecystitis.  CT shows collitis likely due to c. Diff, cannot rule out appendicitis, pt does not have  "acute abdominal pain.surgery has been consulted for evaluation  09/13/2023 pt still with significant diarrhea, cramps have improved a bit.  CT shows cholelithiasis.  Surgery consult is pending  09/14/2023 diarrhea is less this am and cramping is less.  Only 3 BM so far this am.  Surgery plans for lap kehinde in am.    09/15/2023 diarrhea is less, plans for lap kehinde today per surgery, continues with PT and case management working on rehab placement for Monday or Tuesday 09/16/2023 pt s/p lap kehinde, mild pain in RUQ area soreness, incision sites look good.  No diarrhea this am  09/17/2023 still with signigicant diarrhea, minimal po intake  09/18/2023 POD #3 lap kehinde, c. Diff collitis day #7 of po vanc, diarrhea is less, but not resolved.  Pt had 2 episodes of "vomiting" this am says she gets nauseated and mostly spits up.  + 2-3 BM loose this am and denies dysuria.    09/19/2023 a little more po intake today  Urine culture > 100,000 gram neg rods  Wbc trending down  No improvement diarrhea episode  K down to 2.5       Interval History:      Review of Systems   Constitutional:  Negative for activity change, appetite change and fever.   Respiratory:  Negative for chest tightness, shortness of breath and wheezing.    Cardiovascular:  Negative for chest pain.   Gastrointestinal:  Negative for abdominal pain, constipation, diarrhea, nausea and vomiting.   Skin:  Negative for rash and wound.     Objective:     Vital Signs (Most Recent):  Temp: 97 °F (36.1 °C) (09/19/23 1101)  Pulse: 86 (09/19/23 1101)  Resp: 19 (09/19/23 1101)  BP: (!) 151/66 (09/19/23 1101)  SpO2: 97 % (09/19/23 1101) Vital Signs (24h Range):  Temp:  [97 °F (36.1 °C)-98.7 °F (37.1 °C)] 97 °F (36.1 °C)  Pulse:  [72-90] 86  Resp:  [16-20] 19  SpO2:  [96 %-97 %] 97 %  BP: (142-162)/(66-84) 151/66     Weight: 83.1 kg (183 lb 1.6 oz)  Body mass index is 27.84 kg/m².    Intake/Output Summary (Last 24 hours) at 9/19/2023 1323  Last data filed at 9/18/2023 " 1836  Gross per 24 hour   Intake 2444.17 ml   Output --   Net 2444.17 ml           Physical Exam  Constitutional:       General: She is not in acute distress.     Appearance: Normal appearance. She is normal weight. She is not ill-appearing.   Eyes:      General: No scleral icterus.  Cardiovascular:      Rate and Rhythm: Normal rate and regular rhythm.      Pulses: Normal pulses.      Heart sounds: Normal heart sounds.   Pulmonary:      Effort: Pulmonary effort is normal.      Breath sounds: Normal breath sounds.   Abdominal:      General: There is no distension.      Palpations: Abdomen is soft. There is no mass.      Tenderness: There is no abdominal tenderness.   Musculoskeletal:      Right lower leg: No edema.      Left lower leg: No edema.   Skin:     General: Skin is warm and dry.      Capillary Refill: Capillary refill takes less than 2 seconds.      Findings: Rash present. No erythema or lesion.      Comments: See nurses' notes and wound care notes  Wounds POA   Neurological:      General: No focal deficit present.      Mental Status: She is alert. Mental status is at baseline.   Psychiatric:         Mood and Affect: Mood normal.         Behavior: Behavior normal.             Significant Labs: All pertinent labs within the past 24 hours have been reviewed.  CBC:   Recent Labs   Lab 09/18/23  0504 09/19/23  0444   WBC 16.45* 13.83*   HGB 9.5* 9.0*   HCT 30.5* 28.3*    354       CMP:   Recent Labs   Lab 09/18/23  0504 09/19/23  0444    141   K 3.0* 2.5*   CO2 14* 12*   BUN 23.0* 25.0*   CREATININE 2.28*  2.33* 2.51*   CALCIUM 7.8* 7.7*   ALBUMIN 2.4* 2.2*   BILITOT 0.3 0.3   ALKPHOS 73 67   AST 17 17   ALT 14 13         Significant Imaging: I have reviewed all pertinent imaging results/findings within the past 24 hours.      Assessment/Plan:      * Clostridium difficile colitis  Continue po vancomycin  Still with frequent loose stool, has improved but not resolved  Check lactic acid level- normal  "  WBC decreasing  Get flat and erect abdomen- no acute findings       Debility  Continue PT  Case management working on Rehab      Metabolic acidosis  Due to dehydration  Continue ivf  Repeat lactic acid this am      Hyponatremia  Patient has hyponatremia which is uncontrolled,We will aim to correct the sodium by 4-6mEq in 24 hours. We will monitor sodium Daily. The hyponatremia is due to Dehydration/hypovolemia. We will obtain the following studies: TSH, T4. We will treat the hyponatremia with IV fluids as follows: 0.9% NS. The patient's sodium results have been reviewed and are listed below.  No results for input(s): "NA" in the last 24 hours.    Syncope  Likely due to volume depletion  Continue ivf  No further issues      Cholelithiasis with chronic cholecystitis  POD #3      Leukocytosis  liekly due to c. Diff  WBC has come down with plateau 15-17  Will get CXR and UA r/o secondary infections        Wound of buttock  Wound care consulted  Present on admission      Acute kidney injury superimposed on CKD  Patient with acute kidney injury/acute renal failure likely due to pre-renal azotemia due to dehydration NEYDA is currently improving. Baseline creatinine 1.6-2.0 - Labs reviewed- Renal function/electrolytes with Estimated Creatinine Clearance: 23.2 mL/min (A) (based on SCr of 2.51 mg/dL (H)). according to latest data. Monitor urine output and serial BMP and adjust therapy as needed. Avoid nephrotoxins and renally dose meds for GFR listed above.    Cr worsened again, will d/c zosyn  Get repeat UA and CXR r/o underlying infection  May need to change abx  Continue vanc po low likelyhood of worsening NEYDA  Cr 2.5 today trend up slight from 2.3  Repeat labs in am    Obesity  Body mass index is 25.74 kg/m². Morbid obesity complicates all aspects of disease management from diagnostic modalities to treatment. Weight loss encouraged and health benefits explained to patient.         Hypertension  Resume norvasc " today  Hydralazine prn for elevated BP         VTE Risk Mitigation (From admission, onward)         Ordered     enoxaparin injection 30 mg  Daily         09/18/23 1244     IP VTE HIGH RISK PATIENT  Once         09/11/23 1841     Place sequential compression device  Until discontinued         09/11/23 1841                Discharge Planning   PARRIS: 9/21/2023     Code Status: Full Code   Is the patient medically ready for discharge?:     Reason for patient still in hospital (select all that apply): Patient trending condition, Laboratory test and Treatment  Discharge Plan A[facility]   Discharge Delays: None known at this time              Sekou Shah MD  Department of Hospital Medicine   [facility]/Surg

## 2023-09-20 LAB
ANION GAP SERPL CALC-SCNC: 8 MEQ/L (ref 2–13)
ANION GAP SERPL CALC-SCNC: 9 MEQ/L (ref 2–13)
BASOPHILS # BLD AUTO: 0.02 X10(3)/MCL (ref 0.01–0.08)
BASOPHILS NFR BLD AUTO: 0.2 % (ref 0.1–1.2)
BUN SERPL-MCNC: 26 MG/DL (ref 7–20)
BUN SERPL-MCNC: 27 MG/DL (ref 7–20)
CALCIUM SERPL-MCNC: 7.4 MG/DL (ref 8.4–10.2)
CALCIUM SERPL-MCNC: 7.5 MG/DL (ref 8.4–10.2)
CHLORIDE SERPL-SCNC: 119 MMOL/L (ref 98–110)
CHLORIDE SERPL-SCNC: 120 MMOL/L (ref 98–110)
CO2 SERPL-SCNC: 12 MMOL/L (ref 21–32)
CO2 SERPL-SCNC: 12 MMOL/L (ref 21–32)
CREAT SERPL-MCNC: 2.3 MG/DL (ref 0.66–1.25)
CREAT SERPL-MCNC: 2.43 MG/DL (ref 0.66–1.25)
CREAT/UREA NIT SERPL: 11 (ref 12–20)
CREAT/UREA NIT SERPL: 11 (ref 12–20)
EOSINOPHIL # BLD AUTO: 1.07 X10(3)/MCL (ref 0.04–0.36)
EOSINOPHIL NFR BLD AUTO: 8 % (ref 0.7–7)
ERYTHROCYTE [DISTWIDTH] IN BLOOD BY AUTOMATED COUNT: 19.5 % (ref 11–14.5)
GFR SERPLBLD CREATININE-BSD FMLA CKD-EPI: 21 MLS/MIN/1.73/M2
GFR SERPLBLD CREATININE-BSD FMLA CKD-EPI: 22 MLS/MIN/1.73/M2
GLUCOSE SERPL-MCNC: 113 MG/DL (ref 70–115)
GLUCOSE SERPL-MCNC: 115 MG/DL (ref 70–115)
HCT VFR BLD AUTO: 27.4 % (ref 36–48)
HGB BLD-MCNC: 8.7 G/DL (ref 11.8–16)
IMM GRANULOCYTES # BLD AUTO: 0.1 X10(3)/MCL (ref 0–0.03)
IMM GRANULOCYTES NFR BLD AUTO: 0.8 % (ref 0–0.5)
LYMPHOCYTES # BLD AUTO: 1.58 X10(3)/MCL (ref 1.16–3.74)
LYMPHOCYTES NFR BLD AUTO: 11.9 % (ref 20–55)
MAGNESIUM SERPL-MCNC: 1.5 MG/DL (ref 1.8–2.4)
MCH RBC QN AUTO: 27.4 PG (ref 27–34)
MCHC RBC AUTO-ENTMCNC: 31.8 G/DL (ref 31–37)
MCV RBC AUTO: 86.2 FL (ref 79–99)
MONOCYTES # BLD AUTO: 0.92 X10(3)/MCL (ref 0.24–0.36)
MONOCYTES NFR BLD AUTO: 6.9 % (ref 4.7–12.5)
NEUTROPHILS # BLD AUTO: 9.64 X10(3)/MCL (ref 1.56–6.13)
NEUTROPHILS NFR BLD AUTO: 72.2 % (ref 37–73)
PLATELET # BLD AUTO: 352 X10(3)/MCL (ref 140–371)
PMV BLD AUTO: 10.6 FL (ref 9.4–12.4)
POTASSIUM SERPL-SCNC: 2.5 MMOL/L (ref 3.5–5.1)
POTASSIUM SERPL-SCNC: 2.8 MMOL/L (ref 3.5–5.1)
RBC # BLD AUTO: 3.18 X10(6)/MCL (ref 4–5.1)
SODIUM SERPL-SCNC: 140 MMOL/L (ref 135–145)
SODIUM SERPL-SCNC: 140 MMOL/L (ref 135–145)
WBC # SPEC AUTO: 13.33 X10(3)/MCL (ref 4–11.5)

## 2023-09-20 PROCEDURE — 36415 COLL VENOUS BLD VENIPUNCTURE: CPT | Performed by: INTERNAL MEDICINE

## 2023-09-20 PROCEDURE — 80048 BASIC METABOLIC PNL TOTAL CA: CPT | Performed by: INTERNAL MEDICINE

## 2023-09-20 PROCEDURE — 97530 THERAPEUTIC ACTIVITIES: CPT

## 2023-09-20 PROCEDURE — 25000003 PHARM REV CODE 250: Performed by: FAMILY MEDICINE

## 2023-09-20 PROCEDURE — 63600175 PHARM REV CODE 636 W HCPCS: Performed by: FAMILY MEDICINE

## 2023-09-20 PROCEDURE — 85025 COMPLETE CBC W/AUTO DIFF WBC: CPT | Performed by: FAMILY MEDICINE

## 2023-09-20 PROCEDURE — 21400001 HC TELEMETRY ROOM

## 2023-09-20 PROCEDURE — 94761 N-INVAS EAR/PLS OXIMETRY MLT: CPT

## 2023-09-20 PROCEDURE — 97110 THERAPEUTIC EXERCISES: CPT

## 2023-09-20 PROCEDURE — 36415 COLL VENOUS BLD VENIPUNCTURE: CPT | Performed by: FAMILY MEDICINE

## 2023-09-20 PROCEDURE — 80048 BASIC METABOLIC PNL TOTAL CA: CPT | Performed by: FAMILY MEDICINE

## 2023-09-20 PROCEDURE — 83735 ASSAY OF MAGNESIUM: CPT | Performed by: FAMILY MEDICINE

## 2023-09-20 RX ORDER — POTASSIUM CHLORIDE 20 MEQ/1
40 TABLET, EXTENDED RELEASE ORAL 2 TIMES DAILY
Status: DISCONTINUED | OUTPATIENT
Start: 2023-09-20 | End: 2023-09-22 | Stop reason: HOSPADM

## 2023-09-20 RX ORDER — POTASSIUM CHLORIDE 7.45 MG/ML
10 INJECTION INTRAVENOUS
Status: COMPLETED | OUTPATIENT
Start: 2023-09-20 | End: 2023-09-20

## 2023-09-20 RX ORDER — MAGNESIUM SULFATE HEPTAHYDRATE 40 MG/ML
2 INJECTION, SOLUTION INTRAVENOUS ONCE
Status: COMPLETED | OUTPATIENT
Start: 2023-09-20 | End: 2023-09-20

## 2023-09-20 RX ADMIN — POTASSIUM CHLORIDE 10 MEQ: 7.46 INJECTION, SOLUTION INTRAVENOUS at 10:09

## 2023-09-20 RX ADMIN — ROPINIROLE HYDROCHLORIDE 0.25 MG: 0.25 TABLET, FILM COATED ORAL at 08:09

## 2023-09-20 RX ADMIN — POTASSIUM CHLORIDE 40 MEQ: 1500 TABLET, EXTENDED RELEASE ORAL at 08:09

## 2023-09-20 RX ADMIN — MAGNESIUM OXIDE TAB 400 MG (241.3 MG ELEMENTAL MG) 400 MG: 400 (241.3 MG) TAB at 09:09

## 2023-09-20 RX ADMIN — ENOXAPARIN SODIUM 30 MG: 40 INJECTION SUBCUTANEOUS at 09:09

## 2023-09-20 RX ADMIN — Medication: at 04:09

## 2023-09-20 RX ADMIN — POTASSIUM CHLORIDE 10 MEQ: 7.46 INJECTION, SOLUTION INTRAVENOUS at 09:09

## 2023-09-20 RX ADMIN — FAMOTIDINE 20 MG: 20 TABLET ORAL at 09:09

## 2023-09-20 RX ADMIN — MAGNESIUM OXIDE TAB 400 MG (241.3 MG ELEMENTAL MG) 400 MG: 400 (241.3 MG) TAB at 08:09

## 2023-09-20 RX ADMIN — MAGNESIUM SULFATE 2 G: 2 INJECTION INTRAVENOUS at 09:09

## 2023-09-20 RX ADMIN — Medication: at 06:09

## 2023-09-20 RX ADMIN — ESCITALOPRAM OXALATE 20 MG: 10 TABLET ORAL at 09:09

## 2023-09-20 RX ADMIN — DEXTROSE MONOHYDRATE, SODIUM CHLORIDE, AND POTASSIUM CHLORIDE: 50; 4.5; 1.49 INJECTION, SOLUTION INTRAVENOUS at 01:09

## 2023-09-20 RX ADMIN — POTASSIUM CHLORIDE 10 MEQ: 7.46 INJECTION, SOLUTION INTRAVENOUS at 11:09

## 2023-09-20 RX ADMIN — VANCOMYCIN HYDROCHLORIDE 125 MG: 125 CAPSULE ORAL at 09:09

## 2023-09-20 RX ADMIN — BUSPIRONE HYDROCHLORIDE 15 MG: 15 TABLET ORAL at 08:09

## 2023-09-20 RX ADMIN — FERROUS SULFATE TAB 325 MG (65 MG ELEMENTAL FE) 1 EACH: 325 (65 FE) TAB at 09:09

## 2023-09-20 RX ADMIN — ACETAMINOPHEN 650 MG: 325 TABLET, FILM COATED ORAL at 03:09

## 2023-09-20 RX ADMIN — Medication: at 11:09

## 2023-09-20 RX ADMIN — VANCOMYCIN HYDROCHLORIDE 125 MG: 125 CAPSULE ORAL at 01:09

## 2023-09-20 RX ADMIN — VANCOMYCIN HYDROCHLORIDE 125 MG: 125 CAPSULE ORAL at 04:09

## 2023-09-20 RX ADMIN — CEFTRIAXONE SODIUM 1 G: 1 INJECTION, POWDER, FOR SOLUTION INTRAMUSCULAR; INTRAVENOUS at 01:09

## 2023-09-20 RX ADMIN — VANCOMYCIN HYDROCHLORIDE 125 MG: 125 CAPSULE ORAL at 08:09

## 2023-09-20 RX ADMIN — CLOPIDOGREL BISULFATE 75 MG: 75 TABLET ORAL at 04:09

## 2023-09-20 RX ADMIN — PANTOPRAZOLE SODIUM 40 MG: 40 TABLET, DELAYED RELEASE ORAL at 09:09

## 2023-09-20 RX ADMIN — Medication: at 01:09

## 2023-09-20 NOTE — PROGRESS NOTES
[facility]/Surg  [facility] Medicine  Progress Note    Patient Name: Rosalie Campbell  MRN: [id number]  Patient Class: IP- Inpatient   Admission Date: 9/11/2023  Length of Stay: 9 days  Attending Physician: Raquel Vogel MD  Primary Care Provider: Jany Taveras FNP-C        Subjective:     Principal Problem:Clostridium difficile colitis        HPI:   Loss of Consciousness    Diarrhea       C/o diarrhea x 1 month syncopal on Saturday, and this am, dx w/ c diff last month, pt reports placed self on floor when felt like she was gonna pass out, denies hitting head         History of Present Illness: History obtained from Patient     Pt is a 72 y.o. female with hx of CLD 3/4, HTN, Depression/Anxiety, smoker, CAD presented to the ER with about a month of diarrhea.  Having multiple bouts a day.  She was doing to bathroom today and passed out.  She did not strike her head.  She states she fell on Saturday as well and landed on her buttocks.  She was C. Diff + in the ER.  She says she had some ABX about a month ago around the time she had a colonoscopy.  She denies fevers but has some obvious chills.  Denies CP, SOB, NV.  She denies any blood in stool.           Overview/Hospital Course:  09/12/2023 pt presented with 2 months of diarrhea + c. Diff toxin, admitted for dehydration and c. Diff + diarrhea  Mike on ckd usually Cr runs 1.6-2.0 was 3.2 on admit and is 2.6 this am.  WBC was 30,000.  Pt had c. Diff ordered on 08/30 but does not think she was started on any abx.  Was in hospitale here back 07/07/2023 with colitis and was d/c home with cipro and flagyl.  Diarrhea started after hospitalizations.  Wound care nurses has evaluated and pt has some excoriated and broken areas multiple on bilateral sacral/buttocks areas present on admit.  US and CT showed stones and slude in the gallbladder but no acute cholecystitis.  CT shows collitis likely due to c. Diff, cannot rule out appendicitis, pt does not have  "acute abdominal pain.surgery has been consulted for evaluation  09/13/2023 pt still with significant diarrhea, cramps have improved a bit.  CT shows cholelithiasis.  Surgery consult is pending  09/14/2023 diarrhea is less this am and cramping is less.  Only 3 BM so far this am.  Surgery plans for lap kehinde in am.    09/15/2023 diarrhea is less, plans for lap kehinde today per surgery, continues with PT and case management working on rehab placement for Monday or Tuesday 09/16/2023 pt s/p lap kehinde, mild pain in RUQ area soreness, incision sites look good.  No diarrhea this am  09/17/2023 still with signigicant diarrhea, minimal po intake  09/18/2023 POD #3 lap kehinde, c. Diff collitis day #7 of po vanc, diarrhea is less, but not resolved.  Pt had 2 episodes of "vomiting" this am says she gets nauseated and mostly spits up.  + 2-3 BM loose this am and denies dysuria.    09/19/2023 a little more po intake today  Urine culture > 100,000 gram neg rods  Wbc trending down  No improvement diarrhea episode  K down to 2.5   09/20/2023  Feel better today   Tolerate more po intake   Urine culture pending   K low again today at 2.5  Will replace iv and po kcl  Mag sulfate iv 2 gm x 1      Interval History:      Review of Systems   Constitutional:  Negative for activity change, appetite change and fever.   Respiratory:  Negative for chest tightness, shortness of breath and wheezing.    Cardiovascular:  Negative for chest pain.   Gastrointestinal:  Negative for abdominal pain, constipation, diarrhea, nausea and vomiting.   Skin:  Negative for rash and wound.     Objective:     Vital Signs (Most Recent):  Temp: 96.8 °F (36 °C) (09/20/23 1100)  Pulse: 84 (09/20/23 1100)  Resp: 18 (09/20/23 1100)  BP: (!) 162/80 (09/20/23 1100)  SpO2: 98 % (09/20/23 1100) Vital Signs (24h Range):  Temp:  [96.8 °F (36 °C)-97.6 °F (36.4 °C)] 96.8 °F (36 °C)  Pulse:  [73-90] 84  Resp:  [16-20] 18  SpO2:  [96 %-99 %] 98 %  BP: (152-169)/(71-83) 162/80 "     Weight: 83.1 kg (183 lb 1.6 oz)  Body mass index is 27.84 kg/m².    Intake/Output Summary (Last 24 hours) at 9/20/2023 1325  Last data filed at 9/20/2023 0800  Gross per 24 hour   Intake 3293.34 ml   Output --   Net 3293.34 ml           Physical Exam  Constitutional:       General: She is not in acute distress.     Appearance: Normal appearance. She is normal weight. She is not ill-appearing.   Eyes:      General: No scleral icterus.  Cardiovascular:      Rate and Rhythm: Normal rate and regular rhythm.      Pulses: Normal pulses.      Heart sounds: Normal heart sounds.   Pulmonary:      Effort: Pulmonary effort is normal.      Breath sounds: Normal breath sounds.   Abdominal:      General: There is no distension.      Palpations: Abdomen is soft. There is no mass.      Tenderness: There is no abdominal tenderness.   Musculoskeletal:      Right lower leg: No edema.      Left lower leg: No edema.   Skin:     General: Skin is warm and dry.      Capillary Refill: Capillary refill takes less than 2 seconds.      Findings: Rash present. No erythema or lesion.      Comments: See nurses' notes and wound care notes  Wounds POA   Neurological:      General: No focal deficit present.      Mental Status: She is alert. Mental status is at baseline.   Psychiatric:         Mood and Affect: Mood normal.         Behavior: Behavior normal.             Significant Labs: All pertinent labs within the past 24 hours have been reviewed.  CBC:   Recent Labs   Lab 09/19/23 0444 09/20/23 0419   WBC 13.83* 13.33*   HGB 9.0* 8.7*   HCT 28.3* 27.4*    352       CMP:   Recent Labs   Lab 09/19/23 0444 09/20/23  0419 09/20/23  0752    140 140   K 2.5* 2.8* 2.5*   CO2 12* 12* 12*   BUN 25.0* 26.0* 27.0*   CREATININE 2.51* 2.30* 2.43*   CALCIUM 7.7* 7.4* 7.5*   ALBUMIN 2.2*  --   --    BILITOT 0.3  --   --    ALKPHOS 67  --   --    AST 17  --   --    ALT 13  --   --          Significant Imaging: I have reviewed all pertinent  "imaging results/findings within the past 24 hours.      Assessment/Plan:      * Clostridium difficile colitis  Continue po vancomycin day # 9  Still with frequent loose stool, has improved but not resolved  Check lactic acid level- normal   WBC decreasing  Get flat and erect abdomen- no acute findings       Debility  Continue PT  Case management working on Rehab      Metabolic acidosis  Due to dehydration  Continue ivf  Repeat lactic acid this am      Hyponatremia  Patient has hyponatremia which is uncontrolled,We will aim to correct the sodium by 4-6mEq in 24 hours. We will monitor sodium Daily. The hyponatremia is due to Dehydration/hypovolemia. We will obtain the following studies: TSH, T4. We will treat the hyponatremia with IV fluids as follows: 0.9% NS. The patient's sodium results have been reviewed and are listed below.  No results for input(s): "NA" in the last 24 hours.    Syncope  Likely due to volume depletion  Continue ivf  No further issues      Cholelithiasis with chronic cholecystitis  POD #3      Leukocytosis  liekly due to c. Diff  WBC has come down with plateau 15-17  Will get CXR and UA r/o secondary infections        Wound of buttock  Wound care consulted  Present on admission      Acute kidney injury superimposed on CKD  Patient with acute kidney injury/acute renal failure likely due to pre-renal azotemia due to dehydration NEYDA is currently improving. Baseline creatinine 1.6-2.0 - Labs reviewed- Renal function/electrolytes with Estimated Creatinine Clearance: 23.2 mL/min (A) (based on SCr of 2.51 mg/dL (H)). according to latest data. Monitor urine output and serial BMP and adjust therapy as needed. Avoid nephrotoxins and renally dose meds for GFR listed above.    Cr worsened again, will d/c zosyn  Get repeat UA and CXR r/o underlying infection  May need to change abx  Continue vanc po low likelyhood of worsening NEYDA  Cr 2.5 today trend up slight from 2.3  Repeat labs in am    Obesity  Body mass " index is 25.74 kg/m². Morbid obesity complicates all aspects of disease management from diagnostic modalities to treatment. Weight loss encouraged and health benefits explained to patient.         Hypertension  Resume norvasc today  Hydralazine prn for elevated BP         VTE Risk Mitigation (From admission, onward)         Ordered     enoxaparin injection 30 mg  Daily         09/18/23 1244     IP VTE HIGH RISK PATIENT  Once         09/11/23 1841     Place sequential compression device  Until discontinued         09/11/23 1841                Discharge Planning   PARRIS: 9/21/2023     Code Status: Full Code   Is the patient medically ready for discharge?:     Reason for patient still in hospital (select all that apply): Patient trending condition, Laboratory test, Treatment and PT / OT recommendations  Discharge Plan A: Rehab   Discharge Delays: None known at this time              Sekou Shah MD  Department of Hospital Medicine   [facility]/Surg

## 2023-09-20 NOTE — PT/OT/SLP PROGRESS
Physical Therapy Treatment    Patient Name:  Rosalie Campbell   MRN:  [id number]    Recommendations:     Discharge Recommendations: home with home health, rehabilitation facility  Discharge Equipment Recommendations: none  Barriers to discharge:  current medical status    Assessment:     Rosalie aCmpbell is a 72 y.o. female admitted with a medical diagnosis of Clostridium difficile colitis.  She presents with the following impairments/functional limitations: weakness, impaired endurance, impaired functional mobility, gait instability, impaired balance, decreased lower extremity function, decreased safety awareness     Patient tolerated sitting EOB performing BLE therex and tolerated 1 sit to stand transfers with mod A, then returned to bed due to having a accident and rolled to be cleaned and changed    Rehab Prognosis: Good and Fair; patient would benefit from acute skilled PT services to address these deficits and reach maximum level of function.    Recent Surgery: Procedure(s) (LRB):  CHOLECYSTECTOMY, LAPAROSCOPIC, WITH CHOLANGIOGRAM (N/A) 5 Days Post-Op    Plan:     During this hospitalization, patient to be seen 5 x/week (5-6x weekly/ 1-2x daily) to address the identified rehab impairments via gait training, therapeutic activities, therapeutic exercises and progress toward the following goals:    Plan of Care Expires:  10/13/23    Subjective     Chief Complaint: weakness  Patient/Family Comments/goals: to get stronger  Pain/Comfort:         Objective:     Communicated with nursing prior to session.  Patient found HOB elevated with peripheral IV upon PT entry to room.     General Precautions: Standard, fall  Orthopedic Precautions: N/A  Braces: N/A  Respiratory Status: Room air     Functional Mobility:  Bed Mobility:     Supine to Sit: minimum assistance and moderate assistance  Sit to Supine: minimum assistance and moderate assistance      AM-PAC 6 CLICK MOBILITY          Treatment & Education:  See above.     Patient  left HOB elevated with all lines intact, call button in reach, and bed alarm on..    GOALS:   Multidisciplinary Problems       Physical Therapy Goals          Problem: Physical Therapy    Goal Priority Disciplines Outcome Goal Variances Interventions   Physical Therapy Goal     PT, PT/OT Ongoing, Progressing     Description: Goals to be met by: discharge     Patient will increase functional independence with mobility by performin. Supine to sit with Contact Guard Assistance  2. Sit to stand transfer with Contact Guard Assistance  3. Gait  x 75 feet with Contact Guard Assistance using Rolling Walker.                          Time Tracking:     PT Received On: 23  PT Start Time: 1345     PT Stop Time: 1415  PT Total Time (min): 30 min     Billable Minutes: Therapeutic Activity 15 and Therapeutic Exercise 15    Treatment Type: Treatment  PT/PTA: PT           2023

## 2023-09-20 NOTE — PLAN OF CARE
09/20/23 1557   Discharge Reassessment   Assessment Type Discharge Planning Reassessment   Did the patient's condition or plan change since previous assessment? Yes   Discharge Plan discussed with: Patient   Communicated PARRIS with patient/caregiver Yes   Discharge Plan A Rehab   Discharge Plan B Skilled Nursing Facility   DME Needed Upon Discharge  none   Transition of Care Barriers None   Why the patient remains in the hospital Requires continued medical care   Post-Acute Status   Post-Acute Authorization Placement  ([facility])   Post-Acute Placement Status Set-up Complete/Auth obtained   Coverage [facility]   Hospital Resources/Appts/Education Provided Post-Acute resouces added to AVS   Discharge Delays None known at this time

## 2023-09-20 NOTE — PROGRESS NOTES
Inpatient Nutrition Follow-up    Admit Date: 9/11/2023   Total duration of encounter: 9 days     Nutrition Recommendation/Prescription     Continue Renal, Low Fiber Isolation.    Change Novasource BID (475 kcal, 22 gm pro each) from strawberry to vanilla.     Continue Banatrol TID once diet advanced with pudding to help with diarrhea.     Closely monitor Renal function/GI function.     Dietitian will monitor Electrolytes, Energy Intake, Food and Beverage Intake, Gastrointestinal Profile, Renal Function, Weight, and Weight Change and adjust MNT as needed.     Monitoring & Evaluation     Communication of Recommendations: reviewed with nurse, reviewed with patient, and reviewed with family    Reason Seen: follow-up    Nutrition Risk/Follow-Up: moderate (follow-up in 3-5 days)   Please consult if re-assessment needed sooner.      Nutrition Assessment     Malnutrition Assessment/Nutrition-Focused Physical Exam  NFPE not appropriate at this time. Unable to diagnose malnutrition at this time due to not enough criteria known.            Weight Loss (Malnutrition): greater than 7.5% in 3 months (22.88# (12/3%) since 6/30/23, ~2.3 months.)                                                  A minimum of two characteristics is recommended for diagnosis of either severe or non-severe malnutrition.    Chart Review    Malnutrition Screening Tool Results   Have you recently lost weight without trying?: No  Have you been eating poorly because of a decreased appetite?: No   MST Score: 0     Diagnosis:  C.diff colitis, Debility, Metabolic acidosis, Hyponatremia, Syncope, Cholelithiasis with chronic cholecystitis, Leukocytosis, Wound to Buttock, NEYDA Superimposed on CKD, Obesity, HTN.    Past Medical History:   Diagnosis Date    Anxiety     Chronic renal disease stage 4     Colitis 7/6/2023    Colon cancer screening declined     Coronary artery disease     Depression     Hypertension     Irregular heart rhythm     Medication management      Obesity, unspecified     Osteoarthritis of knee     Pain, joint, shoulder region, right     Refused influenza vaccine     Refused pneumococcal vaccination     Smoker      Past Surgical History:   Procedure Laterality Date    ANGIOPLASTY  11/20/2019    COLONOSCOPY  07/05/2023    ESOPHAGOGASTRODUODENOSCOPY  10/29/2018    LAPAROSCOPIC CHOLECYSTECTOMY WITH CHOLANGIOGRAPHY N/A 9/15/2023    Procedure: CHOLECYSTECTOMY, LAPAROSCOPIC, WITH CHOLANGIOGRAM;  Surgeon: Fabien Resendez MD;  Location: [facility] OR;  Service: General;  Laterality: N/A;    STENT, AORTA         Nutrition-Related Medications: Rocephin, Ferrous Sulfate, Magnesium Oxide, Magnesium Sulfate, Vancomycin, D5 with NaCL @ 100 ml/hr.    Calorie Containing IV Medications: no significant kcals from medications at this time    Nutrition-Related Labs:   Lab Results   Component Value Date    WBC 13.33 (H) 09/20/2023    WBC 7.8 03/18/2021     Lab Results   Component Value Date    RBC 3.18 (L) 09/20/2023    RBC 3.60 (L) 03/18/2021     Lab Results   Component Value Date    HGB 8.7 (L) 09/20/2023    HGB 9.5 (L) 03/18/2021     Lab Results   Component Value Date    HCT 27.4 (L) 09/20/2023    HCT 31.2 (L) 03/18/2021     Lab Results   Component Value Date     09/20/2023     03/18/2021     Lab Results   Component Value Date    CHLORIDE 120 (H) 09/20/2023    CHLORIDE 107 03/18/2021     Lab Results   Component Value Date    CO2 12 (L) 09/20/2023    CO2 24 03/18/2021     Lab Results   Component Value Date    BUN 27.0 (H) 09/20/2023    BUN 32 (H) 03/18/2021     Lab Results   Component Value Date    CREATININE 2.43 (H) 09/20/2023    CREATININE 1.80 (H) 03/18/2021     Lab Results   Component Value Date    EGFRNORACEVR 21 09/20/2023    EGFRNORACEVR 27 04/13/2023     Lab Results   Component Value Date    POCTGLUCOSE 120 (H) 06/30/2023     Lab Results   Component Value Date    CALCIUM 7.5 (L) 09/20/2023    CALCIUM 9.3 03/18/2021     Lab Results   Component Value Date     MG 1.50 (L) 09/20/2023    MG 1.60 (L) 06/26/2023     Lab Results   Component Value Date    ALBUMIN 2.2 (L) 09/19/2023    ALBUMIN 4.0 03/18/2021     CrCl:    Lab Value: 19.4    Date: 9/12  CrCl:    Lab Value: 27.9    Date: 9/14   CrCl:    Lab Value: 24.2    Date: 9/18  CrCl:    Lab Value: 24.0    Date: 9/20        Diet/PN Order: Diet renal Low Residue; Isolation Tray - Styrofoam  Oral Supplement Order: Banatrol Plus Banana Flakes and Novasource Renal  Tube Feeding Order: none  Factors Affecting Nutritional Intake: altered gastrointestinal function, decreased appetite, diarrhea, and early satiety  Food/Presybeterian/Cultural Preferences:  Dislike all vegetables except: Lettuce, Tomato, Cucumber, and Corn.   Food Allergies: none reported and no known food allergies    Skin Integrity: wound, incision  Wound(s): [REMOVED]      Altered Skin Integrity 09/11/23 1831 Left Buttocks #1 Partial thickness tissue loss. Shallow open ulcer with a red or pink wound bed, without slough. Intact or Open/Ruptured Serum-filled blister.-Tissue loss description: Partial thickness       Altered Skin Integrity 09/11/23 Left Buttocks #2 Partial thickness tissue loss. Shallow open ulcer with a red or pink wound bed, without slough. Intact or Open/Ruptured Serum-filled blister.-Tissue loss description: Partial thickness  [REMOVED]      Altered Skin Integrity 09/11/23 1836 Left Buttocks #3 Partial thickness tissue loss. Shallow open ulcer with a red or pink wound bed, without slough. Intact or Open/Ruptured Serum-filled blister.-Tissue loss description: Partial thickness       Altered Skin Integrity 09/11/23 1838 Right Buttocks #1 Partial thickness tissue loss. Shallow open ulcer with a red or pink wound bed, without slough. Intact or Open/Ruptured Serum-filled blister.-Tissue loss description: Partial thickness       Altered Skin Integrity 09/11/23 1838 Right Buttocks #2 Partial thickness tissue loss. Shallow open ulcer with a red or pink wound  bed, without slough. Intact or Open/Ruptured Serum-filled blister.-Tissue loss description: Partial thickness     Overview/Hospital Course:    (9/12): Patient reports poor appetite for a while d/t early satiety, and reported similar issue on last admit. Per EMR patient averaged ~10%-1 meal on CLD d/t multiple bouts of diarrhea over past month and pending consult for surgery. Per EMR she weighed 84 kg on 6/30/23, CBW- 73.6 kg showing a 22.88# (12.3%) weight loss. Patient does not like apple sauce and has not had yogurt but willing to try if it will help with diarrhea. GI: ND/TENDER/DIARRHEA/LBM-9/12, NUTR:3, BRDN: 18, NO EDEMA NOTED, 24 HR I/O: 2900/300.    (9/14): Patient reports nausea and poor appetite with little improvement since 9/12. Patient was willing to try ONS. Per EMR patient averaged 50%- 2 meals on CLD d/t multiple bouts of diarrhea over the past month and pending consult for surgery for gallbladder. Per EMR patient has gained 7.92# since 9/12, likely related to fluid retention since NPO/CLD day 3. GI: TAUT/DIARRHEA/LBM-9/14, NUTR: 3, BRDN: 19, NO EDEMA NOTED, 24 HR I/O: 4366/0.    (9/18): Patient reports appetite is still not the best and does not like the boost breeze. Patient still with c/o diarrhea. Patient willing to try ONS for extra calories and to help with diarrhea. Patient averaged 50%- 2 meals on CLD and 0%-1 on Regular. Patient weighed 73.6 kg on 9/12, CBW- 76.8 kg showing a 7.04# weight gain likely d/t fluid retention. GI: ND/TENDER/LBM-9/17, NUTR:3, BRDN:19, NO EDEMA NOTED, 24 HR I/O: 2028/0.    (9/20): Patient reports appetite is improving and ate more today and yesterday. Patient is not the biggest fan of pudding with Banatrol but will continue to eat but does not like strawberry Novasource. Patient continues to gain weight despite low recorded PO intake (20%-5 meals between regular and renal). Patient weighed 73.6 kg on 9/12, CBW- 83.1 showing a 20.9# weight gain likely r/t fluid  "retention due to recorded PO intake insufficient for weight gain. GI: ND/LBM-, NUTR:3, BRDN:19, NO EDEMA NOTED, 24 HR I/O: 3173/0 (DUE TO  INCONTINENCE).    Anthropometrics    Height: 5' 8" (172.7 cm) Height Method: Stated  Last Weight: 83.1 kg (183 lb 1.6 oz) (23 1914) Weight Method: Bed Scale  BMI (Calculated): 27.8  BMI Classification: overweight (BMI 25-29.9)     Ideal Body Weight (IBW), Female: 140 lb     % Ideal Body Weight, Female (lb): 115.93 %                             Usual Weight Provided By: EMR weight history    Wt Readings from Last 5 Encounters:   23 83.1 kg (183 lb 1.6 oz)   23 70.8 kg (156 lb)   23 76.2 kg (167 lb 15.9 oz)   23 77.6 kg (171 lb 1.2 oz)   23 84 kg (185 lb 3.2 oz)     Weight Change(s) Since Admission:  Admit Weight: 68.9 kg (152 lb) (23 1154)      Estimated Needs    Weight Used For Calorie Calculations: 77.2 kg (170 lb 3.1 oz)  Energy Calorie Requirements (kcal): 1,930 - 2,240 KCAL (25-29 KCAL/KG CBW)  Energy Need Method: Kcal/kg  Weight Used For Protein Calculations: 77.2 kg (170 lb 3.1 oz)  Protein Requirements: 62 - 77.4 GM PRO (0.8-1.0 GM/KG CBW)  Fluid Requirements (mL): 1000 ML H2O + OUTPUT  Temp (24hrs), Av.2 °F (36.2 °C), Min:97 °F (36.1 °C), Max:97.6 °F (36.4 °C)         Enteral Nutrition    Patient not receiving enteral nutrition at this time.    Parenteral Nutrition    Patient not receiving parenteral nutrition support at this time.    Evaluation of Received Nutrient Intake    Calories: not meeting estimated needs  Protein: not meeting estimated needs    Patient Education    Not applicable.         Nutrition Diagnosis     PES: Inadequate energy intake related to altered GI function as evidenced by estimated energy intake from diet less than estimated energy needs. (improving)    Interventions/Goals     Intervention(s): general/healthful diet, modified composition of meals/snacks, commercial beverage, commercial food, and " collaboration with other providers    Goal: Meet Greater than 75% of nutritional needs by discharge. (goal progressing)

## 2023-09-20 NOTE — ASSESSMENT & PLAN NOTE
Continue po vancomycin day # 9  Still with frequent loose stool, has improved but not resolved  Check lactic acid level- normal   WBC decreasing  Get flat and erect abdomen- no acute findings

## 2023-09-20 NOTE — SUBJECTIVE & OBJECTIVE
Interval History:      Review of Systems   Constitutional:  Negative for activity change, appetite change and fever.   Respiratory:  Negative for chest tightness, shortness of breath and wheezing.    Cardiovascular:  Negative for chest pain.   Gastrointestinal:  Negative for abdominal pain, constipation, diarrhea, nausea and vomiting.   Skin:  Negative for rash and wound.     Objective:     Vital Signs (Most Recent):  Temp: 96.8 °F (36 °C) (09/20/23 1100)  Pulse: 84 (09/20/23 1100)  Resp: 18 (09/20/23 1100)  BP: (!) 162/80 (09/20/23 1100)  SpO2: 98 % (09/20/23 1100) Vital Signs (24h Range):  Temp:  [96.8 °F (36 °C)-97.6 °F (36.4 °C)] 96.8 °F (36 °C)  Pulse:  [73-90] 84  Resp:  [16-20] 18  SpO2:  [96 %-99 %] 98 %  BP: (152-169)/(71-83) 162/80     Weight: 83.1 kg (183 lb 1.6 oz)  Body mass index is 27.84 kg/m².    Intake/Output Summary (Last 24 hours) at 9/20/2023 1325  Last data filed at 9/20/2023 0800  Gross per 24 hour   Intake 3293.34 ml   Output --   Net 3293.34 ml           Physical Exam  Constitutional:       General: She is not in acute distress.     Appearance: Normal appearance. She is normal weight. She is not ill-appearing.   Eyes:      General: No scleral icterus.  Cardiovascular:      Rate and Rhythm: Normal rate and regular rhythm.      Pulses: Normal pulses.      Heart sounds: Normal heart sounds.   Pulmonary:      Effort: Pulmonary effort is normal.      Breath sounds: Normal breath sounds.   Abdominal:      General: There is no distension.      Palpations: Abdomen is soft. There is no mass.      Tenderness: There is no abdominal tenderness.   Musculoskeletal:      Right lower leg: No edema.      Left lower leg: No edema.   Skin:     General: Skin is warm and dry.      Capillary Refill: Capillary refill takes less than 2 seconds.      Findings: Rash present. No erythema or lesion.      Comments: See nurses' notes and wound care notes  Wounds POA   Neurological:      General: No focal deficit present.       Mental Status: She is alert. Mental status is at baseline.   Psychiatric:         Mood and Affect: Mood normal.         Behavior: Behavior normal.             Significant Labs: All pertinent labs within the past 24 hours have been reviewed.  CBC:   Recent Labs   Lab 09/19/23 0444 09/20/23 0419   WBC 13.83* 13.33*   HGB 9.0* 8.7*   HCT 28.3* 27.4*    352       CMP:   Recent Labs   Lab 09/19/23 0444 09/20/23 0419 09/20/23  0752    140 140   K 2.5* 2.8* 2.5*   CO2 12* 12* 12*   BUN 25.0* 26.0* 27.0*   CREATININE 2.51* 2.30* 2.43*   CALCIUM 7.7* 7.4* 7.5*   ALBUMIN 2.2*  --   --    BILITOT 0.3  --   --    ALKPHOS 67  --   --    AST 17  --   --    ALT 13  --   --          Significant Imaging: I have reviewed all pertinent imaging results/findings within the past 24 hours.

## 2023-09-21 LAB
ANION GAP SERPL CALC-SCNC: 8 MEQ/L (ref 2–13)
BACTERIA UR CULT: ABNORMAL
BASOPHILS # BLD AUTO: 0.04 X10(3)/MCL (ref 0.01–0.08)
BASOPHILS NFR BLD AUTO: 0.3 % (ref 0.1–1.2)
BUN SERPL-MCNC: 25 MG/DL (ref 7–20)
CALCIUM SERPL-MCNC: 7.9 MG/DL (ref 8.4–10.2)
CHLORIDE SERPL-SCNC: 120 MMOL/L (ref 98–110)
CO2 SERPL-SCNC: 12 MMOL/L (ref 21–32)
CREAT SERPL-MCNC: 2.32 MG/DL (ref 0.66–1.25)
CREAT/UREA NIT SERPL: 11 (ref 12–20)
EOSINOPHIL # BLD AUTO: 1.1 X10(3)/MCL (ref 0.04–0.36)
EOSINOPHIL NFR BLD AUTO: 9.4 % (ref 0.7–7)
ERYTHROCYTE [DISTWIDTH] IN BLOOD BY AUTOMATED COUNT: 19.8 % (ref 11–14.5)
GFR SERPLBLD CREATININE-BSD FMLA CKD-EPI: 22 MLS/MIN/1.73/M2
GLUCOSE SERPL-MCNC: 103 MG/DL (ref 70–115)
HCT VFR BLD AUTO: 27.9 % (ref 36–48)
HGB BLD-MCNC: 9 G/DL (ref 11.8–16)
IMM GRANULOCYTES # BLD AUTO: 0.14 X10(3)/MCL (ref 0–0.03)
IMM GRANULOCYTES NFR BLD AUTO: 1.2 % (ref 0–0.5)
LYMPHOCYTES # BLD AUTO: 1.47 X10(3)/MCL (ref 1.16–3.74)
LYMPHOCYTES NFR BLD AUTO: 12.5 % (ref 20–55)
MAGNESIUM SERPL-MCNC: 2 MG/DL (ref 1.8–2.4)
MCH RBC QN AUTO: 27.7 PG (ref 27–34)
MCHC RBC AUTO-ENTMCNC: 32.3 G/DL (ref 31–37)
MCV RBC AUTO: 85.8 FL (ref 79–99)
MONOCYTES # BLD AUTO: 0.77 X10(3)/MCL (ref 0.24–0.36)
MONOCYTES NFR BLD AUTO: 6.6 % (ref 4.7–12.5)
NEUTROPHILS # BLD AUTO: 8.2 X10(3)/MCL (ref 1.56–6.13)
NEUTROPHILS NFR BLD AUTO: 70 % (ref 37–73)
NRBC BLD AUTO-RTO: 0 %
PLATELET # BLD AUTO: 347 X10(3)/MCL (ref 140–371)
PMV BLD AUTO: 10.5 FL (ref 9.4–12.4)
POTASSIUM SERPL-SCNC: 3.5 MMOL/L (ref 3.5–5.1)
RBC # BLD AUTO: 3.25 X10(6)/MCL (ref 4–5.1)
SODIUM SERPL-SCNC: 140 MMOL/L (ref 135–145)
WBC # SPEC AUTO: 11.72 X10(3)/MCL (ref 4–11.5)

## 2023-09-21 PROCEDURE — 25000003 PHARM REV CODE 250: Performed by: FAMILY MEDICINE

## 2023-09-21 PROCEDURE — 83735 ASSAY OF MAGNESIUM: CPT | Performed by: FAMILY MEDICINE

## 2023-09-21 PROCEDURE — 97530 THERAPEUTIC ACTIVITIES: CPT

## 2023-09-21 PROCEDURE — 85025 COMPLETE CBC W/AUTO DIFF WBC: CPT | Performed by: FAMILY MEDICINE

## 2023-09-21 PROCEDURE — 97110 THERAPEUTIC EXERCISES: CPT

## 2023-09-21 PROCEDURE — 36415 COLL VENOUS BLD VENIPUNCTURE: CPT | Performed by: FAMILY MEDICINE

## 2023-09-21 PROCEDURE — 63600175 PHARM REV CODE 636 W HCPCS: Performed by: FAMILY MEDICINE

## 2023-09-21 PROCEDURE — 80048 BASIC METABOLIC PNL TOTAL CA: CPT | Performed by: FAMILY MEDICINE

## 2023-09-21 PROCEDURE — 21400001 HC TELEMETRY ROOM

## 2023-09-21 PROCEDURE — 94761 N-INVAS EAR/PLS OXIMETRY MLT: CPT

## 2023-09-21 RX ORDER — CIPROFLOXACIN 500 MG/1
500 TABLET ORAL EVERY 12 HOURS
Status: DISCONTINUED | OUTPATIENT
Start: 2023-09-21 | End: 2023-09-22

## 2023-09-21 RX ADMIN — MAGNESIUM OXIDE TAB 400 MG (241.3 MG ELEMENTAL MG) 400 MG: 400 (241.3 MG) TAB at 09:09

## 2023-09-21 RX ADMIN — CLOPIDOGREL BISULFATE 75 MG: 75 TABLET ORAL at 04:09

## 2023-09-21 RX ADMIN — DEXTROSE MONOHYDRATE, SODIUM CHLORIDE, AND POTASSIUM CHLORIDE: 50; 4.5; 1.49 INJECTION, SOLUTION INTRAVENOUS at 07:09

## 2023-09-21 RX ADMIN — FERROUS SULFATE TAB 325 MG (65 MG ELEMENTAL FE) 1 EACH: 325 (65 FE) TAB at 09:09

## 2023-09-21 RX ADMIN — VANCOMYCIN HYDROCHLORIDE 125 MG: 125 CAPSULE ORAL at 02:09

## 2023-09-21 RX ADMIN — MAGNESIUM OXIDE TAB 400 MG (241.3 MG ELEMENTAL MG) 400 MG: 400 (241.3 MG) TAB at 08:09

## 2023-09-21 RX ADMIN — Medication: at 05:09

## 2023-09-21 RX ADMIN — MELATONIN TAB 3 MG 3 MG: 3 TAB at 09:09

## 2023-09-21 RX ADMIN — ESCITALOPRAM OXALATE 20 MG: 10 TABLET ORAL at 09:09

## 2023-09-21 RX ADMIN — VANCOMYCIN HYDROCHLORIDE 125 MG: 125 CAPSULE ORAL at 09:09

## 2023-09-21 RX ADMIN — BUSPIRONE HYDROCHLORIDE 15 MG: 15 TABLET ORAL at 08:09

## 2023-09-21 RX ADMIN — VANCOMYCIN HYDROCHLORIDE 125 MG: 125 CAPSULE ORAL at 08:09

## 2023-09-21 RX ADMIN — CEFTRIAXONE SODIUM 1 G: 1 INJECTION, POWDER, FOR SOLUTION INTRAMUSCULAR; INTRAVENOUS at 02:09

## 2023-09-21 RX ADMIN — VANCOMYCIN HYDROCHLORIDE 125 MG: 125 CAPSULE ORAL at 04:09

## 2023-09-21 RX ADMIN — FAMOTIDINE 20 MG: 20 TABLET ORAL at 09:09

## 2023-09-21 RX ADMIN — DEXTROSE MONOHYDRATE, SODIUM CHLORIDE, AND POTASSIUM CHLORIDE: 50; 4.5; 1.49 INJECTION, SOLUTION INTRAVENOUS at 05:09

## 2023-09-21 RX ADMIN — POTASSIUM CHLORIDE 40 MEQ: 1500 TABLET, EXTENDED RELEASE ORAL at 08:09

## 2023-09-21 RX ADMIN — ROPINIROLE HYDROCHLORIDE 0.25 MG: 0.25 TABLET, FILM COATED ORAL at 08:09

## 2023-09-21 RX ADMIN — ENOXAPARIN SODIUM 30 MG: 40 INJECTION SUBCUTANEOUS at 09:09

## 2023-09-21 RX ADMIN — POTASSIUM CHLORIDE 40 MEQ: 1500 TABLET, EXTENDED RELEASE ORAL at 09:09

## 2023-09-21 RX ADMIN — PANTOPRAZOLE SODIUM 40 MG: 40 TABLET, DELAYED RELEASE ORAL at 09:09

## 2023-09-21 NOTE — PT/OT/SLP PROGRESS
Physical Therapy Treatment    Patient Name:  Rosalie Campbell   MRN:  [id number]    Recommendations:     Discharge Recommendations: home with home health, rehabilitation facility  Discharge Equipment Recommendations: none  Barriers to discharge:  current medical status    Assessment:     Rosalie Campbell is a 72 y.o. female admitted with a medical diagnosis of Clostridium difficile colitis.  She presents with the following impairments/functional limitations: weakness, impaired endurance, impaired functional mobility, gait instability, impaired balance, decreased lower extremity function, decreased safety awareness     Patient tolerated sitting EOB performing BLE therex while sitting EOB, then returned to bed due to student nurse setting up IV antibiotics    Rehab Prognosis: Good and Fair; patient would benefit from acute skilled PT services to address these deficits and reach maximum level of function.    Recent Surgery: Procedure(s) (LRB):  CHOLECYSTECTOMY, LAPAROSCOPIC, WITH CHOLANGIOGRAM (N/A) 6 Days Post-Op    Plan:     During this hospitalization, patient to be seen 5 x/week (5-6x weekly/ 1-2x daily) to address the identified rehab impairments via gait training, therapeutic activities, therapeutic exercises and progress toward the following goals:    Plan of Care Expires:  10/13/23    Subjective     Chief Complaint: weakness  Patient/Family Comments/goals: to get stronger  Pain/Comfort:         Objective:     Communicated with nursing prior to session.  Patient found HOB elevated with peripheral IV upon PT entry to room.     General Precautions: Standard, fall  Orthopedic Precautions: N/A  Braces: N/A  Respiratory Status: Room air     Functional Mobility:  Bed Mobility:     Supine to Sit: minimum assistance and moderate assistance  Sit to Supine: minimum assistance and moderate assistance      AM-PAC 6 CLICK MOBILITY          Treatment & Education:  See above.     Patient left HOB elevated with all lines intact, call  button in reach, and bed alarm on..    GOALS:   Multidisciplinary Problems       Physical Therapy Goals          Problem: Physical Therapy    Goal Priority Disciplines Outcome Goal Variances Interventions   Physical Therapy Goal     PT, PT/OT Ongoing, Progressing     Description: Goals to be met by: discharge     Patient will increase functional independence with mobility by performin. Supine to sit with Contact Guard Assistance  2. Sit to stand transfer with Contact Guard Assistance  3. Gait  x 75 feet with Contact Guard Assistance using Rolling Walker.                          Time Tracking:     PT Received On: 23  PT Start Time: 1415     PT Stop Time: 1445  PT Total Time (min): 30 min     Billable Minutes: Therapeutic Activity 15 and Therapeutic Exercise 15    Treatment Type: Treatment  PT/PTA: PT           2023

## 2023-09-21 NOTE — PROGRESS NOTES
[facility]  [facility]  Progress Note    Patient Name: Rosalie Campbell  MRN: [id number]  Patient Class: IP- Inpatient   Admission Date: 9/11/2023  Length of Stay: 10 days  Attending Physician: Raquel Vogel MD  Primary Care Provider: Jany Taveras FNP-C        Subjective:     Principal Problem:Clostridium difficile colitis        HPI:   Loss of Consciousness    Diarrhea       C/o diarrhea x 1 month syncopal on Saturday, and this am, dx w/ c diff last month, pt reports placed self on floor when felt like she was gonna pass out, denies hitting head         History of Present Illness: History obtained from Patient     Pt is a 72 y.o. female with hx of CLD 3/4, HTN, Depression/Anxiety, smoker, CAD presented to the ER with about a month of diarrhea.  Having multiple bouts a day.  She was doing to bathroom today and passed out.  She did not strike her head.  She states she fell on Saturday as well and landed on her buttocks.  She was C. Diff + in the ER.  She says she had some ABX about a month ago around the time she had a colonoscopy.  She denies fevers but has some obvious chills.  Denies CP, SOB, NV.  She denies any blood in stool.           Overview/Hospital Course:  09/12/2023 pt presented with 2 months of diarrhea + c. Diff toxin, admitted for dehydration and c. Diff + diarrhea  Mike on ckd usually Cr runs 1.6-2.0 was 3.2 on admit and is 2.6 this am.  WBC was 30,000.  Pt had c. Diff ordered on 08/30 but does not think she was started on any abx.  Was in hospitale here back 07/07/2023 with colitis and was d/c home with cipro and flagyl.  Diarrhea started after hospitalizations.  Wound care nurses has evaluated and pt has some excoriated and broken areas multiple on bilateral sacral/buttocks areas present on admit.  US and CT showed stones and slude in the gallbladder but no acute cholecystitis.  CT shows collitis likely due to c. Diff, cannot rule out appendicitis, pt does not have  "acute abdominal pain.surgery has been consulted for evaluation  09/13/2023 pt still with significant diarrhea, cramps have improved a bit.  CT shows cholelithiasis.  Surgery consult is pending  09/14/2023 diarrhea is less this am and cramping is less.  Only 3 BM so far this am.  Surgery plans for lap kehinde in am.    09/15/2023 diarrhea is less, plans for lap kehinde today per surgery, continues with PT and case management working on rehab placement for Monday or Tuesday 09/16/2023 pt s/p lap kehinde, mild pain in RUQ area soreness, incision sites look good.  No diarrhea this am  09/17/2023 still with signigicant diarrhea, minimal po intake  09/18/2023 POD #3 lap kehinde, c. Diff collitis day #7 of po vanc, diarrhea is less, but not resolved.  Pt had 2 episodes of "vomiting" this am says she gets nauseated and mostly spits up.  + 2-3 BM loose this am and denies dysuria.    09/19/2023 a little more po intake today  Urine culture > 100,000 gram neg rods  Wbc trending down  No improvement diarrhea episode  K down to 2.5   09/20/2023  Feel better today   Tolerate more po intake   Urine culture pending   K low again today at 2.5  Will replace iv and po kcl  Mag sulfate iv 2 gm x 1  09/21/2023 lab much better today   Get strong ate more for breakfast today       Interval History:      Review of Systems   Constitutional:  Negative for activity change, appetite change and fever.   Respiratory:  Negative for chest tightness, shortness of breath and wheezing.    Cardiovascular:  Negative for chest pain.   Gastrointestinal:  Negative for abdominal pain, constipation, diarrhea, nausea and vomiting.   Skin:  Negative for rash and wound.     Objective:     Vital Signs (Most Recent):  Temp: 98.6 °F (37 °C) (09/21/23 1134)  Pulse: 85 (09/21/23 1134)  Resp: 20 (09/21/23 1134)  BP: 112/87 (09/21/23 1134)  SpO2: 95 % (09/21/23 1134) Vital Signs (24h Range):  Temp:  [97.2 °F (36.2 °C)-98.6 °F (37 °C)] 98.6 °F (37 °C)  Pulse:  [80-86] " 85  Resp:  [18-20] 20  SpO2:  [95 %-99 %] 95 %  BP: (112-185)/(71-87) 112/87     Weight: 83.1 kg (183 lb 1.6 oz)  Body mass index is 27.84 kg/m².    Intake/Output Summary (Last 24 hours) at 9/21/2023 1247  Last data filed at 9/21/2023 0607  Gross per 24 hour   Intake 2256 ml   Output --   Net 2256 ml           Physical Exam  Constitutional:       General: She is not in acute distress.     Appearance: Normal appearance. She is normal weight. She is not ill-appearing.   Eyes:      General: No scleral icterus.  Cardiovascular:      Rate and Rhythm: Normal rate and regular rhythm.      Pulses: Normal pulses.      Heart sounds: Normal heart sounds.   Pulmonary:      Effort: Pulmonary effort is normal.      Breath sounds: Normal breath sounds.   Abdominal:      General: There is no distension.      Palpations: Abdomen is soft. There is no mass.      Tenderness: There is no abdominal tenderness.   Musculoskeletal:      Right lower leg: No edema.      Left lower leg: No edema.   Skin:     General: Skin is warm and dry.      Capillary Refill: Capillary refill takes less than 2 seconds.      Findings: Rash present. No erythema or lesion.      Comments: See nurses' notes and wound care notes  Wounds POA   Neurological:      General: No focal deficit present.      Mental Status: She is alert. Mental status is at baseline.   Psychiatric:         Mood and Affect: Mood normal.         Behavior: Behavior normal.             Significant Labs: All pertinent labs within the past 24 hours have been reviewed.  CBC:   Recent Labs   Lab 09/20/23  0419 09/21/23  0428   WBC 13.33* 11.72*   HGB 8.7* 9.0*   HCT 27.4* 27.9*    347       CMP:   Recent Labs   Lab 09/20/23  0419 09/20/23  0752 09/21/23  0428    140 140   K 2.8* 2.5* 3.5   CO2 12* 12* 12*   BUN 26.0* 27.0* 25.0*   CREATININE 2.30* 2.43* 2.32*   CALCIUM 7.4* 7.5* 7.9*         Significant Imaging: I have reviewed all pertinent imaging results/findings within the past 24  "hours.      Assessment/Plan:      * Clostridium difficile colitis  Continue po vancomycin day # 9  Still with frequent loose stool, has improved but not resolved  Check lactic acid level- normal   WBC decreasing  Get flat and erect abdomen- no acute findings       Debility  Continue PT  Case management working on Rehab      Metabolic acidosis  Due to dehydration  Continue ivf  Repeat lactic acid this am      Hyponatremia  Patient has hyponatremia which is uncontrolled,We will aim to correct the sodium by 4-6mEq in 24 hours. We will monitor sodium Daily. The hyponatremia is due to Dehydration/hypovolemia. We will obtain the following studies: TSH, T4. We will treat the hyponatremia with IV fluids as follows: 0.9% NS. The patient's sodium results have been reviewed and are listed below.  No results for input(s): "NA" in the last 24 hours.    Syncope  Likely due to volume depletion  Continue ivf  No further issues      Cholelithiasis with chronic cholecystitis  POD #3      Leukocytosis  liekly due to c. Diff  WBC has come down with plateau 15-17  Will get CXR and UA r/o secondary infections        Wound of buttock  Wound care consulted  Present on admission      Acute kidney injury superimposed on CKD  Patient with acute kidney injury/acute renal failure likely due to pre-renal azotemia due to dehydration NEYDA is currently improving. Baseline creatinine 1.6-2.0 - Labs reviewed- Renal function/electrolytes with Estimated Creatinine Clearance: 23.2 mL/min (A) (based on SCr of 2.51 mg/dL (H)). according to latest data. Monitor urine output and serial BMP and adjust therapy as needed. Avoid nephrotoxins and renally dose meds for GFR listed above.    Cr worsened again, will d/c zosyn  Get repeat UA and CXR r/o underlying infection  May need to change abx  Continue vanc po low likelyhood of worsening NEYDA  Cr 2.5 today trend up slight from 2.3  Repeat labs in am    Obesity  Body mass index is 25.74 kg/m². Morbid obesity " complicates all aspects of disease management from diagnostic modalities to treatment. Weight loss encouraged and health benefits explained to patient.         Hypertension  Resume norvasc today  Hydralazine prn for elevated BP         VTE Risk Mitigation (From admission, onward)         Ordered     enoxaparin injection 30 mg  Daily         09/18/23 1244     IP VTE HIGH RISK PATIENT  Once         09/11/23 1841     Place sequential compression device  Until discontinued         09/11/23 1841                Discharge Planning   PARRIS: 9/22/2023     Code Status: Full Code   Is the patient medically ready for discharge?:     Reason for patient still in hospital (select all that apply): Patient trending condition, Laboratory test, Treatment and PT / OT recommendations  Discharge Plan A: Rehab   Discharge Delays: None known at this time              Sekou Shah MD  Department of Hospital Medicine   [facility]

## 2023-09-21 NOTE — PLAN OF CARE
Problem: Adult Inpatient Plan of Care  Goal: Plan of Care Review  Outcome: Ongoing, Progressing  Flowsheets (Taken 9/20/2023 2124)  Plan of Care Reviewed With: patient  Goal: Patient-Specific Goal (Individualized)  Outcome: Ongoing, Progressing  Goal: Absence of Hospital-Acquired Illness or Injury  Outcome: Ongoing, Progressing  Goal: Optimal Comfort and Wellbeing  Outcome: Ongoing, Progressing  Goal: Readiness for Transition of Care  Outcome: Ongoing, Progressing     Problem: Fluid and Electrolyte Imbalance (Acute Kidney Injury/Impairment)  Goal: Fluid and Electrolyte Balance  Outcome: Ongoing, Progressing     Problem: Oral Intake Inadequate (Acute Kidney Injury/Impairment)  Goal: Optimal Nutrition Intake  Outcome: Ongoing, Progressing     Problem: Renal Function Impairment (Acute Kidney Injury/Impairment)  Goal: Effective Renal Function  Outcome: Ongoing, Progressing     Problem: Impaired Wound Healing  Goal: Optimal Wound Healing  Outcome: Ongoing, Progressing     Problem: Skin Injury Risk Increased  Goal: Skin Health and Integrity  Outcome: Ongoing, Progressing     Problem: Fluid Volume Deficit  Goal: Fluid Balance  Outcome: Ongoing, Progressing     Problem: Fall Injury Risk  Goal: Absence of Fall and Fall-Related Injury  Outcome: Ongoing, Progressing     Problem: Bleeding (Cholecystectomy)  Goal: Absence of Bleeding  Outcome: Ongoing, Progressing     Problem: Bowel Motility Impaired (Cholecystectomy)  Goal: Effective Bowel Elimination  Outcome: Ongoing, Progressing     Problem: Fluid and Electrolyte Imbalance (Cholecystectomy)  Goal: Fluid and Electrolyte Balance  Outcome: Ongoing, Progressing     Problem: Infection (Cholecystectomy)  Goal: Absence of Infection Signs and Symptoms  Outcome: Ongoing, Progressing     Problem: Ongoing Anesthesia Effects (Cholecystectomy)  Goal: Anesthesia/Sedation Recovery  Outcome: Ongoing, Progressing     Problem: Pain (Cholecystectomy)  Goal: Acceptable Pain Control  Outcome:  Ongoing, Progressing     Problem: Postoperative Nausea and Vomiting (Cholecystectomy)  Goal: Nausea and Vomiting Relief  Outcome: Ongoing, Progressing     Problem: Postoperative Urinary Retention (Cholecystectomy)  Goal: Effective Urinary Elimination  Outcome: Ongoing, Progressing     Problem: Respiratory Compromise (Cholecystectomy)  Goal: Effective Oxygenation and Ventilation  Outcome: Ongoing, Progressing

## 2023-09-21 NOTE — SUBJECTIVE & OBJECTIVE
Interval History:      Review of Systems   Constitutional:  Negative for activity change, appetite change and fever.   Respiratory:  Negative for chest tightness, shortness of breath and wheezing.    Cardiovascular:  Negative for chest pain.   Gastrointestinal:  Negative for abdominal pain, constipation, diarrhea, nausea and vomiting.   Skin:  Negative for rash and wound.     Objective:     Vital Signs (Most Recent):  Temp: 98.6 °F (37 °C) (09/21/23 1134)  Pulse: 85 (09/21/23 1134)  Resp: 20 (09/21/23 1134)  BP: 112/87 (09/21/23 1134)  SpO2: 95 % (09/21/23 1134) Vital Signs (24h Range):  Temp:  [97.2 °F (36.2 °C)-98.6 °F (37 °C)] 98.6 °F (37 °C)  Pulse:  [80-86] 85  Resp:  [18-20] 20  SpO2:  [95 %-99 %] 95 %  BP: (112-185)/(71-87) 112/87     Weight: 83.1 kg (183 lb 1.6 oz)  Body mass index is 27.84 kg/m².    Intake/Output Summary (Last 24 hours) at 9/21/2023 1247  Last data filed at 9/21/2023 0607  Gross per 24 hour   Intake 2256 ml   Output --   Net 2256 ml           Physical Exam  Constitutional:       General: She is not in acute distress.     Appearance: Normal appearance. She is normal weight. She is not ill-appearing.   Eyes:      General: No scleral icterus.  Cardiovascular:      Rate and Rhythm: Normal rate and regular rhythm.      Pulses: Normal pulses.      Heart sounds: Normal heart sounds.   Pulmonary:      Effort: Pulmonary effort is normal.      Breath sounds: Normal breath sounds.   Abdominal:      General: There is no distension.      Palpations: Abdomen is soft. There is no mass.      Tenderness: There is no abdominal tenderness.   Musculoskeletal:      Right lower leg: No edema.      Left lower leg: No edema.   Skin:     General: Skin is warm and dry.      Capillary Refill: Capillary refill takes less than 2 seconds.      Findings: Rash present. No erythema or lesion.      Comments: See nurses' notes and wound care notes  Wounds POA   Neurological:      General: No focal deficit present.       Mental Status: She is alert. Mental status is at baseline.   Psychiatric:         Mood and Affect: Mood normal.         Behavior: Behavior normal.             Significant Labs: All pertinent labs within the past 24 hours have been reviewed.  CBC:   Recent Labs   Lab 09/20/23 0419 09/21/23 0428   WBC 13.33* 11.72*   HGB 8.7* 9.0*   HCT 27.4* 27.9*    347       CMP:   Recent Labs   Lab 09/20/23 0419 09/20/23  0752 09/21/23 0428    140 140   K 2.8* 2.5* 3.5   CO2 12* 12* 12*   BUN 26.0* 27.0* 25.0*   CREATININE 2.30* 2.43* 2.32*   CALCIUM 7.4* 7.5* 7.9*         Significant Imaging: I have reviewed all pertinent imaging results/findings within the past 24 hours.

## 2023-09-22 VITALS
RESPIRATION RATE: 19 BRPM | OXYGEN SATURATION: 98 % | WEIGHT: 184.13 LBS | DIASTOLIC BLOOD PRESSURE: 78 MMHG | BODY MASS INDEX: 27.91 KG/M2 | HEART RATE: 80 BPM | TEMPERATURE: 98 F | HEIGHT: 68 IN | SYSTOLIC BLOOD PRESSURE: 157 MMHG

## 2023-09-22 LAB
ANION GAP SERPL CALC-SCNC: 8 MEQ/L (ref 2–13)
BASOPHILS # BLD AUTO: 0.05 X10(3)/MCL (ref 0.01–0.08)
BASOPHILS NFR BLD AUTO: 0.4 % (ref 0.1–1.2)
BUN SERPL-MCNC: 24 MG/DL (ref 7–20)
CALCIUM SERPL-MCNC: 7.7 MG/DL (ref 8.4–10.2)
CHLORIDE SERPL-SCNC: 121 MMOL/L (ref 98–110)
CO2 SERPL-SCNC: 10 MMOL/L (ref 21–32)
CREAT SERPL-MCNC: 2.11 MG/DL (ref 0.66–1.25)
CREAT/UREA NIT SERPL: 11 (ref 12–20)
EOSINOPHIL # BLD AUTO: 0.75 X10(3)/MCL (ref 0.04–0.36)
EOSINOPHIL NFR BLD AUTO: 6.6 % (ref 0.7–7)
ERYTHROCYTE [DISTWIDTH] IN BLOOD BY AUTOMATED COUNT: 19.8 % (ref 11–14.5)
GFR SERPLBLD CREATININE-BSD FMLA CKD-EPI: 25 MLS/MIN/1.73/M2
GLUCOSE SERPL-MCNC: 114 MG/DL (ref 70–115)
HCT VFR BLD AUTO: 25.9 % (ref 36–48)
HGB BLD-MCNC: 8.4 G/DL (ref 11.8–16)
IMM GRANULOCYTES # BLD AUTO: 0.15 X10(3)/MCL (ref 0–0.03)
IMM GRANULOCYTES NFR BLD AUTO: 1.3 % (ref 0–0.5)
LYMPHOCYTES # BLD AUTO: 1.34 X10(3)/MCL (ref 1.16–3.74)
LYMPHOCYTES NFR BLD AUTO: 11.9 % (ref 20–55)
MAGNESIUM SERPL-MCNC: 1.9 MG/DL (ref 1.8–2.4)
MCH RBC QN AUTO: 28.3 PG (ref 27–34)
MCHC RBC AUTO-ENTMCNC: 32.4 G/DL (ref 31–37)
MCV RBC AUTO: 87.2 FL (ref 79–99)
MONOCYTES # BLD AUTO: 0.76 X10(3)/MCL (ref 0.24–0.36)
MONOCYTES NFR BLD AUTO: 6.7 % (ref 4.7–12.5)
NEUTROPHILS # BLD AUTO: 8.23 X10(3)/MCL (ref 1.56–6.13)
NEUTROPHILS NFR BLD AUTO: 73.1 % (ref 37–73)
NRBC BLD AUTO-RTO: 0 %
PLATELET # BLD AUTO: 331 X10(3)/MCL (ref 140–371)
PMV BLD AUTO: 10.2 FL (ref 9.4–12.4)
POTASSIUM SERPL-SCNC: 3.8 MMOL/L (ref 3.5–5.1)
RBC # BLD AUTO: 2.97 X10(6)/MCL (ref 4–5.1)
SODIUM SERPL-SCNC: 139 MMOL/L (ref 135–145)
WBC # SPEC AUTO: 11.28 X10(3)/MCL (ref 4–11.5)

## 2023-09-22 PROCEDURE — 25000003 PHARM REV CODE 250: Performed by: FAMILY MEDICINE

## 2023-09-22 PROCEDURE — 36415 COLL VENOUS BLD VENIPUNCTURE: CPT | Performed by: FAMILY MEDICINE

## 2023-09-22 PROCEDURE — 85025 COMPLETE CBC W/AUTO DIFF WBC: CPT | Performed by: FAMILY MEDICINE

## 2023-09-22 PROCEDURE — 83735 ASSAY OF MAGNESIUM: CPT | Performed by: FAMILY MEDICINE

## 2023-09-22 PROCEDURE — 80048 BASIC METABOLIC PNL TOTAL CA: CPT | Performed by: FAMILY MEDICINE

## 2023-09-22 PROCEDURE — 94761 N-INVAS EAR/PLS OXIMETRY MLT: CPT

## 2023-09-22 PROCEDURE — 63600175 PHARM REV CODE 636 W HCPCS: Performed by: FAMILY MEDICINE

## 2023-09-22 RX ORDER — VANCOMYCIN HYDROCHLORIDE 125 MG/1
125 CAPSULE ORAL 4 TIMES DAILY
Qty: 16 CAPSULE | Refills: 0 | Status: SHIPPED | OUTPATIENT
Start: 2023-09-22 | End: 2023-09-26

## 2023-09-22 RX ORDER — CIPROFLOXACIN 500 MG/1
500 TABLET ORAL DAILY
Qty: 7 TABLET | Refills: 0
Start: 2023-09-23 | End: 2023-09-30

## 2023-09-22 RX ORDER — CIPROFLOXACIN 500 MG/1
500 TABLET ORAL DAILY
Status: DISCONTINUED | OUTPATIENT
Start: 2023-09-22 | End: 2023-09-22 | Stop reason: HOSPADM

## 2023-09-22 RX ORDER — POTASSIUM CHLORIDE 20 MEQ/1
20 TABLET, EXTENDED RELEASE ORAL 2 TIMES DAILY
Qty: 10 TABLET | Refills: 0 | Status: SHIPPED | OUTPATIENT
Start: 2023-09-22 | End: 2023-09-27

## 2023-09-22 RX ADMIN — VANCOMYCIN HYDROCHLORIDE 125 MG: 125 CAPSULE ORAL at 08:09

## 2023-09-22 RX ADMIN — Medication: at 05:09

## 2023-09-22 RX ADMIN — Medication: at 12:09

## 2023-09-22 RX ADMIN — FAMOTIDINE 20 MG: 20 TABLET ORAL at 08:09

## 2023-09-22 RX ADMIN — ESCITALOPRAM OXALATE 20 MG: 10 TABLET ORAL at 08:09

## 2023-09-22 RX ADMIN — MAGNESIUM OXIDE TAB 400 MG (241.3 MG ELEMENTAL MG) 400 MG: 400 (241.3 MG) TAB at 08:09

## 2023-09-22 RX ADMIN — POTASSIUM CHLORIDE 40 MEQ: 1500 TABLET, EXTENDED RELEASE ORAL at 08:09

## 2023-09-22 RX ADMIN — CIPROFLOXACIN HYDROCHLORIDE 500 MG: 500 TABLET, FILM COATED ORAL at 08:09

## 2023-09-22 RX ADMIN — PANTOPRAZOLE SODIUM 40 MG: 40 TABLET, DELAYED RELEASE ORAL at 08:09

## 2023-09-22 RX ADMIN — FERROUS SULFATE TAB 325 MG (65 MG ELEMENTAL FE) 1 EACH: 325 (65 FE) TAB at 08:09

## 2023-09-22 RX ADMIN — ENOXAPARIN SODIUM 30 MG: 40 INJECTION SUBCUTANEOUS at 08:09

## 2023-09-22 NOTE — PT/OT/SLP DISCHARGE
Physical Therapy Discharge Summary    Name: Rosalie Campbell  MRN: [id number]   Principal Problem: Clostridium difficile colitis     Patient Discharged from acute Physical Therapy on 23.  Please refer to prior PT noted date on 23 for functional status.     Assessment:     Patient appropriate for care in another setting.    Objective:     GOALS:   Multidisciplinary Problems       Physical Therapy Goals          Problem: Physical Therapy    Goal Priority Disciplines Outcome Goal Variances Interventions   Physical Therapy Goal     PT, PT/OT Adequate for Care Transition     Description: Goals to be met by: discharge     Patient will increase functional independence with mobility by performin. Supine to sit with Contact Guard Assistance  2. Sit to stand transfer with Contact Guard Assistance  3. Gait  x 75 feet with Contact Guard Assistance using Rolling Walker.                          Reasons for Discontinuation of Therapy Services  Transfer to alternate level of care.      Plan:     Patient Discharged to: Inpatient Rehab.      2023

## 2023-09-22 NOTE — DISCHARGE SUMMARY
"Hospital Medicine  Discharge Summary    Patient Name: Rosalie Campbell  MRN: [id number]  Admit Date: 9/11/2023  Discharge Date:  09/22/2023  Status: IP- Inpatient   Length of Stay: 11      PHYSICIANS   Admitting Physician: Raquel Vogel MD  Discharging Physician: Sekou Shah MD.  Primary Care Physician: Jany Taveras FNP-C    DISCHARGE DIAGNOSES     * Clostridium difficile colitis  Continue po vancomycin day # 9  Still with frequent loose stool, has improved but not resolved  Check lactic acid level- normal   WBC decreasing  Get flat and erect abdomen- no acute findings         Debility  Continue PT  Case management working on Rehab        Metabolic acidosis  Due to dehydration  Continue ivf  Repeat lactic acid this am        Hyponatremia  Patient has hyponatremia which is uncontrolled,We will aim to correct the sodium by 4-6mEq in 24 hours. We will monitor sodium Daily. The hyponatremia is due to Dehydration/hypovolemia. We will obtain the following studies: TSH, T4. We will treat the hyponatremia with IV fluids as follows: 0.9% NS. The patient's sodium results have been reviewed and are listed below.  No results for input(s): "NA" in the last 24 hours.     Syncope  Likely due to volume depletion  Continue ivf  No further issues        Cholelithiasis with chronic cholecystitis  POD #3        Leukocytosis  liekly due to c. Diff  WBC has come down with plateau 15-17  Will get CXR and UA r/o secondary infections           Wound of buttock  Wound care consulted  Present on admission        Acute kidney injury superimposed on CKD  Patient with acute kidney injury/acute renal failure likely due to pre-renal azotemia due to dehydration NEYDA is currently improving. Baseline creatinine 1.6-2.0 - Labs reviewed- Renal function/electrolytes with Estimated Creatinine Clearance: 23.2 mL/min (A) (based on SCr of 2.51 mg/dL (H)). according to latest data. Monitor urine output and serial BMP and adjust therapy as needed. " Avoid nephrotoxins and renally dose meds for GFR listed above.     Cr worsened again, will d/c zosyn  Get repeat UA and CXR r/o underlying infection  May need to change abx  Continue vanc po low likelyhood of worsening MIKE  Cr 2.5 today trend up slight from 2.3  Repeat labs in am     Obesity  Body mass index is 25.74 kg/m². Morbid obesity complicates all aspects of disease management from diagnostic modalities to treatment. Weight loss encouraged and health benefits explained to patient.                   Hypertension  Resume norvasc today  Hydralazine prn for elevated BP      UTI- enterobacter aerogenes     PROCEDURES         HOSPITAL COURSE      Principal Problem:Clostridium difficile colitis           HPI:        Loss of Consciousness    Diarrhea       C/o diarrhea x 1 month syncopal on Saturday, and this am, dx w/ c diff last month, pt reports placed self on floor when felt like she was gonna pass out, denies hitting head         History of Present Illness: History obtained from Patient     Pt is a 72 y.o. female with hx of CLD 3/4, HTN, Depression/Anxiety, smoker, CAD presented to the ER with about a month of diarrhea.  Having multiple bouts a day.  She was doing to bathroom today and passed out.  She did not strike her head.  She states she fell on Saturday as well and landed on her buttocks.  She was C. Diff + in the ER.  She says she had some ABX about a month ago around the time she had a colonoscopy.  She denies fevers but has some obvious chills.  Denies CP, SOB, NV.  She denies any blood in stool.             Overview/Hospital Course:  09/12/2023 pt presented with 2 months of diarrhea + c. Diff toxin, admitted for dehydration and c. Diff + diarrhea  Mike on ckd usually Cr runs 1.6-2.0 was 3.2 on admit and is 2.6 this am.  WBC was 30,000.  Pt had c. Diff ordered on 08/30 but does not think she was started on any abx.  Was in hospitale here back 07/07/2023 with colitis and was d/c home with cipro and  "flagyl.  Diarrhea started after hospitalizations.  Wound care nurses has evaluated and pt has some excoriated and broken areas multiple on bilateral sacral/buttocks areas present on admit.  US and CT showed stones and slude in the gallbladder but no acute cholecystitis.  CT shows collitis likely due to c. Diff, cannot rule out appendicitis, pt does not have acute abdominal pain.surgery has been consulted for evaluation  09/13/2023 pt still with significant diarrhea, cramps have improved a bit.  CT shows cholelithiasis.  Surgery consult is pending  09/14/2023 diarrhea is less this am and cramping is less.  Only 3 BM so far this am.  Surgery plans for lap kehinde in am.    09/15/2023 diarrhea is less, plans for lap kehinde today per surgery, continues with PT and case management working on rehab placement for Monday or Tuesday 09/16/2023 pt s/p lap kehinde, mild pain in RUQ area soreness, incision sites look good.  No diarrhea this am  09/17/2023 still with signigicant diarrhea, minimal po intake  09/18/2023 POD #3 lap kehinde, c. Diff collitis day #7 of po vanc, diarrhea is less, but not resolved.  Pt had 2 episodes of "vomiting" this am says she gets nauseated and mostly spits up.  + 2-3 BM loose this am and denies dysuria.    09/19/2023 a little more po intake today  Urine culture > 100,000 gram neg rods  Wbc trending down  No improvement diarrhea episode  K down to 2.5   09/20/2023  Feel better today   Tolerate more po intake   Urine culture pending   K low again today at 2.5  Will replace iv and po kcl  Mag sulfate iv 2 gm x 1  09/21/2023 lab much better today   Get strong ate more for breakfast today      Stable for d/c to [facility] today   Will complete po vancomycin course for c diff  Tolerating po intake well    STATUS  Improved, Stable    DISPOSITION  Discharge to [facility]     DIET      ACTIVITY  As tolerated      FOLLOW-UP      Contact information for follow-up providers       [place] " [facility] Follow up.    Specialty: Rehabilitation  Contact information:  [address] [place] [address]  [phone]                       Contact information for after-discharge care       Destination       [facility] .    Service: Inpatient Rehabilitation  Contact information:  [address], [address]  [facility] [address]  [phone]                                     DISCHARGE MEDICATION RECONCILIATION        Medication List        START taking these medications      ciprofloxacin HCl 500 MG tablet  Commonly known as: CIPRO  Take 1 tablet (500 mg total) by mouth once daily. for 7 days  Start taking on: September 23, 2023     potassium chloride SA 20 MEQ tablet  Commonly known as: K-DUR,KLOR-CON  Take 1 tablet (20 mEq total) by mouth 2 (two) times daily. for 5 days     vancomycin 125 MG capsule  Commonly known as: VANCOCIN  Take 1 capsule (125 mg total) by mouth 4 (four) times daily. for 4 days            CONTINUE taking these medications      amLODIPine 5 MG tablet  Commonly known as: NORVASC  Take 1 tablet (5 mg total) by mouth once daily.     atorvastatin 40 MG tablet  Commonly known as: LIPITOR  Take 1 tablet (40 mg total) by mouth Daily.     busPIRone 15 MG tablet  Commonly known as: BUSPAR  Take 1 tablet (15 mg total) by mouth 3 (three) times daily.     cloNIDine 0.2 MG tablet  Commonly known as: CATAPRES  Take 1 tablet (0.2 mg total) by mouth 2 (two) times daily.     clopidogreL 75 mg tablet  Commonly known as: PLAVIX  Take 1 tablet (75 mg total) by mouth Daily.     dapagliflozin propanediol 10 mg tablet  Commonly known as: Farxiga  Take 1 tablet (10 mg total) by mouth once daily.     dicyclomine 20 mg tablet  Commonly known as: BENTYL  Take 1 tablet (20 mg total) by mouth every 6 (six) hours.     EScitalopram oxalate 20 MG tablet  Commonly known as: LEXAPRO  Take 1 tablet (20 mg total) by mouth once daily.     famotidine 20 MG tablet  Commonly known as:  "PEPCID  Take 1 tablet (20 mg total) by mouth once daily.     ferrous sulfate 324 mg (65 mg iron) Tbec  Take 1 tablet (324 mg total) by mouth every other day.     hydrALAZINE 50 MG tablet  Commonly known as: APRESOLINE  Take 1 tablet (50 mg total) by mouth every 8 (eight) hours.     losartan 50 MG tablet  Commonly known as: COZAAR     metoprolol succinate 25 MG 24 hr tablet  Commonly known as: TOPROL-XL  Take 1 tablet (25 mg total) by mouth once daily.     rOPINIRole 0.25 MG tablet  Commonly known as: REQUIP  Take 1 tablet (0.25 mg total) by mouth every evening.               Where to Get Your Medications        These medications were sent to [facility]  [place] - [address]  [address] [address]      Phone: [phone]   potassium chloride SA 20 MEQ tablet  vancomycin 125 MG capsule       Information about where to get these medications is not yet available    Ask your nurse or doctor about these medications  ciprofloxacin HCl 500 MG tablet           PHYSICAL EXAM   VITALS: T 97.5 °F (36.4 °C)   BP (!) 157/78   P 80   RR 19   O2 98 %    Physical Exam  Vitals reviewed.   HENT:      Head: Normocephalic.   Cardiovascular:      Rate and Rhythm: Normal rate.   Pulmonary:      Effort: Pulmonary effort is normal.   Neurological:      Mental Status: She is alert.              Discharge time: 33 minutes     Sekou Shah MD  [facility] Medicine       DIAGNOSITCS   CBC:   Recent Labs   Lab 09/20/23  0419 09/21/23  0428 09/22/23  0433   WBC 13.33* 11.72* 11.28   HGB 8.7* 9.0* 8.4*   HCT 27.4* 27.9* 25.9*    347 331     COAG:  No results for input(s): "APTT", "INR", "PTT" in the last 168 hours.  CMP:   Recent Labs   Lab 09/18/23  0504 09/18/23  0504 09/19/23  0444 09/20/23  0419 09/20/23  0752 09/21/23  0428 09/22/23  0433   CALCIUM 7.8*  --  7.7* 7.4* 7.5* 7.9* 7.7*   ALBUMIN 2.4*  --  2.2*  --   --   --   --      --  141 140 140 140 139   K 3.0*  --  2.5* 2.8* 2.5* " "3.5 3.8   CO2 14*  --  12* 12* 12* 12* 10*   BUN 23.0*  --  25.0* 26.0* 27.0* 25.0* 24.0*   CREATININE 2.28*  2.33*  --  2.51* 2.30* 2.43* 2.32* 2.11*   ALKPHOS 73  --  67  --   --   --   --    ALT 14  --  13  --   --   --   --    AST 17  --  17  --   --   --   --    BILITOT 0.3  --  0.3  --   --   --   --    MG  --    < > 1.60* 1.50*  --  2.00 1.90    < > = values in this interval not displayed.     Estimated Creatinine Clearance: 27.7 mL/min (A) (based on SCr of 2.11 mg/dL (H)).  CARDIAC ENZYMES: No results for input(s): "TROPONINI", "CPK", "CKMB" in the last 72 hours.     No results for input(s): "AMYLASE", "LIPASE" in the last 168 hours.      No results for input(s): "POCTGLUCOSE" in the last 72 hours.     Microbiology Results (last 7 days)       Procedure Component Value Units Date/Time    Urine culture [[id number]]  (Abnormal)  (Susceptibility) Collected: 09/18/23 1032    Order Status: Completed Specimen: Urine Updated: 09/21/23 0656     Urine Culture >/= 100,000 colonies/ml Enterobacter aerogenes    Blood Culture [[id number]] Collected: 09/11/23 1404    Order Status: Completed Specimen: Blood from Hand, Right Updated: 09/18/23 0638     CULTURE, BLOOD ([facility]) Final Report: At 5 days. No growth    Blood Culture [[id number]] Collected: 09/11/23 1404    Order Status: Completed Specimen: Blood from Antecubital, Right Updated: 09/16/23 1500     CULTURE, BLOOD ([facility]) No Growth at 5 days               X-Ray Abdomen Flat And Erect    Result Date: 9/18/2023  EXAMINATION: STUDY: XR ABDOMEN FLAT AND ERECT CLINICAL HISTORY AND TECHNIQUE: Alden Burger RT on 9/18/2023 12:58 PMCLINICAL HX: INPT  X TODAY C/O ELEVATED WBC PT CURRENTLY HAS C. DIFF PAST MEDICAL HX: COLITIS, ANIXETY, CRD, CAD, HTN, OBESITY, OSTEOARTHRITS, AORTIC STENT, EVERYDAY SMOKER TECHNIQUE: ABD FLAT AND ERECT TECH: NN/ MD PT TRANSPORTED WO INCIDENT COMPARISON: None FINDINGS: Postoperative changes are noted within the right upper quadrant.  Vascular " calcifications are noted within the abdomen, pelvis and both inguinal regions.  Air and feces are noted throughout the colon seen as far distally as the rectum with air also scattered throughout the small bowel and within the gastric lumen.  I see no evidence of free intraperitoneal air.  There is displacement of loops of small bowel within the pelvis.    There is moderate demineralization of the skeletal structures with moderate degenerative changes noted involving the lumbar spine.     1. Chronic changes are present as described above.  See above comments. 2. There is displacement of loops of small bowel from the pelvis.  The patient has had a recent CT scan of the abdomen and pelvis (09/11/2023 demonstrating no evidence of a mass within the pelvis and I suspect the changes are related to distension of the urinary bladder. Electronically signed by: Sienna Domínguez Date:    09/18/2023 Time:    14:13    X-Ray Chest 1 View    Result Date: 9/18/2023  EXAMINATION: STUDY: XR CHEST 1 VIEW CLINICAL HISTORY AND TECHNIQUE: Alden Burger RT on 9/18/2023 12:58 PMCLINICAL HX: INPT  X TODAY C/O ELEVATED WBC PT CURRENTLY HAS C. DIFF PAST MEDICAL HX: COLITIS, ANIXETY, CRD, CAD, HTN, OBESITY, OSTEOARTHRITS, AORTIC STENT, EVERYDAY SMOKER TECHNIQUE: 1V PORTABLE CHEST TECH: NN/ MD PT TRANSPORTED WO INCIDENT COMPARISON: None FINDINGS: There is slight elevation/eventration of the right hemidiaphragm.  The lungs are slightly under expanded exaggerating the pulmonary vasculature.The cardiac, hilar, and mediastinal contours appear unremarkable.I see no lobar or segmental infiltrates.No significant pleural effusions are noted.There is moderate demineralization of the skeletal structures with moderate degenerative changes noted involving the thoracic spine.     1. I see no lobar or segmental infiltrates or other significant abnormalities. 2. Chronic changes are present as described above. Electronically signed by: Sienna Domínguez  Date:    09/18/2023 Time:    14:06    SURG [place] Surgery Cholangiogram    Result Date: 9/15/2023  Please see report cardiology report.    US Abdomen Limited    Result Date: 9/11/2023  EXAMINATION: STUDY: US ABDOMEN LIMITED COMPARISON: None FINDINGS: Liver:   The hepatic parenchyma appears unremarkable with no worrisome focal masses or dilated biliary radicles and duplex imaging demonstrates adequate antegrade flow and waveforms within the portal vein, hepatic artery, and hepatic veins. Gallbladder/biliary system: The gallbladder is adequately distended with what appears to be a small amount of echogenic sludge layering dependently on several images with no posterior shadowing.  There is no significant thickening of the gallbladder or pericholecystic edema.  The patient was not significantly tender while scanning over the gallbladder. The common bile duct, where visualized, is not dilated nor do I see evidence of intraductal stones. Miscellaneous: N/A     1. Several images demonstrate what appears to be a small amount of echogenic sludge layering dependently with no changes to suggest cholecystitis.  See above comments. Electronically signed by: Sienna Domínguez Date:    09/11/2023 Time:    19:55    CT Abdomen Pelvis  Without Contrast    Result Date: 9/11/2023  EXAMINATION: CT ABDOMEN PELVIS WITHOUT CONTRAST CLINICAL HISTORY AND TECHNIQUE: Marci Travis, RT on 9/11/2023  2:36 PMPT STATUS: ER PROCEDURE: CT ABD/PEL W/O CLINICAL HX :  DIARRHEA X 1 MONTH, RECENT SYNCOPE PMH: HTN, COLITIS, SMOKER, AORTIC STENT, CHRONIC RENAL DISEASE IV CONTRAST: NONE ORAL CONTRAST: NONE RECTAL CONTRAST: NONE AXIAL IMAGES @ 5MM INTERVALS WITH MULTIPLANAR RECONSTRUCTION TOTAL IMAGE NUMBER: 152 NUMBER OF CT SCANS IN PAST 12 MONTHS: 1 CTDIvol(mGy): HEAD:     BODY: 6.00 DLP(mGycm): HEAD:     BODY: 331.10 TECH: AKG/DB PT TRANSPORTED W/O INCIDENT This patient has had 1 CT and nuclear medicine scans performed within the last 12 months. The following  DOSE REDUCTION TECHNIQUES are used for all CT scans at [facility]: 1. Automated exposure control. 2. Adjustment of the mA and/or kv according to patient size. 3. Use of iterative reconstruction technique. COMPARISON: None FINDINGS: Liver: No clinically significant abnormalities are noted. Gallbladder/biliary system: The gallbladder is fairly well distended with what appear to be tiny, radiopaque stones layering dependently and mild haziness of the gallbladder wall as can be seen with low-grade inflammatory changes of cholecystitis. Spleen: No clinically significant abnormalities are noted. Adrenal glands: No clinically significant abnormalities are noted. Pancreas: No clinically significant abnormalities are noted. Kidneys/ureters: Vascular calcifications are noted within both renal santa.  No worrisome parenchymal abnormalities are noted.  Phleboliths within the pelvis make evaluation of the distal ureters much more difficult although I see no obvious ureteral stones or changes to suggest ureteral obstruction. Urinary bladder: The urinary bladder is well distended with no worrisome abnormalities noted. Uterus and ovaries: No clinically significant abnormalities are noted. GI tract: Unopacified loops of large and small bowel as well as the gastric lumen are difficult to evaluate.  There appears to be at least mild mucosal thickening within the cecum and appendix with adjacent inflammatory as can be seen with focal colitis.  The appendix is not clearly delineated. Vascular structures: Extensive atherosclerotic plaquing is noted throughout the abdominal aorta is primary branches. Musculoskeletal structures: There is moderate demineralization of the skeletal structures with extensive degenerative changes noted throughout the lumbar spine and to a lesser extent involving both hips. Miscellaneous: N/A     1. Cholelithiasis with subtle changes which are suspicious for cholecystitis.  See above  comments. 2.  Mild mucosal thickening with adjacent inflammatory stranding is noted involving the cecum and ascending colon.  The appendix is not clearly delineated.  I suspect the changes represent focal colitis versus much less likely atypical appearing changes related to appendicitis.  Clinical correlation is recommended. 3. Chronic changes are present as described above.  See above comments. Electronically signed by: Sienna Domínguez Date:    09/11/2023 Time:    14:49

## 2023-09-22 NOTE — PLAN OF CARE
Physician ordered to discharge patient to discharge patient to [facility]. [facility] was notified per phone/fax of pt's discharge orders, spoke with Holly. [facility] nurse to call [facility] nurse when ready for report on patient.

## 2023-09-22 NOTE — PLAN OF CARE
Problem: Physical Therapy  Goal: Physical Therapy Goal  Description: Goals to be met by: discharge     Patient will increase functional independence with mobility by performin. Supine to sit with Contact Guard Assistance  2. Sit to stand transfer with Contact Guard Assistance  3. Gait  x 75 feet with Contact Guard Assistance using Rolling Walker.     Outcome: Adequate for Care Transition

## 2023-09-22 NOTE — PLAN OF CARE
09/22/23 0903   Final Note   Assessment Type Final Discharge Note   Anticipated Discharge Disposition Rehab  ([facility])   What phone number can be called within the next 1-3 days to see how you are doing after discharge? [phone]   Hospital Resources/Appts/Education Provided Post-Acute resouces added to AVS   Post-Acute Status   Post-Acute Authorization Placement   Post-Acute Placement Status Set-up Complete/Auth obtained   Coverage MCR   Discharge Delays None known at this time

## 2023-09-23 ENCOUNTER — OUTSIDE PLACE OF SERVICE (OUTPATIENT)
Dept: ADMINISTRATIVE | Facility: OTHER | Age: 73
End: 2023-09-23
Payer: MEDICARE

## 2023-09-23 PROCEDURE — 99223 PR INITIAL HOSPITAL CARE,LEVL III: ICD-10-PCS | Mod: ,,, | Performed by: FAMILY MEDICINE

## 2023-09-23 PROCEDURE — 99223 1ST HOSP IP/OBS HIGH 75: CPT | Mod: ,,, | Performed by: FAMILY MEDICINE

## 2023-09-26 NOTE — PHYSICIAN QUERY
PT Name: oRsalie Campbell  MR #: [id number]     DOCUMENTATION CLARIFICATION     CDS/: Mary Pryor RN              Contact information: [email or web address]  This form is a permanent document in the medical record.     Query Date: September 26, 2023    By submitting this query, we are merely seeking further clarification of documentation.  Please utilize your independent clinical judgment when addressing the question(s) below.      The Medical Record contains the following:    Clinical Information Location in Medical Records   UTI- enterobacter aerogenes     Ceftriaxone IVPB      Indication of use:  UTI  Cipro  PO                   Indication of use:  UTI     Enterobacter aerogenes >100,00K    Admit to inpatient:  9/11  cdiff 9/25 DC summary    9/19-21 MAR  9/21-22 MAR    9/11 Admission orders    9/18 Urine culture     According to coding guidelines, Present on Admission is defined as present at the time the order for inpatient admission occurs. Conditions that develop during an outpatient encounter, including emergency department, observation, or outpatient surgery, are considered as present on admission.       Please clarify the Present on Admission (POA) status of the diagnosis: __UTI__    [ x ] Yes (Y)   [  ] No (N)   [  ] Documentation insufficient to determine if condition is POA (U)   [  ] Clinically Undetermined (W)     Reference:  ICD-10-CM Official Guidelines for Coding and Reporting FY 2021. (2020). Retrieved October 21, 2020, from https://www.cdc.gov/nchs/data/icd/10cmguidelines-FY2021.pdf?fbclid=[email or web address]    Form No. 23149

## 2023-10-09 PROBLEM — K62.5 RECTAL BLEEDING: Status: RESOLVED | Noted: 2023-07-03 | Resolved: 2023-10-09

## 2023-10-12 ENCOUNTER — OFFICE VISIT (OUTPATIENT)
Dept: FAMILY MEDICINE | Facility: CLINIC | Age: 73
End: 2023-10-12
Payer: MEDICARE

## 2023-10-12 VITALS
BODY MASS INDEX: 23.19 KG/M2 | HEIGHT: 68 IN | DIASTOLIC BLOOD PRESSURE: 80 MMHG | TEMPERATURE: 97 F | WEIGHT: 153 LBS | HEART RATE: 79 BPM | SYSTOLIC BLOOD PRESSURE: 132 MMHG | OXYGEN SATURATION: 99 %

## 2023-10-12 DIAGNOSIS — D50.9 IRON DEFICIENCY ANEMIA, UNSPECIFIED IRON DEFICIENCY ANEMIA TYPE: ICD-10-CM

## 2023-10-12 DIAGNOSIS — Z09 HOSPITAL DISCHARGE FOLLOW-UP: Primary | ICD-10-CM

## 2023-10-12 DIAGNOSIS — Z90.49 HISTORY OF LAPAROSCOPIC CHOLECYSTECTOMY: ICD-10-CM

## 2023-10-12 DIAGNOSIS — N18.32 STAGE 3B CHRONIC KIDNEY DISEASE: ICD-10-CM

## 2023-10-12 PROBLEM — K11.7 XEROSTOMIA: Status: RESOLVED | Noted: 2023-01-05 | Resolved: 2023-10-12

## 2023-10-12 PROBLEM — E87.20 METABOLIC ACIDOSIS: Status: RESOLVED | Noted: 2023-09-12 | Resolved: 2023-10-12

## 2023-10-12 PROBLEM — E66.9 OBESITY: Status: RESOLVED | Noted: 2023-01-05 | Resolved: 2023-10-12

## 2023-10-12 PROBLEM — K80.10 CHOLELITHIASIS WITH CHRONIC CHOLECYSTITIS: Status: RESOLVED | Noted: 2023-09-12 | Resolved: 2023-10-12

## 2023-10-12 PROBLEM — D72.829 LEUKOCYTOSIS: Status: RESOLVED | Noted: 2023-09-12 | Resolved: 2023-10-12

## 2023-10-12 PROBLEM — E87.6 HYPOKALEMIA: Status: RESOLVED | Noted: 2023-07-03 | Resolved: 2023-10-12

## 2023-10-12 PROBLEM — E87.1 HYPONATREMIA: Status: RESOLVED | Noted: 2023-09-12 | Resolved: 2023-10-12

## 2023-10-12 PROBLEM — N17.9 ACUTE KIDNEY INJURY SUPERIMPOSED ON CKD: Status: RESOLVED | Noted: 2023-06-26 | Resolved: 2023-10-12

## 2023-10-12 PROBLEM — N18.9 ACUTE KIDNEY INJURY SUPERIMPOSED ON CKD: Status: RESOLVED | Noted: 2023-06-26 | Resolved: 2023-10-12

## 2023-10-12 PROBLEM — D62 ACUTE BLOOD LOSS ANEMIA: Status: RESOLVED | Noted: 2023-06-26 | Resolved: 2023-10-12

## 2023-10-12 PROBLEM — R55 SYNCOPE: Status: RESOLVED | Noted: 2023-09-12 | Resolved: 2023-10-12

## 2023-10-12 PROBLEM — A04.72 CLOSTRIDIUM DIFFICILE COLITIS: Status: RESOLVED | Noted: 2023-09-12 | Resolved: 2023-10-12

## 2023-10-12 PROBLEM — R53.81 DEBILITY: Status: RESOLVED | Noted: 2023-09-15 | Resolved: 2023-10-12

## 2023-10-12 PROBLEM — S31.809A WOUND OF BUTTOCK: Status: RESOLVED | Noted: 2023-09-12 | Resolved: 2023-10-12

## 2023-10-12 LAB
ALBUMIN SERPL-MCNC: 3.6 G/DL (ref 3.4–5)
ALBUMIN/GLOB SERPL: 1.3 RATIO
ALP SERPL-CCNC: 115 UNIT/L (ref 50–144)
ALT SERPL-CCNC: 15 UNIT/L (ref 1–45)
ANION GAP SERPL CALC-SCNC: 10 MEQ/L (ref 2–13)
AST SERPL-CCNC: 17 UNIT/L (ref 14–36)
BASOPHILS # BLD AUTO: 0.07 X10(3)/MCL (ref 0.01–0.08)
BASOPHILS NFR BLD AUTO: 0.8 % (ref 0.1–1.2)
BILIRUB SERPL-MCNC: 0.2 MG/DL (ref 0–1)
BUN SERPL-MCNC: 14 MG/DL (ref 7–20)
CALCIUM SERPL-MCNC: 8.7 MG/DL (ref 8.4–10.2)
CHLORIDE SERPL-SCNC: 112 MMOL/L (ref 98–110)
CO2 SERPL-SCNC: 18 MMOL/L (ref 21–32)
CREAT SERPL-MCNC: 2.01 MG/DL (ref 0.66–1.25)
CREAT/UREA NIT SERPL: 7 (ref 12–20)
EOSINOPHIL # BLD AUTO: 0.63 X10(3)/MCL (ref 0.04–0.36)
EOSINOPHIL NFR BLD AUTO: 7.3 % (ref 0.7–7)
ERYTHROCYTE [DISTWIDTH] IN BLOOD BY AUTOMATED COUNT: 17.7 % (ref 11–14.5)
GFR SERPLBLD CREATININE-BSD FMLA CKD-EPI: 26 MLS/MIN/1.73/M2
GLOBULIN SER-MCNC: 2.8 GM/DL (ref 2–3.9)
GLUCOSE SERPL-MCNC: 96 MG/DL (ref 70–115)
HCT VFR BLD AUTO: 27.4 % (ref 36–48)
HGB BLD-MCNC: 8.6 G/DL (ref 11.8–16)
IMM GRANULOCYTES # BLD AUTO: 0.02 X10(3)/MCL (ref 0–0.03)
IMM GRANULOCYTES NFR BLD AUTO: 0.2 % (ref 0–0.5)
LYMPHOCYTES # BLD AUTO: 2.63 X10(3)/MCL (ref 1.16–3.74)
LYMPHOCYTES NFR BLD AUTO: 30.3 % (ref 20–55)
MCH RBC QN AUTO: 29.3 PG (ref 27–34)
MCHC RBC AUTO-ENTMCNC: 31.4 G/DL (ref 31–37)
MCV RBC AUTO: 93.2 FL (ref 79–99)
MONOCYTES # BLD AUTO: 0.64 X10(3)/MCL (ref 0.24–0.36)
MONOCYTES NFR BLD AUTO: 7.4 % (ref 4.7–12.5)
NEUTROPHILS # BLD AUTO: 4.69 X10(3)/MCL (ref 1.56–6.13)
NEUTROPHILS NFR BLD AUTO: 54 % (ref 37–73)
NRBC BLD AUTO-RTO: 0 %
PLATELET # BLD AUTO: 292 X10(3)/MCL (ref 140–371)
PMV BLD AUTO: 10.3 FL (ref 9.4–12.4)
POTASSIUM SERPL-SCNC: 3.7 MMOL/L (ref 3.5–5.1)
PROT SERPL-MCNC: 6.4 GM/DL (ref 6.3–8.2)
RBC # BLD AUTO: 2.94 X10(6)/MCL (ref 4–5.1)
SODIUM SERPL-SCNC: 140 MMOL/L (ref 135–145)
WBC # SPEC AUTO: 8.68 X10(3)/MCL (ref 4–11.5)

## 2023-10-12 PROCEDURE — 99214 PR OFFICE/OUTPT VISIT, EST, LEVL IV, 30-39 MIN: ICD-10-PCS | Mod: ,,, | Performed by: NURSE PRACTITIONER

## 2023-10-12 PROCEDURE — 99214 OFFICE O/P EST MOD 30 MIN: CPT | Mod: ,,, | Performed by: NURSE PRACTITIONER

## 2023-10-12 PROCEDURE — 85025 COMPLETE CBC W/AUTO DIFF WBC: CPT | Performed by: NURSE PRACTITIONER

## 2023-10-12 PROCEDURE — 80053 COMPREHEN METABOLIC PANEL: CPT | Performed by: NURSE PRACTITIONER

## 2023-10-12 NOTE — PROGRESS NOTES
Patient ID: Rosalie Campbell  : 1950    Chief Complaint: rehab follow up    Allergies: Patient is allergic to codeine.     History of Present Illness:  The patient is a 72 y.o. White female who presents to clinic for follow up on rehab follow up     Here for hospital follow up. Was recently admitted with Cdiff infection; was transferred to rehab for 2 weeks after acute 2 week hospital stay. She reports that she is feeling much better. Was able to gain some strength back at the rehab and now still doing some home therapy.     Repeat c. Diff negative on the 3rd of this month.    Had lap kehinde on 23 (while hospitalized).    Has LEYLA, recently worsened; had EGD and Colonoscopy, no evidence of bleeding.    Baseline creatine 1.88; last checked 23, Creatinine 2.31 (gfr 22) .    Social History:  reports that she has been smoking cigarettes. She has been exposed to tobacco smoke. She has never used smokeless tobacco. She reports that she does not drink alcohol and does not use drugs.    Past Medical History:  has a past medical history of Anxiety, Chronic renal disease stage 4, Clostridium difficile colitis, Colitis, Colon cancer screening declined, Coronary artery disease, Depression, Hypertension, Irregular heart rhythm, Medication management, Obesity, Obesity, unspecified, Osteoarthritis of knee, Pain, joint, shoulder region, right, Refused influenza vaccine, Refused pneumococcal vaccination, and Smoker.    Current Medications:  Current Outpatient Medications   Medication Instructions    amLODIPine (NORVASC) 5 mg, Oral, Daily    atorvastatin (LIPITOR) 40 mg, Oral, Daily    busPIRone (BUSPAR) 15 mg, Oral, 3 times daily    cloNIDine (CATAPRES) 0.2 mg, Oral, 2 times daily    clopidogreL (PLAVIX) 75 mg, Oral, Daily    dapagliflozin propanediol (FARXIGA) 10 mg, Oral, Daily    EScitalopram oxalate (LEXAPRO) 20 mg, Oral, Daily    famotidine (PEPCID) 20 mg, Oral, Daily    ferrous sulfate 324 mg, Oral, Every other  "day    hydrALAZINE (APRESOLINE) 50 mg, Oral, Every 8 hours    losartan (COZAAR) 50 mg, Oral, Daily    metoprolol succinate (TOPROL-XL) 25 mg, Oral, Daily    rOPINIRole (REQUIP) 0.25 mg, Oral, Nightly       Review of Systems   See HPI    Visit Vitals  /80 (BP Location: Left arm)   Pulse 79   Temp 97.2 °F (36.2 °C) (Temporal)   Ht 5' 8" (1.727 m)   Wt 69.4 kg (153 lb)   SpO2 99%   BMI 23.26 kg/m²       Physical Exam  Vitals reviewed.   Constitutional:       Appearance: Normal appearance.   Cardiovascular:      Heart sounds: Normal heart sounds.   Pulmonary:      Breath sounds: Normal breath sounds.   Abdominal:      General: Bowel sounds are normal. There is no distension.      Palpations: Abdomen is soft.      Tenderness: There is no abdominal tenderness.   Skin:     General: Skin is warm and dry.          Labs Reviewed:  Chemistry:  Lab Results   Component Value Date     09/25/2023    K 3.6 09/25/2023    CHLORIDE 122 (H) 09/25/2023    BUN 22.0 (H) 09/25/2023    CREATININE 2.31 (H) 09/25/2023    EGFRNORACEVR 22 09/25/2023    GLUCOSE 104 09/25/2023    CALCIUM 8.1 (L) 09/25/2023    ALKPHOS 76 09/25/2023    LABPROT 4.7 (L) 09/25/2023    ALBUMIN 2.3 (L) 09/25/2023    AST 16 09/25/2023    ALT 12 09/25/2023    MG 1.90 09/22/2023    TSH 1.290 09/13/2023    EIATGB7HJND 1.33 09/13/2023      Hematology:  Lab Results   Component Value Date    WBC 4.47 10/03/2023    RBC 2.74 (L) 10/03/2023    HGB 7.9 (L) 10/03/2023    HCT 26.4 (L) 10/03/2023    MCV 96.4 10/03/2023    MCH 28.8 10/03/2023    MCHC 29.9 (L) 10/03/2023    RDW 19.5 (H) 10/03/2023     10/03/2023    MPV 10.7 10/03/2023         Assessment & Plan:  1. Hospital discharge follow-up  -     CBC Auto Differential; Future; Expected date: 10/12/2023  -     Comprehensive Metabolic Panel; Future; Expected date: 10/12/2023    2. Iron deficiency anemia, unspecified iron deficiency anemia type  Overview:  On Ferrous Sulfate 324 mg QOD    Assessment & " Plan:  Continue iron supplementation QOD; use caution to avoid constipation.   Redraw CBC today.    Orders:  -     CBC Auto Differential; Future; Expected date: 10/12/2023  -     Comprehensive Metabolic Panel; Future; Expected date: 10/12/2023    3. Stage 3b chronic kidney disease  Overview:  Baseline creatinine prior to hospitalization 1.88  Redraw BMP today.     Orders:  -     CBC Auto Differential; Future; Expected date: 10/12/2023  -     Comprehensive Metabolic Panel; Future; Expected date: 10/12/2023    4. History of laparoscopic cholecystectomy  Overview:  Lap Irene 09/2023        Has appointment already scheduled for 11/28 with labs prior.     Future Appointments   Date Time Provider Department Center   11/21/2023  8:10 AM LAB, [facility] [place] LABORATORY DRAW STATION [facility] [place] [facility] [place]   11/28/2023  8:30 AM Jany Taveras FNP-[facility]       No follow-ups on file. Call sooner if needed.    JASWINDER Mosqueda

## 2023-10-13 ENCOUNTER — TELEPHONE (OUTPATIENT)
Dept: FAMILY MEDICINE | Facility: CLINIC | Age: 73
End: 2023-10-13
Payer: MEDICARE

## 2023-10-13 NOTE — TELEPHONE ENCOUNTER
----- Message from Jany Taveras, FNP-C sent at 10/12/2023  4:57 PM CDT -----  Kidneys looking a bit better, blood counts also improving. So, looks like we're on the right track. We have repeat labs next month scheduled. Call if you begin to feel ill before that.

## 2023-10-16 NOTE — SUBJECTIVE & OBJECTIVE
Interval History:      Review of Systems  Review of Systems   Constitutional:  Negative for activity change, appetite change and fever.   Respiratory:  Negative for chest tightness, shortness of breath and wheezing.    Cardiovascular:  Negative for chest pain.   Gastrointestinal:  Negative for abdominal pain, constipation, diarrhea, nausea and vomiting.   Skin:  Negative for rash and wound  Objective:     Vital Signs (Most Recent):  Temp: 97.5 °F (36.4 °C) (09/22/23 0701)  Pulse: 80 (09/22/23 0740)  Resp: 19 (09/22/23 0701)  BP: (!) 157/78 (09/22/23 0701)  SpO2: 98 % (09/22/23 0740) Vital Signs (24h Range):        Weight: 83.5 kg (184 lb 1.6 oz)  Body mass index is 27.99 kg/m².  No intake or output data in the 24 hours ending 10/16/23 1456      Physical Exam      Constitutional:       General: She is not in acute distress.     Appearance: Normal appearance. She is normal weight. She is not ill-appearing.   Eyes:      General: No scleral icterus.  Cardiovascular:      Rate and Rhythm: Normal rate and regular rhythm.      Pulses: Normal pulses.      Heart sounds: Normal heart sounds.   Pulmonary:      Effort: Pulmonary effort is normal.      Breath sounds: Normal breath sounds.   Abdominal:      General: There is no distension.      Palpations: Abdomen is soft. There is no mass.      Tenderness: There is no abdominal tenderness.   Musculoskeletal:      Right lower leg: No edema.      Left lower leg: No edema.   Skin:     General: Skin is warm and dry.      Capillary Refill: Capillary refill takes less than 2 seconds.      Findings: Rash present. No erythema or lesion.      Comments: See nurses' notes and wound care notes  Wounds POA   Neurological:      General: No focal deficit present.      Mental Status: She is alert. Mental status is at baseline.   Psychiatric:         Mood and Affect: Mood normal.         Behavior: Behavior normal.      Significant Labs: All pertinent labs within the past 24 hours have been  reviewed.    Significant Imaging: I have reviewed all pertinent imaging results/findings within the past 24 hours.

## 2023-10-16 NOTE — PROGRESS NOTES
[facility]/Surg  [facility] Medicine  Progress Note    Patient Name: Rosalie Campbell  MRN: [id number]  Patient Class: IP- Inpatient   Admission Date: 9/11/2023  Length of Stay: 11 days  Attending Physician: No att. providers found  Primary Care Provider: Jany Taveras FNP-C        Subjective:     Principal Problem:Clostridium difficile colitis        HPI:   Loss of Consciousness    Diarrhea       C/o diarrhea x 1 month syncopal on Saturday, and this am, dx w/ c diff last month, pt reports placed self on floor when felt like she was gonna pass out, denies hitting head         History of Present Illness: History obtained from Patient     Pt is a 72 y.o. female with hx of CLD 3/4, HTN, Depression/Anxiety, smoker, CAD presented to the ER with about a month of diarrhea.  Having multiple bouts a day.  She was doing to bathroom today and passed out.  She did not strike her head.  She states she fell on Saturday as well and landed on her buttocks.  She was C. Diff + in the ER.  She says she had some ABX about a month ago around the time she had a colonoscopy.  She denies fevers but has some obvious chills.  Denies CP, SOB, NV.  She denies any blood in stool.           Overview/Hospital Course:  09/12/2023 pt presented with 2 months of diarrhea + c. Diff toxin, admitted for dehydration and c. Diff + diarrhea  Mike on ckd usually Cr runs 1.6-2.0 was 3.2 on admit and is 2.6 this am.  WBC was 30,000.  Pt had c. Diff ordered on 08/30 but does not think she was started on any abx.  Was in hospitale here back 07/07/2023 with colitis and was d/c home with cipro and flagyl.  Diarrhea started after hospitalizations.  Wound care nurses has evaluated and pt has some excoriated and broken areas multiple on bilateral sacral/buttocks areas present on admit.  US and CT showed stones and slude in the gallbladder but no acute cholecystitis.  CT shows collitis likely due to c. Diff, cannot rule out appendicitis, pt does not  have acute abdominal pain.surgery has been consulted for evaluation  09/13/2023 pt still with significant diarrhea, cramps have improved a bit.  CT shows cholelithiasis.  Surgery consult is pending  09/14/2023 diarrhea is less this am and cramping is less.  Only 3 BM so far this am.  Surgery plans for lap kehinde in am.           Interval History:      Review of Systems  Review of Systems   Constitutional:  Negative for activity change, appetite change and fever.   Respiratory:  Negative for chest tightness, shortness of breath and wheezing.    Cardiovascular:  Negative for chest pain.   Gastrointestinal:  Negative for abdominal pain, constipation, diarrhea, nausea and vomiting.   Skin:  Negative for rash and wound  Objective:     Vital Signs (Most Recent):  Temp: 97.5 °F (36.4 °C) (09/22/23 0701)  Pulse: 80 (09/22/23 0740)  Resp: 19 (09/22/23 0701)  BP: (!) 157/78 (09/22/23 0701)  SpO2: 98 % (09/22/23 0740) Vital Signs (24h Range):        Weight: 83.5 kg (184 lb 1.6 oz)  Body mass index is 27.99 kg/m².  No intake or output data in the 24 hours ending 10/16/23 1456      Physical Exam      Constitutional:       General: She is not in acute distress.     Appearance: Normal appearance. She is normal weight. She is not ill-appearing.   Eyes:      General: No scleral icterus.  Cardiovascular:      Rate and Rhythm: Normal rate and regular rhythm.      Pulses: Normal pulses.      Heart sounds: Normal heart sounds.   Pulmonary:      Effort: Pulmonary effort is normal.      Breath sounds: Normal breath sounds.   Abdominal:      General: There is no distension.      Palpations: Abdomen is soft. There is no mass.      Tenderness: There is no abdominal tenderness.   Musculoskeletal:      Right lower leg: No edema.      Left lower leg: No edema.   Skin:     General: Skin is warm and dry.      Capillary Refill: Capillary refill takes less than 2 seconds.      Findings: Rash present. No erythema or lesion.      Comments: See nurses'  "notes and wound care notes  Wounds POA   Neurological:      General: No focal deficit present.      Mental Status: She is alert. Mental status is at baseline.   Psychiatric:         Mood and Affect: Mood normal.         Behavior: Behavior normal.      Significant Labs: All pertinent labs within the past 24 hours have been reviewed.    Significant Imaging: I have reviewed all pertinent imaging results/findings within the past 24 hours.        Assessment/Plan:      Hypertension  Resume norvasc today  Hydralazine prn for elevated BP         * Clostridium difficile colitis  Continue po vancomycin        Acute kidney injury superimposed on CKD  Patient with acute kidney injury/acute renal failure likely due to pre-renal azotemia due to dehydration NEYDA is currently improving. Baseline creatinine 1.6-2.0 - Labs reviewed- Renal function/electrolytes with Estimated Creatinine Clearance: 23.7 mL/min (A) (based on SCr of 2.2 mg/dL (H)). according to latest data. Monitor urine output and serial BMP and adjust therapy as needed. Avoid nephrotoxins and renally dose meds for GFR listed above.     Continue ivf  Follow labs     Hyponatremia  Patient has hyponatremia which is uncontrolled,We will aim to correct the sodium by 4-6mEq in 24 hours. We will monitor sodium Daily. The hyponatremia is due to Dehydration/hypovolemia. We will obtain the following studies: TSH, T4. We will treat the hyponatremia with IV fluids as follows: 0.9% NS. The patient's sodium results have been reviewed and are listed below.  No results for input(s): "NA" in the last 24 hours.     Syncope  Likely due to volume depletion  Continue ivf        Metabolic acidosis  Due to dehydration  Continue ivf  monitor        Leukocytosis  liekly due to c. Diff           Wound of buttock  Wound care consulted  Present on admission              Cholelithiasis with chronic cholecystitis  Surgery to follow          VTE Risk Mitigation (From admission, onward)         " Ordered     IP VTE HIGH RISK PATIENT  Once         09/11/23 1841                Discharge Planning   PARRIS: 9/22/2023     Code Status: Prior   Is the patient medically ready for discharge?:     Reason for patient still in hospital (select all that apply): Patient trending condition, Laboratory test and Consult recommendations  Discharge Plan A: Rehab   Discharge Delays: None known at this time              Raquel Vogel MD  Department of Hospital Medicine   [facility]

## 2023-11-21 PROCEDURE — 85025 COMPLETE CBC W/AUTO DIFF WBC: CPT | Performed by: NURSE PRACTITIONER

## 2023-11-21 PROCEDURE — 80053 COMPREHEN METABOLIC PANEL: CPT | Performed by: NURSE PRACTITIONER

## 2023-11-21 PROCEDURE — 83036 HEMOGLOBIN GLYCOSYLATED A1C: CPT | Performed by: NURSE PRACTITIONER

## 2023-11-21 PROCEDURE — 80061 LIPID PANEL: CPT | Performed by: NURSE PRACTITIONER

## 2023-11-21 PROCEDURE — 84443 ASSAY THYROID STIM HORMONE: CPT | Performed by: NURSE PRACTITIONER

## 2023-12-08 NOTE — OP NOTE
[facility]/Surg  Operative Note      Date of Procedure: 9/15/2023     Procedure: Procedure(s) (LRB):  CHOLECYSTECTOMY, LAPAROSCOPIC, WITH CHOLANGIOGRAM (N/A)     Surgeon(s) and Role:     * Fabien Resendez MD - Primary    Assisting Surgeon: None    Pre-Operative Diagnosis: Calculus of gallbladder with chronic cholecystitis without obstruction [K80.10]    Post-Operative Diagnosis: Post-Op Diagnosis Codes:     * Calculus of gallbladder with chronic cholecystitis without obstruction [K80.10]    Anesthesia: General    Operative Findings (including complications, if any):  Patient is a 72-year-old with chronic cholecystitis requiring cholecystectomy     Patient was brought to the operative supine position general anesthetic prepped and draped in a sterile fashion had a supraumbilical incision made with social was needle insufflation abdomen of 14 mm of mercury with insertion of a 5 mm trocar.  Patient then underwent insertion of 2 lateral 5 mm trocars and 1 5 mm trocar just to the right of falciform ligament.  Gallbladder is grasped and mobilized cystic duct and artery were isolated triangle Calot was clearly dissected.  Intraoperative cholangiogram demonstrated proper hepatic duct and common duct to fill appropriately and empty into the duodenal.  Patient did well had no problems or patient underwent clipping of the cystic duct and artery cauterization of gallbladder off the liver bed removed through the epigastric trocar site.  The aponeuroses of the 5 mm trocar sites were closed with 0 Vicryl on  needle.  SubQ was closed with 4-0 plain gut suture.  Dermabond was used for skin.  Sterile dressings were applied no problems were encountered patient tolerated procedure well.          Description of Technical Procedures:  Noted above       Significant Surgical Tasks Conducted by the Assistant(s), if Applicable:  1 week       Estimated Blood Loss (EBL): * No values recorded between 9/15/2023  4:31 PM  and 9/15/2023  5:05 PM *           Implants: * No implants in log *    Specimens:   Specimen (24h ago, onward)      None                    Condition: Good    Disposition: PACU - hemodynamically stable.    Attestation: I was present and scrubbed for the entire procedure.    Discharge Note    OUTCOME: Patient tolerated treatment/procedure well without complication and is now ready for discharge.      DISPOSITION: Home or Self Care    FINAL DIAGNOSIS:  Clostridium difficile colitis    FOLLOWUP: In clinic 1 week    DISCHARGE INSTRUCTIONS:  No discharge procedures on file.

## 2024-01-29 PROCEDURE — 80061 LIPID PANEL: CPT | Performed by: NURSE PRACTITIONER

## 2024-01-29 PROCEDURE — 83036 HEMOGLOBIN GLYCOSYLATED A1C: CPT | Performed by: NURSE PRACTITIONER

## 2024-01-29 PROCEDURE — 80053 COMPREHEN METABOLIC PANEL: CPT | Performed by: NURSE PRACTITIONER

## 2024-01-29 PROCEDURE — 84443 ASSAY THYROID STIM HORMONE: CPT | Performed by: NURSE PRACTITIONER

## 2024-01-29 PROCEDURE — 85025 COMPLETE CBC W/AUTO DIFF WBC: CPT | Performed by: NURSE PRACTITIONER

## 2024-01-31 ENCOUNTER — OFFICE VISIT (OUTPATIENT)
Dept: FAMILY MEDICINE | Facility: CLINIC | Age: 74
End: 2024-01-31
Payer: MEDICARE

## 2024-01-31 VITALS
WEIGHT: 157 LBS | BODY MASS INDEX: 23.79 KG/M2 | SYSTOLIC BLOOD PRESSURE: 116 MMHG | TEMPERATURE: 98 F | OXYGEN SATURATION: 96 % | DIASTOLIC BLOOD PRESSURE: 80 MMHG | HEIGHT: 68 IN | HEART RATE: 81 BPM

## 2024-01-31 DIAGNOSIS — G25.81 RLS (RESTLESS LEGS SYNDROME): ICD-10-CM

## 2024-01-31 DIAGNOSIS — Z28.21 PNEUMOCOCCAL VACCINATION DECLINED: ICD-10-CM

## 2024-01-31 DIAGNOSIS — Z28.21 INFLUENZA VACCINATION DECLINED: ICD-10-CM

## 2024-01-31 DIAGNOSIS — I25.10 ARTERIOSCLEROSIS OF CORONARY ARTERY: ICD-10-CM

## 2024-01-31 DIAGNOSIS — I10 PRIMARY HYPERTENSION: ICD-10-CM

## 2024-01-31 DIAGNOSIS — Z00.00 MEDICARE ANNUAL WELLNESS VISIT, SUBSEQUENT: Primary | ICD-10-CM

## 2024-01-31 DIAGNOSIS — F41.1 GENERALIZED ANXIETY DISORDER: ICD-10-CM

## 2024-01-31 DIAGNOSIS — N18.32 STAGE 3B CHRONIC KIDNEY DISEASE: ICD-10-CM

## 2024-01-31 DIAGNOSIS — R79.9 ABNORMAL FINDING OF BLOOD CHEMISTRY, UNSPECIFIED: ICD-10-CM

## 2024-01-31 DIAGNOSIS — F32.A DEPRESSIVE DISORDER: ICD-10-CM

## 2024-01-31 DIAGNOSIS — F51.01 PRIMARY INSOMNIA: ICD-10-CM

## 2024-01-31 PROBLEM — M17.12 ARTHRITIS OF LEFT KNEE: Status: RESOLVED | Noted: 2023-01-25 | Resolved: 2024-01-31

## 2024-01-31 PROBLEM — M17.12 PRIMARY OSTEOARTHRITIS OF LEFT KNEE: Status: ACTIVE | Noted: 2023-01-05

## 2024-01-31 PROBLEM — K52.9 COLITIS: Status: RESOLVED | Noted: 2023-07-06 | Resolved: 2024-01-31

## 2024-01-31 PROBLEM — N18.4 STAGE 4 CHRONIC KIDNEY DISEASE: Status: RESOLVED | Noted: 2023-07-03 | Resolved: 2024-01-31

## 2024-01-31 PROCEDURE — G0439 PPPS, SUBSEQ VISIT: HCPCS | Mod: ,,, | Performed by: NURSE PRACTITIONER

## 2024-01-31 PROCEDURE — 99497 ADVNCD CARE PLAN 30 MIN: CPT | Mod: 33,,, | Performed by: NURSE PRACTITIONER

## 2024-01-31 RX ORDER — CLONIDINE HYDROCHLORIDE 0.2 MG/1
0.2 TABLET ORAL 2 TIMES DAILY
Qty: 60 TABLET | Refills: 11 | Status: SHIPPED | OUTPATIENT
Start: 2024-01-31

## 2024-01-31 RX ORDER — LOSARTAN POTASSIUM 50 MG/1
50 TABLET ORAL DAILY
Qty: 30 TABLET | Refills: 11 | Status: SHIPPED | OUTPATIENT
Start: 2024-01-31

## 2024-01-31 RX ORDER — CLOPIDOGREL BISULFATE 75 MG/1
75 TABLET ORAL DAILY
Qty: 30 TABLET | Refills: 11 | Status: SHIPPED | OUTPATIENT
Start: 2024-01-31

## 2024-01-31 RX ORDER — ZOLPIDEM TARTRATE 5 MG/1
5 TABLET ORAL NIGHTLY PRN
Qty: 15 TABLET | Refills: 2 | Status: SHIPPED | OUTPATIENT
Start: 2024-01-31 | End: 2024-05-07

## 2024-01-31 RX ORDER — ATORVASTATIN CALCIUM 40 MG/1
40 TABLET, FILM COATED ORAL DAILY
Qty: 30 TABLET | Refills: 11 | Status: SHIPPED | OUTPATIENT
Start: 2024-01-31

## 2024-01-31 RX ORDER — ROPINIROLE 0.25 MG/1
0.25 TABLET, FILM COATED ORAL NIGHTLY
Qty: 30 TABLET | Refills: 11 | Status: SHIPPED | OUTPATIENT
Start: 2024-01-31

## 2024-01-31 RX ORDER — METOPROLOL SUCCINATE 25 MG/1
25 TABLET, EXTENDED RELEASE ORAL DAILY
Qty: 30 TABLET | Refills: 11 | Status: SHIPPED | OUTPATIENT
Start: 2024-01-31

## 2024-01-31 RX ORDER — ESCITALOPRAM OXALATE 20 MG/1
20 TABLET ORAL DAILY
Qty: 30 TABLET | Refills: 11 | Status: SHIPPED | OUTPATIENT
Start: 2024-01-31

## 2024-01-31 RX ORDER — AMLODIPINE BESYLATE 5 MG/1
5 TABLET ORAL DAILY
Qty: 30 TABLET | Refills: 11 | Status: SHIPPED | OUTPATIENT
Start: 2024-01-31

## 2024-01-31 NOTE — PROGRESS NOTES
Patient ID: Rosalie Campbell  : 1950    Chief Complaint: Medicare AWV      History of Present Illness:  The patient is a 73 y.o. White female who presents to clinic for annual wellness visit.      She was hospitalized for prolonged period of time several months ago with C diff associated colitis.  During that hospitalization she was also also noted to have some cholelithiasis and underwent a cholecystectomy. She ended up going to rehab to recover. She is doing much better now.  Anemia is resolving; she is taking OTC supplementation.     She had declined renal function since hospitalization. She was started on Farxiga but discontinued it due to side effects (made her weak and dizzy). She is drinking plenty of water and knows to avoid nephrotoxic medications.     She is having trouble going to sleep and when she does she has difficulty sleeping all night long. She tried multiple OTC sleep aids and has also tried Hydroxyzine and Trazodone; none of those medications were effective.     Allergies: Patient is allergic to codeine.     Past Medical History:  has a past medical history of Anxiety, Chronic renal disease stage 4, Clostridium difficile colitis, Colitis, Colon cancer screening declined, Coronary artery disease, Depression, Hypertension, Irregular heart rhythm, Medication management, Obesity, Obesity, unspecified, Osteoarthritis of knee, Pain, joint, shoulder region, right, Refused influenza vaccine, Refused pneumococcal vaccination, and Smoker.    Surgical History:  has a past surgical history that includes Angioplasty (2019); Esophagogastroduodenoscopy (10/29/2018); stent, aorta; Colonoscopy (2023); and Laparoscopic cholecystectomy with cholangiography (N/A, 09/15/2023).    Family History: family history is not on file.    Social History:  reports that she has been smoking cigarettes. She has been exposed to tobacco smoke. She has never used smokeless tobacco. She reports that she does not  drink alcohol and does not use drugs.    Care Team: Patient Care Team:  Jany Taveras FNP-C as PCP - General (Family Medicine)  Fabien Resendez MD as Consulting Physician (General Surgery)  Jessica Pickard OD (Optometry)     Current Medications:  Current Outpatient Medications   Medication Instructions    amLODIPine (NORVASC) 5 mg, Oral, Daily    atorvastatin (LIPITOR) 40 mg, Oral, Daily    busPIRone (BUSPAR) 15 mg, Oral, 3 times daily    cloNIDine (CATAPRES) 0.2 mg, Oral, 2 times daily    clopidogreL (PLAVIX) 75 mg, Oral, Daily    EScitalopram oxalate (LEXAPRO) 20 mg, Oral, Daily    famotidine (PEPCID) 20 mg, Oral, Daily    ferrous sulfate 324 mg, Oral, Every other day    losartan (COZAAR) 50 mg, Oral, Daily    metoprolol succinate (TOPROL-XL) 25 mg, Oral, Daily    rOPINIRole (REQUIP) 0.25 mg, Oral, Nightly    zolpidem (AMBIEN) 5 mg, Oral, Nightly PRN       Opioid Screening: Patient medication list reviewed, patient is not taking prescription opioids. Patient is not using additional opioids than prescribed. Patient is at low risk of substance abuse based on this opioid use history.     Patient Reported Health Risk Assessment  What [age] 70-79  Are you male or female?: Female  During the past four weeks, how much have you been bothered by emotional problems such as feeling anxious, depressed, irritable, sad, or downhearted and blue?: Not at all  During the past five weeks, has your physical and/or emotional health limited your social activities with family, friends, neighbors, or groups?: Not at all  During the past four weeks, how much bodily pain have you generally had?: Mild pain  During the past four weeks, was someone available to help if you needed and wanted help?: Yes, as much as I wanted  During the past four weeks, what was the hardest physical activity you could do for at least two minutes?: Moderate  Can you get to places out of walking distance without help?  (For example, can you  travel alone on buses or taxis, or drive your own car?): Yes  Can you go shopping for groceries or clothes without someone's help?: Yes  Can you prepare your own meals?: Yes  Can you do your own housework without help?: Yes  Because of any health problems, do you need the help of another person with your personal care needs such as eating, bathing, dressing, or getting around the house?: No  Can you handle your own money without help?: Yes  During the past four weeks, how would you rate your health in general?: Good  How have things been going for you during the past four weeks?: Pretty well  Are you having difficulties driving your car?: Sometimes  Do you always fasten your seat belt when you are in a car?: Yes, usually  How often in the past four weeks have you been bothered by falling or dizzy when standing up?: Never  How often in the past four weeks have you been bothered by sexual problems?: Never  How often in the past four weeks have you been bothered by trouble eating well?: Never  How often in the past four weeks have you been bothered by teeth or denture problems?: Never  How often in the past four weeks have you been bothered with problems using the telephone?: Never  How often in the past four weeks have you been bothered by tiredness or fatigue?: Sometimes  Have you fallen two or more times in the past year?: No  Are you afraid of falling?: No  Are you a smoker?: Yes, I'm not ready to quit  During the past four weeks, how many drinks of wine, beer, or other alcoholic beverages did you have?: No alcohol at all  Do you exercise for about 20 minutes three or more days a week?: Yes, some of the time  Have you been given any information to help you with hazards in your house that might hurt you?: Yes  Have you been given any information to help you with keeping track of your medications?: Yes  How often do you have trouble taking medicines the way you've been told to take them?: I always take them as  "prescribed  How confident are you that you can control and manage most of your health problems?: Very confident  What is your race? (Check all that apply.):     Review of Systems   Constitutional:  Negative for activity change, appetite change, fatigue and unexpected weight change.   HENT:  Negative for ear pain and hearing loss.    Eyes:  Negative for visual disturbance.   Respiratory:  Negative for apnea, cough, chest tightness, shortness of breath and wheezing.    Cardiovascular:  Negative for chest pain, palpitations, leg swelling and claudication.   Gastrointestinal:  Negative for abdominal pain, anal bleeding, blood in stool, change in bowel habit, nausea, vomiting and reflux.   Endocrine: Negative for cold intolerance, heat intolerance, polydipsia, polyphagia and polyuria.   Genitourinary:  Negative for dysuria, frequency, hematuria and urgency.   Musculoskeletal:  Negative for arthralgias.   Integumentary:  Negative for rash and mole/lesion.   Allergic/Immunologic: Negative for environmental allergies.   Neurological:  Negative for dizziness, headaches and memory loss.        Visit Vitals  /80 (BP Location: Left arm)   Pulse 81   Temp 98.1 °F (36.7 °C) (Temporal)   Ht 5' 8" (1.727 m)   Wt 71.2 kg (157 lb)   SpO2 96%   BMI 23.87 kg/m²       Physical Exam  Vitals reviewed.   Constitutional:       Appearance: Normal appearance. She is normal weight.   Eyes:      Conjunctiva/sclera: Conjunctivae normal.   Cardiovascular:      Rate and Rhythm: Normal rate and regular rhythm.      Pulses: Normal pulses.      Heart sounds: Normal heart sounds.   Pulmonary:      Effort: Pulmonary effort is normal.      Breath sounds: Normal breath sounds.   Abdominal:      General: Bowel sounds are normal.      Palpations: Abdomen is soft.   Musculoskeletal:      Cervical back: Neck supple.   Skin:     General: Skin is warm and dry.      Capillary Refill: Capillary refill takes less than 2 seconds.   Neurological:     "  Mental Status: She is alert and oriented to person, place, and time.   Psychiatric:         Mood and Affect: Mood normal.         Behavior: Behavior normal.               No data to display                  1/31/2024     2:30 PM 5/2/2023    10:30 AM 1/25/2023     8:00 AM   Fall Risk Assessment - Outpatient   Mobility Status Ambulatory Ambulatory Ambulatory   Number of falls 0 0 0   Identified as fall risk False False False           Depression Screening  Over the past two weeks, has the patient felt down, depressed, or hopeless?: No  Over the past two weeks, has the patient felt little interest or pleasure in doing things?: No  Functional Ability/Safety Screening  Was the patient's timed Up & Go test unsteady or longer than 30 seconds?: No  Does the patient need help with phone, transportation, shopping, preparing meals, housework, laundry, meds, or managing money?: No  Does the patient's home have rugs in the hallway, lack grab bars in the bathroom, lack handrails on the stairs or have poor lighting?: No  Have you noticed any hearing difficulties?: No    Labs Reviewed:  Chemistry:  Lab Results   Component Value Date     01/29/2024    K 4.1 01/29/2024    CHLORIDE 106 01/29/2024    BUN 19.0 01/29/2024    CREATININE 1.72 (H) 01/29/2024    EGFRNORACEVR 31 01/29/2024    GLUCOSE 107 01/29/2024    CALCIUM 9.5 01/29/2024    ALKPHOS 128 01/29/2024    LABPROT 7.6 01/29/2024    ALBUMIN 4.5 01/29/2024    AST 19 01/29/2024    ALT 13 01/29/2024    MG 1.90 09/22/2023    TSH 2.370 01/29/2024    [id number] 1.33 09/13/2023        Lab Results   Component Value Date    HGBA1C 5.6 01/29/2024        Hematology:  Lab Results   Component Value Date    WBC 7.67 01/29/2024    RBC 3.88 (L) 01/29/2024    HGB 11.1 (L) 01/29/2024    HCT 34.1 (L) 01/29/2024    MCV 87.9 01/29/2024    MCH 28.6 01/29/2024    MCHC 32.6 01/29/2024    RDW 14.2 01/29/2024     01/29/2024    MPV 10.5 01/29/2024       Lipid Panel:  Lab Results   Component  Value Date    CHOL 128 01/29/2024    HDL 79 (H) 01/29/2024    DLDL 37.3 01/29/2024    TRIG 148 01/29/2024        Assessment & Plan:  1. Medicare annual wellness visit, subsequent    2. Primary hypertension  Overview:  Losartan 50 mg daily and amlodipine 5 mg daily.    Assessment & Plan:  Well controlled.   Continue current regimen  Low Sodium Diet (DASH Diet - Less than 2 grams of sodium per day).  Monitor blood pressure daily and log. Report consistent numbers greater than 140/90.  Maintain healthy weight with goal BMI <30. Exercise 30 minutes per day, 5 days per week.        Orders:  -     losartan (COZAAR) 50 MG tablet; Take 1 tablet (50 mg total) by mouth once daily.  Dispense: 30 tablet; Refill: 11  -     cloNIDine (CATAPRES) 0.2 MG tablet; Take 1 tablet (0.2 mg total) by mouth 2 (two) times daily.  Dispense: 60 tablet; Refill: 11  -     amLODIPine (NORVASC) 5 MG tablet; Take 1 tablet (5 mg total) by mouth once daily.  Dispense: 30 tablet; Refill: 11  -     CBC Auto Differential; Future; Expected date: 04/30/2024  -     Comprehensive Metabolic Panel; Future; Expected date: 04/30/2024  -     CBC Auto Differential; Future; Expected date: 01/31/2025  -     Comprehensive Metabolic Panel; Future; Expected date: 01/31/2025  -     TSH; Future; Expected date: 01/31/2025  -     Hemoglobin A1C; Future; Expected date: 01/31/2025    3. Arteriosclerosis of coronary artery  Overview:  Metoprolol succinate 25 mg daily and Plavix 75 mg daily.  On a statin, LDL below goal.    Orders:  -     metoprolol succinate (TOPROL-XL) 25 MG 24 hr tablet; Take 1 tablet (25 mg total) by mouth once daily.  Dispense: 30 tablet; Refill: 11  -     clopidogreL (PLAVIX) 75 mg tablet; Take 1 tablet (75 mg total) by mouth Daily.  Dispense: 30 tablet; Refill: 11  -     atorvastatin (LIPITOR) 40 MG tablet; Take 1 tablet (40 mg total) by mouth Daily.  Dispense: 30 tablet; Refill: 11  -     Lipid Panel; Future; Expected date: 01/31/2025    4. Stage 3b  chronic kidney disease  Overview:  Baseline creatinine 1.75  On Farxiga 10 mg daily.        5. Generalized anxiety disorder  Overview:  On buspirone 15 mg t.i.d..      6. Depressive disorder  Overview:  On Lexapro 20 mg daily.    Orders:  -     EScitalopram oxalate (LEXAPRO) 20 MG tablet; Take 1 tablet (20 mg total) by mouth once daily.  Dispense: 30 tablet; Refill: 11    7. RLS (restless legs syndrome)  -     rOPINIRole (REQUIP) 0.25 MG tablet; Take 1 tablet (0.25 mg total) by mouth every evening.  Dispense: 30 tablet; Refill: 11    8. Primary insomnia  -     zolpidem (AMBIEN) 5 MG Tab; Take 1 tablet (5 mg total) by mouth nightly as needed (insomnia).  Dispense: 15 tablet; Refill: 2    9. Abnormal finding of blood chemistry, unspecified  -     Hemoglobin A1C; Future; Expected date: 01/31/2025    10. Pneumococcal vaccination declined    11. Influenza vaccination declined         Cervical Cancer Screening- declines  Breast Cancer Screening-declines  Osteoporosis Screening-declines  Colon Cancer Screening -  UTD; done 2023  Eye Exam- Recommend annually.  Dental Exam- Recommend biannually.          Follow up for 1), 3 mo f/u kidneys, non-fasting labs prior, 2), 1 year, MCR wellness, Fasting labs prior. Call sooner if needed.    CLAUDIA MosquedaP-C         Medicare Annual Wellness and Personalized Prevention Plan:   Fall Risk + Home Safety + Hearing Impairment + Depression Screen + Opioid and Substance Abuse Screening + Cognitive Impairment Screen + Health Risk Assessment all reviewed.     Health Maintenance Topics with due status: Not Due       Topic Last Completion Date    Colorectal Cancer Screening 07/05/2023    Lipid Panel 01/29/2024    High Dose Statin 01/31/2024      The patient's Health Maintenance was reviewed and the following appears to be due at this time:   Health Maintenance Due   Topic Date Due    Hepatitis C Screening  Never done    COVID-19 Vaccine (1) Never done    Pneumococcal Vaccines (Age 65+)  (1 of 2 - PCV) Never done    TETANUS VACCINE  Never done    Mammogram  Never done    DEXA Scan  Never done    Shingles Vaccine (1 of 2) Never done    RSV Vaccine (Age 60+ and Pregnant patients) (1 - 1-dose 60+ series) Never done    Influenza Vaccine (1) Never done       Advance Care Planning   I attest to discussing Advance Care Planning with patient and/or family member.  Education was provided including the importance of the Health Care Power of , Advance Directives, and/or [place] documentation.  The patient expressed understanding to the importance of this information and discussion.       Follow up for 1), 3 mo f/u kidneys, non-fasting labs prior, 2), 1 year, MCR wellness, Fasting labs prior. In addition to their scheduled follow up, the patient has also been instructed to follow up on as needed basis.

## 2024-03-05 ENCOUNTER — PATIENT OUTREACH (OUTPATIENT)
Dept: ADMINISTRATIVE | Facility: HOSPITAL | Age: 74
End: 2024-03-05
Payer: MEDICARE

## 2024-03-05 NOTE — PROGRESS NOTES
Population Health Chart Review & Patient Outreach Details    Health Maintenance Topics Addressed and Outreach Outcomes / Actions Taken:             Breast Cancer Screening []  Mammogram Order Placed    []  Mammogram Screening Scheduled    []  External Records Requested & Care Team Updated if Applicable    []  External Records Uploaded & Care Team Updated if Applicable    [x]  Pt Declined Scheduling Mammogram    []  Pt Will Schedule with External Provider / Order Routed & Care Team Updated if Applicable                Osteoporosis Screening []  Dexa Order Placed    []  Dexa Appointment Scheduled    []  External Records Requested & Care Team Updated    []  External Records Uploaded, Care Team Updated, & History Updated if Applicable    [x]  Patient Declined Scheduling Dexa or Will Call Back to Schedule    []  Patient Will Schedule with External Provider / Order Routed & Care Team Updated if Applicable       Myesha Nettles MA - Panel Care Coordinator

## 2024-04-19 DIAGNOSIS — K29.50 OTHER CHRONIC GASTRITIS WITHOUT HEMORRHAGE: Primary | ICD-10-CM

## 2024-04-24 PROCEDURE — 85025 COMPLETE CBC W/AUTO DIFF WBC: CPT | Performed by: NURSE PRACTITIONER

## 2024-04-24 PROCEDURE — 80053 COMPREHEN METABOLIC PANEL: CPT | Performed by: NURSE PRACTITIONER

## 2024-05-03 DIAGNOSIS — F51.01 PRIMARY INSOMNIA: ICD-10-CM

## 2024-05-07 RX ORDER — ZOLPIDEM TARTRATE 5 MG/1
5 TABLET ORAL NIGHTLY
Qty: 15 TABLET | Refills: 2 | Status: SHIPPED | OUTPATIENT
Start: 2024-05-07 | End: 2024-05-28 | Stop reason: ALTCHOICE

## 2024-05-28 ENCOUNTER — PATIENT MESSAGE (OUTPATIENT)
Dept: FAMILY MEDICINE | Facility: CLINIC | Age: 74
End: 2024-05-28

## 2024-05-28 ENCOUNTER — OFFICE VISIT (OUTPATIENT)
Dept: FAMILY MEDICINE | Facility: CLINIC | Age: 74
End: 2024-05-28
Payer: MEDICARE

## 2024-05-28 ENCOUNTER — TELEPHONE (OUTPATIENT)
Dept: FAMILY MEDICINE | Facility: CLINIC | Age: 74
End: 2024-05-28

## 2024-05-28 VITALS
WEIGHT: 168 LBS | TEMPERATURE: 98 F | BODY MASS INDEX: 25.46 KG/M2 | SYSTOLIC BLOOD PRESSURE: 118 MMHG | OXYGEN SATURATION: 98 % | HEIGHT: 68 IN | HEART RATE: 62 BPM | DIASTOLIC BLOOD PRESSURE: 72 MMHG

## 2024-05-28 DIAGNOSIS — N18.32 STAGE 3B CHRONIC KIDNEY DISEASE: Primary | ICD-10-CM

## 2024-05-28 DIAGNOSIS — F51.01 PRIMARY INSOMNIA: ICD-10-CM

## 2024-05-28 LAB
ANION GAP SERPL CALC-SCNC: 10 MEQ/L (ref 2–13)
BUN SERPL-MCNC: 34 MG/DL (ref 7–20)
CALCIUM SERPL-MCNC: 9.3 MG/DL (ref 8.4–10.2)
CHLORIDE SERPL-SCNC: 107 MMOL/L (ref 98–110)
CO2 SERPL-SCNC: 22 MMOL/L (ref 21–32)
CREAT SERPL-MCNC: 1.95 MG/DL (ref 0.66–1.25)
CREAT/UREA NIT SERPL: 17 (ref 12–20)
GFR SERPLBLD CREATININE-BSD FMLA CKD-EPI: 27 ML/MIN/1.73/M2
GLUCOSE SERPL-MCNC: 85 MG/DL (ref 70–115)
POTASSIUM SERPL-SCNC: 4.4 MMOL/L (ref 3.5–5.1)
SODIUM SERPL-SCNC: 139 MMOL/L (ref 136–145)

## 2024-05-28 PROCEDURE — 80048 BASIC METABOLIC PNL TOTAL CA: CPT | Performed by: NURSE PRACTITIONER

## 2024-05-28 PROCEDURE — 99214 OFFICE O/P EST MOD 30 MIN: CPT | Mod: ,,, | Performed by: NURSE PRACTITIONER

## 2024-05-28 RX ORDER — DARIDOREXANT 50 MG/1
50 TABLET, FILM COATED ORAL NIGHTLY
Qty: 30 TABLET | Refills: 5 | Status: SHIPPED | OUTPATIENT
Start: 2024-05-28

## 2024-05-28 NOTE — TELEPHONE ENCOUNTER
----- Message from Jany Taveras, CELSA-C sent at 5/28/2024  4:26 PM CDT -----  Call patient to let them know that her kidney function if a bit better on labs drawn today. However, I still think she needs to go see Nephrology, referral was placed today and she should be called to schedule with them. Follow up as previously scheduled.

## 2024-05-28 NOTE — PROGRESS NOTES
Call patient to let them know that her kidney function if a bit better on labs drawn today. However, I still think she needs to go see Nephrology, referral was placed today and she should be called to schedule with them. Follow up as previously scheduled.

## 2024-05-28 NOTE — PROGRESS NOTES
Patient ID: Rosalie Campbell  : 1950    Chief Complaint: Chronic Kidney Disease (With labs)    Allergies: Patient is allergic to codeine.     History of Present Illness:  The patient is a 73 y.o. White female who presents to clinic for follow up on Chronic Kidney Disease (With labs)     She had declined renal function since hospitalization last year. She was started on Farxiga but discontinued it due to side effects (made her weak and dizzy). She is drinking plenty of water and knows to avoid nephrotoxic medications. She previously declined referral to Nephrology but willing to go now.      She is having trouble going to sleep and when she does she has difficulty sleeping all night long. She tried multiple OTC sleep aids and has also tried Hydroxyzine and Trazodone; none of those medications were effective. Recently tried Ambien and it is also not effective. Has only been taking Ambien prn and other nights tries OTC sleep aids. Unable to fall asleep and when she does she wakes up after about 3 hours.      Social History:  reports that she has been smoking cigarettes. She started smoking about 56 years ago. She has a 28.2 pack-year smoking history. She has been exposed to tobacco smoke. She has never used smokeless tobacco. She reports that she does not drink alcohol and does not use drugs.    Past Medical History:  has a past medical history of Anxiety, Chronic renal disease stage 4, Clostridium difficile colitis, Colitis, Colon cancer screening declined, Coronary artery disease, Depression, Hypertension, Irregular heart rhythm, Medication management, Obesity, Obesity, unspecified, Osteoarthritis of knee, Pain, joint, shoulder region, right, Refused influenza vaccine, Refused pneumococcal vaccination, and Smoker.    Current Medications:  Current Outpatient Medications   Medication Instructions    amLODIPine (NORVASC) 5 mg, Oral, Daily    atorvastatin (LIPITOR) 40 mg, Oral, Daily    cloNIDine (CATAPRES) 0.2 mg,  "Oral, 2 times daily    clopidogreL (PLAVIX) 75 mg, Oral, Daily    EScitalopram oxalate (LEXAPRO) 20 mg, Oral, Daily    famotidine (PEPCID) 20 mg, Oral, Daily    ferrous sulfate 324 mg, Oral, Every other day    losartan (COZAAR) 50 mg, Oral, Daily    metoprolol succinate (TOPROL-XL) 25 mg, Oral, Daily    QUVIVIQ 50 mg, Oral, Nightly    rOPINIRole (REQUIP) 0.25 mg, Oral, Nightly       Review of Systems   See HPI    Visit Vitals  /72 (BP Location: Left arm)   Pulse 62   Temp 98.2 °F (36.8 °C) (Temporal)   Ht 5' 8" (1.727 m)   Wt 76.2 kg (168 lb)   SpO2 98%   BMI 25.54 kg/m²       Physical Exam  Vitals reviewed.   Constitutional:       Appearance: Normal appearance.   Eyes:      Conjunctiva/sclera: Conjunctivae normal.   Cardiovascular:      Heart sounds: Normal heart sounds.   Pulmonary:      Breath sounds: Normal breath sounds.   Skin:     General: Skin is warm and dry.   Neurological:      Mental Status: She is oriented to person, place, and time.          Labs Reviewed:  Chemistry:  Lab Results   Component Value Date     04/24/2024    K 5.5 (H) 04/24/2024    CHLORIDE 108 04/24/2024    BUN 34.0 (H) 04/24/2024    CREATININE 2.06 (H) 04/24/2024    EGFRNORACEVR 25 04/24/2024    GLUCOSE 115 04/24/2024    CALCIUM 9.2 04/24/2024    ALKPHOS 96 04/24/2024    LABPROT 8.0 04/24/2024    ALBUMIN 4.6 04/24/2024    AST 28 04/24/2024    ALT 14 04/24/2024    MG 1.90 09/22/2023    TSH 2.370 01/29/2024    [id number] 1.33 09/13/2023      Hematology:  Lab Results   Component Value Date    WBC 7.41 04/24/2024    RBC 3.78 (L) 04/24/2024    HGB 10.8 (L) 04/24/2024    HCT 33.0 (L) 04/24/2024    MCV 87.3 04/24/2024    MCH 28.6 04/24/2024    MCHC 32.7 04/24/2024    RDW 15.4 (H) 04/24/2024     04/24/2024    MPV 10.1 04/24/2024       Assessment & Plan:  1. Stage 3b chronic kidney disease  Overview:  Baseline creatinine 1.75  Did not tolerate Farxiga due to SE      Assessment & Plan:  Creatinine 2.06 (04/24/24).  Will repeat " labs today.   Send referral to Nephrology.     Orders:  -     Ambulatory referral/consult to Nephrology; Future; Expected date: 06/04/2024  -     Basic Metabolic Panel; Future; Expected date: 05/28/2024    2. Primary insomnia  -     daridorexant (QUVIVIQ) 50 mg Tab; Take 50 mg by mouth every evening.  Dispense: 30 tablet; Refill: 5         Future Appointments   Date Time Provider Department Center   4/17/2025  8:00 AM LAB, [facility] LABORATORY DRAW STATION [facility]   4/24/2025 10:30 AM Jany Taveras, CLAUDIAP-C [facility]       Follow up for 1 mo f/u insomnia. Call sooner if needed.    JASWINDER Mosqueda    Lab Frequency Next Occurrence   CBC Auto Differential Once 01/31/2025   Comprehensive Metabolic Panel Once 01/31/2025   Lipid Panel Once 01/31/2025   TSH Once 01/31/2025   Hemoglobin A1C Once 01/31/2025

## 2024-07-01 ENCOUNTER — OFFICE VISIT (OUTPATIENT)
Dept: FAMILY MEDICINE | Facility: CLINIC | Age: 74
End: 2024-07-01
Payer: MEDICARE

## 2024-07-01 VITALS
WEIGHT: 170 LBS | TEMPERATURE: 98 F | HEART RATE: 62 BPM | HEIGHT: 68 IN | OXYGEN SATURATION: 99 % | SYSTOLIC BLOOD PRESSURE: 110 MMHG | DIASTOLIC BLOOD PRESSURE: 82 MMHG | BODY MASS INDEX: 25.76 KG/M2

## 2024-07-01 DIAGNOSIS — F51.01 PRIMARY INSOMNIA: Primary | ICD-10-CM

## 2024-07-01 DIAGNOSIS — N18.4 CHRONIC KIDNEY DISEASE (CKD), STAGE IV (SEVERE): ICD-10-CM

## 2024-07-01 DIAGNOSIS — M70.61 TROCHANTERIC BURSITIS OF RIGHT HIP: ICD-10-CM

## 2024-07-01 PROCEDURE — 99213 OFFICE O/P EST LOW 20 MIN: CPT | Mod: ,,, | Performed by: NURSE PRACTITIONER

## 2024-07-01 RX ORDER — PREDNISONE 20 MG/1
40 TABLET ORAL DAILY
Qty: 6 TABLET | Refills: 0 | Status: SHIPPED | OUTPATIENT
Start: 2024-07-01 | End: 2024-07-04

## 2024-07-01 NOTE — PROGRESS NOTES
Patient ID: Rosalie Campbell  : 1950    Chief Complaint: Insomnia    Allergies: Patient is allergic to codeine.     History of Present Illness:  The patient is a 73 y.o. White female who presents to clinic for follow up on Insomnia     At last visit she reported having trouble falling asleep and staying asleep all night long. She tried multiple OTC sleep aids and has also tried Hydroxyzine and Trazodone; none of those medications were effective. Recently tried Ambien and it is also not effective. Has only been taking Ambien prn and other nights tries OTC sleep aids.   Was started on Quviviq a month ago and it is working very well for her.     Having some issue with right hip pain. She has been walking more than usual recently as her brother has been in the hospital for the last 10 days. Voltaren gel not working for her. She does not feel that Tylenol is effective.           Social History:  reports that she has been smoking cigarettes. She started smoking about 56 years ago. She has a 28.2 pack-year smoking history. She has been exposed to tobacco smoke. She has never used smokeless tobacco. She reports that she does not drink alcohol and does not use drugs.    Past Medical History:  has a past medical history of Anxiety, Chronic renal disease stage 4, Clostridium difficile colitis, Colitis, Colon cancer screening declined, Coronary artery disease, Depression, Hypertension, Irregular heart rhythm, Medication management, Obesity, Obesity, unspecified, Osteoarthritis of knee, Pain, joint, shoulder region, right, Refused influenza vaccine, Refused pneumococcal vaccination, and Smoker.    Current Medications:  Current Outpatient Medications   Medication Instructions    amLODIPine (NORVASC) 5 mg, Oral, Daily    atorvastatin (LIPITOR) 40 mg, Oral, Daily    cloNIDine (CATAPRES) 0.2 mg, Oral, 2 times daily    clopidogreL (PLAVIX) 75 mg, Oral, Daily    EScitalopram oxalate (LEXAPRO) 20 mg, Oral, Daily    famotidine  "(PEPCID) 20 mg, Oral, Daily    ferrous sulfate 324 mg, Oral, Every other day    losartan (COZAAR) 50 mg, Oral, Daily    metoprolol succinate (TOPROL-XL) 25 mg, Oral, Daily    predniSONE (DELTASONE) 40 mg, Oral, Daily    QUVIVIQ 50 mg, Oral, Nightly    rOPINIRole (REQUIP) 0.25 mg, Oral, Nightly       Review of Systems   See HPI    Visit Vitals  /82 (BP Location: Left arm)   Pulse 62   Temp 97.5 °F (36.4 °C) (Temporal)   Ht 5' 8" (1.727 m)   Wt 77.1 kg (170 lb)   SpO2 99%   BMI 25.85 kg/m²       Physical Exam  Vitals reviewed.   Constitutional:       Appearance: Normal appearance.   Eyes:      Conjunctiva/sclera: Conjunctivae normal.   Cardiovascular:      Heart sounds: Normal heart sounds.   Pulmonary:      Breath sounds: Normal breath sounds.   Musculoskeletal:      Comments: + tenderness RT greater trochanter   Skin:     General: Skin is warm and dry.                Assessment & Plan:  1. Primary insomnia  Overview:  Quviviq 50 mg Qhs (started 05/29/24).    Assessment & Plan:  Continue Quviviq      2. Trochanteric bursitis of right hip  Comments:  Voltaren gel BID, apply heat therapy.  Prednisone 40 mg daily x 3 days.  Orders:  -     predniSONE (DELTASONE) 20 MG tablet; Take 2 tablets (40 mg total) by mouth once daily. for 3 days  Dispense: 6 tablet; Refill: 0    3. Chronic kidney disease (CKD), stage IV (severe)  Overview:  Baseline creatinine 1.90    Assessment & Plan:  Referral to Dr Rowe pending; encouraged patient to call to see about scheduling.            Future Appointments   Date Time Provider Department Center   4/17/2025  8:00 AM LAB, [facility] LAB[facility]AT[facility] DRAW [facility]ATION [facility] [place]   4/24/2025 10:30 AM [facility]       Follow up if symptoms worsen or fail to improve, for Keep appointment as scheduled. Call sooner if needed.    JASWINDER Mosqueda    Lab Frequency Next Occurrence   CBC Auto Differential Once 01/31/2025   Comprehensive Metabolic " Panel Once 01/31/2025   Lipid Panel Once 01/31/2025   TSH Once 01/31/2025   Hemoglobin A1C Once 01/31/2025   Ambulatory referral/consult to Nephrology Once 06/04/2024

## 2024-10-14 DIAGNOSIS — Z12.31 BREAST CANCER SCREENING BY MAMMOGRAM: Primary | ICD-10-CM

## 2024-12-12 DIAGNOSIS — N18.32 CHRONIC KIDNEY DISEASE (CKD) STAGE G3B/A1, MODERATELY DECREASED GLOMERULAR FILTRATION RATE (GFR) BETWEEN 30-44 ML/MIN/1.73 SQUARE METER AND ALBUMINURIA CREATININE RATIO LESS THAN 30 MG/G: Primary | ICD-10-CM

## 2024-12-12 DIAGNOSIS — I10 ESSENTIAL HYPERTENSION, MALIGNANT: ICD-10-CM

## 2024-12-12 DIAGNOSIS — F51.01 PRIMARY INSOMNIA: ICD-10-CM

## 2024-12-16 ENCOUNTER — HOSPITAL ENCOUNTER (OUTPATIENT)
Dept: RADIOLOGY | Facility: HOSPITAL | Age: 74
Discharge: HOME | End: 2024-12-16
Attending: INTERNAL MEDICINE
Payer: MEDICARE

## 2024-12-16 DIAGNOSIS — N18.32 CHRONIC KIDNEY DISEASE (CKD) STAGE G3B/A1, MODERATELY DECREASED GLOMERULAR FILTRATION RATE (GFR) BETWEEN 30-44 ML/MIN/1.73 SQUARE METER AND ALBUMINURIA CREATININE RATIO LESS THAN 30 MG/G: ICD-10-CM

## 2024-12-16 DIAGNOSIS — F51.01 PRIMARY INSOMNIA: ICD-10-CM

## 2024-12-16 DIAGNOSIS — I10 ESSENTIAL HYPERTENSION, MALIGNANT: ICD-10-CM

## 2024-12-16 PROCEDURE — 76770 US EXAM ABDO BACK WALL COMP: CPT | Mod: TC

## 2024-12-19 ENCOUNTER — PATIENT OUTREACH (OUTPATIENT)
Facility: CLINIC | Age: 74
End: 2024-12-19
Payer: MEDICARE

## 2024-12-23 ENCOUNTER — LAB VISIT (OUTPATIENT)
Dept: LAB | Facility: HOSPITAL | Age: 74
End: 2024-12-23
Attending: INTERNAL MEDICINE
Payer: MEDICARE

## 2024-12-23 DIAGNOSIS — I10 HYPERTENSION, UNSPECIFIED TYPE: Primary | ICD-10-CM

## 2024-12-23 DIAGNOSIS — N18.32 CHRONIC KIDNEY DISEASE (CKD) STAGE G3B/A1, MODERATELY DECREASED GLOMERULAR FILTRATION RATE (GFR) BETWEEN 30-44 ML/MIN/1.73 SQUARE METER AND ALBUMINURIA CREATININE RATIO LESS THAN 30 MG/G: ICD-10-CM

## 2024-12-23 DIAGNOSIS — F51.01 PRIMARY INSOMNIA: ICD-10-CM

## 2024-12-23 LAB
ALBUMIN SERPL-MCNC: 4.6 G/DL (ref 3.4–5)
BUN SERPL-MCNC: 29 MG/DL (ref 7–20)
CALCIUM SERPL-MCNC: 9.5 MG/DL (ref 8.4–10.2)
CHLORIDE SERPL-SCNC: 107 MMOL/L (ref 98–110)
CO2 SERPL-SCNC: 22 MMOL/L (ref 21–32)
CREAT SERPL-MCNC: 1.96 MG/DL (ref 0.66–1.25)
CREAT/UREA NIT SERPL: 15 (ref 12–20)
GFR SERPLBLD CREATININE-BSD FMLA CKD-EPI: 27 ML/MIN/1.73/M2
GLUCOSE SERPL-MCNC: 119 MG/DL (ref 70–115)
PHOSPHATE SERPL-MCNC: 4.2 MG/DL (ref 2.5–4.9)
POTASSIUM SERPL-SCNC: 4.8 MMOL/L (ref 3.5–5.1)
SODIUM SERPL-SCNC: 138 MMOL/L (ref 136–145)

## 2024-12-23 PROCEDURE — 84156 ASSAY OF PROTEIN URINE: CPT

## 2024-12-23 PROCEDURE — 80069 RENAL FUNCTION PANEL: CPT

## 2024-12-23 PROCEDURE — 81003 URINALYSIS AUTO W/O SCOPE: CPT

## 2024-12-23 PROCEDURE — 36415 COLL VENOUS BLD VENIPUNCTURE: CPT

## 2024-12-30 LAB
BILIRUB UR QL STRIP.AUTO: NEGATIVE
CLARITY UR: CLEAR
COLOR UR AUTO: YELLOW
CREAT UR-MCNC: 36.4 MG/DL
GLUCOSE UR QL STRIP: NEGATIVE
HGB UR QL STRIP: NEGATIVE
KETONES UR QL STRIP: NEGATIVE
LEUKOCYTE ESTERASE UR QL STRIP: NEGATIVE
NITRITE UR QL STRIP: NEGATIVE
PH UR STRIP: 6 [PH]
PROT UR QL STRIP: NEGATIVE
PROT UR STRIP-MCNC: 20 MG/DL
SP GR UR STRIP.AUTO: 1.01 (ref 1–1.03)
URINE PROTEIN/CREATININE RATIO (OLG): 0.5
UROBILINOGEN UR STRIP-ACNC: 0.2

## 2025-01-02 DIAGNOSIS — F51.01 PRIMARY INSOMNIA: ICD-10-CM

## 2025-01-02 RX ORDER — DARIDOREXANT 50 MG/1
1 TABLET, FILM COATED ORAL NIGHTLY
Qty: 30 TABLET | Refills: 5 | Status: SHIPPED | OUTPATIENT
Start: 2025-01-02

## 2025-01-30 DIAGNOSIS — I10 PRIMARY HYPERTENSION: ICD-10-CM

## 2025-01-30 DIAGNOSIS — F51.01 PRIMARY INSOMNIA: ICD-10-CM

## 2025-01-30 DIAGNOSIS — I25.10 ARTERIOSCLEROSIS OF CORONARY ARTERY: ICD-10-CM

## 2025-01-30 DIAGNOSIS — F32.A DEPRESSIVE DISORDER: ICD-10-CM

## 2025-01-30 DIAGNOSIS — G25.81 RLS (RESTLESS LEGS SYNDROME): ICD-10-CM

## 2025-01-30 RX ORDER — METOPROLOL SUCCINATE 25 MG/1
25 TABLET, EXTENDED RELEASE ORAL DAILY
Qty: 30 TABLET | Refills: 11 | Status: SHIPPED | OUTPATIENT
Start: 2025-01-30

## 2025-01-30 RX ORDER — DARIDOREXANT 50 MG/1
1 TABLET, FILM COATED ORAL NIGHTLY
Qty: 30 TABLET | Refills: 5 | Status: SHIPPED | OUTPATIENT
Start: 2025-01-30

## 2025-01-30 RX ORDER — AMLODIPINE BESYLATE 5 MG/1
5 TABLET ORAL DAILY
Qty: 30 TABLET | Refills: 11 | Status: SHIPPED | OUTPATIENT
Start: 2025-01-30

## 2025-01-30 RX ORDER — ATORVASTATIN CALCIUM 40 MG/1
40 TABLET, FILM COATED ORAL DAILY
Qty: 30 TABLET | Refills: 11 | Status: SHIPPED | OUTPATIENT
Start: 2025-01-30

## 2025-01-30 RX ORDER — ROPINIROLE 0.25 MG/1
0.25 TABLET, FILM COATED ORAL NIGHTLY
Qty: 30 TABLET | Refills: 11 | Status: SHIPPED | OUTPATIENT
Start: 2025-01-30

## 2025-01-30 RX ORDER — CLONIDINE HYDROCHLORIDE 0.2 MG/1
0.2 TABLET ORAL 2 TIMES DAILY
Qty: 60 TABLET | Refills: 11 | Status: SHIPPED | OUTPATIENT
Start: 2025-01-30

## 2025-01-30 RX ORDER — ESCITALOPRAM OXALATE 20 MG/1
20 TABLET ORAL DAILY
Qty: 30 TABLET | Refills: 11 | Status: SHIPPED | OUTPATIENT
Start: 2025-01-30

## 2025-01-30 RX ORDER — LOSARTAN POTASSIUM 50 MG/1
50 TABLET ORAL DAILY
Qty: 30 TABLET | Refills: 11 | Status: SHIPPED | OUTPATIENT
Start: 2025-01-30

## 2025-02-03 NOTE — TELEPHONE ENCOUNTER
Insurance denied Quviviq 50MG tablets. This is what they put on the denial letter:      your prescriber must provide information that documents at least one of the following has occurred:    - You have tried the formulary drugs for the treatment of your condition and they did not work for you. OR - The formulary drugs could cause adverse effects. OR - The formulary drugs would be less effective for your condition than the requested drug. Talk to your prescriber to see if the following covered alternative(s) would be right for you: Dayvigo tablet (quantity limit of 30 tablets every 30 days) doxepin HCl tablet (quantity limit of 30 tablets every 30 days) zaleplon capsule (requires prior authorization for members of age 65 or greater) (Different quantity limits apply depending on the strength of the medication prescribed. Please consult the Medicare Part D plan's formulary for the specific quantity limit.) temazepam 30 mg capsule (requires prior authorization for members of age 65 or greater) (quantity limit of 30 capsules every 30 days) temazepam 15 mg capsule (requires prior authorization for members of age 65 or greater) (quantity limit of 30 capsules every 30 days)     Please advise

## 2025-03-09 NOTE — INTERVAL H&P NOTE
A system downtime occurred on November 7, 2010 at [phone] (60 minutes) due to Daylight Savings Eastern Standard Time.  During this downtime, paper charting was completed and will be scanned into the patient's electronic medical record.     The patient has been examined and the H&P has been reviewed:    I concur with the findings and no changes have occurred since H&P was written.        Active Hospital Problems    Diagnosis  POA    *Acute blood loss anemia [D62]  Yes    Constipation [K59.00]  Yes    NEYDA (acute kidney injury) [N17.9]  Yes    Hypertension [I10]  Yes      Resolved Hospital Problems   No resolved problems to display.

## 2025-04-16 ENCOUNTER — PATIENT OUTREACH (OUTPATIENT)
Facility: CLINIC | Age: 75
End: 2025-04-16
Payer: MEDICARE

## 2025-04-16 NOTE — PROGRESS NOTES
Health Maintenance Topic(s) Outreach Outcomes & Actions Taken:    Breast Cancer Screening - Outreach Outcomes & Actions Taken  : Pt Declined Scheduling Mammogram    Osteoporosis Screening - Outreach Outcomes & Actions Taken  : Patient Declined Scheduling Dexa or Will Call Back to Schedule

## 2025-04-17 ENCOUNTER — LAB VISIT (OUTPATIENT)
Dept: LAB | Facility: HOSPITAL | Age: 75
End: 2025-04-17
Attending: INTERNAL MEDICINE
Payer: MEDICARE

## 2025-04-17 DIAGNOSIS — F32.A DEPRESSIVE DISORDER: ICD-10-CM

## 2025-04-17 DIAGNOSIS — N18.32 CHRONIC KIDNEY DISEASE (CKD) STAGE G3B/A1, MODERATELY DECREASED GLOMERULAR FILTRATION RATE (GFR) BETWEEN 30-44 ML/MIN/1.73 SQUARE METER AND ALBUMINURIA CREATININE RATIO LESS THAN 30 MG/G: ICD-10-CM

## 2025-04-17 DIAGNOSIS — N18.4 STAGE 4 CHRONIC KIDNEY DISEASE: ICD-10-CM

## 2025-04-17 DIAGNOSIS — F51.01 PRIMARY INSOMNIA: ICD-10-CM

## 2025-04-17 DIAGNOSIS — I25.10 ARTERIOSCLEROSIS OF CORONARY ARTERY: ICD-10-CM

## 2025-04-17 DIAGNOSIS — D50.9 IRON DEFICIENCY ANEMIA, UNSPECIFIED IRON DEFICIENCY ANEMIA TYPE: ICD-10-CM

## 2025-04-17 DIAGNOSIS — D72.829 LEUKOCYTOSIS, UNSPECIFIED TYPE: ICD-10-CM

## 2025-04-17 DIAGNOSIS — I10 HYPERTENSION, UNSPECIFIED TYPE: Primary | ICD-10-CM

## 2025-04-17 DIAGNOSIS — I10 PRIMARY HYPERTENSION: ICD-10-CM

## 2025-04-17 DIAGNOSIS — Z79.899 MEDICATION MANAGEMENT: ICD-10-CM

## 2025-04-17 DIAGNOSIS — F41.1 GENERALIZED ANXIETY DISORDER: ICD-10-CM

## 2025-04-17 LAB
25(OH)D3+25(OH)D2 SERPL-MCNC: 21 NG/ML (ref 30–80)
ALBUMIN SERPL-MCNC: 4.5 G/DL (ref 3.4–5)
BASOPHILS # BLD AUTO: 0.08 X10(3)/MCL (ref 0.01–0.08)
BASOPHILS NFR BLD AUTO: 1.2 % (ref 0.1–1.2)
BILIRUB UR QL STRIP.AUTO: NEGATIVE
BUN SERPL-MCNC: 27 MG/DL (ref 7–20)
CALCIUM SERPL-MCNC: 9.1 MG/DL (ref 8.4–10.2)
CALCIUM SERPL-MCNC: 9.2 MG/DL (ref 8.4–10.2)
CHLORIDE SERPL-SCNC: 108 MMOL/L (ref 98–110)
CLARITY UR: CLEAR
CO2 SERPL-SCNC: 24 MMOL/L (ref 21–32)
COLOR UR AUTO: YELLOW
CREAT SERPL-MCNC: 1.72 MG/DL (ref 0.66–1.25)
CREAT UR-MCNC: 100.7 MG/DL (ref 45–106)
CREAT UR-MCNC: 104.4 MG/DL
CREAT/UREA NIT SERPL: 16 (ref 12–20)
EOSINOPHIL # BLD AUTO: 0.43 X10(3)/MCL (ref 0.04–0.36)
EOSINOPHIL NFR BLD AUTO: 6.6 % (ref 0.7–7)
ERYTHROCYTE [DISTWIDTH] IN BLOOD BY AUTOMATED COUNT: 14.6 % (ref 11–14.5)
EST. AVERAGE GLUCOSE BLD GHB EST-MCNC: 119.8 MG/DL (ref 70–115)
FERRITIN SERPL-MCNC: 10.8 NG/ML (ref 6.24–264)
GFR SERPLBLD CREATININE-BSD FMLA CKD-EPI: 31 ML/MIN/1.73/M2
GLUCOSE SERPL-MCNC: 108 MG/DL (ref 70–115)
GLUCOSE UR QL STRIP: NEGATIVE
HBA1C MFR BLD: 5.8 % (ref 4–6)
HCT VFR BLD AUTO: 33 % (ref 36–48)
HGB BLD-MCNC: 9.8 G/DL (ref 11.8–16)
HGB UR QL STRIP: NEGATIVE
IMM GRANULOCYTES # BLD AUTO: 0.02 X10(3)/MCL (ref 0–0.03)
IMM GRANULOCYTES NFR BLD AUTO: 0.3 % (ref 0–0.5)
IRON SATN MFR SERPL: 9 % (ref 20–50)
IRON SERPL-MCNC: 31 UG/DL (ref 50–170)
KETONES UR QL STRIP: NEGATIVE
LEUKOCYTE ESTERASE UR QL STRIP: NEGATIVE
LYMPHOCYTES # BLD AUTO: 1.54 X10(3)/MCL (ref 1.16–3.74)
LYMPHOCYTES NFR BLD AUTO: 23.5 % (ref 20–55)
MCH RBC QN AUTO: 28.2 PG (ref 27–34)
MCHC RBC AUTO-ENTMCNC: 29.7 G/DL (ref 31–37)
MCV RBC AUTO: 95.1 FL (ref 79–99)
MICROALBUMIN UR-MCNC: 59 UG/ML
MICROALBUMIN/CREAT RATIO PNL UR: 58.6 MG/GM CR (ref 0–30)
MONOCYTES # BLD AUTO: 0.57 X10(3)/MCL (ref 0.24–0.36)
MONOCYTES NFR BLD AUTO: 8.7 % (ref 4.7–12.5)
NEUTROPHILS # BLD AUTO: 3.9 X10(3)/MCL (ref 1.56–6.13)
NEUTROPHILS NFR BLD AUTO: 59.7 % (ref 37–73)
NITRITE UR QL STRIP: NEGATIVE
NRBC BLD AUTO-RTO: 0 %
PH UR STRIP: 6 [PH]
PHOSPHATE SERPL-MCNC: 4.4 MG/DL (ref 2.5–4.9)
PLATELET # BLD AUTO: 271 X10(3)/MCL (ref 140–371)
PMV BLD AUTO: 9.4 FL (ref 9.4–12.4)
POTASSIUM SERPL-SCNC: 4.5 MMOL/L (ref 3.5–5.1)
PROT UR QL STRIP: ABNORMAL
PROT UR STRIP-MCNC: 22 MG/DL
PTH-INTACT SERPL-MCNC: 261.9 PG/ML (ref 14–73)
RBC # BLD AUTO: 3.47 X10(6)/MCL (ref 4–5.1)
SODIUM SERPL-SCNC: 139 MMOL/L (ref 136–145)
SP GR UR STRIP.AUTO: 1.01 (ref 1–1.03)
TIBC SERPL-MCNC: 306 UG/DL (ref 70–310)
TIBC SERPL-MCNC: 337 UG/DL (ref 250–450)
TRANSFERRIN SERPL-MCNC: 300 MG/DL (ref 173–360)
URINE PROTEIN/CREATININE RATIO (OLG): 0.2
UROBILINOGEN UR STRIP-ACNC: 0.2
WBC # BLD AUTO: 6.54 X10(3)/MCL (ref 4–11.5)

## 2025-04-17 PROCEDURE — 82728 ASSAY OF FERRITIN: CPT

## 2025-04-17 PROCEDURE — 81003 URINALYSIS AUTO W/O SCOPE: CPT

## 2025-04-17 PROCEDURE — 83036 HEMOGLOBIN GLYCOSYLATED A1C: CPT

## 2025-04-17 PROCEDURE — 83970 ASSAY OF PARATHORMONE: CPT

## 2025-04-17 PROCEDURE — 36415 COLL VENOUS BLD VENIPUNCTURE: CPT

## 2025-04-17 PROCEDURE — 82570 ASSAY OF URINE CREATININE: CPT

## 2025-04-17 PROCEDURE — 85025 COMPLETE CBC W/AUTO DIFF WBC: CPT

## 2025-04-17 PROCEDURE — 82043 UR ALBUMIN QUANTITATIVE: CPT

## 2025-04-17 PROCEDURE — 82306 VITAMIN D 25 HYDROXY: CPT

## 2025-04-17 PROCEDURE — 83540 ASSAY OF IRON: CPT

## 2025-04-17 PROCEDURE — 80069 RENAL FUNCTION PANEL: CPT

## 2025-04-24 ENCOUNTER — OFFICE VISIT (OUTPATIENT)
Dept: FAMILY MEDICINE | Facility: CLINIC | Age: 75
End: 2025-04-24
Payer: MEDICARE

## 2025-04-24 VITALS
TEMPERATURE: 97 F | DIASTOLIC BLOOD PRESSURE: 80 MMHG | BODY MASS INDEX: 27.89 KG/M2 | HEART RATE: 62 BPM | WEIGHT: 184 LBS | SYSTOLIC BLOOD PRESSURE: 122 MMHG | HEIGHT: 68 IN | OXYGEN SATURATION: 97 %

## 2025-04-24 DIAGNOSIS — Z91.81 AT RISK FOR FALLS: ICD-10-CM

## 2025-04-24 DIAGNOSIS — G25.81 RLS (RESTLESS LEGS SYNDROME): ICD-10-CM

## 2025-04-24 DIAGNOSIS — R19.7 DIARRHEA, UNSPECIFIED TYPE: ICD-10-CM

## 2025-04-24 DIAGNOSIS — F17.200 TOBACCO DEPENDENCE: ICD-10-CM

## 2025-04-24 DIAGNOSIS — Z72.3 LACK OF PHYSICAL ACTIVITY: ICD-10-CM

## 2025-04-24 DIAGNOSIS — N18.4 CHRONIC KIDNEY DISEASE (CKD), STAGE IV (SEVERE): ICD-10-CM

## 2025-04-24 DIAGNOSIS — Z79.899 MEDICATION MANAGEMENT: ICD-10-CM

## 2025-04-24 DIAGNOSIS — F33.42 MDD (MAJOR DEPRESSIVE DISORDER), RECURRENT, IN FULL REMISSION: ICD-10-CM

## 2025-04-24 DIAGNOSIS — D50.8 OTHER IRON DEFICIENCY ANEMIA: ICD-10-CM

## 2025-04-24 DIAGNOSIS — F41.1 GENERALIZED ANXIETY DISORDER: ICD-10-CM

## 2025-04-24 DIAGNOSIS — R73.01 IFG (IMPAIRED FASTING GLUCOSE): ICD-10-CM

## 2025-04-24 DIAGNOSIS — Z00.00 ENCOUNTER FOR PREVENTIVE HEALTH EXAMINATION: Primary | ICD-10-CM

## 2025-04-24 DIAGNOSIS — I25.10 ARTERIOSCLEROSIS OF CORONARY ARTERY: ICD-10-CM

## 2025-04-24 DIAGNOSIS — I10 PRIMARY HYPERTENSION: ICD-10-CM

## 2025-04-24 PROBLEM — N18.30 STAGE 3 CHRONIC KIDNEY DISEASE: Status: RESOLVED | Noted: 2023-01-05 | Resolved: 2025-04-24

## 2025-04-24 PROBLEM — F51.01 PRIMARY INSOMNIA: Status: ACTIVE | Noted: 2023-01-05

## 2025-04-24 RX ORDER — CALCITRIOL 0.25 UG/1
0.25 CAPSULE ORAL DAILY
COMMUNITY
Start: 2025-04-23

## 2025-04-24 RX ORDER — DOXEPIN 3 MG/1
3 TABLET, FILM COATED ORAL DAILY
COMMUNITY
Start: 2025-04-02

## 2025-04-24 RX ORDER — COLESTIPOL HYDROCHLORIDE 1 G/1
1 TABLET ORAL 2 TIMES DAILY
Qty: 180 TABLET | Refills: 3 | Status: SHIPPED | OUTPATIENT
Start: 2025-04-24 | End: 2026-04-24

## 2025-04-24 NOTE — PROGRESS NOTES
Patient ID: Rosalie Campbell  : 1950    Chief Complaint: Medicare AWV Follow Up      History of Present Illness:  The patient is a 74 y.o. White female who presents to clinic for annual wellness visit.      Feeling well in general, no health concerns or issues to discuss today.     Just saw Dr Rowe yesterday; PTH elevated, he sent Calcitriol to pharmacy. Aslo advised vitamin D3 for her low D level.     Her A1c is slightly elevated; this is new. Denies family hx of diabetes. She does admit that she eats a lot of sweets daily.     Suffering with chronic diarrhea; this started after her cholecystectomy a couple years ago.       Allergies: Patient is allergic to codeine.     Past Medical History:  has a past medical history of Anxiety, Chronic renal disease stage 4, Clostridium difficile colitis, Colitis, Colon cancer screening declined, Coronary artery disease, Depression, Hypertension, Irregular heart rhythm, Medication management, Obesity, Obesity, unspecified, Osteoarthritis of knee, Pain, joint, shoulder region, right, Refused influenza vaccine, Refused pneumococcal vaccination, Smoker, and Stage 3 chronic kidney disease.    Surgical History:  has a past surgical history that includes Angioplasty (2019); Esophagogastroduodenoscopy (10/29/2018); stent, aorta; Colonoscopy (2023); and Laparoscopic cholecystectomy with cholangiography (N/A, 09/15/2023).    Family History: family history is not on file.    Social History:  reports that she has been smoking cigarettes. She started smoking about 57 years ago. She has a 28.7 pack-year smoking history. She has been exposed to tobacco smoke. She has never used smokeless tobacco. She reports that she does not drink alcohol and does not use drugs.    Care Team: Patient Care Team:  Jany Taveras FNP-C as PCP - General (Family Medicine)  Fabien Resendez MD as Consulting Physician (General Surgery)  Jessica Pickard OD (Optometry)  Myesha Nettles  MA as Care Coordinator  Angelo Rowe MD as Consulting Physician (Nephrology)     Current Medications:  Current Outpatient Medications   Medication Instructions    amLODIPine (NORVASC) 5 mg, Oral, Daily    atorvastatin (LIPITOR) 40 mg, Oral, Daily    calcitRIOL (ROCALTROL) 0.25 mcg, Daily    cloNIDine (CATAPRES) 0.2 mg, Oral, 2 times daily    colestipoL (COLESTID) 1 g, Oral, 2 times daily    doxepin (SILENOR) 3 mg, Daily    EScitalopram oxalate (LEXAPRO) 20 mg, Oral, Daily    losartan (COZAAR) 50 mg, Oral, Daily    metoprolol succinate (TOPROL-XL) 25 mg, Oral, Daily    rOPINIRole (REQUIP) 0.25 mg, Oral, Nightly       Opioid Screening: Patient medication list reviewed, patient is not taking prescription opioids. Patient is not using additional opioids than prescribed. Patient is at low risk of substance abuse based on this opioid use history.     Patient Reported Health Risk Assessment       Review of Systems   Constitutional:  Negative for activity change, appetite change, fatigue and unexpected weight change.   HENT:  Negative for ear pain and hearing loss.    Eyes:  Negative for visual disturbance.   Respiratory:  Negative for apnea, cough, chest tightness, shortness of breath and wheezing.    Cardiovascular:  Negative for chest pain, palpitations, leg swelling and claudication.   Gastrointestinal:  Negative for abdominal pain, anal bleeding, blood in stool, change in bowel habit, nausea, vomiting and reflux.   Endocrine: Negative for cold intolerance, heat intolerance, polydipsia, polyphagia and polyuria.   Genitourinary:  Negative for dysuria, frequency, hematuria and urgency.   Musculoskeletal:  Negative for arthralgias.   Integumentary:  Negative for rash and mole/lesion.   Allergic/Immunologic: Negative for environmental allergies.   Neurological:  Negative for dizziness, headaches and memory loss.        Visit Vitals  /80 (BP Location: Left arm)   Pulse 62   Temp 97.2 °F (36.2 °C) (Temporal)   Ht  "5' 8" (1.727 m)   Wt 83.5 kg (184 lb)   SpO2 97%   BMI 27.98 kg/m²       Physical Exam  Vitals reviewed.   Constitutional:       Appearance: Normal appearance. She is normal weight.   Eyes:      Conjunctiva/sclera: Conjunctivae normal.   Cardiovascular:      Rate and Rhythm: Normal rate and regular rhythm.      Pulses: Normal pulses.      Heart sounds: Normal heart sounds.   Pulmonary:      Effort: Pulmonary effort is normal.      Breath sounds: Normal breath sounds.   Abdominal:      General: Bowel sounds are normal.      Palpations: Abdomen is soft.   Musculoskeletal:      Cervical back: Neck supple.   Skin:     General: Skin is warm and dry.      Capillary Refill: Capillary refill takes less than 2 seconds.   Neurological:      Mental Status: She is alert and oriented to person, place, and time.   Psychiatric:         Mood and Affect: Mood normal.         Behavior: Behavior normal.               No data to display                  4/24/2025    10:30 AM 1/31/2024     2:30 PM 5/2/2023    10:30 AM 1/25/2023     8:00 AM   Fall Risk Assessment - Outpatient   Mobility Status Ambulatory Ambulatory Ambulatory Ambulatory   Number of falls 0 0 0 0   Identified as fall risk False False False False                Labs Reviewed:  Chemistry:  Lab Results   Component Value Date     04/17/2025    K 4.5 04/17/2025    BUN 27 (H) 04/17/2025    CREATININE 1.72 (H) 04/17/2025    EGFRNORACEVR 31 04/17/2025    GLUCOSE 108 04/17/2025    CALCIUM 9.1 04/17/2025    CALCIUM 9.2 04/17/2025    ALKPHOS 96 04/24/2024    LABPROT 8.0 04/24/2024    ALBUMIN 4.5 04/17/2025    AST 28 04/24/2024    ALT 14 04/24/2024    MG 1.90 09/22/2023    PHOS 4.4 04/17/2025    [id number] 21 (L) 04/17/2025    TSH 2.370 01/29/2024    [id number] 1.33 09/13/2023        Lab Results   Component Value Date    HGBA1C 5.8 04/17/2025        Hematology:  Lab Results   Component Value Date    WBC 6.54 04/17/2025    RBC 3.47 (L) 04/17/2025    HGB 9.8 (L) 04/17/2025    " HCT 33.0 (L) 04/17/2025    MCV 95.1 04/17/2025    MCH 28.2 04/17/2025    MCHC 29.7 (L) 04/17/2025    RDW 14.6 (H) 04/17/2025     04/17/2025    MPV 9.4 04/17/2025         Assessment & Plan:  1. Encounter for preventive health examination  Comments:  All lab orders were in, however lab did not run the TSH or the lipid panel.  Patient will have those drawn Lat next week when she goes for her mammogram    2. Primary hypertension  Overview:  Losartan 50 mg daily, Metoprolol 25 mg Qd, and amlodipine 5 mg daily.    Assessment & Plan:  Well controlled.   Continue current regimen.   Low Sodium Diet (DASH Diet - Less than 2 grams of sodium per day).  Monitor blood pressure daily and log. Report consistent numbers greater than 140/90.  Maintain healthy weight with goal BMI <30. Exercise 30 minutes per day, 5 days per week.  Smoking cessation encouraged to aid in BP reduction.      Orders:  -     CBC Auto Differential; Future; Expected date: 04/24/2026  -     Comprehensive Metabolic Panel; Future; Expected date: 04/24/2026  -     TSH; Future; Expected date: 04/24/2026    3. IFG (impaired fasting glucose)  -     Hemoglobin A1C; Future; Expected date: 07/24/2025  -     Comprehensive Metabolic Panel; Future; Expected date: 07/24/2025  -     TSH; Future; Expected date: 04/24/2026  -     Hemoglobin A1C; Future; Expected date: 04/24/2026    4. RLS (restless legs syndrome)  Overview:  Ropinerole 0.25 mg Qhs    Assessment & Plan:  Stable; continue current therapy.         5. Arteriosclerosis of coronary artery  Overview:  Metoprolol succinate 25 mg daily and Plavix 75 mg daily.  On a statin, LDL below goal.    Assessment & Plan:  Lipid panel was not run with other labs.  We will redraw next week.     Orders:  -     Lipid Panel; Future; Expected date: 04/24/2026  -     TSH; Future; Expected date: 04/24/2026    6. Other iron deficiency anemia  Overview:  On Ferrous Sulfate 324 mg QOD    Assessment & Plan:  Stable; Continue iron  supplementation QOD; use caution to avoid constipation.         7. Chronic kidney disease (CKD), stage IV (severe)  Overview:  Est with Nephrology, Dr Rowe  Baseline creatinine 1.90  Did not tolerate Farxiga due to SE       Assessment & Plan:  Stable; continue current therapy.   Continue to avoid excessive NSAID use.  Make sure to drink 6-8 glasses of water daily.   Follow up with Dr Rowe as scheduled.       Orders:  -     TSH; Future; Expected date: 04/24/2026    8. MDD (major depressive disorder), recurrent, in full remission  Overview:  On Lexapro 20 mg daily.    Assessment & Plan:  Continue current medication.  Educated patient on the risks of serotonin based medications such as serotonin modulators and SSRIs/SNRIs including common side effects of nausea, GI upset, headache dizziness as well as the rare risk for worsening symptoms of depression including development of suicidal thoughts or ideations, and serotonin syndrome.   Discussed benefits of medication not becoming noticeable until up to 6 weeks from start date.   Exercise daily. Get sunlight daily.  Practice positive phrases and repeat throughout the day, along with yoga and relaxation techniques.  Establish good social support, make changes to reduce stress.  Encourage counseling.   Reports any symptoms of suicidal/homicidal ideations or self harm immediately. If clinic is closed, go to nearest emergency room.          9. Generalized anxiety disorder  Overview:  On buspirone 15 mg t.i.d..    Assessment & Plan:  Stable; continue current therapy.         10. Diarrhea, unspecified type  Overview:  Chronic following cholecystectomy 09/2023    Assessment & Plan:  Try colestipol 1 mg BID    Orders:  -     colestipoL (COLESTID) 1 gram Tab; Take 1 tablet (1 g total) by mouth 2 (two) times daily.  Dispense: 180 tablet; Refill: 3    11. Tobacco dependence  Assessment & Plan:  Encouraged to stop smoking. Discussed negative impact of smoking on health.  Offered support in terms of prescription medications to help with quitting if needed. Educated patient on need to identify triggers for cigarette smoking and to find an alternative to alleviate these triggers such as walking, eating unsalted sunflower seeds, eating carrots. Advised patient to develop a plan to quit smoking whether it is to decrease by a few cigarettes every 3-5 days or quit cold turkey and to schedule a quit day. Patient states understanding. Spent > 3 minutes with discussion.         12. Lack of physical activity    13. At risk for falls    14. Medication management         Cervical Cancer Screening-utd  Breast Cancer Screening- mammogram order in; appt pending  Osteoporosis Screening-refused  Lung cancer screening-refused screen  Colon Cancer Screening - utd   Eye Exam- Recommend annually.  Dental Exam- Recommend biannually.        Follow up for 1), 3 mo f/u, IFG, Fasting Labs prior, 2), 1 year, MCR wellness, Fasting labs prior. Call sooner if needed.    Jany Taveras, CLAUDIAP-C    Lab Frequency Next Occurrence   CBC Auto Differential Once 01/31/2025   Comprehensive Metabolic Panel Once 01/31/2025   Lipid Panel Once 01/31/2025   TSH Once 01/31/2025   Hemoglobin A1C Once 01/31/2025   Ambulatory referral/consult to Nephrology Once 06/04/2024   Mammo Digital Screening Bilat w/ Rod Once 10/14/2024        Medicare Annual Wellness and Personalized Prevention Plan:   Fall Risk + Home Safety + Hearing Impairment + Depression Screen + Opioid and Substance Abuse Screening + Cognitive Impairment Screen + Health Risk Assessment all reviewed.     Health Maintenance Topics with due status: Not Due       Topic Last Completion Date    Colorectal Cancer Screening 07/05/2023    Lipid Panel 01/29/2024    Annual UACr 04/17/2025    High Dose Statin 04/24/2025      The patient's Health Maintenance was reviewed and the following appears to be due at this time:   Health Maintenance Due   Topic Date Due    Hepatitis C  Screening  Never done    TETANUS VACCINE  Never done    Aspirin/Antiplatelet Therapy  Never done    Mammogram  Never done    Pneumococcal Vaccines (Age 50+) (1 of 2 - PCV) Never done    DEXA Scan  Never done    LDCT Lung Screen  Never done    Shingles Vaccine (1 of 2) Never done    RSV Vaccine (Age 60+ and Pregnant patients) (1 - Risk 60-74 years 1-dose series) Never done    COVID-19 Vaccine (1 - 2024-25 season) Never done       A comprehensive HEALTH RISK ASSESSMENT was completed today. Results are summarized below:    There are NO EMOTIONAL/SOCIAL CONCERNS identified on today's screening for Social Isolation, Depression and Anxiety.    The following COGNITIVE FUNCTION CONCERNS were identified on today's screening:  *Patient reports she FREQUENTLY HAS TROUBLE REMEMBERING. (Do you have trouble remembering the date, year, and time?: (!) Yes)  The following FUNCTIONAL AND/OR SAFETY CONCERNS were identified on today's screening for Physical Symptoms, Nutritional, Home Safety/Living Situation, Fall Risk, Activities of Daily Living, Independent Activities of Daily Living, Physical Activity,Timed Up and Go test and Whisper test::   *Patient reports recently FEELING DIZZY, has a FEAR OF FALLING or HAS FALLEN. (In the past four weeks have you felt dizzy when standing up, were afraid of falling or fallen?: (!) Yes)     The patient reports NO OPIOID PRESCRIPTIONS. This was confirmed through medication reconciliation and the [facility] website.    The patient is A CURRENT TOBACCO USER.  Tobacco Use: High Risk (4/24/2025)    Patient History     Smoking Tobacco Use: Every Day     Smokeless Tobacco Use: Never     Passive Exposure: Current           All Questions regarding food, transportation or housing were not answered today.    Provided patient with a 5-10 year written screening schedule and personal prevention plan. Recommendations were developed using the USPSTF age appropriate recommendations. Education, counseling, and referrals  were provided as needed. After Visit Summary printed and given to patient, which includes a list of additional screenings\tests needed.    Advance Care Planning   I attest to discussing Advance Care Planning with patient and/or family member.  Education was provided including the importance of the Health Care Power of , Advance Directives, and/or [place] documentation.  The patient expressed understanding to the importance of this information and discussion.       Follow up for 1), 3 mo f/u, IFG, Fasting Labs prior, 2), 1 year, MCR wellness, Fasting labs prior. In addition to their scheduled follow up, the patient has also been instructed to follow up on as needed basis.

## 2025-04-24 NOTE — PATIENT INSTRUCTIONS
Medicare Annual Wellness Visit      Patient Name: Rsoalie Campbell  Today's Date: 4/24/2025    Below you will find your 5-10 year Screening Plan Recommendations:  Health Maintenance       Date Due Completion Date    Hepatitis C Screening Never done ---    TETANUS VACCINE Never done ---    Aspirin/Antiplatelet Therapy Never done ---    Mammogram Never done ---    Pneumococcal Vaccines (Age 50+) (1 of 2 - PCV) Never done ---    DEXA Scan Never done ---    LDCT Lung Screen Never done ---    Shingles Vaccine (1 of 2) Never done ---    RSV Vaccine (Age 60+ and Pregnant patients) (1 - Risk 60-74 years 1-dose series) Never done ---    COVID-19 Vaccine (1 - 2024-25 season) Never done ---    Annual UACr 04/17/2026 4/17/2025    High Dose Statin 04/24/2026 4/24/2025    Lipid Panel 01/29/2029 1/29/2024    Colorectal Cancer Screening 07/05/2033 7/5/2023          Below is your summarized Personalized Prevention Plan that addresses any concerns we discussed today at your visit. Please see attached detailed information specific to your Health Concerns.  No orders of the defined types were placed in this encounter.        The following information is provided to all patients.  This information is to help you find additional resources for any problems that may be affecting your health: Living healthy guide: www.[facility].[email or web address].gov      Understanding Diabetes: www.diabetes.org      Eating healthy: www.cdc.gov/healthyweight      CDC home safety checklist: www.cdc.gov/steadi/patient.html      Agency on Aging: www.goea.[place].[facility]      Alcoholics anonymous (AA): www.aa.org      Physical Activity: www.ila.nih.gov/di8fbwr      Tobacco use: www.quitwithusla.org                                 Patient Education       Quitting Smoking for Older Adults   About this topic   Most of us know that smoking can cause problems to our health. Even if you know that it is bad, you continue smoking. It is because when you start to smoke it is hard to give up.  You can become addicted to smoking.  Smoking is very harmful to your health. It can cause many illnesses and can affect how you look. The longer you smoke, the greater the effects on your health. It is never too late to try to quit.  As you stop smoking, it is normal to have signs of withdrawal. These will lessen over time. You may have signs like:  Trouble sleeping  Feeling more hungry  Weight gain  Hard stools  Dizziness  Sore throat or cough  Being irritable  Being anxious or restless  Getting frustrated or angry  Trouble thinking clearly  Low mood  Certain medicines given to you by your regular doctor can help improve these symptoms.  General   Older smokers are at high risk from smoking. The longer you have smoked, the more toxins you have been exposed to. Toxins are the bad chemicals in the tobacco. As someone who has smoked for a long time, you are more likely to have illnesses related to smoking.  It is never too late to quit smoking. It helps your health even at an older age. You will begin to notice changes soon after you stop smoking. Here are some steps you can take to help you quit smoking:  Set a date to quit smoking.  Tell your family and friends about your plan to quit smoking. Let them know how to help with your plan.  Plan ahead about what you will do instead of smoking when you crave a cigarette.  Remove cigarettes from your home, car, and work.  You may want to talk with a counselor to help you learn about:  What triggers you to smoke.  How to resist cravings.  Things to raise your chance of quitting smoking for good.  Counseling can be in person, over the phone, in a group, online, or through text messages.  Talk with your regular doctor about medicines to help you stop smoking.  Exercise regularly. This may help to lower stress. Going for a walk is good exercise.  Keep your mouth busy with sugar-free candy or gum.  Stay away from people and places that make you want to smoke. If others close to  you smoke, ask them to quit with you or to not smoke around you.     What will the results be?   When you start to quit:  Blood flow improves right away.  Your lungs become more active.  Heart rate and blood pressure lower.  You have less chance of having a heart attack.  You will help others be healthy since they are not around secondhand smoke.  After a few days to weeks:  Food tastes and smells better.  Your lungs begin to work better.  Breathing becomes easier.  After a few weeks to months:  You have more energy.  Lungs become clear and work better.  You are less likely to get colds and other lung infections.  Shortness of breath decreases.  Clogging of sinuses decreases.  When you have fully stopped smoking, after a while you will:  Lower the risk of lung cancer  Lower the risk of cancer of the mouth, esophagus, voice box, bladder, pancreas, cervix, and kidney  Lower the risk of heart illnesses like stroke and heart attack  Preserve your eyesight and the look of your teeth and aging skin  Lessen the risk for having type 2 diabetes  Reverse bone loss  Lessen the risk of postoperative problems  Lessen the risk of peptic ulcer and improve healing if ulcer is already there  Help others be healthy since they are not around secondhand smoke  Secondhand smoke:  It is important to know that smoking does not just affect you, it affects everyone around you.  It can cause cancer and heart disease in nonsmokers.  It is more dangerous for babies, children, and pregnant women.  It causes many of the same health problems in others, including your family and friends.  The only way to stop secondhand smoke is to quit smoking. It is also important to avoid places where you and others are around people who smoke.  What lifestyle changes are needed?   Thoroughly wash and remove all ashtrays from your home and office.  Watch your eating habits and avoid gaining weight. Eat healthy foods and snacks to keep a healthy weight  Keep  your mouth busy with sugar free candy and gum.  Change your daily routine. Try to change things like eat breakfast at a different place or drink tea instead of coffee.  Try to relax. Listen to music, meditate, do yoga or breathing exercises, take a relaxing bath or hot shower.  Control your thoughts and emotions. Write or record a journal, create new hobbies, and think positive.  Connect with family and friends. Talk to a friend, get a pet, share your problems with family members, and participate in your community.  What drugs may be needed?   The doctor may order drugs to:  Help with withdrawal signs  Reduce your urges to smoke  Gums, lozenges, and skin patches may be given as a substitute for tobacco.  Will there be any other care needed?   It helps to have other people to support you when you are trying to quit smoking. Talk to your family and friends about how they can best help you. You may also want to think about support groups or counseling.  When do I need to call the doctor?   You have signs of low mood or depression like:  Feeling sad, down, hopeless, or cranky most of the day, almost every day.  Losing or gaining weight without trying to do so.  Sleeping too much or too little.  Feeling tired or like you have no energy.  Acting restless or having trouble staying still.  Helpful tips   Commit yourself to stop smoking.  Focus on your goals and work to achieve them.  Talk with your doctor if you are thinking about switching to an electronic cigarette.  Where can I learn more?   American Cancer Society  https://www.cancer.org/latest-news/szihs-ctn-nfrk-to-quit-smoking.html   American Lung Association  http://www.lung.org/stop-smoking/i-gnpp-ui-quit/   Centers for Disease Control and Prevention  http://www.cdc.gov/tobacco/campaign/tips/   Last Reviewed Date   2021-06-08  Consumer Information Use and Disclaimer   This information is not specific medical advice and does not replace information you receive from  your health care provider. This is only a brief summary of general information. It does NOT include all information about conditions, illnesses, injuries, tests, procedures, treatments, therapies, discharge instructions or life-style choices that may apply to you. You must talk with your health care provider for complete information about your health and treatment options. This information should not be used to decide whether or not to accept your health care providers advice, instructions or recommendations. Only your health care provider has the knowledge and training to provide advice that is right for you.  Copyright   Copyright © 2021 [employer], Inc. and its affiliates and/or licensors. All rights reserved.    Patient Education       Smoking: Not Just Harmful to Your Lungs and Heart   About this topic   Smoking is very common at any age. It is one of the leading causes of poor health and illness. Smoking can lead to death. It has many harmful chemicals that are released by the tobacco you smoke and inhale. Tobacco has many forms. These are:  Cigarettes  Pipes  Cigars  Chewing tobacco  Electronic cigarettes  Loose  Hookah  All kinds of tobaccos contain many toxic substances. These are what make you sick from smoking.  Nicotine - The main thing that makes you addicted to smoking  Carbon monoxide - A poison that gets into your blood when smoking  Tar - Has chemicals that are cancerous  Lead and many others  These factors affect how much damage smoking will have on you:  How much you smoke  What you smoke  How what you smoke has been prepared  The content of what you smoke  Smoking hurts every part of the body. The lungs and the heart are most often affected by tobacco use.  General   Smoking is very harmful to your body. It can cause many illnesses and can affect how you look.  Smoking and Cancer   Smoking is the most common cause of lung cancer. Smoking may also increase the risk of:  Mouth cancer. This can also  be caused by chewing tobacco.  Bladder cancer  Cancer of the tube from the mouth to stomach. This is also called the esophagus.  Cancer of the throat. This can also be caused by chewing tobacco.  Kidney cancer  Liver cancer  Stomach, colon, and rectal cancer  Cancer of the pancreas  Cancer of the cervix in women  Cancer of the blood or leukemia  Skin cancer  Smoking and Your Lungs   If you smoke you are more likely to have:  Breathing problems  Lung infections like pneumonia or bronchitis  Lung disease such as COPD or emphysema  Asthma  Smoking and the Heart and Circulation   Causes heart disease and stroke  Smokers are at a higher risk for high blood pressure  You are more likely to get blood clots  Smoking can lower blood flow to the legs and skin  Smoking and Diabetes   If you smoke, you:  Have a higher risk of developing diabetes  May have higher blood sugar levels  May have more problems with your diabetes  Smoking and Digestion   Smokers make more stomach acid. This can cause sores or ulcers in your belly or bowel.  Your stomach acids may flow back to your esophagus. This is also called heartburn.  You have more chance of bowel irritation and swelling  Smoking and Your Bones   If you smoke:  Your bone-forming cells don't grow as fast  Your bones may become weaker (osteoporosis)  You may have more low back pain and arthritis  You may have more overuse injuries  You may have a greater risk of breaking bones  Your broken bones may take longer to heal  Smoking and Pregnancy   Makes your baby more prone to problems  You have a higher chance of having a premature baby or baby born early  Your healthy baby may die without reason. This is called sudden infant death syndrome.  Your baby may be born dead (stillbirth)  Your baby may have a low birth weight  You have a higher risk of an ectopic pregnancy or a pregnancy outside of the uterus  You have a higher chance of having a baby with mouth or facial defects  Smoking  and Your Eyes, Hair, Hands, and Mouth   If you smoke, you may notice your:  Eyes become cloudy and form cataracts  Hair may get thin and turn gray sooner than it should  Fingernails turn yellow  Teeth become yellowish brown  Gums have more problems  Teeth have problems or even become loose  Sense of taste and smell start to go away  Breath smells bad  Smoking and Skin   Smoking causes:  Less blood flow to the skin  Wrinkles start early around your eyes and mouth  Dry skin  Your skin to lose elasticity and strength  Skin may get splotchy or pale  Your face to look thin and old. This is called smoker's face.  Greater risk of getting a skin problem called psoriasis  Slower healing of cuts or bruises  Smoking and Sex Life   If you smoke you are more likely to have problems like:  Impotence  Decreased blood flow to the penis. This is also called erectile dysfunction.  Trouble getting pregnant  Early change of life or menopause for women  A higher risk of heart attack or stroke for women who smoke and use birth control pills  Damaged sperm that may lead to problems getting a woman pregnant, birth defects, or miscarriage  Smoking and Your Brain and Behavior   Smoking can:  Affect your mood  Lead to addiction  Cause long-term confusion or damage to your brain cells  Cause low mood  Smoking and Children   If you smoke, your children:  Will be more likely to smoke.  Can be sick from being around your smoke. This is called secondhand smoke.  Need to learn about the bad effects of smoking. Most smokers start before the age of 18. Encourage your children never to smoke.  Smoking and Other Effects   Smoking can cause:  Wounds to take longer to heal  You to eat poorly  Weight loss  Swelling in the body and affect the body's immune or defense system  Rheumatoid arthritis  Greater chance of injury  You to get warts easier (human papillomavirus)  A bad odor in hair and on clothes  You to have poor sports performance  You to spend a  lot of money. Smoking is an expensive habit. A smoker who smokes a pack per day in the [place] will spend over $2000 per year on cigarettes.  Health care to cost more  You to miss work more often  Hearing loss  Even if you have smoked for many years, it is not too late to quit. Your body starts to heal as soon as you stop smoking. It is better to quit when you are young, but you can still get healthier if you quit at any age.       Helpful tips   Some helpful steps you can take to help you quit smoking:  Set a date to quit smoking.  Know the reasons that make you smoke more.  Know why you want to quit smoking.  Write down each time you smoke. Include the time and what you are doing. Plan ahead about what you will do instead of smoking when that time or event reappears.  Tell your family and friends about your plan to quit smoking. Let them know how to help you.  Slowly reduce your smoking.  Remove smoking and tobacco products from your home and other places.  Avoid places and situations where you will more likely smoke. If people close to you smoke, ask them to quit with you. If they do not quit, ask them to not smoke around you.  Reward or treat yourself every time you do not smoke. Do not use food as a reward.  Ask a doctor for help.  Talk with your doctor before you try an electronic cigarette.  Where can I learn more?   Centers for Disease Control and Prevention  http://www.cdc.gov/tobacco/data_statistics/fact_sheets/health_effects/effects_cig_smoking/#overview   Centers for Disease Control and Prevention  https://www.cdc.gov/tobacco/campaign/tips/quit-smoking/guide/quit-plan.html?s_cid=OSH_tips_D9400   [employer]  http://kidshealth.org/teen/drug_alcohol/tobacco/smoking.html#   US Food and Drug Administration  https://www.fda.gov/TobaccoProducts/Labeling/ProductsIngredientsComponents/default.htm   Last Reviewed Date   2021-03-31  Consumer Information Use and Disclaimer   This information is not specific medical  advice and does not replace information you receive from your health care provider. This is only a brief summary of general information. It does NOT include all information about conditions, illnesses, injuries, tests, procedures, treatments, therapies, discharge instructions or life-style choices that may apply to you. You must talk with your health care provider for complete information about your health and treatment options. This information should not be used to decide whether or not to accept your health care providers advice, instructions or recommendations. Only your health care provider has the knowledge and training to provide advice that is right for you.  Copyright   Copyright © 2021 Up[employer]Date, Inc. and its affiliates and/or licensors. All rights reserved.    Patient Education       Exercise and Aging   General   Exercise can help older adults prevent falls and stay independent longer. Exercise may also help:  You have stronger muscles and bones and better balance  You lose weight and have more energy  Make your heart and lungs stronger  Lower your chance of health problems like heart disease, high blood sugar, or breast or colon cancer  Control conditions like high blood pressure and diabetes  You manage stress and get better sleep  Your mood, self-esteem, and self-image  How you think, plan, and pay attention  There are four types of exercise: endurance, strength, balance, and flexibility.  Endurance exercises get your heart rate up and keep it up for a while. Most people should get at least 30 minutes of exercise that gets your heart rate up on 5 or more days each week. Walking, swimming, biking, or going up and down stairs are kinds of exercises to get your heart rate up. You do not have to do all 30 minutes at one time. Try doing 10 minutes 3 times a day.  Strength exercises build muscle. Lifting weights or doing knee bends are kinds of strength exercises.  Balance exercises help to prevent falls.  Raise up on your toes or stand on one leg as a kind of balance exercise.  Flexibility exercises move your joints through a full range of motion and stretch your muscles. Bend forward in a chair to stretch your back, do stretches, or bend and extend your arms or legs as a kind of flexibility exercise. Try doing 10 minutes twice a week.  Plan to include all these exercise types in your exercise program. Always check with your doctor before you start a new exercise program.  Some people do not like formal exercise classes, but there are many ways to work your muscles each day. Here are some things you can do.  Use steps instead of elevators.  Park far away in parking lots to walk farther.  Use the time while you wait. Do knee bends as you hold onto the kitchen counter while you wait for your coffee to brew.  When you unload groceries, lift the bags or a container of milk a few times to exercise your arms and legs.  Walk the long way when you go somewhere.  After you walk to the bathroom, walk a few laps around the house before sitting down.  Try doing different standing exercises after you wash your hands each time in the bathroom.  Do balance exercises while you brush your teeth.  Do an exercise or get up and walk during TV commercials.  Don't forget to exercise small muscle groups like your hands, fingers, ankles, toes, and neck. Wiggle, bend, flex, and rotate these joints from left to right and back to front to help to keep these joints flexible.  When do I need to call the doctor?   Stop exercising and talk to your doctor if you have any of these problems:  Dizziness  Shortness of breath  Pain or pressure in the chest, arms, throat, jaw, or back  Nausea or vomiting  Blood clots  Infection  Joint swelling  Open wounds  Recent hip, back, or eye surgery  New problems that start during exercise  Helpful tips   If you have a health problem like heart disease or diabetes, talk with your doctor about the best exercises  for you.  Always warm up before you stretch. Heated muscles stretch much easier than cool muscles. Try to walk or bike at an easy pace for a few minutes to warm up your muscles.  Slow your pace again after you exercise to cool down and bring your heart rate down slowly.  Be sure you do not hold your breath when you exercise because it can raise your blood pressure. If you tend to hold your breath, try to count out loud when you exercise.  Never bounce when doing stretches. Use slow and steady motions and hold your stretch for 20 to 30 seconds.  Have a routine. Doing exercises before a meal may be a good way to get into a routine.  Set small goals for yourself when you start to exercise. Use a chart to see how much you are doing. Ask someone to exercise with you.  Exercise may be slightly uncomfortable, but you should not have sharp pains. If you do get sharp pains, stop what you are doing. If the sharp pains continue, call your doctor.  Drink plenty of fluids without caffeine when you exercise and afterwards. Avoid outdoor exercise if it is too cold or too hot.  Wear the right clothes and shoes. Try layers of clothes, so that you can take them off if you get too hot. Shoes should fit well and support your feet.  Where can I learn more?   [place] on Aging  https://www.ila.nih.gov/health/publication/exercise-physical-activity/introduction   Last Reviewed Date   2021-03-18  Consumer Information Use and Disclaimer   This information is not specific medical advice and does not replace information you receive from your health care provider. This is only a brief summary of general information. It does NOT include all information about conditions, illnesses, injuries, tests, procedures, treatments, therapies, discharge instructions or life-style choices that may apply to you. You must talk with your health care provider for complete information about your health and treatment options. This information should not  be used to decide whether or not to accept your health care providers advice, instructions or recommendations. Only your health care provider has the knowledge and training to provide advice that is right for you.  Copyright   Copyright © 2021 UpToDate, Inc. and its affiliates and/or licensors. All rights reserved.    Patient Education       Preventing Falls in the Older Adult   About this topic   A fall is the sudden loss of balance that causes a person to drop to the ground or floor. Falls are a serious health risk and they happen more often as we get older. Many things may increase your risk of falling, like:  Problems that come with getting older  Muscle weakness  Balance problems  More trouble seeing  Personal health factors  Health conditions such as arthritis, Parkinson's disease, low blood pressure, or stroke  Medicines you take  Loss of feeling in your feet  Being less active  Taking drugs that makes you dizzy or drowsy  Habits like alcohol use  Things around your house  Slippery floors  Unsecured rugs  Stairs  Wearing improper fitting shoes  Areas where it is dark and difficult to see  Incorrect size or type of assistive devices  Clutter and items on the floor that block your walkway     What will the results be?   Prevent future falls  Avoid injuries and disabilities  Improve overall health  Will there be any other care needed?   Ask your doctor if you need to take vitamin D to help keep your bones strong.  Make your home safer. Get rid of things that might make you trip or slip. These are things like loose rugs, electrical cords, or clutter. Add grab bars, a shower seat, and handrails.  Wear sturdy shoes that fit well. Shoes should fit well, have a low heel, and the soles of the shoe should not be slippery. Walking in socks or with bare feet can raise your chance for falling.  Stay active. Walk, garden, swim, or do something active on a regular basis. These activities may prevent you from getting hurt  if you do fall. They also help with your strength and balance.  Use a cane, walker, or other safety device. Be sure it is the right size for you and that you know how to use it safely. Be sure to wear your eyeglasses if they have been ordered for you.  Get up slowly after you sit or lie down. Try to change positions slowly.  What to do if you fall:  Stay calm and do not panic.  Look for signs to decide if you have been hurt or have an injury.  If you think you can get up safely, try to get up.  If you are hurt or cannot get up on your own, try to get help.  If no one is available to help, try to get comfortable and wait for someone to arrive who can help you.  Stay warm and move regularly as you are able. Avoid putting too much pressure on any one area.  After a fall, tell family and friends that you have fallen. It is also important to talk to your doctor about your fall right away.  What problems could happen?   A fall can lead to broken bones and other serious injuries in older adults. Problems that happen because of a fall may even lead to death in older adults. Many people are not able to return to their former level of activity after a fall.  What can be done to prevent this health problem?   Lower Your Risk of Falling  Wear your eyeglasses. Have regular eye checkups. Do not use reading glasses when you walk around.  Quit smoking and limit alcohol intake. Smoking and too much alcohol can decrease bone mass and increase the chance of broken bones.  Know the side effects of the drugs you are taking. Some drugs may affect your balance and cause confusion or sleepiness.  Get up slowly after you sit or lie down. Do not change positions quickly. Do not rush when you need to go to the bathroom or to answer the phone.  Stay Physically Active  Be physically active. This will help to improve your strength and balance.  Fear of falling may lead you to avoid activities. Talk to your doctor. You may be sent to a physical  therapist. This person can help you improve balance and build your confidence. Getting rid of your fear of falling can help you stay active and prevent future falls.  Join an exercise program. Ask your doctor what exercise is safe for you. Be sure to ask before you do any exercises, especially if you have illnesses like arthritis. Exercise can help you keep muscles strong and help with your balance. It is also a good way to learn proper ways to do each activity or exercise.  Safety Tips at Home  Keep your floors and walking areas clear from clutter. Remove furniture that blocks your way. Secure cords and wires near the wall to avoid tripping over them. Get rid of throw rugs.  Be sure the lights in your house are working well and provide good lighting throughout your home. Make sure you can reach switches and lamps easily. Place a lamp close to your bed that is easy to reach.  Fix all steps and sidewalks to make them smooth and even. Put handrails and lights on stairs.  Keep all the things you use often on low shelves or in cabinets that are at about waist level. Ask for help to move items off of high shelves. Do not use a chair as a step stool.  Keep your bathroom area safe. Use nonslip rubber mats on the floor and in the tub or shower.  Keep a phone near you in case of emergency. Keep a list of your emergency contact numbers in large print near your phone. Carry a phone with you when you go for a walk. Consider using a personal alarm device that could call for help in case you fall and cannot get up.  Think about protecting your hip. Hip protectors may be needed if you have a higher chance for falling. Ask your doctor about this.  Where can I learn more?   [employer]  https://familydoctor.org/vkcah-njb-lh-lower-your-risk/   [facility]  https://www.nhs.[place]/conditions/falls/prevention/   Last Reviewed Date   2021-06-07  Consumer Information Use and Disclaimer   This information is not specific  medical advice and does not replace information you receive from your health care provider. This is only a brief summary of general information. It does NOT include all information about conditions, illnesses, injuries, tests, procedures, treatments, therapies, discharge instructions or life-style choices that may apply to you. You must talk with your health care provider for complete information about your health and treatment options. This information should not be used to decide whether or not to accept your health care providers advice, instructions or recommendations. Only your health care provider has the knowledge and training to provide advice that is right for you.  Copyright   Copyright © 2021 [employer] and its affiliates and/or licensors. All rights reserved.

## 2025-04-24 NOTE — ASSESSMENT & PLAN NOTE
Stable; continue current therapy.   Continue to avoid excessive NSAID use.  Make sure to drink 6-8 glasses of water daily.   Follow up with Dr Rowe as scheduled.

## 2025-04-24 NOTE — ASSESSMENT & PLAN NOTE
Continue current medication.  Educated patient on the risks of serotonin based medications such as serotonin modulators and SSRIs/SNRIs including common side effects of nausea, GI upset, headache dizziness as well as the rare risk for worsening symptoms of depression including development of suicidal thoughts or ideations, and serotonin syndrome.   Discussed benefits of medication not becoming noticeable until up to 6 weeks from start date.   Exercise daily. Get sunlight daily.  Practice positive phrases and repeat throughout the day, along with yoga and relaxation techniques.  Establish good social support, make changes to reduce stress.  Encourage counseling.   Reports any symptoms of suicidal/homicidal ideations or self harm immediately. If clinic is closed, go to nearest emergency room.

## 2025-07-11 PROCEDURE — 80061 LIPID PANEL: CPT | Performed by: NURSE PRACTITIONER

## 2025-07-11 PROCEDURE — 83036 HEMOGLOBIN GLYCOSYLATED A1C: CPT | Performed by: NURSE PRACTITIONER

## 2025-07-11 PROCEDURE — 80053 COMPREHEN METABOLIC PANEL: CPT | Performed by: NURSE PRACTITIONER

## 2025-07-11 PROCEDURE — 84443 ASSAY THYROID STIM HORMONE: CPT | Performed by: NURSE PRACTITIONER

## 2025-08-07 ENCOUNTER — PATIENT MESSAGE (OUTPATIENT)
Facility: CLINIC | Age: 75
End: 2025-08-07
Payer: MEDICARE

## 2025-08-15 ENCOUNTER — OFFICE VISIT (OUTPATIENT)
Dept: FAMILY MEDICINE | Facility: CLINIC | Age: 75
End: 2025-08-15
Payer: MEDICARE

## 2025-08-15 VITALS
HEART RATE: 67 BPM | BODY MASS INDEX: 27.89 KG/M2 | DIASTOLIC BLOOD PRESSURE: 72 MMHG | TEMPERATURE: 97 F | HEIGHT: 68 IN | WEIGHT: 184 LBS | SYSTOLIC BLOOD PRESSURE: 122 MMHG | OXYGEN SATURATION: 97 %

## 2025-08-15 DIAGNOSIS — N18.4 CHRONIC KIDNEY DISEASE (CKD), STAGE IV (SEVERE): ICD-10-CM

## 2025-08-15 DIAGNOSIS — F17.200 TOBACCO DEPENDENCE: ICD-10-CM

## 2025-08-15 DIAGNOSIS — R73.01 IFG (IMPAIRED FASTING GLUCOSE): ICD-10-CM

## 2025-08-15 DIAGNOSIS — R19.7 DIARRHEA, UNSPECIFIED TYPE: Primary | ICD-10-CM

## 2025-08-15 RX ORDER — DICYCLOMINE HYDROCHLORIDE 20 MG/1
20 TABLET ORAL EVERY 6 HOURS PRN
Qty: 30 TABLET | Refills: 5 | Status: SHIPPED | OUTPATIENT
Start: 2025-08-15

## 2025-08-15 RX ORDER — DARIDOREXANT 50 MG/1
1 TABLET, FILM COATED ORAL NIGHTLY
COMMUNITY
Start: 2025-05-30

## (undated) DEVICE — SCISSOR 5MMX35CM DIRECT DRIVE

## (undated) DEVICE — DRAPE DEVON INSTRUMENT 10X16IN

## (undated) DEVICE — SYR 10CC LUER LOCK

## (undated) DEVICE — DISSECTOR EPIX LAPA 5MMX35CM

## (undated) DEVICE — SUT 0 VICRYL / UR6 (J603)

## (undated) DEVICE — BAG SPEC RETRV ENDO 4X6IN DISP

## (undated) DEVICE — ADHESIVE DERMABOND ADVANCED

## (undated) DEVICE — HOLDER SCALPEL SURGICAL GOLD

## (undated) DEVICE — IRRIGATOR HYDRO-SURG PLUS 5MM

## (undated) DEVICE — Device

## (undated) DEVICE — NDL INSUFFLATION VERRES 120MM

## (undated) DEVICE — SYR LUER LOCK 20ML

## (undated) DEVICE — SUT GUT PL. 4-0 27 FS-2

## (undated) DEVICE — TROCAR KII SHLD BLDED 5X100MM

## (undated) DEVICE — SET TUB INSUFFLATION SUC 20L

## (undated) DEVICE — DRAPE C ARM 42 X 120 10/BX

## (undated) DEVICE — DRAPE INCISE IOBAN 2 23X17IN

## (undated) DEVICE — ELECTRODE MEGADYNE L-WIRE 33CM

## (undated) DEVICE — CATH CHOLANGIOGRAM 13IN

## (undated) DEVICE — NDL SAFETY 22G X 1.5 ECLIPSE

## (undated) DEVICE — SLEEVE KII ADV FIX 5X100MM

## (undated) DEVICE — APPLIER CLIP EPIX UNIV 5X34

## (undated) DEVICE — FILTER LAPAROSCOPIC SMOKE EVAC

## (undated) DEVICE — NDL PNEUMO INSUFFLATI 120MM

## (undated) DEVICE — GLOVE PROTEXIS HYDROGEL SZ6.5